# Patient Record
Sex: MALE | Race: BLACK OR AFRICAN AMERICAN | NOT HISPANIC OR LATINO | Employment: OTHER | URBAN - METROPOLITAN AREA
[De-identification: names, ages, dates, MRNs, and addresses within clinical notes are randomized per-mention and may not be internally consistent; named-entity substitution may affect disease eponyms.]

---

## 2017-03-23 ENCOUNTER — HOSPITAL ENCOUNTER (EMERGENCY)
Facility: HOSPITAL | Age: 22
Discharge: HOME | End: 2017-03-23
Attending: EMERGENCY MEDICINE
Payer: MEDICARE

## 2017-03-23 VITALS
BODY MASS INDEX: 15.83 KG/M2 | SYSTOLIC BLOOD PRESSURE: 105 MMHG | OXYGEN SATURATION: 95 % | HEART RATE: 95 BPM | RESPIRATION RATE: 20 BRPM | TEMPERATURE: 101 F | WEIGHT: 95 LBS | HEIGHT: 65 IN | DIASTOLIC BLOOD PRESSURE: 46 MMHG

## 2017-03-23 DIAGNOSIS — R50.9 FEVER: ICD-10-CM

## 2017-03-23 DIAGNOSIS — B34.9 VIRAL SYNDROME: ICD-10-CM

## 2017-03-23 DIAGNOSIS — N39.0 LOWER URINARY TRACT INFECTIOUS DISEASE: ICD-10-CM

## 2017-03-23 LAB
BACTERIA #/AREA URNS AUTO: ABNORMAL /HPF
BILIRUB UR QL STRIP: NEGATIVE
CLARITY UR REFRACT.AUTO: CLEAR
COLOR UR AUTO: ABNORMAL
FLUAV AG SPEC QL IA: NEGATIVE
FLUBV AG SPEC QL IA: NEGATIVE
GLUCOSE UR QL STRIP: NEGATIVE
HGB UR QL STRIP: ABNORMAL
HYALINE CASTS UR QL AUTO: 2 /LPF
KETONES UR QL STRIP: NEGATIVE
LEUKOCYTE ESTERASE UR QL STRIP: ABNORMAL
MICROSCOPIC COMMENT: ABNORMAL
NITRITE UR QL STRIP: NEGATIVE
NON-SQ EPI CELLS #/AREA URNS AUTO: ABNORMAL /HPF
PH UR STRIP: 8 [PH] (ref 5–8)
PROT UR QL STRIP: ABNORMAL
RBC #/AREA URNS AUTO: 35 /HPF (ref 0–4)
SP GR UR STRIP: 1.01 (ref 1–1.03)
SPECIMEN SOURCE: NORMAL
SQUAMOUS #/AREA URNS AUTO: ABNORMAL /HPF
URN SPEC COLLECT METH UR: ABNORMAL
UROBILINOGEN UR STRIP-ACNC: NEGATIVE EU/DL
WBC #/AREA URNS AUTO: 15 /HPF (ref 0–5)

## 2017-03-23 PROCEDURE — 99284 EMERGENCY DEPT VISIT MOD MDM: CPT | Mod: 25

## 2017-03-23 PROCEDURE — 87400 INFLUENZA A/B EACH AG IA: CPT

## 2017-03-23 PROCEDURE — 25000003 PHARM REV CODE 250: Performed by: EMERGENCY MEDICINE

## 2017-03-23 PROCEDURE — 96372 THER/PROPH/DIAG INJ SC/IM: CPT

## 2017-03-23 PROCEDURE — 63600175 PHARM REV CODE 636 W HCPCS: Performed by: EMERGENCY MEDICINE

## 2017-03-23 PROCEDURE — 81000 URINALYSIS NONAUTO W/SCOPE: CPT

## 2017-03-23 RX ORDER — SULFAMETHOXAZOLE AND TRIMETHOPRIM 200; 40 MG/5ML; MG/5ML
20 SUSPENSION ORAL EVERY 12 HOURS
Qty: 400 ML | Refills: 0 | Status: SHIPPED | OUTPATIENT
Start: 2017-03-23 | End: 2017-04-02

## 2017-03-23 RX ORDER — ACETAMINOPHEN 650 MG/1
650 SUPPOSITORY RECTAL
Status: COMPLETED | OUTPATIENT
Start: 2017-03-23 | End: 2017-03-23

## 2017-03-23 RX ORDER — DEXAMETHASONE SODIUM PHOSPHATE 4 MG/ML
8 INJECTION, SOLUTION INTRA-ARTICULAR; INTRALESIONAL; INTRAMUSCULAR; INTRAVENOUS; SOFT TISSUE
Status: COMPLETED | OUTPATIENT
Start: 2017-03-23 | End: 2017-03-23

## 2017-03-23 RX ADMIN — DEXAMETHASONE SODIUM PHOSPHATE 8 MG: 4 INJECTION, SOLUTION INTRAMUSCULAR; INTRAVENOUS at 05:03

## 2017-03-23 RX ADMIN — ACETAMINOPHEN 650 MG: 650 SUPPOSITORY RECTAL at 04:03

## 2017-03-23 NOTE — ED AVS SNAPSHOT
[facility]  [address] [id number]               Karri Galeano   3/23/2017  4:41 AM   ED    Description:  Male : 1995   Department:  [facility]           Your Care was Coordinated By:     Provider Role From To    Deysi Bernstein MD Attending Provider 17 0526 --      Reason for Visit     Fever           Diagnoses this Visit        Comments    Fever         Viral syndrome         Lower urinary tract infectious disease           ED Disposition     ED Disposition Condition Comment    Discharge             To Do List           Follow-up Information     Follow up with Echo Reeves MD In 1 week(s).    Specialty:  Emergency Medicine    Contact information:    [address] [facility] [address]  [phone]          Follow up with Echo Reeves MD In 1 week(s).    Specialty:  Emergency Medicine    Contact information:    [address] [facility] [address]  [phone]         These Medications        Disp Refills Start End    sulfamethoxazole-trimethoprim 200-40 mg/5 ml (BACTRIM,SEPTRA) 200-40 mg/5 mL Susp 400 mL 0 3/23/2017 2017    Take 20 mLs by mouth every 12 (twelve) hours. - Oral      [facility]     [facility] -  Assistance  Registered nurses in the [facility] provide clinical advisement, health education, appointment booking, and other advisory services.  Call for this free service at [phone].             Medications           START taking these NEW medications        Refills    sulfamethoxazole-trimethoprim 200-40 mg/5 ml (BACTRIM,SEPTRA) 200-40 mg/5 mL Susp 0    Sig: Take 20 mLs by mouth every 12 (twelve) hours.    Class: Print    Route: Oral      These medications were administered today        Dose Freq    acetaminophen suppository 650 mg 650 mg ED 1 Time    Sig: Place 1 suppository (650 mg total) rectally ED 1 Time.    Class: Normal    Route: Rectal     "dexamethasone injection 8 mg 8 mg ED 1 Time    Sig: Inject 2 mLs (8 mg total) into the muscle ED 1 Time.    Class: Normal    Route: Intramuscular           Verify that the below list of medications is an accurate representation of the medications you are currently taking.  If none reported, the list may be blank. If incorrect, please contact your healthcare provider. Carry this list with you in case of emergency.           Current Medications     clonazePAM (KLONOPIN) 1 MG tablet Take 1 mg by mouth 2 (two) times daily.    divalproex (DEPAKOTE) 500 MG Tb24 Take 500 mg by mouth 2 (two) times daily.    fluoxetine (PROZAC) 40 MG capsule Take 40 mg by mouth once daily.    fluticasone (CUTIVATE) 0.05 % cream Apply topically nightly.    LACTOSE-REDUCED FOOD (ENSURE ORAL) Take by mouth 4 (four) times daily.    salicylic acid 2 % Liqd Apply topically.    sulfamethoxazole-trimethoprim 200-40 mg/5 ml (BACTRIM,SEPTRA) 200-40 mg/5 mL Susp Take 20 mLs by mouth every 12 (twelve) hours.    trazodone (DESYREL) 150 MG tablet Take 150 mg by mouth every evening.           Clinical Reference Information           Your Vitals Were     BP Pulse Temp Resp Height Weight    105/46 104 102.1 °F (38.9 °C) (Rectal) 20 5' 5" (1.651 m) 43.1 kg (95 lb)    SpO2 BMI             95% 15.81 kg/m2         Allergies as of 3/23/2017        Reactions    Clindamycin Hives    Erythromycin Rash      Immunizations Administered on Date of Encounter - 3/23/2017     None      ED Micro, Lab, POCT     Start Ordered       Status Ordering Provider    03/23/17 0519 03/23/17 0518  Urinalysis  STAT      Final result     03/23/17 0518 03/23/17 0518  Urinalysis Microscopic  Once      Final result     03/23/17 0457 03/23/17 0456  Influenza antigen Nasal Swab  STAT      Final result       ED Imaging Orders     Start Ordered       Status Ordering Provider    03/23/17 0448 03/23/17 0447  X-Ray Chest AP Portable  1 time imaging      In process         Discharge Instructions  " "         Viral Syndrome (Adult)  A viral illness may cause a number of symptoms. The symptoms depend on the part of the body that the virus affects. If it settles in your nose, throat, and lungs, it may cause cough, sore throat, congestion, and sometimes headache. If it settles in your stomach and intestinal tract, it may cause vomiting and diarrhea. Sometimes it causes vague symptoms like "aching all over," feeling tired, loss of appetite, or fever.  A viral illness usually lasts 1 to 2 weeks, but sometimes it lasts longer. In some cases, a more serious infection can look like a viral syndrome in the first few days of the illness. You may need another exam and additional tests to know the difference. Watch for the warning signs listed below.  Home care  Follow these guidelines for taking care of yourself at home:  · If symptoms are severe, rest at home for the first 2 to 3 days.  · Stay away from cigarette smoke - both your smoke and the smoke from others.  · You may use over-the-counter acetaminophen or ibuprofen for fever, muscle aching, and headache, unless another medicine was prescribed for this. If you have chronic liver or kidney disease or ever had a stomach ulcer or GI bleeding, talk with your doctor before using these medicines. No one who is younger than 18 and ill with a fever should take aspirin. It may cause severe disease or death.  · Your appetite may be poor, so a light diet is fine. Avoid dehydration by drinking 8 to 12 8-ounce glasses of fluids each day. This may include water; orange juice; lemonade; apple, grape, and cranberry juice; clear fruit drinks; electrolyte replacement and sports drinks; and decaffeinated teas and coffee. If you have been diagnosed with a kidney disease, ask your doctor how much and what types of fluids you should drink to prevent dehydration. If you have kidney disease, drinking too much fluid can cause it build up in the your body and be dangerous to your " health.  · Over-the-counter remedies won't shorten the length of the illness but may be helpful for cough, sore throat; and nasal and sinus congestion. Don't use decongestants if you have high blood pressure.  Follow-up care  Follow up with your healthcare provider if you do not improve over the next week.  Call 911  Get emergency medical care if any of the following occur:  · Convulsion  · Feeling weak, dizzy, or like you are going to faint  · Chest pain, shortness of breath, wheezing, or difficulty breathing  When to seek medical advice  Call your healthcare provider right away if any of these occur:  · Cough with lots of colored sputum (mucus) or blood in your sputum  · Chest pain, shortness of breath, wheezing, or difficulty breathing  · Severe headache; face, neck, or ear pain  · Severe, constant pain in the lower right side of your belly (abdominal)  · Continued vomiting (cant keep liquids down)  · Frequent diarrhea (more than 5 times a day); blood (red or black color) or mucus in diarrhea  · Feeling weak, dizzy, or like you are going to faint  · Extreme thirst  · Fever of 100.4°F (38°C) or higher, or as directed by your healthcare provider  Date Last Reviewed: 9/25/2015  © 0351-5245 [employer]. [address] [place] [address], [place] [address]. All rights reserved. This information is not intended as a substitute for professional medical care. Always follow your healthcare professional's instructions.          Urinary Tract Infections in Men    Urinary tract infections (UTIs) are most often caused by bacteria (germs) that invade the urinary tract. The bacteria may come from outside the body. Or they may travel from the skin outside of rectum into the urethra. Pain in or around the urinary tract is a common symptom for most UTIs. But the only way to know for sure if you have a UTI is to have a urinalysis and urine culture.   Types of UTIs  · Cystitis: This is a bladder infection and is often linked to  a blockage from an enlarged prostate. You may have an urgent or frequent need to urinate, and bloody urine. Treatment includes antibiotics and medications to relax or shrink the prostate. Sometimes, surgery is needed.  · Urethritis: This is an infection of the urethra. You may have a discharge from the urethra or burning when you urinate. You may also have pain in the urethra or penis. It is treated with antibiotics.  · Prostatitis: This is an inflammation or infection of the prostate. You may have an urgent or frequent need to urinate, fever, or burning when you urinate. Or you may have a tender prostate, or a vague feeling of pressure. Prostatitis is treated with a range of medications, depending on the cause.  · Pyelonephritis: This is a kidney infection. If not treated, it can be serious and damage your kidneys. In severe cases you may be hospitalized. You may have a fever and upper back pain.  Treating a UTI  · Medications: Most UTIs are treated with antibiotics. These kill the bacteria. The length of time you need to take them depends on the type of infection. Take antibiotics exactly as directed until all of the medication is gone. If you don't, the infection may not go away and may become harder to treat. For certain types of UTIs, you may be given other medications to help treat your symptoms.  · Lifestyle changes: The lifestyle changes below will help get rid of your current infection. They may also help prevent future UTIs.  ¨ Drink plenty of fluids such as water, juice, or other caffeine-free drinks. This helps flush bacteria out of your system.  ¨ Empty your bladder when you feel the urge to urinate and before going to sleep. Urine that stays in your bladder promotes infection.  ¨ Use condoms during sex. These help prevent UTIs caused by sexually transmitted bacteria.  ¨ Keep follow-up appointments with your health care provider. He or she can may do tests to make sure the infection has cleared. If  necessary, additional treatment can be started.  · Other treatment: Most UTIs respond to medication. But sometimes a procedure or surgery is needed. This can treat an enlarged prostate, or remove a kidney stone or other blockage. Surgery may also treat problems caused by scarring or long-term infections.  Date Last Reviewed: 9/8/2014  © 1245-3361 [employer]. [address] [place] [address]. All rights reserved. This information is not intended as a substitute for professional medical care. Always follow your healthcare professional's instructions.          [facility] Sign-Up     Activating your [facility] account is as easy as 1-2-3!     1) Visit [email or web address], select Sign Up Now, enter this activation code and your date of birth, then select Next.  [email or web address]  Expires: 5/7/2017  6:07 AM      2) Create a username and password to use when you visit [facility] in the future and select a security question in case you lose your password and select Next.    3) Enter your e-mail address and click Sign Up!    Additional Information  If you have questions, please e-mail [email or web address] or call [phone] to talk to our [facility] staff. Remember, [facility] is NOT to be used for urgent needs. For medical emergencies, dial 911.          [facility] complies with applicable Federal civil rights laws and does not discriminate on the basis of race, color, national origin, age, disability, or sex.        Language Assistance Services     ATTENTION: Language assistance services are available, free of charge. Please call [phone].      ATENCIÓN: Si habla español, tiene a sheikh disposición servicios gratuitos de asistencia lingüística. Llame al [phone].     CHÚ Ý: N?u b?n nói Ti?ng Vi?t, có các d?ch v? h? tr? ngôn ng? mi?n phí dành cho b?n. G?i s? [phone].

## 2017-03-23 NOTE — DISCHARGE INSTRUCTIONS
"    Viral Syndrome (Adult)  A viral illness may cause a number of symptoms. The symptoms depend on the part of the body that the virus affects. If it settles in your nose, throat, and lungs, it may cause cough, sore throat, congestion, and sometimes headache. If it settles in your stomach and intestinal tract, it may cause vomiting and diarrhea. Sometimes it causes vague symptoms like "aching all over," feeling tired, loss of appetite, or fever.  A viral illness usually lasts 1 to 2 weeks, but sometimes it lasts longer. In some cases, a more serious infection can look like a viral syndrome in the first few days of the illness. You may need another exam and additional tests to know the difference. Watch for the warning signs listed below.  Home care  Follow these guidelines for taking care of yourself at home:  · If symptoms are severe, rest at home for the first 2 to 3 days.  · Stay away from cigarette smoke - both your smoke and the smoke from others.  · You may use over-the-counter acetaminophen or ibuprofen for fever, muscle aching, and headache, unless another medicine was prescribed for this. If you have chronic liver or kidney disease or ever had a stomach ulcer or GI bleeding, talk with your doctor before using these medicines. No one who is younger than 18 and ill with a fever should take aspirin. It may cause severe disease or death.  · Your appetite may be poor, so a light diet is fine. Avoid dehydration by drinking 8 to 12 8-ounce glasses of fluids each day. This may include water; orange juice; lemonade; apple, grape, and cranberry juice; clear fruit drinks; electrolyte replacement and sports drinks; and decaffeinated teas and coffee. If you have been diagnosed with a kidney disease, ask your doctor how much and what types of fluids you should drink to prevent dehydration. If you have kidney disease, drinking too much fluid can cause it build up in the your body and be dangerous to your " health.  · Over-the-counter remedies won't shorten the length of the illness but may be helpful for cough, sore throat; and nasal and sinus congestion. Don't use decongestants if you have high blood pressure.  Follow-up care  Follow up with your healthcare provider if you do not improve over the next week.  Call 911  Get emergency medical care if any of the following occur:  · Convulsion  · Feeling weak, dizzy, or like you are going to faint  · Chest pain, shortness of breath, wheezing, or difficulty breathing  When to seek medical advice  Call your healthcare provider right away if any of these occur:  · Cough with lots of colored sputum (mucus) or blood in your sputum  · Chest pain, shortness of breath, wheezing, or difficulty breathing  · Severe headache; face, neck, or ear pain  · Severe, constant pain in the lower right side of your belly (abdominal)  · Continued vomiting (cant keep liquids down)  · Frequent diarrhea (more than 5 times a day); blood (red or black color) or mucus in diarrhea  · Feeling weak, dizzy, or like you are going to faint  · Extreme thirst  · Fever of 100.4°F (38°C) or higher, or as directed by your healthcare provider  Date Last Reviewed: 9/25/2015  © 2012-4081 [employer]. [address] [place] [address], [place] [address]. All rights reserved. This information is not intended as a substitute for professional medical care. Always follow your healthcare professional's instructions.          Urinary Tract Infections in Men    Urinary tract infections (UTIs) are most often caused by bacteria (germs) that invade the urinary tract. The bacteria may come from outside the body. Or they may travel from the skin outside of rectum into the urethra. Pain in or around the urinary tract is a common symptom for most UTIs. But the only way to know for sure if you have a UTI is to have a urinalysis and urine culture.   Types of UTIs  · Cystitis: This is a bladder infection and is often linked to  a blockage from an enlarged prostate. You may have an urgent or frequent need to urinate, and bloody urine. Treatment includes antibiotics and medications to relax or shrink the prostate. Sometimes, surgery is needed.  · Urethritis: This is an infection of the urethra. You may have a discharge from the urethra or burning when you urinate. You may also have pain in the urethra or penis. It is treated with antibiotics.  · Prostatitis: This is an inflammation or infection of the prostate. You may have an urgent or frequent need to urinate, fever, or burning when you urinate. Or you may have a tender prostate, or a vague feeling of pressure. Prostatitis is treated with a range of medications, depending on the cause.  · Pyelonephritis: This is a kidney infection. If not treated, it can be serious and damage your kidneys. In severe cases you may be hospitalized. You may have a fever and upper back pain.  Treating a UTI  · Medications: Most UTIs are treated with antibiotics. These kill the bacteria. The length of time you need to take them depends on the type of infection. Take antibiotics exactly as directed until all of the medication is gone. If you don't, the infection may not go away and may become harder to treat. For certain types of UTIs, you may be given other medications to help treat your symptoms.  · Lifestyle changes: The lifestyle changes below will help get rid of your current infection. They may also help prevent future UTIs.  ¨ Drink plenty of fluids such as water, juice, or other caffeine-free drinks. This helps flush bacteria out of your system.  ¨ Empty your bladder when you feel the urge to urinate and before going to sleep. Urine that stays in your bladder promotes infection.  ¨ Use condoms during sex. These help prevent UTIs caused by sexually transmitted bacteria.  ¨ Keep follow-up appointments with your health care provider. He or she can may do tests to make sure the infection has cleared. If  necessary, additional treatment can be started.  · Other treatment: Most UTIs respond to medication. But sometimes a procedure or surgery is needed. This can treat an enlarged prostate, or remove a kidney stone or other blockage. Surgery may also treat problems caused by scarring or long-term infections.  Date Last Reviewed: 9/8/2014  © 2433-2896 [employer]. [address] [place] [address], [place] [address]. All rights reserved. This information is not intended as a substitute for professional medical care. Always follow your healthcare professional's instructions.

## 2017-03-23 NOTE — ED PROVIDER NOTES
Encounter Date: 3/23/2017       History     Chief Complaint   Patient presents with    Fever     Pt with fever last couple of hours. Congestion and cough for the last three days. Brought in by nurse at [facility]     Review of patient's allergies indicates:   Allergen Reactions    Clindamycin Hives    Erythromycin Rash     Patient is a 21 y.o. male presenting with the following complaint: fever. The history is provided by a caregiver.   Fever   Primary symptoms of the febrile illness include fever. Primary symptoms do not include wheezing, shortness of breath, abdominal pain, nausea or vomiting. The current episode started today. This is a new problem. The problem has not changed since onset.  The maximum temperature recorded prior to his arrival was 101 to 101.9 F. Primary symptoms comment: rhinorrhea.     Past Medical History:   Diagnosis Date    Anxiety     Congenital cerebral palsy     Hypotonia     Leukodystrophy     Mental retardation     Seizures     Stereotyped movement disorder      History reviewed. No pertinent surgical history.  History reviewed. No pertinent family history.  Social History   Substance Use Topics    Smoking status: Never Smoker    Smokeless tobacco: None    Alcohol use None     Review of Systems   Constitutional: Positive for fever.   HENT: Positive for rhinorrhea.    Respiratory: Negative for shortness of breath and wheezing.    Gastrointestinal: Negative for abdominal pain, nausea and vomiting.   All other systems reviewed and are negative.      Physical Exam   Initial Vitals   BP Pulse Resp Temp SpO2   03/23/17 0444 03/23/17 0444 03/23/17 0444 03/23/17 0444 03/23/17 0444   132/80 104 20 102.1 °F (38.9 °C) 95 %     Physical Exam    Nursing note and vitals reviewed.  Constitutional: He appears well-developed and well-nourished.   HENT:   Head: Normocephalic and atraumatic.   Nose: Mucosal edema and rhinorrhea present.   Eyes: EOM are normal.   Neck: Normal range of motion.  Neck supple.   Cardiovascular: Normal rate, regular rhythm, normal heart sounds and intact distal pulses.   Pulmonary/Chest: Breath sounds normal.   Abdominal: Soft.   Musculoskeletal: Normal range of motion.   Neurological: He is alert and oriented to person, place, and time.   Skin: Skin is warm and dry.   Psychiatric: He has a normal mood and affect. His behavior is normal. Judgment and thought content normal.         ED Course   Procedures  Labs Reviewed   URINALYSIS - Abnormal; Notable for the following:        Result Value    Protein, UA 1+ (*)     Occult Blood UA 3+ (*)     Leukocytes, UA 1+ (*)     All other components within normal limits   INFLUENZA A AND B ANTIGEN   URINALYSIS MICROSCOPIC          X-Rays:   Independently Interpreted Readings:   Chest X-Ray: Normal heart size.  No infiltrates.  No acute abnormalities.     Medical Decision Making:   Clinical Tests:   Lab Tests: Ordered and Reviewed  Radiological Study: Ordered and Reviewed                   ED Course     Clinical Impression:   Diagnoses of Fever, Viral syndrome, and Lower urinary tract infectious disease were pertinent to this visit.    Disposition:   Disposition: Discharged  Condition: Stable       Deysi Bernstein MD  03/23/17 0607

## 2017-06-03 ENCOUNTER — HOSPITAL ENCOUNTER (INPATIENT)
Facility: HOSPITAL | Age: 22
LOS: 3 days | Discharge: HOME | DRG: 871 | End: 2017-06-06
Attending: EMERGENCY MEDICINE | Admitting: HOSPITALIST
Payer: MEDICARE

## 2017-06-03 DIAGNOSIS — R09.02 HYPOXIA: ICD-10-CM

## 2017-06-03 DIAGNOSIS — A41.9 SEPSIS DUE TO PNEUMONIA: ICD-10-CM

## 2017-06-03 DIAGNOSIS — J18.9 PNEUMONIA OF RIGHT LOWER LOBE DUE TO INFECTIOUS ORGANISM: Primary | ICD-10-CM

## 2017-06-03 DIAGNOSIS — J18.9 SEPSIS DUE TO PNEUMONIA: ICD-10-CM

## 2017-06-03 DIAGNOSIS — J18.9 RIGHT LOWER LOBE PNEUMONIA: ICD-10-CM

## 2017-06-03 DIAGNOSIS — F72 SEVERE MENTAL RETARDATION: Chronic | ICD-10-CM

## 2017-06-03 PROBLEM — Z74.1: Chronic | Status: ACTIVE | Noted: 2017-06-03

## 2017-06-03 PROBLEM — J96.01 ACUTE HYPOXEMIC RESPIRATORY FAILURE: Status: ACTIVE | Noted: 2017-06-03

## 2017-06-03 LAB
ALBUMIN SERPL BCP-MCNC: 3.9 G/DL
ALLENS TEST: ABNORMAL
ALP SERPL-CCNC: 107 IU/L
ALT SERPL W/O P-5'-P-CCNC: 21 IU/L
ANION GAP SERPL CALC-SCNC: 13 MMOL/L
ANISOCYTOSIS BLD QL SMEAR: SLIGHT
APTT BLDCRRT: 28 SEC
AST SERPL-CCNC: 23 IU/L
BASOPHILS NFR BLD: 0 %
BILIRUB SERPL-MCNC: 0.6 MG/DL
BILIRUB UR QL STRIP: NEGATIVE
BUN SERPL-MCNC: 17 MG/DL
CALCIUM SERPL-MCNC: 8.8 MG/DL
CHLORIDE SERPL-SCNC: 105 MMOL/L
CLARITY UR REFRACT.AUTO: CLEAR
CO2 SERPL-SCNC: 29 MMOL/L
COLOR UR AUTO: YELLOW
CREAT SERPL-MCNC: 0.56 MG/DL
DELSYS: ABNORMAL
DIFFERENTIAL METHOD: ABNORMAL
EOSINOPHIL NFR BLD: 0 %
ERYTHROCYTE [DISTWIDTH] IN BLOOD BY AUTOMATED COUNT: 14.6 %
EST. GFR  (AFRICAN AMERICAN): >60 ML/MIN/1.73 M^2
EST. GFR  (NON AFRICAN AMERICAN): >60 ML/MIN/1.73 M^2
FLUAV AG SPEC QL IA: NEGATIVE
FLUBV AG SPEC QL IA: NEGATIVE
GLUCOSE SERPL-MCNC: 124 MG/DL
GLUCOSE UR QL STRIP: NEGATIVE
HCO3 UR-SCNC: 27 MMOL/L (ref 24–28)
HCT VFR BLD AUTO: 37.1 %
HCT VFR BLD CALC: 34 %PCV (ref 36–54)
HGB BLD-MCNC: 12 G/DL
HGB BLD-MCNC: 12.1 G/DL
HGB UR QL STRIP: ABNORMAL
INR PPP: 1.4
KETONES UR QL STRIP: NEGATIVE
LACTATE SERPL-SCNC: 1.9 MMOL/L
LEUKOCYTE ESTERASE UR QL STRIP: NEGATIVE
LIPASE SERPL-CCNC: 22 U/L
LYMPHOCYTES NFR BLD: 14 %
MAGNESIUM SERPL-MCNC: 1.6 MG/DL
MCH RBC QN AUTO: 28.8 PG
MCHC RBC AUTO-ENTMCNC: 32.6 %
MCV RBC AUTO: 88 FL
MICROSCOPIC COMMENT: ABNORMAL
MONOCYTES NFR BLD: 17 %
NEUTROPHILS NFR BLD: 66 %
NEUTS BAND NFR BLD MANUAL: 3 %
NITRITE UR QL STRIP: NEGATIVE
PCO2 BLDA: 37.9 MMHG (ref 35–45)
PH SMN: 7.46 [PH] (ref 7.35–7.45)
PH UR STRIP: 8 [PH] (ref 5–8)
PHOSPHATE SERPL-MCNC: 3.2 MG/DL
PLATELET # BLD AUTO: 149 K/UL
PMV BLD AUTO: 12.3 FL
PO2 BLDA: 55 MMHG (ref 80–100)
POC BE: 3 MMOL/L
POC IONIZED CALCIUM: 1.14 MMOL/L (ref 1.06–1.42)
POC SATURATED O2: 90 % (ref 95–100)
POC TCO2: 28 MMOL/L (ref 23–27)
POIKILOCYTOSIS BLD QL SMEAR: SLIGHT
POTASSIUM BLD-SCNC: 3.6 MMOL/L (ref 3.5–5.1)
POTASSIUM SERPL-SCNC: 3.8 MMOL/L
PROT SERPL-MCNC: 7.2 G/DL
PROT UR QL STRIP: NEGATIVE
PROTHROMBIN TIME: 15.7 SEC
RBC # BLD AUTO: 4.2 M/UL
RBC #/AREA URNS AUTO: 25 /HPF (ref 0–4)
SAMPLE: ABNORMAL
SITE: ABNORMAL
SODIUM BLD-SCNC: 144 MMOL/L (ref 136–145)
SODIUM SERPL-SCNC: 147 MMOL/L
SP GR UR STRIP: 1.01 (ref 1–1.03)
SPECIMEN SOURCE: NORMAL
TROPONIN I SERPL DL<=0.01 NG/ML-MCNC: <0.012 NG/ML
URN SPEC COLLECT METH UR: ABNORMAL
UROBILINOGEN UR STRIP-ACNC: 1 EU/DL
WBC # BLD AUTO: 15.91 K/UL
WBC #/AREA URNS AUTO: 1 /HPF (ref 0–5)

## 2017-06-03 PROCEDURE — 84484 ASSAY OF TROPONIN QUANT: CPT

## 2017-06-03 PROCEDURE — 96367 TX/PROPH/DG ADDL SEQ IV INF: CPT

## 2017-06-03 PROCEDURE — 25000003 PHARM REV CODE 250

## 2017-06-03 PROCEDURE — 96365 THER/PROPH/DIAG IV INF INIT: CPT

## 2017-06-03 PROCEDURE — 84100 ASSAY OF PHOSPHORUS: CPT

## 2017-06-03 PROCEDURE — 63600175 PHARM REV CODE 636 W HCPCS

## 2017-06-03 PROCEDURE — 63600175 PHARM REV CODE 636 W HCPCS: Performed by: EMERGENCY MEDICINE

## 2017-06-03 PROCEDURE — 87040 BLOOD CULTURE FOR BACTERIA: CPT | Mod: 59

## 2017-06-03 PROCEDURE — 11000001 HC ACUTE MED/SURG PRIVATE ROOM

## 2017-06-03 PROCEDURE — 94761 N-INVAS EAR/PLS OXIMETRY MLT: CPT

## 2017-06-03 PROCEDURE — 96375 TX/PRO/DX INJ NEW DRUG ADDON: CPT

## 2017-06-03 PROCEDURE — 93005 ELECTROCARDIOGRAM TRACING: CPT

## 2017-06-03 PROCEDURE — 96361 HYDRATE IV INFUSION ADD-ON: CPT

## 2017-06-03 PROCEDURE — 36600 WITHDRAWAL OF ARTERIAL BLOOD: CPT

## 2017-06-03 PROCEDURE — 83690 ASSAY OF LIPASE: CPT

## 2017-06-03 PROCEDURE — 81000 URINALYSIS NONAUTO W/SCOPE: CPT

## 2017-06-03 PROCEDURE — 83605 ASSAY OF LACTIC ACID: CPT

## 2017-06-03 PROCEDURE — 25000242 PHARM REV CODE 250 ALT 637 W/ HCPCS: Performed by: NURSE PRACTITIONER

## 2017-06-03 PROCEDURE — 94640 AIRWAY INHALATION TREATMENT: CPT

## 2017-06-03 PROCEDURE — 87400 INFLUENZA A/B EACH AG IA: CPT

## 2017-06-03 PROCEDURE — 85610 PROTHROMBIN TIME: CPT

## 2017-06-03 PROCEDURE — 25000003 PHARM REV CODE 250: Performed by: HOSPITALIST

## 2017-06-03 PROCEDURE — 27000221 HC OXYGEN, UP TO 24 HOURS

## 2017-06-03 PROCEDURE — 85025 COMPLETE CBC W/AUTO DIFF WBC: CPT

## 2017-06-03 PROCEDURE — 94760 N-INVAS EAR/PLS OXIMETRY 1: CPT

## 2017-06-03 PROCEDURE — 96368 THER/DIAG CONCURRENT INF: CPT

## 2017-06-03 PROCEDURE — 25000003 PHARM REV CODE 250: Performed by: EMERGENCY MEDICINE

## 2017-06-03 PROCEDURE — 99285 EMERGENCY DEPT VISIT HI MDM: CPT | Mod: 25

## 2017-06-03 PROCEDURE — 83735 ASSAY OF MAGNESIUM: CPT

## 2017-06-03 PROCEDURE — 63600175 PHARM REV CODE 636 W HCPCS: Performed by: HOSPITALIST

## 2017-06-03 PROCEDURE — 80053 COMPREHEN METABOLIC PANEL: CPT

## 2017-06-03 PROCEDURE — 87086 URINE CULTURE/COLONY COUNT: CPT

## 2017-06-03 PROCEDURE — 25000003 PHARM REV CODE 250: Performed by: NURSE PRACTITIONER

## 2017-06-03 PROCEDURE — 85730 THROMBOPLASTIN TIME PARTIAL: CPT

## 2017-06-03 PROCEDURE — 82803 BLOOD GASES ANY COMBINATION: CPT

## 2017-06-03 PROCEDURE — 63600175 PHARM REV CODE 636 W HCPCS: Performed by: NURSE PRACTITIONER

## 2017-06-03 RX ORDER — FLUOXETINE HYDROCHLORIDE 20 MG/1
60 CAPSULE ORAL DAILY
Status: DISCONTINUED | OUTPATIENT
Start: 2017-06-04 | End: 2017-06-06 | Stop reason: HOSPADM

## 2017-06-03 RX ORDER — ALBUTEROL SULFATE 0.83 MG/ML
2.5 SOLUTION RESPIRATORY (INHALATION)
Status: DISCONTINUED | OUTPATIENT
Start: 2017-06-04 | End: 2017-06-06 | Stop reason: HOSPADM

## 2017-06-03 RX ORDER — GUAIFENESIN 100 MG/5ML
200 SOLUTION ORAL 3 TIMES DAILY
Status: DISCONTINUED | OUTPATIENT
Start: 2017-06-03 | End: 2017-06-06 | Stop reason: HOSPADM

## 2017-06-03 RX ORDER — IPRATROPIUM BROMIDE AND ALBUTEROL SULFATE 2.5; .5 MG/3ML; MG/3ML
3 SOLUTION RESPIRATORY (INHALATION)
Status: COMPLETED | OUTPATIENT
Start: 2017-06-03 | End: 2017-06-03

## 2017-06-03 RX ORDER — ACETAMINOPHEN 650 MG/1
650 SUPPOSITORY RECTAL
Status: COMPLETED | OUTPATIENT
Start: 2017-06-03 | End: 2017-06-03

## 2017-06-03 RX ORDER — ALBUTEROL SULFATE 0.83 MG/ML
2.5 SOLUTION RESPIRATORY (INHALATION)
Status: CANCELLED | OUTPATIENT
Start: 2017-06-03

## 2017-06-03 RX ORDER — DEXTROSE MONOHYDRATE 50 MG/ML
1000 INJECTION, SOLUTION INTRAVENOUS
Status: COMPLETED | OUTPATIENT
Start: 2017-06-03 | End: 2017-06-03

## 2017-06-03 RX ORDER — SODIUM CHLORIDE 0.9 % (FLUSH) 0.9 %
3 SYRINGE (ML) INJECTION EVERY 8 HOURS
Status: DISCONTINUED | OUTPATIENT
Start: 2017-06-03 | End: 2017-06-06 | Stop reason: HOSPADM

## 2017-06-03 RX ORDER — ENOXAPARIN SODIUM 100 MG/ML
40 INJECTION SUBCUTANEOUS EVERY 24 HOURS
Status: DISCONTINUED | OUTPATIENT
Start: 2017-06-03 | End: 2017-06-06 | Stop reason: HOSPADM

## 2017-06-03 RX ORDER — TRAZODONE HYDROCHLORIDE 100 MG/1
100 TABLET ORAL NIGHTLY
Status: ON HOLD | COMMUNITY
End: 2019-11-01 | Stop reason: HOSPADM

## 2017-06-03 RX ORDER — CLONAZEPAM 0.5 MG/1
1 TABLET ORAL 2 TIMES DAILY
Status: DISCONTINUED | OUTPATIENT
Start: 2017-06-03 | End: 2017-06-06 | Stop reason: HOSPADM

## 2017-06-03 RX ORDER — IPRATROPIUM BROMIDE AND ALBUTEROL SULFATE 2.5; .5 MG/3ML; MG/3ML
3 SOLUTION RESPIRATORY (INHALATION)
Status: DISCONTINUED | OUTPATIENT
Start: 2017-06-04 | End: 2017-06-03

## 2017-06-03 RX ORDER — FLUOXETINE HYDROCHLORIDE 20 MG/1
60 CAPSULE ORAL DAILY
Status: ON HOLD | COMMUNITY
End: 2019-11-01 | Stop reason: SDUPTHER

## 2017-06-03 RX ORDER — TRAZODONE HYDROCHLORIDE 100 MG/1
100 TABLET ORAL NIGHTLY
Status: DISCONTINUED | OUTPATIENT
Start: 2017-06-03 | End: 2017-06-06 | Stop reason: HOSPADM

## 2017-06-03 RX ORDER — KETOROLAC TROMETHAMINE 30 MG/ML
15 INJECTION, SOLUTION INTRAMUSCULAR; INTRAVENOUS
Status: COMPLETED | OUTPATIENT
Start: 2017-06-03 | End: 2017-06-03

## 2017-06-03 RX ORDER — DOXYCYCLINE HYCLATE 100 MG
100 TABLET ORAL EVERY 12 HOURS
Status: DISCONTINUED | OUTPATIENT
Start: 2017-06-03 | End: 2017-06-04

## 2017-06-03 RX ADMIN — ACETAMINOPHEN 650 MG: 650 SUPPOSITORY RECTAL at 03:06

## 2017-06-03 RX ADMIN — SODIUM CHLORIDE 1320 ML: 0.9 INJECTION, SOLUTION INTRAVENOUS at 03:06

## 2017-06-03 RX ADMIN — KETOROLAC TROMETHAMINE 15 MG: 30 INJECTION, SOLUTION INTRAMUSCULAR at 06:06

## 2017-06-03 RX ADMIN — PIPERACILLIN AND TAZOBACTAM 4.5 G: 4; .5 INJECTION, POWDER, LYOPHILIZED, FOR SOLUTION INTRAVENOUS; PARENTERAL at 09:06

## 2017-06-03 RX ADMIN — IPRATROPIUM BROMIDE AND ALBUTEROL SULFATE 3 ML: .5; 3 SOLUTION RESPIRATORY (INHALATION) at 03:06

## 2017-06-03 RX ADMIN — DEXTROSE 1000 ML: 5 SOLUTION INTRAVENOUS at 04:06

## 2017-06-03 RX ADMIN — ENOXAPARIN SODIUM 40 MG: 100 INJECTION SUBCUTANEOUS at 09:06

## 2017-06-03 RX ADMIN — PIPERACILLIN SODIUM AND TAZOBACTAM SODIUM 4.5 G: 4; .5 INJECTION, POWDER, LYOPHILIZED, FOR SOLUTION INTRAVENOUS at 05:06

## 2017-06-03 RX ADMIN — SODIUM CHLORIDE, PRESERVATIVE FREE 3 ML: 5 INJECTION INTRAVENOUS at 09:06

## 2017-06-03 NOTE — ED NOTES
Here from [facility] home with a sitter.  The sitter states that he has not been eating today and that he has had a fever of 103.5 this am.  Pt is non verbal and is mentally challenged.  No family is present at this time and the sitter has limited information regarding the patient but does have acces to the patient chart.  Pt is tachypneic and feels febrile at this time.  Pt has not had Tylenol according to the sitter.

## 2017-06-03 NOTE — PROGRESS NOTES
Karri Galeano is a 21 y.o. black man with cerebral palsy, microcephaly, severe mental retardation, hypotonia, developmental delays, fine motor and gross motor delays, history of seizures before age 3.  He lives at [facility] in [place], [place].      He was taken to [facility] on 6/3/17 after he had a fever of 103.5 F.  He was found to also have tachycardia (pulse 110), tachypnea (respiratory rate 42), hypoxia (oxygen saturation 89%), leukocytosis (15,910), hypernatremia (147), and a right lung base ground glass opacity on chest x-ray.  [facility].      Plan: Ceftriaxone and doxycycline, albuterol-ipratropium nebulizer treatments, guaifenesin, supplemental oxygen.

## 2017-06-03 NOTE — ED NOTES
Report called to Mary Wray at [facility].  Awaiting [facility] ambulance to transport.  Sitter remains at bedside.

## 2017-06-03 NOTE — ED NOTES
Patient is resting comfortably.  Sitter at bedside.  Linens changed of urine soaked sheets.  Sitter had changed patient prior to my arrival.

## 2017-06-03 NOTE — ED NOTES
Sitter aware that patient is to be transferred for admission.  Pt resting quietly.  Respirations non labored but remains tachypneic.  Pt very mobile in bed.  Lying supine with feet against abdomen and buttocks in the air.  He is very limber and kicks his feet into the air/above his head frequently.  Sitter remains at bedside.  We are awaiting bed placement.

## 2017-06-03 NOTE — ED NOTES
Dr Aguilar on phone with Dr Herrera about pt, notified [facility] of bed request.

## 2017-06-03 NOTE — ED NOTES
[facility] contacted for transport to [facility] [address] dispatch advised will send a unit within the hour.

## 2017-06-03 NOTE — PROGRESS NOTES
Results for MODESTO CANTU (MRN [id number])    Ref. Range 6/3/2017 15:41   POC Sodium Latest Ref Range: 136 - 145 mmol/L 144   POC Potassium Latest Ref Range: 3.5 - 5.1 mmol/L 3.6   POC Ionized Calcium Latest Ref Range: 1.06 - 1.42 mmol/L 1.14   POC Hematocrit Latest Ref Range: 36 - 54 %PCV 34 (L)   POC HEMOGLOBIN Latest Units: g/dL 12   POC PH Latest Ref Range: 7.35 - 7.45  7.460 (H)   POC PCO2 Latest Ref Range: 35 - 45 mmHg 37.9   POC PO2 Latest Ref Range: 80 - 100 mmHg 55 (LL)   POC BE Latest Ref Range: -2 to 2 mmol/L 3   POC HCO3 Latest Ref Range: 24 - 28 mmol/L 27.0   POC SATURATED O2 Latest Ref Range: 95 - 100 % 90 (L)   POC TCO2 Latest Ref Range: 23 - 27 mmol/L 28 (H)   Sample Unknown ARTERIAL   DelSys Unknown Nasal Can   Allens Test Unknown Pass   Site Unknown RR     Pressure held  No bleeding  Bandage applied  Results given to ELAINE Tolbert NP   Patient remains on NC 4.5 lpm  In use

## 2017-06-03 NOTE — ED PROVIDER NOTES
"Encounter Date: 6/3/2017       History     Chief Complaint   Patient presents with    Fever     onset this am     Review of patient's allergies indicates:   Allergen Reactions    Clindamycin Hives    Erythromycin Rash     21-year-old male history of cerebral palsy and mental retardation presents to the emergency room with caregiver who states he would not eat breakfast this morning and had a temperature of 103.5.  Caregiver is a poor historian and states "I just started working with him last week and this is only mild second shift".  Patient is nonverbal.  Caregiver states she thinks that he may have had a strong urine and maybe a cough unable to provide any other information at this.  Patient is a resident of [facility].          Past Medical History:   Diagnosis Date    Anxiety     Congenital cerebral palsy     Hypotonia     Leukodystrophy     Mental retardation     Seizures     Stereotyped movement disorder      History reviewed. No pertinent surgical history.  History reviewed. No pertinent family history.  Social History   Substance Use Topics    Smoking status: Never Smoker    Smokeless tobacco: Not on file    Alcohol use Not on file     Review of Systems   Unable to perform ROS: Patient nonverbal       Physical Exam     Initial Vitals [06/03/17 1450]   BP Pulse Resp Temp SpO2   129/65 110 (!) 28 100.3 °F (37.9 °C) (!) 94 %     Physical Exam    Nursing note and vitals reviewed.  Constitutional: He has a sickly appearance. He appears distressed.   Patient illness his eyes to verbal stimuli however he is nonverbal.     HENT:   Mouth/Throat: Mucous membranes are dry.   Yellow and orange secretions noted in patient's mouth and on teeth.  Caregiver does not know if anyone performs oral hygiene on patient.    Eyes: Pupils are equal, round, and reactive to light.   Cardiovascular: Tachycardia present.    Pulmonary/Chest: No accessory muscle usage. Tachypnea noted. He is in respiratory distress. " He has no wheezes.   Abdominal: Soft. There is no rigidity, no rebound, no guarding and no CVA tenderness.   Musculoskeletal:   Lower extremities contracted.  Patient able to move upper extremities movement somewhat uncoordinated   Neurological:   Patient nonverbal and unable to determine patient's baseline mental status and activity   Skin: Skin is dry. Capillary refill takes less than 2 seconds.   Psychiatric:   Nonverbal         ED Course   Procedures  Labs Reviewed   CBC W/ AUTO DIFFERENTIAL - Abnormal; Notable for the following:        Result Value    WBC 15.91 (*)     RBC 4.20 (*)     Hemoglobin 12.1 (*)     Hematocrit 37.1 (*)     RDW 14.6 (*)     Platelets 149 (*)     Lymph% 14.0 (*)     Mono% 17.0 (*)     All other components within normal limits   COMPREHENSIVE METABOLIC PANEL - Abnormal; Notable for the following:     Sodium 147 (*)     Glucose 124 (*)     All other components within normal limits   LIPASE - Abnormal; Notable for the following:     Lipase 22 (*)     All other components within normal limits   PROTIME-INR - Abnormal; Notable for the following:     Prothrombin Time 15.7 (*)     INR 1.4 (*)     All other components within normal limits   URINALYSIS - Abnormal; Notable for the following:     Occult Blood UA 3+ (*)     All other components within normal limits   URINALYSIS MICROSCOPIC - Abnormal; Notable for the following:     RBC, UA 25 (*)     All other components within normal limits   ISTAT PROCEDURE - Abnormal; Notable for the following:     POC PH 7.460 (*)     POC PO2 55 (*)     POC SATURATED O2 90 (*)     POC TCO2 28 (*)     POC Hematocrit 34 (*)     All other components within normal limits   CULTURE, URINE   APTT   LACTIC ACID, PLASMA   MAGNESIUM   PHOSPHORUS   TROPONIN I   INFLUENZA A AND B ANTIGEN             Medical Decision Making:   Initial Assessment:   21-year-old male history of cerebral palsy and mental retardation presents to the emergency room with caregiver who states he  "would not eat breakfast this morning and had a temperature of 103.5.  Caregiver is a poor historian and states "I just started working with him last week and this is only mild second shift".  Patient is nonverbal.  Caregiver states she thinks that he may have had a strong urine and maybe a cough unable to provide any other information at this.  Condition initial O2 sat 84% on room air improved to 89% with 2 L respiratory called to bedside stat ABG drawn patient increased to 4 L 90% on 4 L patient remains tachypnic initial temperature 103.6 rectal.  Differential Diagnosis:   Sepsis, UTI, aspiration pneumonia, bacterial pneumonia, viral syndrome,  Clinical Tests:   Lab Tests: Ordered  Radiological Study: Ordered  Medical Tests: Ordered                   ED Course     Clinical Impression:   The primary encounter diagnosis was Pneumonia of right lower lobe due to infectious organism. Diagnoses of Sepsis due to pneumonia, Hypoxia, and Right lower lobe pneumonia were also pertinent to this visit.    Disposition:   Disposition: Admitted  Condition: Kitty Tolbert NP  06/04/17 1207    "

## 2017-06-03 NOTE — ED NOTES
Pt cleaned of urine soaked brief and linens.  Awaiting [facility] for transport to [place].

## 2017-06-04 PROBLEM — F98.4: Chronic | Status: ACTIVE | Noted: 2017-06-04

## 2017-06-04 PROBLEM — D72.829 LEUKOCYTOSIS: Status: ACTIVE | Noted: 2017-06-04

## 2017-06-04 PROBLEM — E87.0 HYPERNATREMIA: Status: ACTIVE | Noted: 2017-06-04

## 2017-06-04 PROBLEM — E87.0 HYPERNATREMIA: Status: RESOLVED | Noted: 2017-06-04 | Resolved: 2017-06-04

## 2017-06-04 PROBLEM — R13.12 OROPHARYNGEAL DYSPHAGIA: Chronic | Status: ACTIVE | Noted: 2017-06-04

## 2017-06-04 LAB
ALBUMIN SERPL BCP-MCNC: 2.8 G/DL
ALP SERPL-CCNC: 74 U/L
ALT SERPL W/O P-5'-P-CCNC: 10 U/L
ANION GAP SERPL CALC-SCNC: 10 MMOL/L
ANION GAP SERPL CALC-SCNC: 10 MMOL/L
ANISOCYTOSIS BLD QL SMEAR: SLIGHT
ANISOCYTOSIS BLD QL SMEAR: SLIGHT
AST SERPL-CCNC: 13 U/L
BACTERIA UR CULT: NO GROWTH
BASOPHILS # BLD AUTO: ABNORMAL K/UL
BASOPHILS # BLD AUTO: ABNORMAL K/UL
BASOPHILS NFR BLD: 0 %
BASOPHILS NFR BLD: 0 %
BILIRUB SERPL-MCNC: 0.7 MG/DL
BUN SERPL-MCNC: 16 MG/DL
BUN SERPL-MCNC: 16 MG/DL
CALCIUM SERPL-MCNC: 8.6 MG/DL
CALCIUM SERPL-MCNC: 8.6 MG/DL
CHLORIDE SERPL-SCNC: 101 MMOL/L
CHLORIDE SERPL-SCNC: 101 MMOL/L
CO2 SERPL-SCNC: 26 MMOL/L
CO2 SERPL-SCNC: 26 MMOL/L
CREAT SERPL-MCNC: 0.7 MG/DL
CREAT SERPL-MCNC: 0.7 MG/DL
DIFFERENTIAL METHOD: ABNORMAL
DIFFERENTIAL METHOD: ABNORMAL
EOSINOPHIL # BLD AUTO: ABNORMAL K/UL
EOSINOPHIL # BLD AUTO: ABNORMAL K/UL
EOSINOPHIL NFR BLD: 1 %
EOSINOPHIL NFR BLD: 1 %
ERYTHROCYTE [DISTWIDTH] IN BLOOD BY AUTOMATED COUNT: 14.2 %
ERYTHROCYTE [DISTWIDTH] IN BLOOD BY AUTOMATED COUNT: 14.2 %
EST. GFR  (AFRICAN AMERICAN): >60 ML/MIN/1.73 M^2
EST. GFR  (AFRICAN AMERICAN): >60 ML/MIN/1.73 M^2
EST. GFR  (NON AFRICAN AMERICAN): >60 ML/MIN/1.73 M^2
EST. GFR  (NON AFRICAN AMERICAN): >60 ML/MIN/1.73 M^2
GLUCOSE SERPL-MCNC: 111 MG/DL
GLUCOSE SERPL-MCNC: 111 MG/DL
HCT VFR BLD AUTO: 33.4 %
HCT VFR BLD AUTO: 33.4 %
HGB BLD-MCNC: 11 G/DL
HGB BLD-MCNC: 11 G/DL
HYPOCHROMIA BLD QL SMEAR: ABNORMAL
HYPOCHROMIA BLD QL SMEAR: ABNORMAL
LYMPHOCYTES # BLD AUTO: ABNORMAL K/UL
LYMPHOCYTES # BLD AUTO: ABNORMAL K/UL
LYMPHOCYTES NFR BLD: 15 %
LYMPHOCYTES NFR BLD: 15 %
MAGNESIUM SERPL-MCNC: 1.8 MG/DL
MCH RBC QN AUTO: 28.4 PG
MCH RBC QN AUTO: 28.4 PG
MCHC RBC AUTO-ENTMCNC: 32.9 %
MCHC RBC AUTO-ENTMCNC: 32.9 %
MCV RBC AUTO: 86 FL
MCV RBC AUTO: 86 FL
METAMYELOCYTES NFR BLD MANUAL: 6 %
METAMYELOCYTES NFR BLD MANUAL: 6 %
MONOCYTES # BLD AUTO: ABNORMAL K/UL
MONOCYTES # BLD AUTO: ABNORMAL K/UL
MONOCYTES NFR BLD: 13 %
MONOCYTES NFR BLD: 13 %
NEUTROPHILS NFR BLD: 40 %
NEUTROPHILS NFR BLD: 40 %
NEUTS BAND NFR BLD MANUAL: 25 %
NEUTS BAND NFR BLD MANUAL: 25 %
PHOSPHATE SERPL-MCNC: 2.4 MG/DL
PLATELET # BLD AUTO: 147 K/UL
PLATELET # BLD AUTO: 147 K/UL
PLATELET BLD QL SMEAR: ABNORMAL
PLATELET BLD QL SMEAR: ABNORMAL
PMV BLD AUTO: 12.1 FL
PMV BLD AUTO: 12.1 FL
POTASSIUM SERPL-SCNC: 3.3 MMOL/L
POTASSIUM SERPL-SCNC: 3.3 MMOL/L
PROT SERPL-MCNC: 6.3 G/DL
RBC # BLD AUTO: 3.88 M/UL
RBC # BLD AUTO: 3.88 M/UL
SODIUM SERPL-SCNC: 137 MMOL/L
SODIUM SERPL-SCNC: 137 MMOL/L
WBC # BLD AUTO: 15.73 K/UL
WBC # BLD AUTO: 15.73 K/UL

## 2017-06-04 PROCEDURE — 94761 N-INVAS EAR/PLS OXIMETRY MLT: CPT

## 2017-06-04 PROCEDURE — 11000001 HC ACUTE MED/SURG PRIVATE ROOM

## 2017-06-04 PROCEDURE — 84100 ASSAY OF PHOSPHORUS: CPT

## 2017-06-04 PROCEDURE — 25000003 PHARM REV CODE 250: Performed by: NURSE PRACTITIONER

## 2017-06-04 PROCEDURE — 63600175 PHARM REV CODE 636 W HCPCS: Performed by: HOSPITALIST

## 2017-06-04 PROCEDURE — 25000003 PHARM REV CODE 250: Performed by: HOSPITALIST

## 2017-06-04 PROCEDURE — 99900035 HC TECH TIME PER 15 MIN (STAT)

## 2017-06-04 PROCEDURE — 27000221 HC OXYGEN, UP TO 24 HOURS

## 2017-06-04 PROCEDURE — 36415 COLL VENOUS BLD VENIPUNCTURE: CPT

## 2017-06-04 PROCEDURE — G8997 SWALLOW GOAL STATUS: HCPCS | Mod: CK

## 2017-06-04 PROCEDURE — G8996 SWALLOW CURRENT STATUS: HCPCS | Mod: CN

## 2017-06-04 PROCEDURE — 80053 COMPREHEN METABOLIC PANEL: CPT

## 2017-06-04 PROCEDURE — 94640 AIRWAY INHALATION TREATMENT: CPT

## 2017-06-04 PROCEDURE — 83735 ASSAY OF MAGNESIUM: CPT

## 2017-06-04 PROCEDURE — 25000242 PHARM REV CODE 250 ALT 637 W/ HCPCS: Performed by: HOSPITALIST

## 2017-06-04 PROCEDURE — 85025 COMPLETE CBC W/AUTO DIFF WBC: CPT

## 2017-06-04 PROCEDURE — 92610 EVALUATE SWALLOWING FUNCTION: CPT

## 2017-06-04 RX ORDER — DEXTROSE MONOHYDRATE AND SODIUM CHLORIDE 5; .45 G/100ML; G/100ML
INJECTION, SOLUTION INTRAVENOUS CONTINUOUS
Status: DISCONTINUED | OUTPATIENT
Start: 2017-06-04 | End: 2017-06-05

## 2017-06-04 RX ORDER — DIVALPROEX SODIUM 500 MG/1
500 TABLET, FILM COATED, EXTENDED RELEASE ORAL 2 TIMES DAILY
Status: DISCONTINUED | OUTPATIENT
Start: 2017-06-04 | End: 2017-06-04

## 2017-06-04 RX ORDER — DEXTROSE MONOHYDRATE AND SODIUM CHLORIDE 5; .45 G/100ML; G/100ML
INJECTION, SOLUTION INTRAVENOUS CONTINUOUS
Status: ACTIVE | OUTPATIENT
Start: 2017-06-04 | End: 2017-06-04

## 2017-06-04 RX ORDER — LORAZEPAM 2 MG/ML
1 INJECTION INTRAMUSCULAR ONCE
Status: COMPLETED | OUTPATIENT
Start: 2017-06-05 | End: 2017-06-04

## 2017-06-04 RX ADMIN — ALBUTEROL SULFATE 2.5 MG: 2.5 SOLUTION RESPIRATORY (INHALATION) at 12:06

## 2017-06-04 RX ADMIN — ALBUTEROL SULFATE 2.5 MG: 2.5 SOLUTION RESPIRATORY (INHALATION) at 04:06

## 2017-06-04 RX ADMIN — SODIUM CHLORIDE, PRESERVATIVE FREE 3 ML: 5 INJECTION INTRAVENOUS at 06:06

## 2017-06-04 RX ADMIN — ALBUTEROL SULFATE 2.5 MG: 2.5 SOLUTION RESPIRATORY (INHALATION) at 07:06

## 2017-06-04 RX ADMIN — DOXYCYCLINE 100 MG: 100 INJECTION, POWDER, LYOPHILIZED, FOR SOLUTION INTRAVENOUS at 11:06

## 2017-06-04 RX ADMIN — DEXTROSE AND SODIUM CHLORIDE: 5; .45 INJECTION, SOLUTION INTRAVENOUS at 03:06

## 2017-06-04 RX ADMIN — ENOXAPARIN SODIUM 40 MG: 100 INJECTION SUBCUTANEOUS at 08:06

## 2017-06-04 RX ADMIN — DOXYCYCLINE 100 MG: 100 INJECTION, POWDER, LYOPHILIZED, FOR SOLUTION INTRAVENOUS at 12:06

## 2017-06-04 RX ADMIN — VALPROATE SODIUM 500 MG: 100 INJECTION, SOLUTION INTRAVENOUS at 11:06

## 2017-06-04 RX ADMIN — DEXTROSE AND SODIUM CHLORIDE: 5; .45 INJECTION, SOLUTION INTRAVENOUS at 08:06

## 2017-06-04 RX ADMIN — CEFTRIAXONE 1 G: 1 INJECTION, SOLUTION INTRAVENOUS at 06:06

## 2017-06-04 RX ADMIN — VALPROATE SODIUM 500 MG: 100 INJECTION, SOLUTION INTRAVENOUS at 08:06

## 2017-06-04 RX ADMIN — LORAZEPAM 1 MG: 2 INJECTION INTRAMUSCULAR; INTRAVENOUS at 11:06

## 2017-06-04 NOTE — ASSESSMENT & PLAN NOTE
Right lower lobe pneumonia  Leukocytosis  Pneumonia of right lower lobe due to infectious organism  IV ceftriaxone and doxycycline, albuterol-ipratropium nebulizer treatments, guaifenesin if willing to take PO, supplemental oxygen.

## 2017-06-04 NOTE — ED NOTES
Report received from Dottie at bedside. Pt awake and alert in stable condition at this time. Sitter at bedside.

## 2017-06-04 NOTE — ASSESSMENT & PLAN NOTE
Right lower lobe pneumonia  Leukocytosis  Pneumonia of right lower lobe due to infectious organism  Meets sepsis criteria due to tachycardia, leukocytosis, and Right lung base ground glass opacity on chest x-ray.       Plan: Ceftriaxone and doxycycline, albuterol-ipratropium nebulizer treatments, guaifenesin, supplemental oxygen.

## 2017-06-04 NOTE — PLAN OF CARE
Problem: Patient Care Overview  Goal: Plan of Care Review  Pt received on nasal cannula at  2 lpm. SPO2  91 % increased to 3lpm NC.  Pt tachypeanic with RR 28  Will continue to monitor.

## 2017-06-04 NOTE — ED NOTES
Called [facility] for [facility], said would try for within the hour, but there's no [facility]place coverage at this time per dispatch.

## 2017-06-04 NOTE — PROGRESS NOTES
[facility] Hospital Medicine  Progress Note    Patient Name: Karri Galeano  MRN: [id number]  Patient Class: IP- Inpatient   Admission Date: 6/3/2017  Length of Stay: 1 days  Attending Physician: Todd Herrera MD  Primary Care Provider: Echo Reeves MD        Subjective:     Principal Problem:Right lower lobe pneumonia    HPI:  Karri Galeano is a 21 y.o. black man with cerebral palsy, microcephaly, severe mental retardation, hypotonia, developmental delays, fine motor and gross motor delays, history of seizures before age 3.  He lives at [facility] in [place], [place].   His caretaker's name is Johnathon Waterman.  His next of kin is his aunt Chloe Galeano [phone].     He was taken to [facility] Emergency Department on 6/3/17 after he had an axillary temperature reading of 101F, no appetite, and withdrawn.   Per caretaker, he is normally very playful, smiling, and active in wheelchair.      Upon arrival to ED he was found to also have tachycardia (pulse 110), tachypnea (respiratory rate 42), hypoxia (oxygen saturation 89%), leukocytosis (15,910), hypernatremia (147), and a right lung base ground glass opacity on chest x-ray.   Influenza EIA was negative.  [facility] was [facility] for pneumonia.        Hospital Course:  He was put on IV ceftriaxone and PO doxycycline for atypicals instead of azithromycin due to erythromycin allergy.  Scheduled nebulizer treatments were given.  He was relatively withdrawn and refused to take oral medications, like an ill infant.  Doxycycline was changed to IV the next day.      Interval History: See above.    Review of Systems   Reason unable to perform ROS: developmental delay.     Objective:     Vital Signs (Most Recent):  Temp: 98.7 °F (37.1 °C) (06/04/17 1200)  Pulse: 103 (06/04/17 1202)  Resp: (!) 24 (06/04/17 1202)  BP: (!) 147/67 (06/04/17  1200)  SpO2: (!) 93 % (06/04/17 1202) Vital Signs (24h Range):  Temp:  [98.1 °F (36.7 °C)-103.6 °F (39.8 °C)] 98.7 °F (37.1 °C)  Pulse:  [] 103  Resp:  [18-42] 24  SpO2:  [89 %-98 %] 93 %  BP: (120-147)/(55-82) 147/67     Weight: 44 kg (97 lb)  Body mass index is 16.14 kg/m².    Intake/Output Summary (Last 24 hours) at 06/04/17 1349  Last data filed at 06/04/17 0605   Gross per 24 hour   Intake           228.33 ml   Output               97 ml   Net           131.33 ml      Physical Exam   Constitutional: He appears well-developed. No distress.   Cardiovascular: Regular rhythm.  Tachycardia present.    Pulmonary/Chest: Tachypnea noted. He has decreased breath sounds.   Neurological: He is alert.   Responds to stimuli and looks at speaker   Psychiatric: His affect is blunt. He is withdrawn.   Nursing note and vitals reviewed.      Significant Labs:   CBC:   Recent Labs  Lab 06/03/17  1520 06/03/17  1541 06/04/17  0515   WBC 15.91*  --  15.73*  15.73*   HGB 12.1*  --  11.0*  11.0*   HCT 37.1* 34* 33.4*  33.4*   *  --  147*  147*     CMP:   Recent Labs  Lab 06/03/17  1520 06/04/17  0515   * 137  137   K 3.8 3.3*  3.3*    101  101   CO2 29 26  26   * 111*  111*   BUN 17 16  16   CREATININE 0.56 0.7  0.7   CALCIUM 8.8 8.6*  8.6*   PROT 7.2 6.3   ALBUMIN 3.9 2.8*   BILITOT 0.6 0.7   ALKPHOS 107 74   AST 23 13   ALT 21 10   ANIONGAP 13 10  10   EGFRNONAA >60.0 >60  >60       Significant Imaging: I have reviewed all pertinent imaging results/findings within the past 24 hours.   X-Ray Chest AP Portable 6/03/17: The exam limited due to motion or respiratory artifact. Heart size appears normal. There appears be some groundglass opacity in the right lung base, however this may be due to the motion artifact.  No pneumothorax.    Assessment/Plan:      Oropharyngeal dysphagia    Is on pureed diet at group home.  Appreciate Speech Therapy.          Atypical stereotyped movement disorder     Continue home dose valproic acid (change to IV) and clonazepam (if willing to take PO).          Acute hypoxemic respiratory failure    Supplemental oxygen.          Sepsis due to pneumonia    Right lower lobe pneumonia  Leukocytosis  Pneumonia of right lower lobe due to infectious organism  IV ceftriaxone and doxycycline, albuterol-ipratropium nebulizer treatments, guaifenesin if willing to take PO, supplemental oxygen.        Severe mental retardation    Microcephaly  Requires assistance with all daily activities  Makes treatment more difficult compared to an average 21 year old.            VTE Risk Mitigation         Ordered     enoxaparin injection 40 mg  Daily     Route:  Subcutaneous        06/03/17 2106     High Risk of VTE  Once      06/03/17 2106          Todd Herrera MD  Department of Hospital Medicine   [facility]

## 2017-06-04 NOTE — ASSESSMENT & PLAN NOTE
Microcephaly  Requires assistance with all daily activities  Makes treatment more difficult compared to an average 21 year old.

## 2017-06-04 NOTE — PLAN OF CARE
Pt lives at [facility] in [place], [place].   His caretaker's name is Johnathon Waterman.  His next of kin is his aunt Chloe Galeano [phone].     He was taken to [facility] on 6/3/17 after he had an axillary temperature reading of 101F, no appetite, and withdrawn.   Per caretaker, patient is normally very playful, smiling, and active in wheelchair.      Hx of cerebral palsy, microcephaly, severe mental retardation, hypotonia, developmental delays, fine motor and gross motor delays, history of seizures before age 3.     Upon arrival to ED he was found to also have tachycardia (pulse 110), tachypnea (respiratory rate 42), hypoxia (oxygen saturation 89%), leukocytosis (15,910), hypernatremia (147), and a right lung base ground glass opacity on chest x-ray.  He was admitted to [facility] for pneumonia.         06/04/17 1503   Discharge Assessment   Assessment Type Discharge Planning Assessment   Confirmed/corrected address and phone number on facesheet? Yes   Assessment information obtained from? Caregiver   Expected Length of Stay (days) (TBD)   Communicated expected length of stay with patient/caregiver no   Prior to hospitilization cognitive status: Not Oriented to Person;Not Oriented to Place;Not Oriented to Time   Prior to hospitalization functional status: Completely Dependent;Wheelchair Bound   Current cognitive status: Not Oriented to Time;Not Oriented to Place;Not Oriented to Person   Current Functional Status: Completely Dependent;Wheelchair Bound   Arrived From [facility] home  ([facility] [phone])   Lives With facility resident   Able to Return to Prior Arrangements yes   Is patient able to care for self after discharge? No   How many people do you have in your home that can help with your care after discharge? other (see comments)  ([facility] [phone])    Who are your caregiver(s) and their phone number(s)? [facility] [phone]   Patient's perception of discharge disposition group home   Readmission Within The Last 30 Days no previous admission in last 30 days   Patient currently being followed by outpatient case management? No   Patient currently receives home health services? No   Does the patient currently use HME? Yes   Patient currently receives private duty nursing? No   Patient currently receives any other outside agency services? No   Equipment Currently Used at Home wheelchair;shower chair;grab bar   Do you have any problems affording any of your prescribed medications? No   Is the patient taking medications as prescribed? yes   Do you have any financial concerns preventing you from receiving the healthcare you need? No   Does the patient have transportation to healthcare appointments? Yes   Transportation Available agency transportation;van, wheelchair accessible   On Dialysis? No   Does the patient receive services at the [facility]mad[facility] Clinic? No   Are there any open cases? No   Discharge Plan A Group Home   Discharge Plan B Group home   Patient/Family In Agreement With Plan yes     Teresa Barreto RN Transitional Navigator  [phone]

## 2017-06-04 NOTE — PLAN OF CARE
Problem: Fall Risk (Adult)  Goal: Absence of Falls  Patient will demonstrate the desired outcomes by discharge/transition of care.   Outcome: Ongoing (interventions implemented as appropriate)  Pt stable, no apparent distress noted, fall/safety/Aspiration precautions maintained, no acute injuries noted during shift, Bed in lowest position, locked , call light within reach, side rails up x's 2, slip resistant socks on. Bed alarm on,will continue to monitor pt.    Soft wrist mitten restraints remains in place due to pt removing tele, pulling at lines and removing PIV, O2 continued at 3L via NC, pt still tachypneic, RR 22-24 per min, IV fluids and antibiotics continued, pt remains NPO, speech evaluate completed today, speech to repeat tomorrow, plan of care continued.    Pt stable, pt on telemetry, no ectopy or true red alarms noted.

## 2017-06-04 NOTE — HPI
Karri Galeano is a 21 y.o. male with cerebral palsy, microcephaly, severe mental retardation, hypotonia, developmental delays, fine motor and gross motor delays, history of seizures before age 3.  He lives at [facility] in [place], [place].  His caretaker's name is Johnathon Waterman ([phone]).  His next of kin is his aunt Chloe Galeano [phone] who lives in [place].     He was taken to [facility] on 6/3/17 after he had an axillary temperature reading of 101F, no appetite, and withdrawn.  Per caretaker, he is normally very playful, smiling, and active in wheelchair.      Upon arrival to ED, he was found to also have tachycardia (pulse 110), tachypnea (respiratory rate 42), hypoxia (oxygen saturation 89%), leukocytosis (15,910), hypernatremia (147), and a right lung base ground glass opacity on chest x-ray.  Influenza EIA was negative.  [facility] for pneumonia.

## 2017-06-04 NOTE — SUBJECTIVE & OBJECTIVE
Interval History: See above.    Review of Systems   Reason unable to perform ROS: developmental delay.     Objective:     Vital Signs (Most Recent):  Temp: 98.7 °F (37.1 °C) (06/04/17 1200)  Pulse: 103 (06/04/17 1202)  Resp: (!) 24 (06/04/17 1202)  BP: (!) 147/67 (06/04/17 1200)  SpO2: (!) 93 % (06/04/17 1202) Vital Signs (24h Range):  Temp:  [98.1 °F (36.7 °C)-103.6 °F (39.8 °C)] 98.7 °F (37.1 °C)  Pulse:  [] 103  Resp:  [18-42] 24  SpO2:  [89 %-98 %] 93 %  BP: (120-147)/(55-82) 147/67     Weight: 44 kg (97 lb)  Body mass index is 16.14 kg/m².    Intake/Output Summary (Last 24 hours) at 06/04/17 1349  Last data filed at 06/04/17 0605   Gross per 24 hour   Intake           228.33 ml   Output               97 ml   Net           131.33 ml      Physical Exam   Constitutional: He appears well-developed. No distress.   Cardiovascular: Regular rhythm.  Tachycardia present.    Pulmonary/Chest: Tachypnea noted. He has decreased breath sounds.   Neurological: He is alert.   Responds to stimuli and looks at speaker   Psychiatric: His affect is blunt. He is withdrawn.   Nursing note and vitals reviewed.      Significant Labs:   CBC:   Recent Labs  Lab 06/03/17  1520 06/03/17  1541 06/04/17  0515   WBC 15.91*  --  15.73*  15.73*   HGB 12.1*  --  11.0*  11.0*   HCT 37.1* 34* 33.4*  33.4*   *  --  147*  147*     CMP:   Recent Labs  Lab 06/03/17  1520 06/04/17  0515   * 137  137   K 3.8 3.3*  3.3*    101  101   CO2 29 26  26   * 111*  111*   BUN 17 16  16   CREATININE 0.56 0.7  0.7   CALCIUM 8.8 8.6*  8.6*   PROT 7.2 6.3   ALBUMIN 3.9 2.8*   BILITOT 0.6 0.7   ALKPHOS 107 74   AST 23 13   ALT 21 10   ANIONGAP 13 10  10   EGFRNONAA >60.0 >60  >60       Significant Imaging: I have reviewed all pertinent imaging results/findings within the past 24 hours.   X-Ray Chest AP Portable 6/03/17: The exam limited due to motion or respiratory artifact. Heart size appears normal. There appears be  some groundglass opacity in the right lung base, however this may be due to the motion artifact.  No pneumothorax.

## 2017-06-04 NOTE — H&P
[facility] Hospital Medicine  History & Physical    Patient Name: Karri Galeano  MRN: [id number]  Admission Date: 6/3/2017  Attending Physician: Todd Herrera MD   Primary Care Provider: Echo Reeves MD         Patient information was obtained from past medical records and ER records.     Subjective:     Principal Problem:Right lower lobe pneumonia    Chief Complaint:   Chief Complaint   Patient presents with    Fever     onset this am        HPI: Karri Galeano is a 21 y.o. black man with cerebral palsy, microcephaly, severe mental retardation, hypotonia, developmental delays, fine motor and gross motor delays, history of seizures before age 3.  He lives at [facility] in [place], [place].   His caretaker's name is Johnathon Waterman.  His next of kin is his aunt Chloe Galeano [phone].     He was taken to [facility] Emergency Department on 6/3/17 after he had an axillary temperature reading of 101F, no appetite, and withdrawn.   Per caretaker, patient is normally very playful, smiling, and active in wheelchair.      Upon arrival to ED he was found to also have tachycardia (pulse 110), tachypnea (respiratory rate 42), hypoxia (oxygen saturation 89%), leukocytosis (15,910), hypernatremia (147), and a right lung base ground glass opacity on chest x-ray.  [facility] was [facility] for pneumonia.        Past Medical History:   Diagnosis Date    Anxiety     Congenital cerebral palsy     Hypotonia     Leukodystrophy     Mental retardation     Seizures     Stereotyped movement disorder        History reviewed. No pertinent surgical history.    Review of patient's allergies indicates:   Allergen Reactions    Clindamycin Hives    Erythromycin Rash       No current facility-administered medications on file prior to encounter.      Current Outpatient Prescriptions on File Prior to  Encounter   Medication Sig    clonazePAM (KLONOPIN) 1 MG tablet Take 1 mg by mouth 2 (two) times daily.    divalproex (DEPAKOTE) 500 MG Tb24 Take 500 mg by mouth 2 (two) times daily.    LACTOSE-REDUCED FOOD (ENSURE ORAL) Take by mouth 4 (four) times daily.    [DISCONTINUED] fluoxetine (PROZAC) 40 MG capsule Take 60 mg by mouth once daily.     salicylic acid 2 % Liqd Apply topically.    [DISCONTINUED] fluticasone (CUTIVATE) 0.05 % cream Apply topically nightly.    [DISCONTINUED] trazodone (DESYREL) 150 MG tablet Take 150 mg by mouth every evening.     Family History     None        Social History Main Topics    Smoking status: Never Smoker    Smokeless tobacco: Not on file    Alcohol use Not on file    Drug use: Unknown    Sexual activity: Not on file     Review of Systems   Unable to perform ROS: Patient nonverbal     Objective:     Vital Signs (Most Recent):  Temp: 99.1 °F (37.3 °C) (06/04/17 0052)  Pulse: 91 (06/04/17 0052)  Resp: 20 (06/04/17 0052)  BP: 135/82 (06/04/17 0052)  SpO2: (!) 94 % (06/03/17 2322) Vital Signs (24h Range):  Temp:  [99.1 °F (37.3 °C)-103.6 °F (39.8 °C)] 99.1 °F (37.3 °C)  Pulse:  [] 91  Resp:  [18-42] 20  SpO2:  [89 %-98 %] 94 %  BP: (120-142)/(55-82) 135/82     Weight: 44 kg (97 lb)  Body mass index is 16.14 kg/m².    Physical Exam   Constitutional: He appears cachectic. He appears ill. Nasal cannula in place.   2L NC   HENT:   Head: Normocephalic and atraumatic.   Mouth/Throat: Dental caries present.   Eyes: Conjunctivae are normal. Pupils are equal, round, and reactive to light. No scleral icterus.   Neck: Normal range of motion. Neck supple. No JVD present.   Cardiovascular: Normal rate, regular rhythm and intact distal pulses.    Pulmonary/Chest: Breath sounds normal. Tachypnea noted.   Abdominal: Soft. Bowel sounds are normal. He exhibits no distension. There is no tenderness.   Musculoskeletal:        Right knee: He exhibits decreased range of motion.        Left  knee: He exhibits decreased range of motion.   Patient is in fetal position; contracted at bilateral knees.   Neurological: He is alert. GCS eye subscore is 4. GCS verbal subscore is 1. GCS motor subscore is 5.   Nonverbal.  Tracts movements appropriately with eyes.  Spontaneous, purposeful movement of bilateral upper extremities.   Skin: Skin is warm, dry and intact. No cyanosis. Nails show no clubbing.   Psychiatric:   Unable to assess   Nursing note and vitals reviewed.       Significant Labs:   ABGs:   Recent Labs  Lab 06/03/17  1541   PH 7.460*   PCO2 37.9   HCO3 27.0   POCSATURATED 90*   BE 3     CBC:   Recent Labs  Lab 06/03/17  1520 06/03/17  1541   WBC 15.91*  --    HGB 12.1*  --    HCT 37.1* 34*   *  --      CMP:   Recent Labs  Lab 06/03/17  1520   *   K 3.8      CO2 29   *   BUN 17   CREATININE 0.56   CALCIUM 8.8   PROT 7.2   ALBUMIN 3.9   BILITOT 0.6   ALKPHOS 107   AST 23   ALT 21   ANIONGAP 13   EGFRNONAA >60.0     Coagulation:   Recent Labs  Lab 06/03/17  1520   INR 1.4*   APTT 28.0     Lactic Acid:   Recent Labs  Lab 06/03/17  1530   LACTATE 1.9     Troponin:   Recent Labs  Lab 06/03/17  1520   TROPONINI <0.012     Urine Studies:   Recent Labs  Lab 06/03/17  1548   COLORU Yellow   APPEARANCEUA Clear   PHUR 8.0   SPECGRAV 1.010   PROTEINUA Negative   GLUCUA Negative   KETONESU Negative   BILIRUBINUA Negative   OCCULTUA 3+*   NITRITE Negative   UROBILINOGEN 1.0   LEUKOCYTESUR Negative   RBCUA 25*   WBCUA 1     Influenza A & B antigen: negative    All pertinent labs within the past 24 hours have been reviewed.    Significant Imaging: I have reviewed all pertinent imaging results/findings within the past 24 hours.   X  -Ray Chest AP Portable: The exam limited due to motion or respiratory artifact. Heart size appears normal. There appears be some groundglass opacity in the right lung base, however this may be due to the motion artifact.  No pneumothorax.    Assessment/Plan:      Sepsis due to pneumonia    Right lower lobe pneumonia  Leukocytosis  Pneumonia of right lower lobe due to infectious organism  Meets sepsis criteria due to tachycardia, leukocytosis, and Right lung base ground glass opacity on chest x-ray.       Plan: Ceftriaxone and doxycycline, albuterol-ipratropium nebulizer treatments, guaifenesin, supplemental oxygen.        Hypernatremia    Hydrate with IVFs.  Repeat BMP in a.m.          Oropharyngeal dysphagia    Is on pureed diet at group home.  Will have SLP evaluate.          Atypical stereotyped movement disorder    Resume home dose depakote and clonazepam.          Acute hypoxemic respiratory failure    Supplemental oxygen.            VTE Risk Mitigation         Ordered     enoxaparin injection 40 mg  Daily     Route:  Subcutaneous        06/03/17 2106     High Risk of VTE  Once      06/03/17 2106        Duane Amezquita NP  Department of Hospital Medicine   [facility]

## 2017-06-04 NOTE — PLAN OF CARE
Problem: Patient Care Overview  Goal: Plan of Care Review  Outcome: Ongoing (interventions implemented as appropriate)  Plan of care reviewed with patient. Patient non-verbal. Patient shows no non-verbal indicators of pain/discomfort. Patient on 2L O2 per NC, O2 sats ~94%. Without supplemental O2, patient de-sats to mid 80's. Soft mitt restraints initiated @ 0305 due to patient pulling out IVs, pulling at lines, removing supplemental O2. D5 1/2 NS infusing @ 100 cc/hour. Patient NPO pending SLP evaluation. Patient NSR on telemetry monitoring, HR 90's, with no ectopy noted. Bed is low and locked, bed alarm is activated, call light is within reach. Will continue to monitor.

## 2017-06-04 NOTE — HOSPITAL COURSE
He was put on IV ceftriaxone and PO doxycycline for atypicals instead of azithromycin due to erythromycin allergy.  Scheduled nebulizer treatments were given.  He was relatively withdrawn and refused to take oral medications.  Doxycycline was changed to IV the next day.  On early AM 6/5/17 his supplemental oxygen needed to be increased to high flow nasal cannula. Now > 95% so weaned as tolerated.  6/6/17: Pt is breathing comfortably on room air.  O2 sat monitor is on his toe for the low readings.  When moved to the right ear, reading > 95%. Switched to PO augmentin for 4 days to complete 7 days course as he is tolerating PO.  Speech Therapy performed modified barium swallow study today and noted: Modified Barium no aspiration or penetration with pureed consistency and thin liquids. Recommend: Pureed diet, thin liquids, crushed po medications, 1:1 assistance with meals, avoid styrofoam cups and plastic utensils, upright for all po intake and 30 minutes s/p po intake, slow rate/one bite/sip at a time. Left message for pt's Aunt and spoke to pt's caregiver Tha Waterman about discharge.

## 2017-06-04 NOTE — PLAN OF CARE
Patient arrived on unit via [facility]. Safely moved to patient bed with 3 assist. Patient non-verbal, moves eyes in response to verbal stimuli. Healed PU noted to sacrum, skin otherwise clean, dry and intact. In no apparent distress. Will continue to monitor.

## 2017-06-04 NOTE — PLAN OF CARE
Problem: Patient Care Overview  Goal: Plan of Care Review  SpO2   94% on   2lpm NC. No apparent distress noted. Will continue to monitor.

## 2017-06-04 NOTE — PT/OT/SLP EVAL
Speech Language Pathology  Bedside Swallow Study  Evaluation    Karri Galeano   MRN: [id number]   [id number]    Admitting Diagnosis: Right lower lobe pneumonia    Diet recommendations: Solid Diet Level: NPO  Liquid Diet Level: NPO oral care as tolerated      SLP Treatment Date: 06/04/17  Speech Start Time: 1041     Speech Stop Time: 1050     Speech Total (min): 9 min       TREATMENT BILLABLE MINUTES:  Eval Swallow and Oral Function 9    Diagnosis: Right lower lobe pneumonia    CXR: The exam limited due to motion or respiratory artifact. Heart size appears normal. There appears be some groundglass opacity in the right lung base, however this may be due to the motion artifact.  No pneumothorax.    H&P: Karri Galeano is a 21 y.o. black man with cerebral palsy, microcephaly, severe mental retardation, hypotonia, developmental delays, fine motor and gross motor delays, history of seizures before age 3.  He lives at [facility] in [place], [place].   His caretaker's name is Johnathon Waterman.  His next of kin is his aunt Chloe Galeano [phone].  He was taken to [facility] on 6/3/17 after he had an axillary temperature reading of 101F, no appetite, and withdrawn.   Per caretaker, patient is normally very playful, smiling, and active in wheelchair.    Upon arrival to ED he was found to also have tachycardia (pulse 110), tachypnea (respiratory rate 42), hypoxia (oxygen saturation 89%), leukocytosis (15,910), hypernatremia (147), and a right lung base ground glass opacity on chest x-ray.  He was admitted to [facility] for pneumonia.        Past Medical History:   Diagnosis Date    Anxiety     Congenital cerebral palsy     Hypotonia     Leukodystrophy     Mental retardation     Seizures     Stereotyped movement disorder      History reviewed. No pertinent surgical history.    Has the patient been  evaluated by SLP for swallowing? : Yes  Keep patient NPO?: Yes   General Precautions: Standard,         Social Hx: Patient lives with at [facility].    Prior diet: puree/thin; crushed po meds; liquids via sippy cup; typically excellent appetite    Subjective:  Pt awake and contracted. Caregiver from [facility] present in pt's room.   Patient goals: no vocalizations; caregiver goal for pt to eat.    Pain/Comfort  Pain Rating 1: 0/10    Objective:      Oral Musculature Evaluation  Oral Musculature: unable to assess due to poor participation/comprehension  Volitional Swallow: no swallow initiated  Voice Prior to PO Intake: no vocalizations     Bedside Swallow Eval:  Consistencies Assessed: Thin liquids ice chip, cup sip, spoon sip. straw sip attempted and Puree 1/4 tsp bite of pudding attempted  Oral Phase: unable to cooperate  Pharyngeal Phase: no swallows initiated  Communicated with RN prior to BSS.  Bedside swallow study attempted. Pt actively pushing SLP's hands away and clamping mouth closed. Per caregiver, pt on a pureed diet/thin liquids via sippy cup, and crushed meds with an excellent appetite. Typically no coughing noted during meals. Oral care attempted, SLP placed oral swab with mouth wash in pt's oral cavity, and pt began turning head away and pushing SLP's hands away.  Recommend: NPO. ST will continue to follow.    Assessment:  Karri Galeano is a 21 y.o. male with a medical diagnosis of Right lower lobe pneumonia and presents with decreased acceptance of po intake. ST will continue to follow for ongoing assessment of swallowing function.           Discharge recommendations: Discharge Facility/Level Of Care Needs:  (pending progress)     Goals:    SLP Goals        Problem: SLP Goal    Goal Priority Disciplines Outcome   SLP Goal     SLP Ongoing (interventions implemented as appropriate)   Description:  Short Term Goals:   1. Pt will participate in an ongoing assessment to determine the  least restrictive and safest po diet with updated goals to follow pending results.                        Plan:   Patient to be seen Therapy Frequency: 3 x/week   Plan of Care expires: 07/03/17  Plan of Care reviewed with: patient, caregiver  SLP Follow-up?: Yes  SLP - Next Visit Date: 06/05/17      SLP G-Codes  Functional Assessment Tool Used: noms  Score: 1  Functional Limitations: Swallowing  Swallow Current Status (): CN  Swallow Goal Status (): JOSE MANUEL Velazquez, CCC-SLP  06/04/2017

## 2017-06-04 NOTE — PLAN OF CARE
Problem: SLP Goal  Goal: SLP Goal  Short Term Goals:   1. Pt will participate in an ongoing assessment to determine the least restrictive and safest po diet with updated goals to follow pending results.     Outcome: Ongoing (interventions implemented as appropriate)  Bedside swallow study attempted. Pt actively pushing SLP's hands away and clamping mouth closed. Per caregiver, pt on a pureed diet/thin liquids via sippy cup, and crushed meds with an excellent appetite. Recommend: NPO. ST will continue to follow.  KENNEDY Nelson., CCC-SLP  06/04/2017

## 2017-06-05 PROBLEM — G80.0 SPASTIC QUADRIPLEGIC CEREBRAL PALSY: Chronic | Status: ACTIVE | Noted: 2017-06-05

## 2017-06-05 LAB
ALBUMIN SERPL BCP-MCNC: 2.7 G/DL
ALP SERPL-CCNC: 92 U/L
ALT SERPL W/O P-5'-P-CCNC: 13 U/L
ANION GAP SERPL CALC-SCNC: 8 MMOL/L
ANION GAP SERPL CALC-SCNC: 8 MMOL/L
ANISOCYTOSIS BLD QL SMEAR: SLIGHT
ANISOCYTOSIS BLD QL SMEAR: SLIGHT
AST SERPL-CCNC: 19 U/L
BASOPHILS # BLD AUTO: 0.03 K/UL
BASOPHILS # BLD AUTO: 0.03 K/UL
BASOPHILS NFR BLD: 0.2 %
BASOPHILS NFR BLD: 0.2 %
BILIRUB SERPL-MCNC: 0.6 MG/DL
BNP SERPL-MCNC: 146 PG/ML
BUN SERPL-MCNC: 10 MG/DL
BUN SERPL-MCNC: 10 MG/DL
CALCIUM SERPL-MCNC: 8.5 MG/DL
CALCIUM SERPL-MCNC: 8.5 MG/DL
CHLORIDE SERPL-SCNC: 101 MMOL/L
CHLORIDE SERPL-SCNC: 101 MMOL/L
CO2 SERPL-SCNC: 25 MMOL/L
CO2 SERPL-SCNC: 25 MMOL/L
CREAT SERPL-MCNC: 0.6 MG/DL
CREAT SERPL-MCNC: 0.6 MG/DL
DIFFERENTIAL METHOD: ABNORMAL
DIFFERENTIAL METHOD: ABNORMAL
EOSINOPHIL # BLD AUTO: 0 K/UL
EOSINOPHIL # BLD AUTO: 0 K/UL
EOSINOPHIL NFR BLD: 0 %
EOSINOPHIL NFR BLD: 0 %
ERYTHROCYTE [DISTWIDTH] IN BLOOD BY AUTOMATED COUNT: 13.7 %
ERYTHROCYTE [DISTWIDTH] IN BLOOD BY AUTOMATED COUNT: 13.7 %
EST. GFR  (AFRICAN AMERICAN): >60 ML/MIN/1.73 M^2
EST. GFR  (AFRICAN AMERICAN): >60 ML/MIN/1.73 M^2
EST. GFR  (NON AFRICAN AMERICAN): >60 ML/MIN/1.73 M^2
EST. GFR  (NON AFRICAN AMERICAN): >60 ML/MIN/1.73 M^2
GLUCOSE SERPL-MCNC: 107 MG/DL
GLUCOSE SERPL-MCNC: 107 MG/DL
HCT VFR BLD AUTO: 32.7 %
HCT VFR BLD AUTO: 32.7 %
HGB BLD-MCNC: 11 G/DL
HGB BLD-MCNC: 11 G/DL
HYPOCHROMIA BLD QL SMEAR: ABNORMAL
HYPOCHROMIA BLD QL SMEAR: ABNORMAL
LYMPHOCYTES # BLD AUTO: 2.8 K/UL
LYMPHOCYTES # BLD AUTO: 2.8 K/UL
LYMPHOCYTES NFR BLD: 18 %
LYMPHOCYTES NFR BLD: 18 %
MAGNESIUM SERPL-MCNC: 1.9 MG/DL
MCH RBC QN AUTO: 27.9 PG
MCH RBC QN AUTO: 27.9 PG
MCHC RBC AUTO-ENTMCNC: 33.6 %
MCHC RBC AUTO-ENTMCNC: 33.6 %
MCV RBC AUTO: 83 FL
MCV RBC AUTO: 83 FL
MONOCYTES # BLD AUTO: 2.3 K/UL
MONOCYTES # BLD AUTO: 2.3 K/UL
MONOCYTES NFR BLD: 15 %
MONOCYTES NFR BLD: 15 %
NEUTROPHILS # BLD AUTO: 10.1 K/UL
NEUTROPHILS # BLD AUTO: 10.1 K/UL
NEUTROPHILS NFR BLD: 66.8 %
NEUTROPHILS NFR BLD: 66.8 %
PHOSPHATE SERPL-MCNC: 2.1 MG/DL
PLATELET # BLD AUTO: 167 K/UL
PLATELET # BLD AUTO: 167 K/UL
PLATELET BLD QL SMEAR: ABNORMAL
PLATELET BLD QL SMEAR: ABNORMAL
PMV BLD AUTO: 11.6 FL
PMV BLD AUTO: 11.6 FL
POTASSIUM SERPL-SCNC: 3.1 MMOL/L
POTASSIUM SERPL-SCNC: 3.1 MMOL/L
PROT SERPL-MCNC: 6.3 G/DL
RBC # BLD AUTO: 3.94 M/UL
RBC # BLD AUTO: 3.94 M/UL
SODIUM SERPL-SCNC: 134 MMOL/L
SODIUM SERPL-SCNC: 134 MMOL/L
WBC # BLD AUTO: 15.3 K/UL
WBC # BLD AUTO: 15.3 K/UL

## 2017-06-05 PROCEDURE — 25000003 PHARM REV CODE 250: Performed by: HOSPITALIST

## 2017-06-05 PROCEDURE — 99900035 HC TECH TIME PER 15 MIN (STAT)

## 2017-06-05 PROCEDURE — 94644 CONT INHLJ TX 1ST HOUR: CPT

## 2017-06-05 PROCEDURE — 80053 COMPREHEN METABOLIC PANEL: CPT

## 2017-06-05 PROCEDURE — 83735 ASSAY OF MAGNESIUM: CPT

## 2017-06-05 PROCEDURE — 36415 COLL VENOUS BLD VENIPUNCTURE: CPT

## 2017-06-05 PROCEDURE — 94664 DEMO&/EVAL PT USE INHALER: CPT

## 2017-06-05 PROCEDURE — 97802 MEDICAL NUTRITION INDIV IN: CPT

## 2017-06-05 PROCEDURE — 85025 COMPLETE CBC W/AUTO DIFF WBC: CPT

## 2017-06-05 PROCEDURE — 94640 AIRWAY INHALATION TREATMENT: CPT

## 2017-06-05 PROCEDURE — 25000003 PHARM REV CODE 250: Performed by: PHYSICIAN ASSISTANT

## 2017-06-05 PROCEDURE — 83880 ASSAY OF NATRIURETIC PEPTIDE: CPT

## 2017-06-05 PROCEDURE — 11000001 HC ACUTE MED/SURG PRIVATE ROOM

## 2017-06-05 PROCEDURE — 27100171 HC OXYGEN HIGH FLOW UP TO 24 HOURS

## 2017-06-05 PROCEDURE — 94761 N-INVAS EAR/PLS OXIMETRY MLT: CPT

## 2017-06-05 PROCEDURE — 63600175 PHARM REV CODE 636 W HCPCS: Performed by: NURSE PRACTITIONER

## 2017-06-05 PROCEDURE — 25000242 PHARM REV CODE 250 ALT 637 W/ HCPCS: Performed by: HOSPITALIST

## 2017-06-05 PROCEDURE — 92526 ORAL FUNCTION THERAPY: CPT

## 2017-06-05 PROCEDURE — 27000221 HC OXYGEN, UP TO 24 HOURS

## 2017-06-05 PROCEDURE — 63600175 PHARM REV CODE 636 W HCPCS: Performed by: HOSPITALIST

## 2017-06-05 PROCEDURE — 84100 ASSAY OF PHOSPHORUS: CPT

## 2017-06-05 RX ORDER — AMMONIUM LACTATE 12 G/100G
LOTION TOPICAL 2 TIMES DAILY
Status: DISCONTINUED | OUTPATIENT
Start: 2017-06-05 | End: 2017-06-06 | Stop reason: HOSPADM

## 2017-06-05 RX ORDER — POTASSIUM CHLORIDE 20 MEQ/15ML
40 SOLUTION ORAL ONCE
Status: COMPLETED | OUTPATIENT
Start: 2017-06-05 | End: 2017-06-05

## 2017-06-05 RX ADMIN — ALBUTEROL SULFATE 2.5 MG: 2.5 SOLUTION RESPIRATORY (INHALATION) at 07:06

## 2017-06-05 RX ADMIN — VALPROATE SODIUM 500 MG: 100 INJECTION, SOLUTION INTRAVENOUS at 10:06

## 2017-06-05 RX ADMIN — Medication: at 10:06

## 2017-06-05 RX ADMIN — CLONAZEPAM 1 MG: 0.5 TABLET ORAL at 10:06

## 2017-06-05 RX ADMIN — VALPROATE SODIUM 500 MG: 100 INJECTION, SOLUTION INTRAVENOUS at 08:06

## 2017-06-05 RX ADMIN — ALBUTEROL SULFATE 2.5 MG: 2.5 SOLUTION RESPIRATORY (INHALATION) at 08:06

## 2017-06-05 RX ADMIN — ALBUTEROL SULFATE 2.5 MG: 2.5 SOLUTION RESPIRATORY (INHALATION) at 03:06

## 2017-06-05 RX ADMIN — SODIUM CHLORIDE, PRESERVATIVE FREE 3 ML: 5 INJECTION INTRAVENOUS at 10:06

## 2017-06-05 RX ADMIN — ENOXAPARIN SODIUM 40 MG: 100 INJECTION SUBCUTANEOUS at 10:06

## 2017-06-05 RX ADMIN — TRAZODONE HYDROCHLORIDE 100 MG: 100 TABLET ORAL at 10:06

## 2017-06-05 RX ADMIN — DEXTROSE AND SODIUM CHLORIDE: 5; .45 INJECTION, SOLUTION INTRAVENOUS at 06:06

## 2017-06-05 RX ADMIN — ALBUTEROL SULFATE 2.5 MG: 2.5 SOLUTION RESPIRATORY (INHALATION) at 11:06

## 2017-06-05 RX ADMIN — POTASSIUM CHLORIDE 40 MEQ: 20 SOLUTION ORAL at 03:06

## 2017-06-05 RX ADMIN — CARBAMIDE PEROXIDE 6.5% 5 DROP: 6.5 LIQUID AURICULAR (OTIC) at 10:06

## 2017-06-05 RX ADMIN — CEFTRIAXONE 1 G: 1 INJECTION, SOLUTION INTRAVENOUS at 06:06

## 2017-06-05 RX ADMIN — SODIUM CHLORIDE, PRESERVATIVE FREE 3 ML: 5 INJECTION INTRAVENOUS at 06:06

## 2017-06-05 RX ADMIN — GUAIFENESIN 200 MG: 100 SOLUTION ORAL at 10:06

## 2017-06-05 RX ADMIN — DOXYCYCLINE 100 MG: 100 INJECTION, POWDER, LYOPHILIZED, FOR SOLUTION INTRAVENOUS at 11:06

## 2017-06-05 RX ADMIN — GUAIFENESIN 200 MG: 100 SOLUTION ORAL at 03:06

## 2017-06-05 NOTE — PLAN OF CARE
Problem: Pneumonia (Adult)  Goal: Signs and Symptoms of Listed Potential Problems Will be Absent, Minimized or Managed (Pneumonia)  Signs and symptoms of listed potential problems will be absent, minimized or managed by discharge/transition of care (reference Pneumonia (Adult) CPG).   Pt continues on hi-ivan nc at 10lpm, per Jennifer NP attempting to wean as able. Pt tolerating meds as ordered. Occasionally agitated with tubes and mitts in place. Appears more energetic throughout shift. Tolerating puree diet with thin liquids. VSS. Frequent rounding in place, bed alarm on.

## 2017-06-05 NOTE — SUBJECTIVE & OBJECTIVE
Interval History: Group Home Director, Golden, and Manager, Leena, present this afternoon and confirm that patient is usually active and eats whatever he is given.  They state that he will drink Ensure even if not feeling well so not eating.  State that he must be fed and that he likes to roll around on the floor at the home.  Aunkarolina Galeano is flying in from [place] on 6/6/2017 evening to visit.      Review of Systems   Unable to perform ROS: Patient nonverbal     Objective:     Vital Signs (Most Recent):  Temp: 99.1 °F (37.3 °C) (06/05/17 1200)  Pulse: 81 (06/05/17 1200)  Resp: 18 (06/05/17 1200)  BP: (!) 140/90 (06/05/17 1200)  SpO2: 99 % (06/05/17 1117) Vital Signs (24h Range):  Temp:  [98.8 °F (37.1 °C)-99.6 °F (37.6 °C)] 99.1 °F (37.3 °C)  Pulse:  [] 81  Resp:  [18-24] 18  SpO2:  [88 %-99 %] 99 %  BP: (137-163)/(70-90) 140/90     Weight: 44 kg (97 lb)  Body mass index is 16.14 kg/m².    Intake/Output Summary (Last 24 hours) at 06/05/17 1356  Last data filed at 06/05/17 0800   Gross per 24 hour   Intake             1455 ml   Output              749 ml   Net              706 ml      Physical Exam   Constitutional: No distress.   Cardiovascular: Normal rate and regular rhythm.    Murmur heard.  Pulmonary/Chest: Effort normal and breath sounds normal. No respiratory distress. He has no wheezes.   Abdominal: Soft.   Musculoskeletal: He exhibits no edema.   Legs with no muscle tone and pulled up under his hips.    Neurological: He is alert.   Pt rubbing his right arm on the bed rail. He stops and holds my hand while I talk to him.     Skin: Skin is dry.   Skin on right forearm dry and peeling.    Psychiatric:   Calm and listens while I talk to him.    Nursing note and vitals reviewed.      Significant Labs:   BMP:   Recent Labs  Lab 06/05/17  0334     107   *  134*   K 3.1*  3.1*     101   CO2 25  25   BUN 10  10   CREATININE 0.6  0.6   CALCIUM 8.5*  8.5*   MG 1.9     CBC:    Recent Labs  Lab 06/03/17  1520 06/03/17  1541 06/04/17  0515 06/05/17  0334   WBC 15.91*  --  15.73*  15.73* 15.30*  15.30*   HGB 12.1*  --  11.0*  11.0* 11.0*  11.0*   HCT 37.1* 34* 33.4*  33.4* 32.7*  32.7*   *  --  147*  147* 167  167       Significant Imaging: no new

## 2017-06-05 NOTE — PROGRESS NOTES
"[facility]  Adult Nutrition  Progress Note    SUMMARY     Recommendations    Recommendation/Intervention:  1. Encourage good intake of meals & Boost.     Goals:   Pt will consume at least 75% intake at meals  Nutrition Goal Status: new     Continuum of Care Plan  Referral to Outpatient Services:  (d/c diet per ST recs)    Reason for Assessment  Reason for Assessment: identified at risk by screening criteria (dysphagia)  Diagnosis:  (pneumonia)  Relevent Medical History: mental retardation, anxiety, congenital cerebral palsy, seizures     General Information Comments: Pt started on pureed diet with Boost Plus per ST recs. Pt underderweight related to hx as evidenced by pt with poor po intake.    Nutrition Prescription Ordered  Current Diet Order: pureed  Oral Nutrition Supplement: Boost Plus   Other Calories (kcal): 408 (5% dex at 100)     Nutrition Risk Screen  Nutrition Risk Screen: dysphagia or difficulty swallowing    Nutrition/Diet History  Patient Reported Diet/Restrictions/Preferences:  (pureed thin liquids at group home)  Food Preferences: no Synagogue or cultural food prefs identified  Factors Affecting Nutritional Intake: impaired cognitive status/motor control     Labs/Tests/Procedures/Meds  Pertinent Labs Reviewed: reviewed  Pertinent Labs Comments: K 3.3L, Glu 111H, Ca 8.6L, Alb 2.8L  Pertinent Medications Reviewed: reviewed  Pertinent Medications Comments: rocephin, 5% dex at 100    Physical Findings  Overall Physical Appearance: on oxygen therapy, underweight  Tubes:  (none)  Oral/Mouth Cavity:  (unable to assess)  Skin:  (Antwon 15-intact)    Anthropometrics  Temp: 99.1 °F (37.3 °C)  Height: 5' 5" (165.1 cm)  Weight Method: Stated  Weight: 44 kg (97 lb)  Ideal Body Weight (IBW), Male: 136 lb  % Ideal Body Weight, Male (lb): 71.32 lb  BMI (Calculated): 16.2  BMI Grade: less than 16 protein-energy malnutrition grade III     Estimated/Assessed Needs  Weight Used For Calorie " Calculations: 61.8 kg (136 lb 3.9 oz) (IBW)   Energy Need Method: Kcal/kg  RMR (Dyer-St. Jeor Equation): 1366.88  Weight Used For Protein Calculations: 61.8 kg (136 lb 3.9 oz)  Protein Requirements: 74g (1.2 g/kg IBW)     Assessment and Plan  No new Assessment & Plan notes have been filed under this hospital service since the last note was generated.  Service: Nutrition    Monitor and Evaluation  Food and Nutrient Intake: energy intake  Food and Nutrient Adminstration: diet order, enteral and parenteral nutrition administration  Physical Activity and Function: nutrition-related ADLs and IADLs  Anthropometric Measurements: weight  Biochemical Data, Medical Tests and Procedures: electrolyte and renal panel  Nutrition-Focused Physical Findings: overall appearance    Nutrition Risk  Level of Risk:  (2x/weekly)    Nutrition Follow-Up  RD Follow-up?: Yes

## 2017-06-05 NOTE — ASSESSMENT & PLAN NOTE
Right lower lobe pneumonia  Leukocytosis  Pneumonia of right lower lobe due to infectious organism  Afebrile. WBC elevated in 2016 too.  IV ceftriaxone and doxycycline, day #3; continue albuterol-ipratropium nebulizer treatments, guaifenesin if willing to take PO, supplemental oxygen.

## 2017-06-05 NOTE — PLAN OF CARE
Problem: Nutrition, Imbalanced: Inadequate Oral Intake (Adult)  Goal: Improved Oral Intake  Patient will demonstrate the desired outcomes by discharge/transition of care.  Outcome: Outcome(s) achieved Date Met: 06/05/17  Recommendation/Intervention:  1. Encourage good intake of meals & Boost.     Goals:   Pt will consume at least 75% intake at meals  Nutrition Goal Status: new

## 2017-06-05 NOTE — PLAN OF CARE
Problem: Patient Care Overview  Goal: Plan of Care Review  Outcome: Ongoing (interventions implemented as appropriate)  Plan of care reviewed with patient. Patient non-verbal. Spoke briefly on phone with patient's caretaker regarding patient's care. Bilateral soft mitt hand restraints maintained; order renewed and will  @ 0243 on . Patient agitated for most of shift, frequently pulling mitts off, pulling @ lines, O2 tubing, telemetry box. One time dose of ativan IVP administered; patient slept for ~1.5 hours after dose administration. Patient unable to obtain adequate oxygen saturation on low-ivan nasal cannula. Patient switched to hi-flow nasal cannula, O2 sats currently high 90's. Patient NSR-sinus tachycardia on telemetry monitoring, HR 70's-110's, with no true red alarms noted. Bed is low and locked, bed alarm is activated. Will continue to monitor.

## 2017-06-05 NOTE — PLAN OF CARE
TN met with patient. Patient from a group home.   Patient currently on high-flow 02 at this time.   TN attempted to call Leena with [facility], left message, will call again tomorrow.  No discharge plans at this time, will continue to follow.       06/05/17 7085   Discharge Reassessment   Assessment Type Discharge Planning Reassessment   Can the patient answer the patient profile reliably? No, cognitively impaired   Describe the patient's ability to walk at the present time. Major restrictions/daily assistance from another person   How often would a person be available to care for the patient? Occasionally   Discharge Plan A Group Home   Discharge Plan B Group home   Patient choice form signed by patient/caregiver N/A   Involved the patient/caregiver in establishing a new discharge plan: No     Claire Monahan RN  Transition Navigator  [phone]

## 2017-06-05 NOTE — PLAN OF CARE
Problem: SLP Goal  Goal: SLP Goal  Short Term Goals:   1. Pt will participate in an ongoing assessment to determine the least restrictive and safest po diet with updated goals to follow pending results. MET 6/5  2. Patient will tolerate puree consistency/thin liquids po diet with no overt s/s of aspiration.          Outcome: Ongoing (interventions implemented as appropriate)  6/5: Swallow re-assessed at bedside, pt fed with caregiver, Leena from group home, pt tolerated cup sips of water/juice and tsp swallows of applesauce via spoon. Pt tolerated with delay in oral phase and loud, swallow noted on liquids. Swallow precautions posted in room, pt noted to grab at SLP and caregiver, she reports, he has sensory issues and tends to touch others as a form of non-verbal communication.   JOSE MANUEL Singer, [facility]-SLP  Speech Pathologist 6/5/2017

## 2017-06-05 NOTE — PLAN OF CARE
Problem: Patient Care Overview  Goal: Plan of Care Review  SpO2   90% on  3 lpm NC. No apparent distress noted. Will continue to monitor.

## 2017-06-05 NOTE — PROGRESS NOTES
[facility] Hospital Medicine  Progress Note    Patient Name: Karri Galeano  MRN: [id number]  Patient Class: IP- Inpatient   Admission Date: 6/3/2017  Length of Stay: 2 days  Attending Physician: Todd Herrera MD  Primary Care Provider: Echo Reeves MD        Subjective:     Principal Problem:Right lower lobe pneumonia    HPI:  Karri Galeano is a 21 y.o. black man with cerebral palsy, microcephaly, severe mental retardation, hypotonia, developmental delays, fine motor and gross motor delays, history of seizures before age 3.  He lives at [facility] in [place], [place].   His caretaker's name is Johnathon Waterman.  His next of kin is his aunt Chloe Galeano [phone].     He was taken to [facility] Emergency Department on 6/3/17 after he had an axillary temperature reading of 101F, no appetite, and withdrawn.   Per caretaker, he is normally very playful, smiling, and active in wheelchair.      Upon arrival to ED he was found to also have tachycardia (pulse 110), tachypnea (respiratory rate 42), hypoxia (oxygen saturation 89%), leukocytosis (15,910), hypernatremia (147), and a right lung base ground glass opacity on chest x-ray.   Influenza EIA was negative.  [facility] was [facility] for pneumonia.        Hospital Course:  He was put on IV ceftriaxone and PO doxycycline for atypicals instead of azithromycin due to erythromycin allergy.  Scheduled nebulizer treatments were given.  He was relatively withdrawn and refused to take oral medications, like an ill infant.  Doxycycline was changed to IV the next day.  On early AM 6/5/17 his supplemental oxygen needed to be increased to high flow nasal cannula. Now > 95% so will wean as tolerated.      Interval History: Group Home Director, Golden, and Manager, Leena, present this afternoon and confirm that patient is usually active and  eats whatever he is given.  They state that he will drink Ensure even if not feeling well so not eating.  State that he must be fed and that he likes to roll around on the floor at the home.  Aunt Chloe Galeano is flying in from [place] on 6/6/2017 evening to visit.      Review of Systems   Unable to perform ROS: Patient nonverbal     Objective:     Vital Signs (Most Recent):  Temp: 99.1 °F (37.3 °C) (06/05/17 1200)  Pulse: 81 (06/05/17 1200)  Resp: 18 (06/05/17 1200)  BP: (!) 140/90 (06/05/17 1200)  SpO2: 99 % (06/05/17 1117) Vital Signs (24h Range):  Temp:  [98.8 °F (37.1 °C)-99.6 °F (37.6 °C)] 99.1 °F (37.3 °C)  Pulse:  [] 81  Resp:  [18-24] 18  SpO2:  [88 %-99 %] 99 %  BP: (137-163)/(70-90) 140/90     Weight: 44 kg (97 lb)  Body mass index is 16.14 kg/m².    Intake/Output Summary (Last 24 hours) at 06/05/17 1356  Last data filed at 06/05/17 0800   Gross per 24 hour   Intake             1455 ml   Output              749 ml   Net              706 ml      Physical Exam   Constitutional: No distress.   Cardiovascular: Normal rate and regular rhythm.    Murmur heard.  Pulmonary/Chest: Effort normal and breath sounds normal. No respiratory distress. He has no wheezes.   Abdominal: Soft.   Musculoskeletal: He exhibits no edema.   Legs with no muscle tone and pulled up under his hips.    Neurological: He is alert.   Pt rubbing his right arm on the bed rail. He stops and holds my hand while I talk to him.     Skin: Skin is dry.   Skin on right forearm dry and peeling.    Psychiatric:   Calm and listens while I talk to him.    Nursing note and vitals reviewed.      Significant Labs:   BMP:   Recent Labs  Lab 06/05/17  0334     107   *  134*   K 3.1*  3.1*     101   CO2 25  25   BUN 10  10   CREATININE 0.6  0.6   CALCIUM 8.5*  8.5*   MG 1.9     CBC:   Recent Labs  Lab 06/03/17  1520 06/03/17  1541 06/04/17  0515 06/05/17  0334   WBC 15.91*  --  15.73*  15.73* 15.30*  15.30*   HGB 12.1*   --  11.0*  11.0* 11.0*  11.0*   HCT 37.1* 34* 33.4*  33.4* 32.7*  32.7*   *  --  147*  147* 167  167       Significant Imaging: no new    Assessment/Plan:      Sepsis due to pneumonia    Right lower lobe pneumonia  Leukocytosis  Pneumonia of right lower lobe due to infectious organism  Afebrile. WBC elevated in 2016 too.  IV ceftriaxone and doxycycline, day #3; continue albuterol-ipratropium nebulizer treatments, guaifenesin if willing to take PO, supplemental oxygen.        Acute hypoxemic respiratory failure    Pt noted to have O2 sat 88% last night so switched to high-flow oxygen. Wean as tolerated.          Atypical stereotyped movement disorder    Cerebral palsy   Continue home dose valproic acid (IV for now) and clonazepam (if willing to take PO).          Oropharyngeal dysphagia    Is on pureed diet at group home.  Appreciate Speech Therapy recommendations.          Severe mental retardation    Microcephaly  Requires assistance with all daily activities  Discharge when on room air.              VTE Risk Mitigation         Ordered     enoxaparin injection 40 mg  Daily     Route:  Subcutaneous        06/03/17 2106     High Risk of VTE  Once      06/03/17 2106          Jennifer Dunn PA-C  Department of Hospital Medicine   [facility]  Pager: [phone]    Attending: Todd Herrera MD

## 2017-06-05 NOTE — PT/OT/SLP PROGRESS
"Speech Language Pathology  Swallow Treatment    Karri Galeano   MRN: [id number]   Admitting Diagnosis: Right lower lobe pneumonia    Diet recommendations: Solid Diet Level: Puree  Liquid Diet Level: Thin   Upright for meals, assist for all meals, alert and awake for all meals, crush po meds, cup swallows of thin liquids, alternate sips and bites    SLP Treatment Date: 06/05/17  Speech Start Time: 1411     Speech Stop Time: 1424     Speech Total (min): 13 min       TREATMENT BILLABLE MINUTES:  Treatment Swallowing Dysfunction 13    Has the patient been evaluated by SLP for swallowing? : Yes  Keep patient NPO?: No   General Precautions: Standard, fall (intellectual disability, lives at group home)  Current Respiratory Status:  (room air)       Subjective:  Pt seen at bedside for swallow re-assessment, pt found in room, alert and awake.     Pain/Comfort  Pain Rating 1: 0/10 (non-verbal)    Objective:   Patient found with: bed alarm, telemetry (hand mits)  Pt seen for direct dysphagia follow-up and assessment of swallow function.   SLP called by RN and caregiver.  Pt resides at group home, Edward at bedside familiar with pt and provided case history.   Pt tolerated cup swallows by cup edge sips of water and apple juice. He consumed 4 oz of juice with audible and loud swallow noted.   Pt tolerated applesauce by tsp swallows with delay in a/p transfer and occasional bolus holding noted. Caregivers at bedside reported pocketing and delay in oral phae has been ongoing. No coughing or choking  Noted with trials at bedside. Pt was seated upright and noted to grab at SLP during feedings. Caregiver reports he is trying to communicate with SLP and that wants "sensory input."    FIM:  Social Interaction: 2 Maximal Direction--The patient interacts appropriately 25 to 49% of the time, but may beed restraint due to socially inappropriate behaviors.         Comprehension: 1 Total Assistance--The patient understands " direction and conversation about basic daily needs less than 25% of the time, or does no understand simple, commonly used spoken expressions (e.g hello, how are you) or gestures (e.g. waving good-bye, thank                Assessment:  Karri Galeano is a 21 y.o. male with a medical diagnosis of Right lower lobe pneumonia and presents with oral dysphagia which appears to be baseline given intellectual disability.     Discharge recommendations: Discharge Facility/Level Of Care Needs:  (return to group [facility], needs 24 hour supervision with meals)     Goals:    SLP Goals        Problem: SLP Goal    Goal Priority Disciplines Outcome   SLP Goal     SLP Ongoing (interventions implemented as appropriate)   Description:  Short Term Goals:   1. Pt will participate in an ongoing assessment to determine the least restrictive and safest po diet with updated goals to follow pending results. MET 6/5  2. Patient will tolerate puree consistency/thin liquids po diet with no overt s/s of aspiration.                             Plan:   Patient to be seen Therapy Frequency: 2 x/week   Plan of Care expires: 07/03/17  Plan of Care reviewed with: patient, caregiver (lisa and otto from group [facility])  SLP Follow-up?: Yes  SLP - Next Visit Date: 06/06/17           JOSE MANUEL Singer, CCC-SLP  06/05/2017

## 2017-06-06 VITALS
TEMPERATURE: 99 F | BODY MASS INDEX: 16.16 KG/M2 | SYSTOLIC BLOOD PRESSURE: 127 MMHG | WEIGHT: 97 LBS | HEIGHT: 65 IN | OXYGEN SATURATION: 99 % | DIASTOLIC BLOOD PRESSURE: 75 MMHG | HEART RATE: 80 BPM | RESPIRATION RATE: 16 BRPM

## 2017-06-06 PROBLEM — D72.829 LEUKOCYTOSIS: Status: RESOLVED | Noted: 2017-06-04 | Resolved: 2017-06-06

## 2017-06-06 PROBLEM — A41.9 SEPSIS DUE TO PNEUMONIA: Status: RESOLVED | Noted: 2017-06-03 | Resolved: 2017-06-06

## 2017-06-06 PROBLEM — J96.01 ACUTE HYPOXEMIC RESPIRATORY FAILURE: Status: RESOLVED | Noted: 2017-06-03 | Resolved: 2017-06-06

## 2017-06-06 PROBLEM — J18.9 SEPSIS DUE TO PNEUMONIA: Status: RESOLVED | Noted: 2017-06-03 | Resolved: 2017-06-06

## 2017-06-06 LAB
ANION GAP SERPL CALC-SCNC: 9 MMOL/L
ANISOCYTOSIS BLD QL SMEAR: SLIGHT
BASOPHILS # BLD AUTO: ABNORMAL K/UL
BASOPHILS NFR BLD: 0 %
BUN SERPL-MCNC: 9 MG/DL
CALCIUM SERPL-MCNC: 8.8 MG/DL
CHLORIDE SERPL-SCNC: 100 MMOL/L
CO2 SERPL-SCNC: 24 MMOL/L
CREAT SERPL-MCNC: 0.6 MG/DL
DIFFERENTIAL METHOD: ABNORMAL
EOSINOPHIL # BLD AUTO: ABNORMAL K/UL
EOSINOPHIL NFR BLD: 0 %
ERYTHROCYTE [DISTWIDTH] IN BLOOD BY AUTOMATED COUNT: 13.7 %
EST. GFR  (AFRICAN AMERICAN): >60 ML/MIN/1.73 M^2
EST. GFR  (NON AFRICAN AMERICAN): >60 ML/MIN/1.73 M^2
GLUCOSE SERPL-MCNC: 105 MG/DL
HCT VFR BLD AUTO: 32.8 %
HGB BLD-MCNC: 11.1 G/DL
HYPOCHROMIA BLD QL SMEAR: ABNORMAL
LYMPHOCYTES # BLD AUTO: ABNORMAL K/UL
LYMPHOCYTES NFR BLD: 24 %
MAGNESIUM SERPL-MCNC: 1.8 MG/DL
MCH RBC QN AUTO: 27.8 PG
MCHC RBC AUTO-ENTMCNC: 33.8 %
MCV RBC AUTO: 82 FL
MONOCYTES # BLD AUTO: ABNORMAL K/UL
MONOCYTES NFR BLD: 5 %
NEUTROPHILS NFR BLD: 49 %
NEUTS BAND NFR BLD MANUAL: 22 %
PHOSPHATE SERPL-MCNC: 3.5 MG/DL
PLATELET # BLD AUTO: 191 K/UL
PLATELET BLD QL SMEAR: ABNORMAL
PMV BLD AUTO: 11.1 FL
POTASSIUM SERPL-SCNC: 3.3 MMOL/L
RBC # BLD AUTO: 3.99 M/UL
SODIUM SERPL-SCNC: 133 MMOL/L
WBC # BLD AUTO: 11.99 K/UL

## 2017-06-06 PROCEDURE — 25000003 PHARM REV CODE 250: Performed by: PHYSICIAN ASSISTANT

## 2017-06-06 PROCEDURE — 83735 ASSAY OF MAGNESIUM: CPT

## 2017-06-06 PROCEDURE — 99900035 HC TECH TIME PER 15 MIN (STAT)

## 2017-06-06 PROCEDURE — G8998 SWALLOW D/C STATUS: HCPCS | Mod: CJ

## 2017-06-06 PROCEDURE — 92611 MOTION FLUOROSCOPY/SWALLOW: CPT

## 2017-06-06 PROCEDURE — 85027 COMPLETE CBC AUTOMATED: CPT

## 2017-06-06 PROCEDURE — G8997 SWALLOW GOAL STATUS: HCPCS | Mod: CK

## 2017-06-06 PROCEDURE — 36415 COLL VENOUS BLD VENIPUNCTURE: CPT

## 2017-06-06 PROCEDURE — 94761 N-INVAS EAR/PLS OXIMETRY MLT: CPT

## 2017-06-06 PROCEDURE — 84100 ASSAY OF PHOSPHORUS: CPT

## 2017-06-06 PROCEDURE — 25000242 PHARM REV CODE 250 ALT 637 W/ HCPCS: Performed by: HOSPITALIST

## 2017-06-06 PROCEDURE — 94640 AIRWAY INHALATION TREATMENT: CPT

## 2017-06-06 PROCEDURE — 80048 BASIC METABOLIC PNL TOTAL CA: CPT

## 2017-06-06 PROCEDURE — 25000003 PHARM REV CODE 250: Performed by: HOSPITALIST

## 2017-06-06 PROCEDURE — 85007 BL SMEAR W/DIFF WBC COUNT: CPT

## 2017-06-06 PROCEDURE — 92526 ORAL FUNCTION THERAPY: CPT

## 2017-06-06 PROCEDURE — 63600175 PHARM REV CODE 636 W HCPCS: Performed by: HOSPITALIST

## 2017-06-06 RX ORDER — DIVALPROEX SODIUM 125 MG/1
500 TABLET, DELAYED RELEASE ORAL EVERY 12 HOURS
Status: DISCONTINUED | OUTPATIENT
Start: 2017-06-06 | End: 2017-06-06 | Stop reason: HOSPADM

## 2017-06-06 RX ORDER — GUAIFENESIN 100 MG/5ML
200 SOLUTION ORAL 3 TIMES DAILY
Refills: 0 | COMMUNITY
Start: 2017-06-06 | End: 2017-06-11

## 2017-06-06 RX ORDER — AMOXICILLIN AND CLAVULANATE POTASSIUM 875; 125 MG/1; MG/1
1 TABLET, FILM COATED ORAL EVERY 12 HOURS
Status: DISCONTINUED | OUTPATIENT
Start: 2017-06-06 | End: 2017-06-06 | Stop reason: HOSPADM

## 2017-06-06 RX ORDER — AMOXICILLIN AND CLAVULANATE POTASSIUM 875; 125 MG/1; MG/1
1 TABLET, FILM COATED ORAL EVERY 12 HOURS
Qty: 7 TABLET | Refills: 0 | Status: SHIPPED | OUTPATIENT
Start: 2017-06-06 | End: 2017-06-10

## 2017-06-06 RX ORDER — POTASSIUM CHLORIDE 20 MEQ/15ML
40 SOLUTION ORAL ONCE
Status: COMPLETED | OUTPATIENT
Start: 2017-06-06 | End: 2017-06-06

## 2017-06-06 RX ORDER — DIVALPROEX SODIUM 500 MG/1
500 TABLET, DELAYED RELEASE ORAL EVERY 12 HOURS
Qty: 60 TABLET | Refills: 11 | Status: ON HOLD
Start: 2017-06-06 | End: 2019-11-01 | Stop reason: HOSPADM

## 2017-06-06 RX ADMIN — ALBUTEROL SULFATE 2.5 MG: 2.5 SOLUTION RESPIRATORY (INHALATION) at 08:06

## 2017-06-06 RX ADMIN — AMOXICILLIN AND CLAVULANATE POTASSIUM 1 TABLET: 875; 125 TABLET, FILM COATED ORAL at 12:06

## 2017-06-06 RX ADMIN — VALPROATE SODIUM 500 MG: 100 INJECTION, SOLUTION INTRAVENOUS at 09:06

## 2017-06-06 RX ADMIN — CEFTRIAXONE 1 G: 1 INJECTION, SOLUTION INTRAVENOUS at 06:06

## 2017-06-06 RX ADMIN — ALBUTEROL SULFATE 2.5 MG: 2.5 SOLUTION RESPIRATORY (INHALATION) at 12:06

## 2017-06-06 RX ADMIN — FLUOXETINE 60 MG: 20 CAPSULE ORAL at 09:06

## 2017-06-06 RX ADMIN — CARBAMIDE PEROXIDE 6.5% 5 DROP: 6.5 LIQUID AURICULAR (OTIC) at 09:06

## 2017-06-06 RX ADMIN — POTASSIUM CHLORIDE 40 MEQ: 20 SOLUTION ORAL at 12:06

## 2017-06-06 RX ADMIN — CLONAZEPAM 1 MG: 0.5 TABLET ORAL at 09:06

## 2017-06-06 RX ADMIN — Medication: at 09:06

## 2017-06-06 RX ADMIN — SODIUM CHLORIDE, PRESERVATIVE FREE 3 ML: 5 INJECTION INTRAVENOUS at 06:06

## 2017-06-06 NOTE — ASSESSMENT & PLAN NOTE
Resolved. Suspect O2 sat monitor on toe not accurate. Pt in no distress and readings > 94% on right ear.  Monitor.

## 2017-06-06 NOTE — PT/OT/SLP PROGRESS
Speech Language Pathology  Dysphagia Treatment    Karri Galeano   MRN: [id number]   [id number]/[id number] A    Admitting Diagnosis: Right lower lobe pneumonia    Diet recommendations: Solid Diet Level: Puree  Liquid Diet Level: Thin strict aspiration precautions  MBSS to objectively rule out aspiration and determine the least restrictive and safest diet    SLP Treatment Date: 06/06/17  Speech Start Time: 1046     Speech Stop Time: 1110     Speech Total (min): 24 min       TREATMENT BILLABLE MINUTES:  Treatment Swallowing Dysfunction 24    Has the patient been evaluated by SLP for swallowing? : Yes  Keep patient NPO?: No   General Precautions: Standard, fall, aspiration, pureed diet  Current Respiratory Status:  (taken off high flow nasal cannula; room air)       Subjective:  Pt awake with PA present in room. Pt taken off of high flow nasal cannula.     Pain/Comfort  Pain Rating 1: 0/10    Objective:   Pt seen to assess tolerance of pureed diet and thin liquids. Pt noted to swallow with open mouth posture utilizing tongue to push bolus into pharyngeal cavity. All swallows palpated. Noted audible swallows, wet breathing sounds, and decrease in O2 stats per each sip/bite. Pt also noted to have RLL pneumonia dx which is concerning for possible aspiration. Pt would benefit from a Modified Barium Swallow Study to objectively r/o aspiration and determine the least restrictive and safest diet.     Assessment:  Karri Galeano is a 21 y.o. male with a medical diagnosis of Right lower lobe pneumonia and presents with Dysphagia. ST will continue to follow.     Discharge recommendations: Discharge Facility/Level Of Care Needs:  (return to group home, needs 24 hour supervision with meals)     Goals:    SLP Goals        Problem: SLP Goal    Goal Priority Disciplines Outcome   SLP Goal     SLP Ongoing (interventions implemented as appropriate)   Description:  Short Term Goals:   1. Pt will participate in an ongoing assessment to determine  the least restrictive and safest po diet with updated goals to follow pending results. MET 6/5  2. Patient will tolerate puree consistency/thin liquids po diet with no overt s/s of aspiration.                             Plan:   Patient to be seen Therapy Frequency: 2 x/week   Plan of Care expires: 07/03/17  Plan of Care reviewed with: patient (RN)  SLP Follow-up?: Yes  SLP - Next Visit Date: 06/06/17           JOSE MANUEL Bower, CCC-SLP  06/06/2017

## 2017-06-06 NOTE — PLAN OF CARE
The patient's  spoke to his caregiver. Patient d/c home d/c instructions printed and I will send with the patient.  Education printed and  I will send with the patient for his caregiver. refer to clinical reference for education. IV removed tip intact. Soft restraints removed. Waiting for transportation to come and  the patient

## 2017-06-06 NOTE — SUBJECTIVE & OBJECTIVE
Interval History: Pt appears comfortable on room air. Charge Nurse Catalina moved O2 sat monitor to right ear and now > 95% on room air consistently.  Was previously on his great toe.      Review of Systems   Unable to perform ROS: Patient nonverbal     Objective:     Vital Signs (Most Recent):  Temp: 98.5 °F (36.9 °C) (06/06/17 0800)  Pulse: 76 (06/06/17 0802)  Resp: 18 (06/06/17 0802)  BP: (!) 140/89 (06/06/17 0800)  SpO2: (!) 88 % (06/06/17 0802) Vital Signs (24h Range):  Temp:  [98.4 °F (36.9 °C)-99.1 °F (37.3 °C)] 98.5 °F (36.9 °C)  Pulse:  [66-89] 76  Resp:  [16-19] 18  SpO2:  [88 %-99 %] 88 %  BP: (140-156)/(68-96) 140/89     Weight: 44 kg (97 lb)  Body mass index is 16.14 kg/m².    Intake/Output Summary (Last 24 hours) at 06/06/17 1032  Last data filed at 06/06/17 0903   Gross per 24 hour   Intake              700 ml   Output              434 ml   Net              266 ml      Physical Exam   Constitutional: No distress.   Cardiovascular: Normal rate and regular rhythm.    Murmur heard.  Pulmonary/Chest: Effort normal and breath sounds normal. No respiratory distress. He has no wheezes.   Abdominal: Soft.   Musculoskeletal: He exhibits no edema.   Legs with no muscle tone and pulled up under his hips.    Neurological: He is alert.       Skin: Skin is warm.   Scratch with dried blood on left lateral nose near corner of his eye.     Psychiatric:   Calm and listens while I talk to him.    Nursing note and vitals reviewed.      Significant Labs:   BMP:   Recent Labs  Lab 06/06/17  0432      *   K 3.3*      CO2 24   BUN 9   CREATININE 0.6   CALCIUM 8.8   MG 1.8     CBC:   Recent Labs  Lab 06/05/17  0334 06/06/17  0432   WBC 15.30*  15.30* 11.99   HGB 11.0*  11.0* 11.1*   HCT 32.7*  32.7* 32.8*     167 191       Significant Imaging: no new

## 2017-06-06 NOTE — PROGRESS NOTES
[facility] Hospital Medicine  Progress Note    Patient Name: Karri Galeano  MRN: [id number]  Patient Class: IP- Inpatient   Admission Date: 6/3/2017  Length of Stay: 3 days  Attending Physician: Mainor Claros MD  Primary Care Provider: Echo Reeves MD        Subjective:     Principal Problem:Right lower lobe pneumonia    HPI:  Karri Galeano is a 21 y.o. black man with cerebral palsy, microcephaly, severe mental retardation, hypotonia, developmental delays, fine motor and gross motor delays, history of seizures before age 3.  He lives at [facility] in [place], [place].   His caretaker's name is Johnathon Waterman.  His next of kin is his aunt Chloe Galeano [phone].     He was taken to [facility] on 6/3/17 after he had an axillary temperature reading of 101F, no appetite, and withdrawn.   Per caretaker, he is normally very playful, smiling, and active in wheelchair.      Upon arrival to ED he was found to also have tachycardia (pulse 110), tachypnea (respiratory rate 42), hypoxia (oxygen saturation 89%), leukocytosis (15,910), hypernatremia (147), and a right lung base ground glass opacity on chest x-ray.   Influenza EIA was negative.  He was admitted to [facility] for pneumonia.        Hospital Course:  He was put on IV ceftriaxone and PO doxycycline for atypicals instead of azithromycin due to erythromycin allergy.  Scheduled nebulizer treatments were given.  He was relatively withdrawn and refused to take oral medications, like an ill infant.  Doxycycline was changed to IV the next day.  On early AM 6/5/17 his supplemental oxygen needed to be increased to high flow nasal cannula. Now > 95% so will wean as tolerated.  6/6/17: Pt is breathing comfortably on room air.  O2 sat monitor is on his toe for the low readings.  When moved to the right ear, reading >  95%. Switching to PO augmentin as he is tolerating PO. Will work on discharge today.     Interval History: Pt appears comfortable on room air. Charge Nurse Catalina moved O2 sat monitor to right ear and now > 95% on room air consistently.  Was previously on his great toe.      Review of Systems   Unable to perform ROS: Patient nonverbal     Objective:     Vital Signs (Most Recent):  Temp: 98.5 °F (36.9 °C) (06/06/17 0800)  Pulse: 76 (06/06/17 0802)  Resp: 18 (06/06/17 0802)  BP: (!) 140/89 (06/06/17 0800)  SpO2: (!) 88 % (06/06/17 0802) Vital Signs (24h Range):  Temp:  [98.4 °F (36.9 °C)-99.1 °F (37.3 °C)] 98.5 °F (36.9 °C)  Pulse:  [66-89] 76  Resp:  [16-19] 18  SpO2:  [88 %-99 %] 88 %  BP: (140-156)/(68-96) 140/89     Weight: 44 kg (97 lb)  Body mass index is 16.14 kg/m².    Intake/Output Summary (Last 24 hours) at 06/06/17 1032  Last data filed at 06/06/17 0903   Gross per 24 hour   Intake              700 ml   Output              434 ml   Net              266 ml      Physical Exam   Constitutional: No distress.   Cardiovascular: Normal rate and regular rhythm.    Murmur heard.  Pulmonary/Chest: Effort normal and breath sounds normal. No respiratory distress. He has no wheezes.   Abdominal: Soft.   Musculoskeletal: He exhibits no edema.   Legs with no muscle tone and pulled up under his hips.    Neurological: He is alert.       Skin: Skin is warm.   Scratch with dried blood on left lateral nose near corner of his eye.     Psychiatric:   Calm and listens while I talk to him.    Nursing note and vitals reviewed.      Significant Labs:   BMP:   Recent Labs  Lab 06/06/17  0432      *   K 3.3*      CO2 24   BUN 9   CREATININE 0.6   CALCIUM 8.8   MG 1.8     CBC:   Recent Labs  Lab 06/05/17  0334 06/06/17  0432   WBC 15.30*  15.30* 11.99   HGB 11.0*  11.0* 11.1*   HCT 32.7*  32.7* 32.8*     167 191       Significant Imaging: no new    Assessment/Plan:      Sepsis due to pneumonia    Right  lower lobe pneumonia  Leukocytosis - resolved  Pneumonia of right lower lobe due to infectious organism  Sepsis resolved. Afebrile. WBC 12K today. D/C IV ceftriaxone and doxycycline - received 3 days. Starting on PO augmentin for 4 more days to complete 7 day course. Continue albuterol-ipratropium nebulizer treatments, guaifenesin and PRN supplemental oxygen.        Acute hypoxemic respiratory failure    Resolved. Suspect O2 sat monitor on toe not accurate. Pt in no distress and readings > 94% on right ear.  Monitor.         Atypical stereotyped movement disorder    Cerebral palsy   Continue home dose valproic acid and clonazepam.          Oropharyngeal dysphagia    Is on pureed diet at group home.  Appreciate Speech Therapy recommendations.          Severe mental retardation    Microcephaly  Requires assistance with all daily activities  Discharge when on room air.              VTE Risk Mitigation         Ordered     enoxaparin injection 40 mg  Daily     Route:  Subcutaneous        06/03/17 2106     High Risk of VTE  Once      06/03/17 2106          Jennifer Dunn PA-C  Department of Hospital Medicine   [facility]  Pager: [phone]    Attending: Todd Herrera MD

## 2017-06-06 NOTE — PHYSICIAN QUERY
"PT Name: Karri Galeano  MR #: [id number]    Physician Query Form - Pneumonia Clarification     CDS/: Lizz Mejia               Contact information:[email or web address]  This form is a permanent document in the medical record.    Query Date:  June 6, 2017    By submitting this query, we are merely seeking further clarification of documentation. Please utilize your independent clinical judgment when addressing the question(s) below.    The Medical record contains the following:   Indicators   Supporting Clinical Findings Location in Medical Record   x "Pneumonia" documented Pneumonia of right lower lobe due to infectious organism  H&P   x Chest X-Ray: Right lung base ground glass opacity on chest x-ray.    H&P    PaO2    PaCO2     O2 sat      Cultures      x Treatment  He was put on IV ceftriaxone and PO doxycycline for atypicals instead of azithromycin due to erythromycin allergy    Switched to PO augmentin for 4 days to complete 7 days course as he is tolerating PO.  PN 06/04              Discharge Summary    Supplemental O2      Other         Provider, please specify type of pneumonia.    [x ] Bacterial Pneumonia (Specify organism): ______________________  [  ] Bacterial, Gram Negative organism Pneumonia  [  ] Other type of pneumonia (please specify): ______________________________________  [  ] Clinically undetermined    Please document in your progress notes daily for the duration of treatment, until resolved, and include in your discharge summary.    .                                                                                    "

## 2017-06-06 NOTE — ASSESSMENT & PLAN NOTE
Right lower lobe pneumonia  Leukocytosis - resolved  Pneumonia of right lower lobe due to infectious organism  Sepsis resolved. Afebrile. WBC 12K today. D/C IV ceftriaxone and doxycycline - received 3 days. Starting on PO augmentin for 4 more days to complete 7 day course. Continue albuterol-ipratropium nebulizer treatments, guaifenesin and PRN supplemental oxygen.

## 2017-06-06 NOTE — PROCEDURES
Modifed Barium Swallow Study  Speech Start Time: 1130  Speech Stop Time: 1200  Speech Total (min): 30 min  SLP Treatment Date: 06/06/17    Reason for Referral  Patient was referred for a Modified Barium Swallow Study to assess the efficiency of his/her swallow function, rule out aspiration and make recommendations regarding safe dietary consistencies, effective compensatory strategies, and safe eating environment.     Diagnosis   Right lower lobe pneumonia    Past Medical History:   Diagnosis Date    Anxiety     Congenital cerebral palsy     Hypotonia     Leukodystrophy     Mental retardation     Seizures     Stereotyped movement disorder      History reviewed. No pertinent surgical history.     General Precautions: fall, aspiration, pureed diet  Current Respiratory Status:  (taken off high flow nasal cannula; room air)    Recommendations  1. Pureed diet  2. Thin liquids  3. Crushed po medication  4. Upright for all po intake and remain upright for 20 minutes s/p po intake  5. Small bites and sips  6. Slow rate  7. Avoid styrofoam cups/plastic utensils    Oral Peripheral Examination  Oral Musculature Evaluation  Oral Musculature: unable to assess due to poor participation/comprehension  Volitional Swallow: no swallow initiated  Voice Prior to PO Intake: no vocalizations    Consistencies Assessed  THIN: Thin liquids 5cc thin x1 via cup, clinicina regulated unmeasured cup sip x1, tsp sip x3 and   Rosenbek 8-Point Penetration-Aspiration Scale Score: 1: Material does not enter the airway.  PUREE: Puree tsp bites x2  Rosenbek 8-Point Penetration-Aspiration Scale Score: 1: Material does not enter the airway.    Oral Preparation / Oral Phase  Pt presents with moderate-severe oral dysphagia c/b anterior loss of all thin liquid trials via cup, impaired ability to close lips around cup/spoon,  Decreased bolus formation, and mild oral residue follow all trials 2/2 decreased overall oral motor strength and coordination.  This is pt's baseline.     Pharyngeal Phase  Pt presents with mild pharyngeal dysphagia c/b trace-mild piriform sinus residue s/p thin liquid trials 2/2 decreased pharyngeal wall constriction. Pt does not follow commands to complete volitional second swallows to clear. Noted delayed second volitional swallow. No aspiration or penetration noted with all trials.     Cervical Esophageal Phase  Adequate UES opening, no noted esophageal backflow    Impressions  Pt presented with moderate oral dysphagia and mild pharyngeal dysphagia. Pt presented with no aspiration or penetration with pureed consistency and thin liquids. Recommend: Pureed diet, thin liquids, crushed po medications, 1:1 assistance with meals, avoid styrofoam cups and plastic utensils, upright for all po intake and 30 minutes s/p po intake, slow rate/one bite/sip at a time. No further skilled ST services warranted at this time. Please reconsult as needed.    Prognosis/Plan/Education  Prognosis for improvement is poor 2/2 severe intellectual disability.      Care Plan    SLP Goals     Not on file          Multidisciplinary Problems (Resolved)        Problem: SLP Goal    Goal Priority Disciplines Outcome   SLP Goal   (Resolved)     SLP Outcome(s) achieved   Description:  Short Term Goals:   1. Pt will participate in an ongoing assessment to determine the least restrictive and safest po diet with updated goals to follow pending results. MET 6/5  2. Patient will tolerate puree consistency/thin liquids po diet with no overt s/s of aspiration. MET                             G-Codes  Functional Assessment Tool Used: noms  Score: 1  Functional Limitations: Swallowing  Swallow Current Status (): CN  Swallow Goal Status (): MARIAA Cade, CCC-SLP  06/06/2017

## 2017-06-06 NOTE — ASSESSMENT & PLAN NOTE
Sepsis resolved. Afebrile. WBC 12K today. D/C IV ceftriaxone and doxycycline - received 3 days. Started on PO augmentin for 4 more days to complete 7 day course. Continue guaifenesin for 4 more days.

## 2017-06-06 NOTE — PLAN OF CARE
TN spoke to Tha Waterman, patient's caretaker @ group home  @ [phone] and informed of pt ready for discharge and will need to see PCP next week. TN unable to schedule as office is closed this week for vacation. Mr. Waterman verbalized understanding and informed Tn he will bring pt in for follow up next week and will have someone  pt from hospital between 2- 2:30. TN informed floor nurse Janet of pt ready when ride arrives.      06/06/17 1252   Final Note   Assessment Type Final Discharge Note   Discharge Disposition Home   Discharge planning education complete? Yes   What phone number can be called within the next 1-3 days to see how you are doing after discharge? [phone]   Hospital Follow Up  Appt(s) scheduled? No  (message left at PCP office and caregiver informed to have pt follow up next week)   Offered [facility] Bedside Delivery? n/a   Referral to Outpatient Case Management complete? n/a   Referral to / orders for Home Health Complete? n/a   30 day supply of medicines given at discharge, if documented non-compliance / non-adherence? n/a   Any social issues identified prior to discharge? No   Did you assess the readiness or willingness of the family or caregiver to support self management of care? Yes   Right Care Referral Info   Post Acute Recommendation No Care

## 2017-06-06 NOTE — DISCHARGE SUMMARY
[facility] Hospital Medicine  Discharge Summary      Patient Name: Karri Galeano  MRN: [id number]  Admission Date: 6/3/2017  Hospital Length of Stay: 3 days  Discharge Date and Time:  06/06/2017 12:32 PM  Attending Physician: Mainor Claros MD   Discharging Provider: Jennifer Dunn PA-C  Primary Care Provider: Echo Reeves MD      HPI:   Karri Galeano is a 21 y.o.  male with cerebral palsy, microcephaly, severe mental retardation, hypotonia, developmental delays, fine motor and gross motor delays, history of seizures before age 3.  He lives at [facility] in [place], [place].   His caretaker's name is Johnathon Waterman ([phone]).  His next of kin is his aunt Chloe Galeano [phone] who lives in [place].     He was taken to [facility] on 6/3/17 after he had an axillary temperature reading of 101F, no appetite, and withdrawn.   Per caretaker, he is normally very playful, smiling, and active in wheelchair.      Upon arrival to ED, he was found to also have tachycardia (pulse 110), tachypnea (respiratory rate 42), hypoxia (oxygen saturation 89%), leukocytosis (15,910), hypernatremia (147), and a right lung base ground glass opacity on chest x-ray.   Influenza EIA was negative.  He was admitted to [facility] for pneumonia.        * No surgery found *      Indwelling Lines/Drains at time of discharge:   Lines/Drains/Airways          No matching active lines, drains, or airways        Hospital Course:   He was put on IV ceftriaxone and PO doxycycline for atypicals instead of azithromycin due to erythromycin allergy.  Scheduled nebulizer treatments were given.  He was relatively withdrawn and refused to take oral medications.  Doxycycline was changed to IV the next day.  On early AM 6/5/17 his supplemental oxygen needed to be increased to high flow nasal  cannula. Now > 95% so weaned as tolerated.  6/6/17: Pt is breathing comfortably on room air.  O2 sat monitor is on his toe for the low readings.  When moved to the right ear, reading > 95%. Switched to PO augmentin for 4 days to complete 7 days course as he is tolerating PO.  Speech Therapy performed modified barium swallow study today and noted: Modified Barium no aspiration or penetration with pureed consistency and thin liquids. Recommend: Pureed diet, thin liquids, crushed po medications, 1:1 assistance with meals, avoid styrofoam cups and plastic utensils, upright for all po intake and 30 minutes s/p po intake, slow rate/one bite/sip at a time. Left message for pt's Aunt and spoke to pt's caregiver Tha Waterman about discharge.       Consults:     Significant Diagnostic Studies: Labs:   BMP:   Recent Labs  Lab 06/05/17  0334 06/06/17  0432     107 105   *  134* 133*   K 3.1*  3.1* 3.3*     101 100   CO2 25  25 24   BUN 10  10 9   CREATININE 0.6  0.6 0.6   CALCIUM 8.5*  8.5* 8.8   MG 1.9 1.8    and CBC   Recent Labs  Lab 06/05/17  0334 06/06/17  0432   WBC 15.30*  15.30* 11.99   HGB 11.0*  11.0* 11.1*   HCT 32.7*  32.7* 32.8*     167 191     Microbiology Results (last 7 days)     Procedure Component Value Units Date/Time    Blood culture x two cultures. Draw prior to antibiotics. [[id number]] Collected:  06/03/17 1520    Order Status:  Completed Specimen:  Blood from Peripheral, Forearm, Left Updated:  06/05/17 2012     Blood Culture, Routine No Growth to date     Blood Culture, Routine No Growth to date     Blood Culture, Routine No Growth to date    Narrative:       Aerobic and anaerobic    Blood culture x two cultures. Draw prior to antibiotics. [[id number]] Collected:  06/03/17 1531    Order Status:  Completed Specimen:  Blood from Peripheral, Wrist, Left Updated:  06/05/17 2012     Blood Culture, Routine No Growth to date     Blood Culture, Routine No Growth to date      Blood Culture, Routine No Growth to date    Narrative:       Aerobic and anaerobic    Urine culture [[id number]] Collected:  06/03/17 1548    Order Status:  Completed Specimen:  Urine from Urine, Catheterized Updated:  06/04/17 2001     Urine Culture, Routine No growth        Imaging Results          Fl Modified Barium Swallow Speech (Final result)  Result time 06/06/17 12:17:39    Final result by Krysta Funes MD (06/06/17 12:17:39)                 Impression:      No evidence of laryngeal penetration or tracheal aspiration.  Please see speech pathology report for further details.      Electronically signed by: KRYSTA FUNES MD  Date:     06/06/17  Time:    12:17              Narrative:    Clinical indication: 21-year-old male with possible aspiration.    Comparison: None.    Technique: Video fluoroscopic swallowing examination was performed in conjunction with the speech pathology department.  Various liquid and solid food substances were used to assess swallowing.    Findings: Normal oral transit was observed. Both regular and thin barium, as well as semisolid food substances were swallowed without difficulty.  No laryngeal penetration or tracheal aspiration was observed.  Please see speech pathology report for further details.    Total fluoroscopic time was 1 minute 8 seconds.                             X-Ray Chest AP Portable (Final result)  Result time 06/03/17 15:42:48    Final result by Nj Blackwell MD (06/03/17 15:42:48)                 Impression:     See above            Electronically signed by: NJ BLACKWELL MD  Date:     06/03/17  Time:    15:42              Narrative:    Exam: Portable chest radiograph    Clinical History:   sepsis.     Comparison: Chest x-ray, 03/23/2017    Findings:.     The exam limited due to motion or respiratory artifact. Heart size appears normal. There appears be some groundglass opacity in the right lung base, however this may be due to the motion artifact.  No  pneumothorax.                            6/6/2017:  Modified Barium Swallow Study completed. Pt presented with no aspiration or penetration with pureed consistency and thin liquids. Recommend: Pureed diet, thin liquids, crushed po medications, 1:1 assistance with meals, avoid styrofoam cups and plastic utensils, upright for all po intake and 30 minutes s/p po intake, slow rate/one bite/sip at a time.      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] 06/05/2017 Yes     Chronic    Requires assistance with all daily activities [Z74.1] 06/03/2017 Not Applicable     Chronic    Severe mental retardation [F72] 03/02/2015 Yes     Chronic    Microcephaly [Q02] 03/02/2015 Not Applicable     Chronic      Problems Resolved During this Admission:    Diagnosis Date Noted Date Resolved POA    Sepsis due to pneumonia [J18.9, A41.9] 06/03/2017 06/06/2017 Yes    Acute hypoxemic respiratory failure [J96.01] 06/03/2017 06/06/2017 Yes    Hypernatremia [E87.0] 06/04/2017 06/04/2017 Yes    Leukocytosis [D72.829] 06/04/2017 06/06/2017 Yes      * Right lower lobe pneumonia    Sepsis resolved. Afebrile. WBC 12K today. D/C IV ceftriaxone and doxycycline - received 3 days. Started on PO augmentin for 4 more days to complete 7 day course. Continue guaifenesin for 4 more days.         Atypical stereotyped movement disorder    Cerebral palsy   Continue home dose valproic acid and clonazepam.          Oropharyngeal dysphagia    Is on pureed diet at group home.  Appreciate Speech Therapy recommendations.          Severe mental retardation    Microcephaly  Requires assistance with all daily activities  Return to group home.               Discharged Condition: stable    Disposition: Home or Self Care    Follow Up:  Follow-up Information     Echo Reeves MD.    Specialty:  Emergency Medicine  Why:  As needed  Contact information:  [address] [address] [phone]                 Patient Instructions:     Diet general   Order Specific Question Answer Comments   Additional restrictions: Pureed      Activity as tolerated     Call MD for:  temperature >100.4     Call MD for:  difficulty breathing or increased cough       Medications:  Reconciled Home Medications:   Current Discharge Medication List      START taking these medications    Details   amoxicillin-clavulanate 875-125mg (AUGMENTIN) 875-125 mg per tablet Take 1 tablet by mouth every 12 (twelve) hours.  Qty: 7 tablet, Refills: 0    Associated Diagnoses: Pneumonia of right lower lobe due to infectious organism      divalproex (DEPAKOTE) 500 MG TbEC Take 1 tablet (500 mg total) by mouth every 12 (twelve) hours.  Qty: 60 tablet, Refills: 11    Comments: Resume prior prescription.  Associated Diagnoses: Severe mental retardation      guaifenesin 100 mg/5 ml (ROBITUSSIN) 100 mg/5 mL syrup Take 10 mLs (200 mg total) by mouth 3 (three) times daily.  Refills: 0    Associated Diagnoses: Pneumonia of right lower lobe due to infectious organism         CONTINUE these medications which have NOT CHANGED    Details   clonazePAM (KLONOPIN) 1 MG tablet Take 1 mg by mouth 2 (two) times daily.      fluoxetine (PROZAC) 20 MG capsule Take 60 mg by mouth once daily.      LACTOSE-REDUCED FOOD (ENSURE ORAL) Take by mouth 4 (four) times daily.      mineral oil-hydrophil petrolat (AQUAPHOR) Oint Apply topically as needed. Apply to dry skin once daily after bath      nutritional supplement-caloric (BENECALORIE) 7.5 kcal/mL Liqd Take by mouth. Add to food or drink 3 times daily for calorie supplement at 7am and 1600 and 1900      trazodone (DESYREL) 100 MG tablet Take 100 mg by mouth every  evening.      salicylic acid 2 % Liqd Apply topically to face every morning for acne         STOP taking these medications       divalproex (DEPAKOTE) 500 MG Tb24 Comments:   Reason for Stopping:             Time spent on the discharge of patient: 40 minutes    Jennifer Dunn PA-C  Department of Hospital Medicine  [facility]  Pager: [phone]    Attending: Mainor Claros MD

## 2017-06-06 NOTE — PLAN OF CARE
Problem: SLP Goal  Goal: SLP Goal  Short Term Goals:   1. Pt will participate in an ongoing assessment to determine the least restrictive and safest po diet with updated goals to follow pending results. MET 6/5  2. Patient will tolerate puree consistency/thin liquids po diet with no overt s/s of aspiration. MET          Outcome: Outcome(s) achieved Date Met: 06/06/17  Modified Barium Swallow Study completed. Pt presented with no aspiration or penetration with pureed consistency and thin liquids. Recommend: Pureed diet, thin liquids, crushed po medications, 1:1 assistance with meals, avoid styrofoam cups and plastic utensils, upright for all po intake and 30 minutes s/p po intake, slow rate/one bite/sip at a time. No further skilled ST services warranted at this time. Please reconsult as needed.  KENNEDY Nelson., CCC-SLP  06/06/2017

## 2017-06-06 NOTE — PLAN OF CARE
Problem: Patient Care Overview  Goal: Plan of Care Review  Outcome: Ongoing (interventions implemented as appropriate)  Plan of care reviewed with patient. Patient non-verbal. Patient on hi-flow O2 per NC, will continue to wean as tolerated. PO meds administered crushed in pudding. When awake, patient continues to pull at lines, O2 tubing. Bilateral soft mitt restraints maintained; order to  06/07 @ 0236. Bed is low and locked, bed alarm is activated, call light is within reach. Will continue to monitor.

## 2017-06-06 NOTE — PLAN OF CARE
Problem: Patient Care Overview  Goal: Plan of Care Review  Pt received on 10 lpm HFNC @ 90% with a SpO2 of 98 %. No respiratory distress noted. Will continue to monitor.

## 2017-06-07 NOTE — PHARMACY MED REC
[facility] Medication Reconciliation  Template    Patient was admitted on 6/3/2017 for Right lower lobe pneumonia.      Patient's prior to admission medication regimen was as follows:  No prescriptions prior to admission.         Please add appropriate    SmartPhrase below:      Pharmacy Medication Reconciliation     Admission Medication Reconciliation has been completed by the technician and reviewed by the pharmacist.     Any issues and discrepancies have been discussed with the [facility] team.     Please call pharmacy if any questions.     Pepe Morales, PharmD  [phone]

## 2017-06-08 LAB
BACTERIA BLD CULT: NORMAL
BACTERIA BLD CULT: NORMAL

## 2017-06-11 NOTE — ED PROVIDER NOTES
Encounter Date: 6/3/2017       History     Chief Complaint   Patient presents with    Fever     onset this am     Review of patient's allergies indicates:   Allergen Reactions    Clindamycin Hives    Erythromycin Rash     HPI  Past Medical History:   Diagnosis Date    Anxiety     Congenital cerebral palsy     Hypotonia     Leukodystrophy     Mental retardation     Seizures     Stereotyped movement disorder      History reviewed. No pertinent surgical history.  History reviewed. No pertinent family history.  Social History   Substance Use Topics    Smoking status: Never Smoker    Smokeless tobacco: Not on file    Alcohol use Not on file     Review of Systems    Physical Exam     Initial Vitals [06/03/17 1450]   BP Pulse Resp Temp SpO2   129/65 110 (!) 42 100.3 °F (37.9 °C) (!) 94 %     Physical Exam    ED Course   Procedures  Labs Reviewed   CBC W/ AUTO DIFFERENTIAL - Abnormal; Notable for the following:        Result Value    WBC 15.91 (*)     RBC 4.20 (*)     Hemoglobin 12.1 (*)     Hematocrit 37.1 (*)     RDW 14.6 (*)     Platelets 149 (*)     Lymph% 14.0 (*)     Mono% 17.0 (*)     All other components within normal limits   COMPREHENSIVE METABOLIC PANEL - Abnormal; Notable for the following:     Sodium 147 (*)     Glucose 124 (*)     All other components within normal limits   LIPASE - Abnormal; Notable for the following:     Lipase 22 (*)     All other components within normal limits   PROTIME-INR - Abnormal; Notable for the following:     Prothrombin Time 15.7 (*)     INR 1.4 (*)     All other components within normal limits   URINALYSIS - Abnormal; Notable for the following:     Occult Blood UA 3+ (*)     All other components within normal limits   URINALYSIS MICROSCOPIC - Abnormal; Notable for the following:     RBC, UA 25 (*)     All other components within normal limits   ISTAT PROCEDURE - Abnormal; Notable for the following:     POC PH 7.460 (*)     POC PO2 55 (*)     POC SATURATED O2 90 (*)      POC TCO2 28 (*)     POC Hematocrit 34 (*)     All other components within normal limits   APTT   LACTIC ACID, PLASMA   MAGNESIUM   PHOSPHORUS   TROPONIN I   INFLUENZA A AND B ANTIGEN        ECG Results          EKG 12-lead (Final result)  Result time 06/05/17 19:08:38    Final result by Interface, Lab In [facility] (06/05/17 19:08:38)                 Narrative:    Test Reason : r00.0  Blood Pressure :mmHG  Vent. Rate : 111 BPM     Atrial Rate : 111 BPM     P-R Int : 126 ms          QRS Dur : 078 ms      QT Int : 304 ms       P-R-T Axes : 043 031 043 degrees     QTc Int : 413 ms    Sinus tachycardia  Normal ECG  No previous ECGs available  Confirmed by Trenton Haider MD ([id number]) on 6/5/2017 7:08:23 PM    Referred By: [email or web address]   SELF           Confirmed By:Trenton Haider MD                                                 ED Course     Clinical Impression:   The primary encounter diagnosis was Pneumonia of right lower lobe due to infectious organism. Diagnoses of Sepsis due to pneumonia, Hypoxia, Right lower lobe pneumonia, and Severe mental retardation were also pertinent to this visit.          Denys Aguilar MD  06/11/17 0651       Denys Aguilar MD  06/11/17 0652

## 2017-06-12 NOTE — PHYSICIAN QUERY
PT Name: Karri Galeano  MR #: [id number]    Physician Query Form - Cause and Effect Relationship Clarification      CDS/: Catalina Manuel               Contact information: [email or web address]      This form is a permanent document in the medical record.     Query Date: June 12, 2017    By submitting this query, we are merely seeking further clarification of documentation. Please utilize your independent clinical judgment when addressing the question(s) below.    The Medical record contains the following:  Supporting Clinical Findings   Location in record         ED Course :     Clinical Impression:   The primary encounter diagnosis was Pneumonia of right lower lobe due to infectious organism. Diagnoses of Sepsis due to pneumonia, Hypoxia, and Right lower lobe pneumonia were also pertinent to this visit.     Sepsis due to pneumonia Right lower lobe pneumonia   Leukocytosis   Pneumonia of right lower lobe due to infectious organism   Meets sepsis criteria due to tachycardia, leukocytosis, and Right lung base ground glass opacity on chest x-ray.     Plan:Ceftriaxone and doxycycline, albuterol-ipratropium nebulizer treatments, guaifenesin, supplemental oxygen.                                              Bacterial Pneumonia                                                                                                                                                ED note 6/3                    H&P                    Query 6/6                                                                                                                                                                                                       Provider, please clarify if there is any correlation between _sepsis__ and __bacterial pneumonia___.           Are the conditions:     [ x ] Due to or associated with each other     [  ] Unrelated to each other     [  ] Other (Please Specify): _________________________     [  ] Clinically  Undetermined

## 2017-09-11 ENCOUNTER — HOSPITAL ENCOUNTER (EMERGENCY)
Facility: HOSPITAL | Age: 22
Discharge: HOME | End: 2017-09-11
Attending: EMERGENCY MEDICINE
Payer: MEDICARE

## 2017-09-11 VITALS
BODY MASS INDEX: 19.97 KG/M2 | WEIGHT: 120 LBS | DIASTOLIC BLOOD PRESSURE: 77 MMHG | TEMPERATURE: 98 F | HEART RATE: 94 BPM | SYSTOLIC BLOOD PRESSURE: 127 MMHG | OXYGEN SATURATION: 98 % | RESPIRATION RATE: 18 BRPM
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
## 2017-09-11 PROCEDURE — 99283 EMERGENCY DEPT VISIT LOW MDM: CPT

## 2017-09-12 NOTE — DISCHARGE INSTRUCTIONS
Follow-up the patient primary care physician on tomorrow.  Monitor patient for any worsening of symptoms or changes in behavior.

## 2017-09-12 NOTE — ED PROVIDER NOTES
"Encounter Date: 9/11/2017       History     Chief Complaint   Patient presents with    Fall     family states " he fell backwards in his weelchair around 3 pm" denies LOC, no complaints      21-year-old male with a history of cerebral palsy an MR reports to the emergency room with staff from living facility that reports a fall from the wheelchair.  hospitals patient was not properly strapped into his wheelchair when the wheelchair rocked and the patient slid from the wheelchair seat onto the ground.  hospitals patient fell primarily on his back.  Fall was witnessed by staff.  Patient is nonverbal but has been behaving appropriately at baseline since the fall.  Denies any change in behaviors or activities.          Review of patient's allergies indicates:   Allergen Reactions    Clindamycin Hives    Erythromycin Rash     Past Medical History:   Diagnosis Date    Anxiety     Congenital cerebral palsy     Hypotonia     Leukodystrophy     Mental retardation     Seizures     Stereotyped movement disorder      History reviewed. No pertinent surgical history.  History reviewed. No pertinent family history.  Social History   Substance Use Topics    Smoking status: Never Smoker    Smokeless tobacco: Never Used    Alcohol use No     Review of Systems   Unable to perform ROS: Patient nonverbal       Physical Exam     Initial Vitals [09/11/17 1750]   BP Pulse Resp Temp SpO2   133/83 101 18 98 °F (36.7 °C) 100 %      MAP       99.67         Physical Exam    Nursing note and vitals reviewed.  Constitutional: He is not diaphoretic. No distress.   HENT:   Head: Normocephalic and atraumatic.   Eyes: Conjunctivae are normal.   Neck: Normal range of motion. Neck supple.   Cardiovascular: Normal rate and regular rhythm.   Pulmonary/Chest: Breath sounds normal. No respiratory distress.   Abdominal: Soft. He exhibits no distension.   Musculoskeletal: He exhibits no tenderness.   Neurological: He is alert.   Skin: Skin is warm and " dry. Capillary refill takes less than 2 seconds.   Psychiatric: He has a normal mood and affect.         ED Course   Procedures  Labs Reviewed - No data to display          Medical Decision Making:   Initial Assessment:   21-year-old male with a history of cerebral palsy an MR reports to the emergency room with staff from living facility that reports a fall from the wheelchair.  States patient was not properly strapped into his wheelchair when the wheelchair rocked and the patient slid from the wheelchair seat onto the ground.  States patient fell primarily on his back.  Fall was witnessed by staff.  Patient is nonverbal but has been behaving appropriately at baseline since the fall.  Denies any change in behaviors or activities. Patient behaving at baseline per staff.  He is moving all extremities without difficulty. vital signs stable.   Differential Diagnosis:   Fall, fracture, dislocation, contusion, head injury, internal hemorrhage  ED Management:  Based on my assessment believe the patient did not sustain any significant injuries from the fall. there is no area of ecchymosis or deformity. I instructed the staff return patient to the emergency room if any behavioral changes occur are there any further concerns. follow-up with primary care doctor on tomorrow.  Take preventative measures to prevent further falls                   ED Course      Clinical Impression:   The encounter diagnosis was Fall, initial encounter.                           Blanche Tolbert NP  09/19/17 1951

## 2017-10-16 ENCOUNTER — HOSPITAL ENCOUNTER (EMERGENCY)
Facility: HOSPITAL | Age: 22
Discharge: HOME | End: 2017-10-16
Payer: MEDICARE

## 2017-10-16 VITALS
DIASTOLIC BLOOD PRESSURE: 70 MMHG | OXYGEN SATURATION: 97 % | TEMPERATURE: 97 F | RESPIRATION RATE: 16 BRPM | SYSTOLIC BLOOD PRESSURE: 136 MMHG | HEART RATE: 75 BPM

## 2017-10-16 DIAGNOSIS — K59.00 CONSTIPATION, UNSPECIFIED CONSTIPATION TYPE: Primary | ICD-10-CM

## 2017-10-16 DIAGNOSIS — K92.1 BLOOD IN STOOL: ICD-10-CM

## 2017-10-16 PROCEDURE — 99283 EMERGENCY DEPT VISIT LOW MDM: CPT

## 2017-10-16 RX ORDER — POLYETHYLENE GLYCOL 3350 17 G/17G
17 POWDER, FOR SOLUTION ORAL DAILY
Qty: 119 G | Refills: 0 | Status: SHIPPED | OUTPATIENT
Start: 2017-10-16 | End: 2018-02-07

## 2017-10-17 NOTE — ED PROVIDER NOTES
Encounter Date: 10/16/2017       History     Chief Complaint   Patient presents with    Rectal Bleeding     caregiver noted blood in stool today. Caregiver that brought pt in did not see the blood. She does report constipation      22-year-old male history of cerebral palsy and mental retardation brought to the emergency room today by staff at the [facility] states at the  program they notice some blood in his stool during one bowel movement.  Staff with him at this time states he had a bowel movement approximate 2 hours ago ago and no blood was noted at that time.  Staff states patient has been eating and drinking normally.  Behaving at his baseline.  Has not had a soft bowel movement in several days.          Review of patient's allergies indicates:   Allergen Reactions    Clindamycin Hives    Erythromycin Rash     Past Medical History:   Diagnosis Date    Anxiety     Congenital cerebral palsy     Hypotonia     Leukodystrophy     Mental retardation     Seizures     Stereotyped movement disorder      History reviewed. No pertinent surgical history.  History reviewed. No pertinent family history.  Social History   Substance Use Topics    Smoking status: Never Smoker    Smokeless tobacco: Never Used    Alcohol use No     Review of Systems   Unable to perform ROS: Patient nonverbal       Physical Exam     Initial Vitals   BP Pulse Resp Temp SpO2   -- -- -- -- --      MAP       --         Physical Exam    Nursing note and vitals reviewed.  Constitutional: He is not diaphoretic. No distress.   HENT:   Head: Normocephalic and atraumatic.   Eyes: Pupils are equal, round, and reactive to light.   Neck: Normal range of motion.   Cardiovascular: Normal rate.   Pulmonary/Chest: No respiratory distress.   Abdominal: Soft. Bowel sounds are normal. He exhibits no distension.   Neurological: He is alert.   Skin: Skin is warm and dry. Capillary refill takes less than 2 seconds.         ED Course    Procedures  Labs Reviewed - No data to display          Medical Decision Making:   Initial Assessment:   22-year-old male history of cerebral palsy and mental retardation brought to the emergency room today by staff at the [facility] states at the  program they notice some blood in his stool during one bowel movement.  Staff with him at this time states he had a bowel movement approximate 2 hours ago ago and no blood was noted at that time.  Staff states patient has been eating and drinking normally.  Behaving at his baseline.  Has not had a soft bowel movement in several days.  Abdomen is soft nontender.  Bowel sounds are normal. Patient is playful and behaving at his baseline.  Does not appear to be in distress.  Oral mucosa is pink and moist.  Differential Diagnosis:   Constipation, rectal bleeding, colon cancer  ED Management:  I instructed the [facility] staff to follow-up with primary care doctor on tomorrow.  There was only one episode of blood noted in stool after constipation.  I will prescribe MiraLAX.  If bleeding continues or worsens return to the emergency department.                   ED Course      Clinical Impression:   The primary encounter diagnosis was Constipation, unspecified constipation type. A diagnosis of Blood in stool was also pertinent to this visit.    Disposition:   Disposition: Discharged  Condition: Stable                        Blanche Tolbert NP  10/19/17 [id number]

## 2017-10-17 NOTE — DISCHARGE INSTRUCTIONS
Please contact the patient's primary care doctor on tomorrow and have him further evaluated if bleeding returns.  Increase fluid intake.

## 2017-11-21 ENCOUNTER — HOSPITAL ENCOUNTER (EMERGENCY)
Facility: HOSPITAL | Age: 22
Discharge: HOME | End: 2017-11-21
Attending: EMERGENCY MEDICINE
Payer: MEDICARE

## 2017-11-21 VITALS
HEART RATE: 79 BPM | OXYGEN SATURATION: 97 % | DIASTOLIC BLOOD PRESSURE: 78 MMHG | RESPIRATION RATE: 18 BRPM | SYSTOLIC BLOOD PRESSURE: 142 MMHG | TEMPERATURE: 98 F

## 2017-11-21 DIAGNOSIS — L03.119 CELLULITIS OF HAND: ICD-10-CM

## 2017-11-21 DIAGNOSIS — K59.00 CONSTIPATION: ICD-10-CM

## 2017-11-21 DIAGNOSIS — R45.1 AGITATION: ICD-10-CM

## 2017-11-21 PROCEDURE — 99283 EMERGENCY DEPT VISIT LOW MDM: CPT

## 2017-11-21 RX ORDER — SULFAMETHOXAZOLE AND TRIMETHOPRIM 800; 160 MG/1; MG/1
1 TABLET ORAL 2 TIMES DAILY
Qty: 20 TABLET | Refills: 0 | Status: SHIPPED | OUTPATIENT
Start: 2017-11-21 | End: 2017-12-01

## 2017-11-21 RX ORDER — POLYETHYLENE GLYCOL 3350 17 G/17G
17 POWDER, FOR SOLUTION ORAL DAILY
Qty: 510 G | Refills: 0 | Status: SHIPPED | OUTPATIENT
Start: 2017-11-21 | End: 2017-12-21

## 2017-11-22 NOTE — ED PROVIDER NOTES
Encounter Date: 11/21/2017       History     Chief Complaint   Patient presents with    Hand Problem     Pt lives in a group home- ress care worker noted right hand with a josep on it and nurse said looks like something bit him. Right hand swelling. Pt has been aggressive for 3 days and also has had a decreased appetite for 3 days also. They noticed it around 3:00 pm when also the agressiveness got worse.     Decreased Appetite     Pt lives in a group home- ress care worker noted   right hand with a josep on it and nurse said looks like something bit him.   Right hand swelling. Pt has been more aggressive for 3 days and also has had a   decreased appetite for 3 days also. They noticed it around 3:00 pm when   also the agressiveness got worse.  He also has not had a BM since Saturday, 2 days after stopping Miralax                       Review of patient's allergies indicates:   Allergen Reactions    Clindamycin Hives    Erythromycin Rash     Past Medical History:   Diagnosis Date    Anxiety     Congenital cerebral palsy     Hypotonia     Leukodystrophy     Mental retardation     Seizures     Stereotyped movement disorder      History reviewed. No pertinent surgical history.  History reviewed. No pertinent family history.  Social History   Substance Use Topics    Smoking status: Never Smoker    Smokeless tobacco: Never Used    Alcohol use No     Review of Systems   Constitutional: Negative for fever.   HENT: Negative for sore throat.    Respiratory: Negative for shortness of breath.    Cardiovascular: Negative for chest pain.   Gastrointestinal: Negative for nausea.   Genitourinary: Negative for dysuria.   Musculoskeletal: Negative for back pain.   Skin: Negative for rash.   Neurological: Negative for weakness.   Hematological: Does not bruise/bleed easily.   All other systems reviewed and are negative.      Physical Exam     Initial Vitals [11/21/17 1801]   BP Pulse Resp Temp SpO2   (!) 142/78 84 20 98 °F  (36.7 °C) 95 %      MAP       99.33         Physical Exam    Nursing note and vitals reviewed.  Constitutional: He appears well-developed and well-nourished. He does not have a sickly appearance. He does not appear ill. No distress.   HENT:   Head: Normocephalic and atraumatic.   Mouth/Throat: Oropharynx is clear and moist and mucous membranes are normal.   Eyes: Conjunctivae are normal.   Neck: Trachea normal. Neck supple.   Cardiovascular: Normal rate and regular rhythm. Exam reveals no gallop and no friction rub.    No murmur heard.  Pulmonary/Chest: Breath sounds normal. He has no wheezes. He has no rales.   Abdominal: Soft. There is no tenderness. There is no guarding.   Musculoskeletal: Normal range of motion.        Right forearm: He exhibits tenderness and swelling. He exhibits no deformity.   Neurological: He is alert and oriented to person, place, and time. He has normal strength.   Psychiatric: He has a normal mood and affect. Thought content normal.         ED Course   Procedures  Labs Reviewed - No data to display              - none    Vitals:    11/21/17 1801   BP: (!) 142/78   Pulse: 84   Resp: 20   Temp: 98 °F (36.7 °C)   TempSrc: Axillary   SpO2: 95%       Results for orders placed or performed during the hospital encounter of 06/03/17   Blood culture x two cultures. Draw prior to antibiotics.   Result Value Ref Range    Blood Culture, Routine No growth after 5 days.    Blood culture x two cultures. Draw prior to antibiotics.   Result Value Ref Range    Blood Culture, Routine No growth after 5 days.    Urine culture   Result Value Ref Range    Urine Culture, Routine No growth    APTT   Result Value Ref Range    aPTT 28.0 21.0 - 32.0 sec   CBC auto differential   Result Value Ref Range    WBC 15.91 (H) 3.90 - 12.70 K/uL    RBC 4.20 (L) 4.60 - 6.20 M/uL    Hemoglobin 12.1 (L) 14.0 - 18.0 g/dL    Hematocrit 37.1 (L) 40.0 - 54.0 %    MCV 88 82 - 98 fL    MCH 28.8 27.0 - 31.0 pg    MCHC 32.6 32.0 - 36.0  %    RDW 14.6 (H) 11.5 - 14.5 %    Platelets 149 (L) 150 - 350 K/uL    MPV 12.3 9.2 - 12.9 fL    Gran% 66.0 38.0 - 73.0 %    Lymph% 14.0 (L) 18.0 - 48.0 %    Mono% 17.0 (H) 4.0 - 15.0 %    Eosinophil% 0.0 0.0 - 8.0 %    Basophil% 0.0 0.0 - 1.9 %    Bands 3.0 %    Aniso Slight     Poik Slight     Differential Method Automated    Comprehensive metabolic panel   Result Value Ref Range    Sodium 147 (H) 136 - 145 mmol/L    Potassium 3.8 3.5 - 5.1 mmol/L    Chloride 105 95 - 110 mmol/L    CO2 29 23 - 29 mmol/L    Glucose 124 (H) 70 - 110 mg/dL    BUN, Bld 17 2 - 20 mg/dL    Creatinine 0.56 0.50 - 1.40 mg/dL    Calcium 8.8 8.7 - 10.5 mg/dL    Total Protein 7.2 6.0 - 8.4 g/dL    Albumin 3.9 3.5 - 5.2 g/dL    Total Bilirubin 0.6 0.1 - 1.0 mg/dL    Alkaline Phosphatase 107 38 - 126 IU/L    AST 23 15 - 46 IU/L    ALT 21 10 - 44 IU/L    Anion Gap 13 8 - 16 mmol/L    eGFR if African American >60.0 >60 mL/min/1.73 m^2    eGFR if non African American >60.0 >60 mL/min/1.73 m^2   Lactic acid, plasma #1   Result Value Ref Range    Lactate (Lactic Acid) 1.9 0.5 - 2.2 mmol/L   Lipase   Result Value Ref Range    Lipase 22 (L) 23 - 300 U/L   Magnesium   Result Value Ref Range    Magnesium 1.6 1.6 - 2.6 mg/dL   Phosphorus   Result Value Ref Range    Phosphorus 3.2 2.7 - 4.5 mg/dL   Protime-INR   Result Value Ref Range    Prothrombin Time 15.7 (H) 9.0 - 12.5 sec    INR 1.4 (H) 0.8 - 1.2   Troponin I   Result Value Ref Range    Troponin I <0.012 0.012 - 0.034 ng/mL   Urinalysis   Result Value Ref Range    Specimen UA Urine, Catheterized     Color, UA Yellow Yellow, Straw, Afua    Appearance, UA Clear Clear    pH, UA 8.0 5.0 - 8.0    Specific Gravity, UA 1.010 1.005 - 1.030    Protein, UA Negative Negative    Glucose, UA Negative Negative    Ketones, UA Negative Negative    Bilirubin (UA) Negative Negative    Occult Blood UA 3+ (A) Negative    Nitrite, UA Negative Negative    Urobilinogen, UA 1.0 <2.0 EU/dL    Leukocytes, UA Negative  Negative   Influenza antigen Nasopharyngeal Swab   Result Value Ref Range    Influenza A Ag, EIA Negative Negative    Influenza B Ag, EIA Negative Negative    Flu A & B Source Nasopharyngeal Swab    Urinalysis Microscopic   Result Value Ref Range    RBC, UA 25 (H) 0 - 4 /hpf    WBC, UA 1 0 - 5 /hpf    Microscopic Comment SEE COMMENT    Basic Metabolic Panel (BMP)   Result Value Ref Range    Sodium 137 136 - 145 mmol/L    Potassium 3.3 (L) 3.5 - 5.1 mmol/L    Chloride 101 95 - 110 mmol/L    CO2 26 23 - 29 mmol/L    Glucose 111 (H) 70 - 110 mg/dL    BUN, Bld 16 6 - 20 mg/dL    Creatinine 0.7 0.5 - 1.4 mg/dL    Calcium 8.6 (L) 8.7 - 10.5 mg/dL    Anion Gap 10 8 - 16 mmol/L    eGFR if African American >60 >60 mL/min/1.73 m^2    eGFR if non African American >60 >60 mL/min/1.73 m^2   Magnesium   Result Value Ref Range    Magnesium 1.8 1.6 - 2.6 mg/dL   Phosphorus   Result Value Ref Range    Phosphorus 2.4 (L) 2.7 - 4.5 mg/dL   CBC auto differential   Result Value Ref Range    WBC 15.73 (H) 3.90 - 12.70 K/uL    RBC 3.88 (L) 4.60 - 6.20 M/uL    Hemoglobin 11.0 (L) 14.0 - 18.0 g/dL    Hematocrit 33.4 (L) 40.0 - 54.0 %    MCV 86 82 - 98 fL    MCH 28.4 27.0 - 31.0 pg    MCHC 32.9 32.0 - 36.0 %    RDW 14.2 11.5 - 14.5 %    Platelets 147 (L) 150 - 350 K/uL    MPV 12.1 9.2 - 12.9 fL    Lymph # CANCELED 1.0 - 4.8 K/uL    Mono # CANCELED 0.3 - 1.0 K/uL    Eos # CANCELED 0.0 - 0.5 K/uL    Baso # CANCELED 0.00 - 0.20 K/uL    Gran% 40.0 38.0 - 73.0 %    Lymph% 15.0 (L) 18.0 - 48.0 %    Mono% 13.0 4.0 - 15.0 %    Eosinophil% 1.0 0.0 - 8.0 %    Basophil% 0.0 0.0 - 1.9 %    Bands 25.0 %    Metamyelocytes 6.0 %    Platelet Estimate Decreased (A)     Aniso Slight     Hypo Occasional     Differential Method Automated    Comprehensive metabolic panel   Result Value Ref Range    Sodium 137 136 - 145 mmol/L    Potassium 3.3 (L) 3.5 - 5.1 mmol/L    Chloride 101 95 - 110 mmol/L    CO2 26 23 - 29 mmol/L    Glucose 111 (H) 70 - 110 mg/dL    BUN,  Bld 16 6 - 20 mg/dL    Creatinine 0.7 0.5 - 1.4 mg/dL    Calcium 8.6 (L) 8.7 - 10.5 mg/dL    Total Protein 6.3 6.0 - 8.4 g/dL    Albumin 2.8 (L) 3.5 - 5.2 g/dL    Total Bilirubin 0.7 0.1 - 1.0 mg/dL    Alkaline Phosphatase 74 55 - 135 U/L    AST 13 10 - 40 U/L    ALT 10 10 - 44 U/L    Anion Gap 10 8 - 16 mmol/L    eGFR if African American >60 >60 mL/min/1.73 m^2    eGFR if non African American >60 >60 mL/min/1.73 m^2   CBC auto differential   Result Value Ref Range    WBC 15.73 (H) 3.90 - 12.70 K/uL    RBC 3.88 (L) 4.60 - 6.20 M/uL    Hemoglobin 11.0 (L) 14.0 - 18.0 g/dL    Hematocrit 33.4 (L) 40.0 - 54.0 %    MCV 86 82 - 98 fL    MCH 28.4 27.0 - 31.0 pg    MCHC 32.9 32.0 - 36.0 %    RDW 14.2 11.5 - 14.5 %    Platelets 147 (L) 150 - 350 K/uL    MPV 12.1 9.2 - 12.9 fL    Lymph # CANCELED 1.0 - 4.8 K/uL    Mono # CANCELED 0.3 - 1.0 K/uL    Eos # CANCELED 0.0 - 0.5 K/uL    Baso # CANCELED 0.00 - 0.20 K/uL    Gran% 40.0 38.0 - 73.0 %    Lymph% 15.0 (L) 18.0 - 48.0 %    Mono% 13.0 4.0 - 15.0 %    Eosinophil% 1.0 0.0 - 8.0 %    Basophil% 0.0 0.0 - 1.9 %    Bands 25.0 %    Metamyelocytes 6.0 %    Platelet Estimate Decreased (A)     Aniso Slight     Hypo Occasional     Differential Method Automated    Basic Metabolic Panel (BMP)   Result Value Ref Range    Sodium 134 (L) 136 - 145 mmol/L    Potassium 3.1 (L) 3.5 - 5.1 mmol/L    Chloride 101 95 - 110 mmol/L    CO2 25 23 - 29 mmol/L    Glucose 107 70 - 110 mg/dL    BUN, Bld 10 6 - 20 mg/dL    Creatinine 0.6 0.5 - 1.4 mg/dL    Calcium 8.5 (L) 8.7 - 10.5 mg/dL    Anion Gap 8 8 - 16 mmol/L    eGFR if African American >60 >60 mL/min/1.73 m^2    eGFR if non African American >60 >60 mL/min/1.73 m^2   Magnesium   Result Value Ref Range    Magnesium 1.9 1.6 - 2.6 mg/dL   Phosphorus   Result Value Ref Range    Phosphorus 2.1 (L) 2.7 - 4.5 mg/dL   CBC auto differential   Result Value Ref Range    WBC 15.30 (H) 3.90 - 12.70 K/uL    RBC 3.94 (L) 4.60 - 6.20 M/uL    Hemoglobin 11.0 (L)  14.0 - 18.0 g/dL    Hematocrit 32.7 (L) 40.0 - 54.0 %    MCV 83 82 - 98 fL    MCH 27.9 27.0 - 31.0 pg    MCHC 33.6 32.0 - 36.0 %    RDW 13.7 11.5 - 14.5 %    Platelets 167 150 - 350 K/uL    MPV 11.6 9.2 - 12.9 fL    Gran # 10.1 (H) 1.8 - 7.7 K/uL    Lymph # 2.8 1.0 - 4.8 K/uL    Mono # 2.3 (H) 0.3 - 1.0 K/uL    Eos # 0.0 0.0 - 0.5 K/uL    Baso # 0.03 0.00 - 0.20 K/uL    Gran% 66.8 38.0 - 73.0 %    Lymph% 18.0 18.0 - 48.0 %    Mono% 15.0 4.0 - 15.0 %    Eosinophil% 0.0 0.0 - 8.0 %    Basophil% 0.2 0.0 - 1.9 %    Platelet Estimate Appears normal     Aniso Slight     Hypo Occasional     Differential Method Automated    Comprehensive metabolic panel   Result Value Ref Range    Sodium 134 (L) 136 - 145 mmol/L    Potassium 3.1 (L) 3.5 - 5.1 mmol/L    Chloride 101 95 - 110 mmol/L    CO2 25 23 - 29 mmol/L    Glucose 107 70 - 110 mg/dL    BUN, Bld 10 6 - 20 mg/dL    Creatinine 0.6 0.5 - 1.4 mg/dL    Calcium 8.5 (L) 8.7 - 10.5 mg/dL    Total Protein 6.3 6.0 - 8.4 g/dL    Albumin 2.7 (L) 3.5 - 5.2 g/dL    Total Bilirubin 0.6 0.1 - 1.0 mg/dL    Alkaline Phosphatase 92 55 - 135 U/L    AST 19 10 - 40 U/L    ALT 13 10 - 44 U/L    Anion Gap 8 8 - 16 mmol/L    eGFR if African American >60 >60 mL/min/1.73 m^2    eGFR if non African American >60 >60 mL/min/1.73 m^2   CBC auto differential   Result Value Ref Range    WBC 15.30 (H) 3.90 - 12.70 K/uL    RBC 3.94 (L) 4.60 - 6.20 M/uL    Hemoglobin 11.0 (L) 14.0 - 18.0 g/dL    Hematocrit 32.7 (L) 40.0 - 54.0 %    MCV 83 82 - 98 fL    MCH 27.9 27.0 - 31.0 pg    MCHC 33.6 32.0 - 36.0 %    RDW 13.7 11.5 - 14.5 %    Platelets 167 150 - 350 K/uL    MPV 11.6 9.2 - 12.9 fL    Gran # 10.1 (H) 1.8 - 7.7 K/uL    Lymph # 2.8 1.0 - 4.8 K/uL    Mono # 2.3 (H) 0.3 - 1.0 K/uL    Eos # 0.0 0.0 - 0.5 K/uL    Baso # 0.03 0.00 - 0.20 K/uL    Gran% 66.8 38.0 - 73.0 %    Lymph% 18.0 18.0 - 48.0 %    Mono% 15.0 4.0 - 15.0 %    Eosinophil% 0.0 0.0 - 8.0 %    Basophil% 0.2 0.0 - 1.9 %    Platelet Estimate Appears  normal     Aniso Slight     Hypo Occasional     Differential Method Automated    Brain natriuretic peptide   Result Value Ref Range     (H) 0 - 99 pg/mL   Basic Metabolic Panel (BMP)   Result Value Ref Range    Sodium 133 (L) 136 - 145 mmol/L    Potassium 3.3 (L) 3.5 - 5.1 mmol/L    Chloride 100 95 - 110 mmol/L    CO2 24 23 - 29 mmol/L    Glucose 105 70 - 110 mg/dL    BUN, Bld 9 6 - 20 mg/dL    Creatinine 0.6 0.5 - 1.4 mg/dL    Calcium 8.8 8.7 - 10.5 mg/dL    Anion Gap 9 8 - 16 mmol/L    eGFR if African American >60 >60 mL/min/1.73 m^2    eGFR if non African American >60 >60 mL/min/1.73 m^2   Magnesium   Result Value Ref Range    Magnesium 1.8 1.6 - 2.6 mg/dL   Phosphorus   Result Value Ref Range    Phosphorus 3.5 2.7 - 4.5 mg/dL   CBC auto differential   Result Value Ref Range    WBC 11.99 3.90 - 12.70 K/uL    RBC 3.99 (L) 4.60 - 6.20 M/uL    Hemoglobin 11.1 (L) 14.0 - 18.0 g/dL    Hematocrit 32.8 (L) 40.0 - 54.0 %    MCV 82 82 - 98 fL    MCH 27.8 27.0 - 31.0 pg    MCHC 33.8 32.0 - 36.0 %    RDW 13.7 11.5 - 14.5 %    Platelets 191 150 - 350 K/uL    MPV 11.1 9.2 - 12.9 fL    Lymph # CANCELED 1.0 - 4.8 K/uL    Mono # CANCELED 0.3 - 1.0 K/uL    Eos # CANCELED 0.0 - 0.5 K/uL    Baso # CANCELED 0.00 - 0.20 K/uL    Gran% 49.0 38.0 - 73.0 %    Lymph% 24.0 18.0 - 48.0 %    Mono% 5.0 4.0 - 15.0 %    Eosinophil% 0.0 0.0 - 8.0 %    Basophil% 0.0 0.0 - 1.9 %    Bands 22.0 %    Platelet Estimate Appears normal     Aniso Slight     Hypo Occasional     Differential Method Manual    ISTAT PROCEDURE   Result Value Ref Range    POC PH 7.460 (H) 7.35 - 7.45    POC PCO2 37.9 35 - 45 mmHg    POC PO2 55 (LL) 80 - 100 mmHg    POC HCO3 27.0 24 - 28 mmol/L    POC BE 3 -2 to 2 mmol/L    POC SATURATED O2 90 (L) 95 - 100 %    POC Sodium 144 136 - 145 mmol/L    POC Potassium 3.6 3.5 - 5.1 mmol/L    POC TCO2 28 (H) 23 - 27 mmol/L    POC Ionized Calcium 1.14 1.06 - 1.42 mmol/L    POC Hematocrit 34 (L) 36 - 54 %PCV    POC HEMOGLOBIN 12  g/dL    Sample ARTERIAL     Site RR     Allens Test Pass     DelSys Nasal Can         Imaging Results          X-Ray Hand 2 View Right (Final result)  Result time 11/21/17 19:17:21   Procedure changed from X-Ray Hand 2 View Left     Final result by Gatito Fuentes MD (Timothy) (11/21/17 19:17:21)                 Impression:         No acute bony changes      Electronically signed by: GATITO FUENTES MD  Date:     11/21/17  Time:    19:17              Narrative:    Right hand, 3 views    Clinical History:  Right hand cellulitis    Findings:     There is  no acute bony or joint abnormality. No acute fracture or dislocation.No soft tissue gas.  No opaque foreign body.                             X-Ray Abdomen AP 1 View (KUB) (Final result)  Result time 11/21/17 19:18:36    Final result by Gatito Fuentes MD (Timothy) (11/21/17 19:18:36)                 Impression:     Scattered gas in the small bowel and colon.  There is also still noted in colon.  Findings could represent an ileus and constipation.      Electronically signed by: GATITO FUENTES MD  Date:     11/21/17  Time:    19:18              Narrative:    Abdomen, one view    Clinical history: Constipation    Findings: Scattered gas in the small bowel.  There scattered gas and stool in colon.  Findings could represent diffuse ileus with constipation.                             X-Ray Chest 1 View (Final result)  Result time 11/21/17 19:12:58    Final result by Gatito Fuentes MD (Timothy) (11/21/17 19:12:58)                 Impression:     Normal sized heart.Clear lungs. Gas distention of bowel which could represent an ileus.  Comparison 06/03/2017.      Electronically signed by: GATITO FUENTES MD  Date:     11/21/17  Time:    19:12              Narrative:    Chest, 1 view.    Clinical History: Restlessness and agitation                                 The above test results and vital signs have been reviewed by the physician.         I have a low suspicion for  medical, surgical or other life threatening illness and I believe patient is safe for discharge.  I specifically counseled the patient and/or family/caretaker that despite an unrevealing evaluation in the ED, patient may still be at risk for serious, even life threatening illness.  I have attempted to answer all questions related to her stay today.  I have given the patient explicit instructions to return immediately for any worsening or change in current symptoms, or for any concern.                ED Course      Clinical Impression:   Diagnoses of Cellulitis of hand, Constipation, and Agitation were pertinent to this visit.    Disposition:   Disposition: Discharged  Condition: Stable                        Marlon Tovar MD  11/21/17 2012

## 2017-12-07 ENCOUNTER — HOSPITAL ENCOUNTER (EMERGENCY)
Facility: HOSPITAL | Age: 22
Discharge: HOME | End: 2017-12-07
Attending: EMERGENCY MEDICINE
Payer: MEDICARE

## 2017-12-07 VITALS
HEART RATE: 98 BPM | WEIGHT: 95 LBS | RESPIRATION RATE: 18 BRPM | BODY MASS INDEX: 15.81 KG/M2 | DIASTOLIC BLOOD PRESSURE: 82 MMHG | SYSTOLIC BLOOD PRESSURE: 145 MMHG | TEMPERATURE: 98 F | OXYGEN SATURATION: 99 %

## 2017-12-07 DIAGNOSIS — R14.1 ABDOMINAL GAS PAIN: Primary | ICD-10-CM

## 2017-12-07 DIAGNOSIS — R52 PAIN: ICD-10-CM

## 2017-12-07 LAB
BASOPHILS # BLD AUTO: 0.03 K/UL
BASOPHILS NFR BLD: 0.3 %
DIFFERENTIAL METHOD: ABNORMAL
EOSINOPHIL # BLD AUTO: 0 K/UL
EOSINOPHIL NFR BLD: 0.1 %
ERYTHROCYTE [DISTWIDTH] IN BLOOD BY AUTOMATED COUNT: 14.4 %
HCT VFR BLD AUTO: 39.2 %
HGB BLD-MCNC: 13.1 G/DL
LYMPHOCYTES # BLD AUTO: 2.5 K/UL
LYMPHOCYTES NFR BLD: 21.7 %
MCH RBC QN AUTO: 28.2 PG
MCHC RBC AUTO-ENTMCNC: 33.4 G/DL
MCV RBC AUTO: 84 FL
MONOCYTES # BLD AUTO: 1.6 K/UL
MONOCYTES NFR BLD: 13.9 %
NEUTROPHILS # BLD AUTO: 7.2 K/UL
NEUTROPHILS NFR BLD: 63.7 %
PLATELET # BLD AUTO: 210 K/UL
PMV BLD AUTO: 11.8 FL
RBC # BLD AUTO: 4.65 M/UL
WBC # BLD AUTO: 11.31 K/UL

## 2017-12-07 PROCEDURE — 99284 EMERGENCY DEPT VISIT MOD MDM: CPT | Mod: 25

## 2017-12-07 PROCEDURE — 96360 HYDRATION IV INFUSION INIT: CPT

## 2017-12-07 PROCEDURE — 25000003 PHARM REV CODE 250: Performed by: EMERGENCY MEDICINE

## 2017-12-07 PROCEDURE — 85025 COMPLETE CBC W/AUTO DIFF WBC: CPT

## 2017-12-07 RX ORDER — SODIUM CHLORIDE 9 MG/ML
1000 INJECTION, SOLUTION INTRAVENOUS
Status: COMPLETED | OUTPATIENT
Start: 2017-12-07 | End: 2017-12-07

## 2017-12-07 RX ADMIN — SODIUM CHLORIDE 1000 ML: 0.9 INJECTION, SOLUTION INTRAVENOUS at 08:12

## 2017-12-08 ENCOUNTER — HOSPITAL ENCOUNTER (OUTPATIENT)
Facility: HOSPITAL | Age: 22
Discharge: OTHER INSTITUTION | End: 2017-12-09
Attending: EMERGENCY MEDICINE | Admitting: INTERNAL MEDICINE
Payer: MEDICARE

## 2017-12-08 DIAGNOSIS — F72 SEVERE MENTAL RETARDATION: Chronic | ICD-10-CM

## 2017-12-08 DIAGNOSIS — D64.9 NORMOCYTIC ANEMIA: ICD-10-CM

## 2017-12-08 DIAGNOSIS — R10.9 ABDOMINAL PAIN, UNSPECIFIED ABDOMINAL LOCATION: Primary | ICD-10-CM

## 2017-12-08 DIAGNOSIS — R31.9 HEMATURIA, UNSPECIFIED TYPE: ICD-10-CM

## 2017-12-08 DIAGNOSIS — G80.0 SPASTIC QUADRIPLEGIC CEREBRAL PALSY: Chronic | ICD-10-CM

## 2017-12-08 DIAGNOSIS — E86.0 DEHYDRATION: ICD-10-CM

## 2017-12-08 LAB
ALBUMIN SERPL BCP-MCNC: 4.1 G/DL
ALP SERPL-CCNC: 95 U/L
ALT SERPL W/O P-5'-P-CCNC: 26 U/L
ANION GAP SERPL CALC-SCNC: 13 MMOL/L
AST SERPL-CCNC: 46 U/L
BASOPHILS # BLD AUTO: 0.03 K/UL
BASOPHILS NFR BLD: 0.2 %
BILIRUB SERPL-MCNC: 0.5 MG/DL
BILIRUB UR QL STRIP: NEGATIVE
BUN SERPL-MCNC: 9 MG/DL
CALCIUM SERPL-MCNC: 9.6 MG/DL
CHLORIDE SERPL-SCNC: 106 MMOL/L
CLARITY UR: ABNORMAL
CO2 SERPL-SCNC: 23 MMOL/L
COLOR UR: YELLOW
CREAT SERPL-MCNC: 0.7 MG/DL
DIFFERENTIAL METHOD: ABNORMAL
EOSINOPHIL # BLD AUTO: 0 K/UL
EOSINOPHIL NFR BLD: 0 %
ERYTHROCYTE [DISTWIDTH] IN BLOOD BY AUTOMATED COUNT: 14.4 %
EST. GFR  (AFRICAN AMERICAN): >60 ML/MIN/1.73 M^2
EST. GFR  (NON AFRICAN AMERICAN): >60 ML/MIN/1.73 M^2
GLUCOSE SERPL-MCNC: 110 MG/DL
GLUCOSE UR QL STRIP: NEGATIVE
HCT VFR BLD AUTO: 38.6 %
HGB BLD-MCNC: 12.9 G/DL
HGB UR QL STRIP: ABNORMAL
KETONES UR QL STRIP: ABNORMAL
LEUKOCYTE ESTERASE UR QL STRIP: NEGATIVE
LIPASE SERPL-CCNC: 9 U/L
LYMPHOCYTES # BLD AUTO: 1.8 K/UL
LYMPHOCYTES NFR BLD: 14.6 %
MCH RBC QN AUTO: 28.2 PG
MCHC RBC AUTO-ENTMCNC: 33.4 G/DL
MCV RBC AUTO: 85 FL
MICROSCOPIC COMMENT: ABNORMAL
MONOCYTES # BLD AUTO: 1.3 K/UL
MONOCYTES NFR BLD: 10.3 %
NEUTROPHILS # BLD AUTO: 9.2 K/UL
NEUTROPHILS NFR BLD: 74.6 %
NITRITE UR QL STRIP: NEGATIVE
PH UR STRIP: 7 [PH] (ref 5–8)
PLATELET # BLD AUTO: 213 K/UL
PMV BLD AUTO: 10.8 FL
POTASSIUM SERPL-SCNC: 3.7 MMOL/L
PROT SERPL-MCNC: 7.4 G/DL
PROT UR QL STRIP: NEGATIVE
RBC # BLD AUTO: 4.57 M/UL
RBC #/AREA URNS HPF: 50 /HPF (ref 0–4)
SODIUM SERPL-SCNC: 142 MMOL/L
SP GR UR STRIP: 1.02 (ref 1–1.03)
URN SPEC COLLECT METH UR: ABNORMAL
UROBILINOGEN UR STRIP-ACNC: NEGATIVE EU/DL
WBC # BLD AUTO: 12.35 K/UL

## 2017-12-08 PROCEDURE — 81000 URINALYSIS NONAUTO W/SCOPE: CPT

## 2017-12-08 PROCEDURE — G0378 HOSPITAL OBSERVATION PER HR: HCPCS

## 2017-12-08 PROCEDURE — 85025 COMPLETE CBC W/AUTO DIFF WBC: CPT

## 2017-12-08 PROCEDURE — 80053 COMPREHEN METABOLIC PANEL: CPT

## 2017-12-08 PROCEDURE — 96374 THER/PROPH/DIAG INJ IV PUSH: CPT

## 2017-12-08 PROCEDURE — 25500020 PHARM REV CODE 255: Performed by: EMERGENCY MEDICINE

## 2017-12-08 PROCEDURE — 96372 THER/PROPH/DIAG INJ SC/IM: CPT

## 2017-12-08 PROCEDURE — 83690 ASSAY OF LIPASE: CPT

## 2017-12-08 PROCEDURE — 63600175 PHARM REV CODE 636 W HCPCS: Performed by: EMERGENCY MEDICINE

## 2017-12-08 PROCEDURE — 96361 HYDRATE IV INFUSION ADD-ON: CPT

## 2017-12-08 PROCEDURE — 11000001 HC ACUTE MED/SURG PRIVATE ROOM

## 2017-12-08 PROCEDURE — 99285 EMERGENCY DEPT VISIT HI MDM: CPT | Mod: 25

## 2017-12-08 PROCEDURE — 25000003 PHARM REV CODE 250: Performed by: EMERGENCY MEDICINE

## 2017-12-08 PROCEDURE — 96376 TX/PRO/DX INJ SAME DRUG ADON: CPT

## 2017-12-08 RX ORDER — MIDAZOLAM HYDROCHLORIDE 1 MG/ML
2 INJECTION INTRAMUSCULAR; INTRAVENOUS ONCE
Status: COMPLETED | OUTPATIENT
Start: 2017-12-08 | End: 2017-12-08

## 2017-12-08 RX ORDER — DEXTROMETHORPHAN/PSEUDOEPHED 2.5-7.5/.8
40 DROPS ORAL
Status: COMPLETED | OUTPATIENT
Start: 2017-12-08 | End: 2017-12-08

## 2017-12-08 RX ORDER — MIDAZOLAM HYDROCHLORIDE 5 MG/ML
2 INJECTION INTRAMUSCULAR; INTRAVENOUS
Status: DISCONTINUED | OUTPATIENT
Start: 2017-12-08 | End: 2017-12-08

## 2017-12-08 RX ORDER — MIDAZOLAM HYDROCHLORIDE 5 MG/ML
2 INJECTION INTRAMUSCULAR; INTRAVENOUS
Status: DISCONTINUED | OUTPATIENT
Start: 2017-12-08 | End: 2017-12-08 | Stop reason: SDUPTHER

## 2017-12-08 RX ORDER — LIDOCAINE HYDROCHLORIDE 20 MG/ML
JELLY TOPICAL
Status: COMPLETED | OUTPATIENT
Start: 2017-12-08 | End: 2017-12-08

## 2017-12-08 RX ORDER — MIDAZOLAM HYDROCHLORIDE 1 MG/ML
2 INJECTION INTRAMUSCULAR; INTRAVENOUS
Status: COMPLETED | OUTPATIENT
Start: 2017-12-08 | End: 2017-12-08

## 2017-12-08 RX ADMIN — SIMETHICONE 40 MG: 20 SUSPENSION/ DROPS ORAL at 07:12

## 2017-12-08 RX ADMIN — SODIUM CHLORIDE 1000 ML: 0.9 INJECTION, SOLUTION INTRAVENOUS at 03:12

## 2017-12-08 RX ADMIN — IOHEXOL 75 ML: 350 INJECTION, SOLUTION INTRAVENOUS at 09:12

## 2017-12-08 RX ADMIN — MIDAZOLAM HYDROCHLORIDE 2 MG: 1 INJECTION, SOLUTION INTRAMUSCULAR; INTRAVENOUS at 08:12

## 2017-12-08 RX ADMIN — MIDAZOLAM HYDROCHLORIDE 2 MG: 1 INJECTION, SOLUTION INTRAMUSCULAR; INTRAVENOUS at 02:12

## 2017-12-08 RX ADMIN — LIDOCAINE HYDROCHLORIDE 5 ML: 20 JELLY TOPICAL at 06:12

## 2017-12-08 RX ADMIN — MIDAZOLAM HYDROCHLORIDE 2 MG: 1 INJECTION, SOLUTION INTRAMUSCULAR; INTRAVENOUS at 01:12

## 2017-12-08 NOTE — ED NOTES
Pt quietly lying in bed. Respirations even and unlabored. Skin warm and dry. NAD noted. Fall precautions remain in place. Will continue to monitor.

## 2017-12-08 NOTE — ED PROVIDER NOTES
Encounter Date: 12/8/2017       History     Chief Complaint   Patient presents with    Abdominal Pain     Seen in ED on 12/7 for abdominal pain d/t gas, caregiver reports pain continues.  Taken to PCP this morning and was told he is dehydrated and to come straight to the ER.      Patient is nonverbal suffering cerebral palsy and is not able to give any history additionally normally however able to indicate if he is in pain or having problems.  However this time he's been unable to give any medication other than his agitated behavior.    All history is provided by the caregivers and documents from the primary care doctor as well as the ED visit last night.  Patient brought by caregivers concern that he has been having problems with constipation for over 2 weeks.  Initially given MiraLAX with some relief but his appetite and abdominal distention has not changed.  He has increase in flatus without relief.      He was brought to [facility] yesterday evening as he was interpreted that he was having abdominal pain, his behavior is becoming more agitated which is consistent with the pain picking objects, tearing papers, and scratching his skin.  Studies last night included a abdominal abdominal obstruction series x-ray which showed a nonspecific gas pattern and a CBC which was nonspecific noted testing was done.  Caretakers were instructed to encourage fluid intake    Patient has refused medications for 2 days and has not been taking any food this time..          Review of patient's allergies indicates:   Allergen Reactions    Clindamycin Hives    Erythromycin Rash     Past Medical History:   Diagnosis Date    Anxiety     Congenital cerebral palsy     Hypotonia     Leukodystrophy     Mental retardation     Seizures     Stereotyped movement disorder      History reviewed. No pertinent surgical history.  History reviewed. No pertinent family history.  Social History   Substance Use Topics    Smoking status:  Never Smoker    Smokeless tobacco: Never Used    Alcohol use No     Review of Systems   Constitutional: Positive for activity change and appetite change. Negative for chills and fever.        More agitated behavior decreased oral intake    All history provided by caretakers   HENT: Negative for nosebleeds, rhinorrhea and sore throat.    Eyes: Negative for pain and visual disturbance.   Respiratory: Negative for cough, choking and shortness of breath.    Cardiovascular: Negative for chest pain, palpitations and leg swelling.   Gastrointestinal: Positive for abdominal pain and constipation. Negative for diarrhea, nausea and vomiting.   Genitourinary: Negative for difficulty urinating, dysuria and frequency.        Decreased urination   Musculoskeletal: Negative for arthralgias, myalgias and neck pain.   Skin: Positive for wound.        Abrasion on right foot due to scratching, this occurred several days ago   Neurological: Negative for seizures, syncope, facial asymmetry and weakness.   Hematological: Does not bruise/bleed easily.   Psychiatric/Behavioral:        Unable to assess due to patient's condition   All other systems reviewed and are negative.      Physical Exam     Initial Vitals [12/08/17 1157]   BP Pulse Resp Temp SpO2   (!) 182/97 96 18 98.8 °F (37.1 °C) 96 %      MAP       125.33         Physical Exam    Nursing note and vitals reviewed.  Constitutional: He appears well-nourished. He is not diaphoretic. No distress.   Patient is agitated grasping pulling trying to bite his jacket   HENT:   Head: Normocephalic and atraumatic.   Mouth/Throat: No oropharyngeal exudate.   Oropharynx is dry and pasty with no lesions   Eyes: Conjunctivae and EOM are normal. Pupils are equal, round, and reactive to light. Right eye exhibits no discharge. Left eye exhibits no discharge. No scleral icterus.   Neck: Normal range of motion. Neck supple.   Cardiovascular: Normal rate, regular rhythm, normal heart sounds and intact  distal pulses.   No murmur heard.  Pulmonary/Chest: Breath sounds normal. No respiratory distress. He has no wheezes. He exhibits no tenderness.   Abdominal: Soft. Bowel sounds are normal. There is no tenderness.   Abdomen is diffusely tympanic.   Musculoskeletal: He exhibits no edema or tenderness.   Chronic ballistic maneuvers but much exaggerated for this individual   Lymphadenopathy:     He has no cervical adenopathy.   Neurological: He is alert. He has normal strength.   Unable to completely assess due to patient's current medical condition however patient is able to grasp and move his arms and legs.  However he can push my hands away in protection from painful stimuli.    Skin: Skin is warm and dry. Capillary refill takes 2 to 3 seconds. Rash noted. No erythema.   Good skin turgor and good capillary refill    Several thin nail scratch lines on his right medial foot - not indurated not warm no fluctuance         ED Course   Procedures  Labs Reviewed - No data to display          Medical Decision Making:   Initial Assessment:   Patient brought in by caretakers from care facility for problems with initially constipation medication well with MiraLAX now continue to be distended and with some belching and flatus no longer taking his medications or eating any of his thickened foods the last 3-4 days.  He was seen at [place] ED last night in which they found that he had a nonsignificant CBC and a abdominal obstruction series showing no evidence of any obstruction but diffuse gas pattern without any step extensive dilation.  His chest x-ray also showed no signs of pneumonia.  Facility is concerned because health since he's been noted be in more pain as detected by his behaviors  Differential Diagnosis:   Abdominal pain, obstruction, urinary tract infection, pyelonephritis,   ED Management:  Work up including examination evaluation of additional labs not completed yesterday including electrolytes and CMP also  collecting urine for the patient.  We were originally unsuccessful collecting urine with a bag or condom catheter a bag or condom catheter.  We did attempted placing a in and out or otherwise indwelling catheter without success several times.  after a final attempt with a coudé and using a Urojet and failure to pass at approximate depth of a prostate I removed the catheter and the patient began urinating as I pressed  over his suprapubic bladder area.  It is unlikely that the patient was suffering from urinary tract infection as his urinalysis was negative for white cells and just had 2 red cells.    Patient still however not taking any sips of medicines not taking any orals of any kind.  With concern for patient's poor status for taking medications and maintaining hydration we will plan admission.  He would be in the unreferred section his doctor is Emy Hartley and he does not have a insurance that would apply for [facility].  I spoke to the [facility] hospitalist resident who will come see the patient and will order a CT abdomen as requested by the hospitalist team                   ED Course      Clinical Impression:   Abdominal pain,.  Abdominal distention, dehydration, altered behavior    Disposition:   Disposition: Admitted  Condition: Stable                        Randy Peguero MD  12/08/17 2032

## 2017-12-08 NOTE — ED NOTES
Patient identifiers verified and correct for Karri Galeano.   History of microcephaly, with severe retardation, continuous exaggerated movement noted. Changes in behavior reported by caregivers, Self injury noted, crying, bowel changes. Was seen yesterday at another facility, because of impaction, and flatus. Behaviors continues    LOC: The patient is awake, severe retardation reported by caregivers.quick glances, no verbalization noted.  APPEARANCE: Patient resting comfortably and in no acute distress, patient is clean and well groomed, patient's clothing is properly fastened.  SKIN: The skin is warm and dry, color consistent with ethnicity, patient has normal skin turgor and moist mucus membranes, skin intact, no breakdown or bruising noted.Small scratches, to face.  MUSCULOSKELETAL: Patient moving all extremities spontaneously, no obvious swelling or deformities noted. Exaggerated movements of lower extremities.  RESPIRATORY: Airway is open and patent, respirations are spontaneous, patient has a normal effort and rate, no accessory muscle use noted, bilateral breath sounds clear.  CARDIAC: Patient has a normal rate and regular rhythm, no periphreal edema noted, capillary refill < 3 seconds.  ABDOMEN: Soft and non tender to palpation, no distention noted, normoactive bowel sounds present in all four quadrants.  NEUROLOGIC: PERRL, 4mm bilaterally, eyes open spontaneously, behavior appropriate to situation, follows commands, facial expression symmetrical, bilateral hand grasp equal and even, purposeful motor response noted, normal sensation in all extremities when touched with a finger.

## 2017-12-08 NOTE — ED NOTES
Patients's diaper wet with urine. Attempted to in and cath, sterile technique maintained. Unable to obtain urine. Two other attempts made by RN to no avail. MD notified. Bladder scanned 276 ml noted. MD notified.

## 2017-12-08 NOTE — ED PROVIDER NOTES
"Encounter Date: 12/7/2017       History     Chief Complaint   Patient presents with    Agitation     "He is more agitated than normal and his behavior is not like this. He has been scratching his face and has had no bowel movements since Monday" per sitter. Pt lives in [place] [phone]. Pt has CP     The history is provided by a caregiver.   General Illness    The current episode started several days ago. The problem occurs continuously. The problem has been unchanged. The pain is at a severity of 2/10. Nothing relieves the symptoms. Nothing aggravates the symptoms. Pertinent negatives include no constipation, no diarrhea, no nausea, no vomiting, no congestion, no ear discharge, no headaches, no mouth sores, no rhinorrhea, no sore throat, no cough, no shortness of breath, no wheezing and no eye redness.     Review of patient's allergies indicates:   Allergen Reactions    Clindamycin Hives    Erythromycin Rash     Past Medical History:   Diagnosis Date    Anxiety     Congenital cerebral palsy     Hypotonia     Leukodystrophy     Mental retardation     Seizures     Stereotyped movement disorder      No past surgical history on file.  No family history on file.  Social History   Substance Use Topics    Smoking status: Never Smoker    Smokeless tobacco: Never Used    Alcohol use No     Review of Systems   Unable to perform ROS: Dementia   HENT: Negative for congestion, ear discharge, mouth sores, rhinorrhea and sore throat.    Eyes: Negative for redness.   Respiratory: Negative for cough, shortness of breath and wheezing.    Gastrointestinal: Negative for constipation, diarrhea, nausea and vomiting.   Neurological: Negative for headaches.   All other systems reviewed and are negative.      Physical Exam     Initial Vitals [12/07/17 1906]   BP Pulse Resp Temp SpO2   (!) 158/92 110 18 97.5 °F (36.4 °C) 98 %      MAP       114         Physical Exam    Nursing note and vitals reviewed.  Constitutional: " He appears well-developed and well-nourished.   HENT:   Head: Normocephalic and atraumatic.   Eyes: EOM are normal.   Neck: Normal range of motion. Neck supple.   Cardiovascular: Normal rate, regular rhythm, normal heart sounds and intact distal pulses.   Pulmonary/Chest: Breath sounds normal.   Abdominal: Soft.   Musculoskeletal: Normal range of motion.   Neurological: He is alert. No cranial nerve deficit or sensory deficit.   Skin: Skin is warm. Capillary refill takes less than 2 seconds.   Psychiatric:   Patient has advanced CP and is incapable of cooperating for this portion of the exam         ED Course   Procedures  Labs Reviewed   CBC W/ AUTO DIFFERENTIAL - Abnormal; Notable for the following:        Result Value    Hemoglobin 13.1 (*)     Hematocrit 39.2 (*)     Mono # 1.6 (*)     All other components within normal limits          X-Rays:   Independently Interpreted Readings:   Abdomen:   KUB of Abdomen - Large amount of intestinal gas.     Medical Decision Making:   Clinical Tests:   Lab Tests: Ordered and Reviewed  Radiological Study: Ordered and Reviewed                   ED Course      Clinical Impression:   The primary encounter diagnosis was Abdominal gas pain. A diagnosis of Pain was also pertinent to this visit.    Disposition:   Disposition: Discharged  Condition: Stable                        Deysi Bernstein MD  12/07/17 6185

## 2017-12-08 NOTE — ED NOTES
"Per pts caregiver, pt has been "constipated and agitated since Saturday." Pts last BM Monday 12/4/17. Pts bilateral cheeks appear reddened. Per caretaker, pt has been occasionally "scratching" face. Pt appears restless. Bowel sounds audible and active. No facial grimacing or discomfort noted with palpation of pts abdomen. Respirations even and unlabored. Awaiting further orders.  "

## 2017-12-09 VITALS
DIASTOLIC BLOOD PRESSURE: 97 MMHG | SYSTOLIC BLOOD PRESSURE: 166 MMHG | OXYGEN SATURATION: 96 % | RESPIRATION RATE: 20 BRPM | TEMPERATURE: 98 F | HEART RATE: 89 BPM

## 2017-12-09 PROBLEM — R31.9 HEMATURIA: Status: ACTIVE | Noted: 2017-12-09

## 2017-12-09 PROBLEM — D64.9 NORMOCYTIC ANEMIA: Status: ACTIVE | Noted: 2017-12-09

## 2017-12-09 PROBLEM — E86.0 DEHYDRATION: Status: ACTIVE | Noted: 2017-12-09

## 2017-12-09 LAB
FERRITIN SERPL-MCNC: 34 NG/ML
VIT B12 SERPL-MCNC: 1191 PG/ML

## 2017-12-09 PROCEDURE — G0378 HOSPITAL OBSERVATION PER HR: HCPCS

## 2017-12-09 PROCEDURE — 82607 VITAMIN B-12: CPT

## 2017-12-09 PROCEDURE — 94761 N-INVAS EAR/PLS OXIMETRY MLT: CPT

## 2017-12-09 PROCEDURE — 25000003 PHARM REV CODE 250: Performed by: HOSPITALIST

## 2017-12-09 PROCEDURE — 36415 COLL VENOUS BLD VENIPUNCTURE: CPT

## 2017-12-09 PROCEDURE — 82728 ASSAY OF FERRITIN: CPT

## 2017-12-09 RX ORDER — GLYCERIN 1 G/1
1 SUPPOSITORY RECTAL ONCE
Status: COMPLETED | OUTPATIENT
Start: 2017-12-09 | End: 2017-12-09

## 2017-12-09 RX ORDER — SODIUM CHLORIDE 9 MG/ML
INJECTION, SOLUTION INTRAVENOUS CONTINUOUS
Status: DISCONTINUED | OUTPATIENT
Start: 2017-12-09 | End: 2017-12-09 | Stop reason: HOSPADM

## 2017-12-09 RX ADMIN — SODIUM CHLORIDE: 0.9 INJECTION, SOLUTION INTRAVENOUS at 01:12

## 2017-12-09 RX ADMIN — GLYCERIN 1 SUPPOSITORY: 2.1 SUPPOSITORY RECTAL at 11:12

## 2017-12-09 NOTE — PROGRESS NOTES
Pt is at baseline. VSS. NAD noted. Discharge teaching given to facility staff. She verbalized understanding. All questions answered. Pt d/c'd per protocol.

## 2017-12-09 NOTE — ED NOTES
Urologist Dr Miller here, bladder scan obtained. Attempted to scan bladder, patient urinated. Md said no further exam needed.

## 2017-12-09 NOTE — PLAN OF CARE
Problem: Fall Risk (Adult)  Goal: Identify Related Risk Factors and Signs and Symptoms  Related risk factors and signs and symptoms are identified upon initiation of Human Response Clinical Practice Guideline (CPG)   Outcome: Ongoing (interventions implemented as appropriate)  Patient Awake and Alert. Non verbal. Does not follow commands. patient frequently getting into fetal position or bringing legs to chest position. patient fidgety , frequently rubbing feet on bed. Abrasions POA to right foot x2. And right upper arm. mepilex placed on wounds. Incontinent of urine and stool . Diaper placed. No sacral breakdown. PIV intact to left Arm. Suction at bedside if needed. patient refusing to drink. Bed alarm on yellow socks placed . Fall arm band on. Bed alarm on with side rails up.   Goal: Absence of Falls  Patient will demonstrate the desired outcomes by discharge/transition of care.   Outcome: Ongoing (interventions implemented as appropriate)       Problem: Patient Care Overview  Goal: Plan of Care Review  Outcome: Ongoing (interventions implemented as appropriate)     Goal: Individualization & Mutuality  Outcome: Ongoing (interventions implemented as appropriate)     Goal: Discharge Needs Assessment  Outcome: Ongoing (interventions implemented as appropriate)       Problem: Pressure Ulcer Risk (Antwon Scale) (Adult,Obstetrics,Pediatric)  Goal: Identify Related Risk Factors and Signs and Symptoms  Related risk factors and signs and symptoms are identified upon initiation of Human Response Clinical Practice Guideline (CPG)   Outcome: Ongoing (interventions implemented as appropriate)     Goal: Skin Integrity  Patient will demonstrate the desired outcomes by discharge/transition of care.   Outcome: Ongoing (interventions implemented as appropriate)

## 2017-12-09 NOTE — PLAN OF CARE
"  Chief Complaint   Patient presents with    Agitation       "He is more agitated than normal and his behavior is not like this. He has been scratching his face and has had no bowel movements since Monday" per sitter. Pt lives in [facility] [phone]. Pt has CP      Pt lives at [facility] in [place], [place].   His caretaker's name is Johnathon Waterman.  His next of kin is his aunt Chloe Galeano [phone].     Per medical record, patient is normally very playful, smiling, and active in wheelchair.       Hx of cerebral palsy, microcephaly, severe mental retardation, hypotonia, developmental delays, fine motor and gross motor delays, history of seizures before age 3.     12/09/17 1400   Discharge Assessment   Assessment Type Discharge Planning Assessment   Confirmed/corrected address and phone number on facesheet? No   Assessment information obtained from? Medical Record  (pt is non verbal)   Expected Length of Stay (days) 2   Communicated expected length of stay with patient/caregiver yes   Prior to hospitilization cognitive status: Not Oriented to Place;Not Oriented to Time;Not Oriented to Person   Prior to hospitalization functional status: Completely Dependent;Wheelchair Bound   Current cognitive status: Not Oriented to Person;Not Oriented to Place;Not Oriented to Time   Current Functional Status: Completely Dependent;Wheelchair Bound   Facility Arrived From: [facility] [phone]   Lives With facility resident   Able to Return to Prior Arrangements yes   Is patient able to care for self after discharge? No   Who are your caregiver(s) and their phone number(s)? [facility] [phone]   Patient's perception of discharge disposition group home   Readmission Within The Last 30 Days no previous admission in last 30 days   Patient currently being followed by outpatient case management? Unable to determine (comments)   Patient currently receives any other outside " agency services? No   Equipment Currently Used at Home wheelchair;shower chair;grab bar   Do you have any problems affording any of your prescribed medications? No   Is the patient taking medications as prescribed? yes   Does the patient have transportation home? Yes   Transportation Available agency transportation;van, wheelchair accessible   Dialysis Name and Scheduled days N/A   Does the patient receive services at the [facility]? No   Discharge Plan A Group Home   Patient/Family In Agreement With Plan unable to assess     Teresa Barreto RN Transitional Navigator  [phone]

## 2017-12-09 NOTE — PLAN OF CARE
Discharge orders noted, no HH or HME ordered.    Pt's nurse will go over medications/signs and symptoms prior to discharge    Representative/caretaker from [facility] was visiting pt at time of discharge and will provide transportation for pt back to facility       12/09/17 1444   Final Note   Assessment Type Final Discharge Note   Discharge Disposition ANOTHER [facility]  ([facility])   What phone number can be called within the next 1-3 days to see how you are doing after discharge? [phone]  ([facility])   Right Care Referral Info   Post Acute Recommendation No Care  (pt to return to [facility])     Teresa Barreto, RN Transitional Navigator  [phone]

## 2017-12-09 NOTE — PROGRESS NOTES
[facility] IM Resident [facility] Progress Note    Subjective:      Karri Galeano is a 22 y.o.  male who is being followed by the [facility] IM service at [facility] for abdominal pain.        Objective:   Last 24 Hour Vital Signs:  BP  Min: 150/89  Max: 182/97  Temp  Av.4 °F (36.9 °C)  Min: 97.5 °F (36.4 °C)  Max: 99.5 °F (37.5 °C)  Pulse  Av.9  Min: 70  Max: 96  Resp  Av.3  Min: 18  Max: 20  SpO2  Av.1 %  Min: 95 %  Max: 100 %  No intake/output data recorded.    Physical Examination:  Gen: Awake, Alert, NAD  HEENT: PERRLA, EOMI  CV: RRR, no murmurs or rubs, no S3 or S4  Lungs: CTA bilaterally, no wheezes, rales, or rhonchi  Abd: firm, non-tender, non-distended, +BS  Ext: no peripheral edema, excoriations on R. Foot and R. Arm  Neuro: moving all extremities spontaneously, no evidence of focal deficits    Laboratory:  Laboratory Data Reviewed: yes    CBC: Lab Results   Component Value Date    WBC 12.35 2017    HGB 12.9 (L) 2017    HCT 38.6 (L) 2017     2017    MCV 85 2017    RDW 14.4 2017     BMP: Lab Results   Component Value Date     2017    K 3.7 2017     2017    CO2 23 2017    BUN 9 2017    CREATININE 0.7 2017     2017    CALCIUM 9.6 2017     LFTs: Lab Results   Component Value Date    PROT 7.4 2017    ALBUMIN 4.1 2017    BILITOT 0.5 2017    AST 46 (H) 2017    ALKPHOS 95 2017    ALT 26 2017       Urinalysis: Lab Results   Component Value Date    LABURIN No growth 2017    COLORU Yellow 2017    SPECGRAV 1.020 2017    NITRITE Negative 2017    KETONESU 1+ (A) 2017    UROBILINOGEN Negative 2017    WBCUA 1 2017       Trended Lab Data:    Recent Labs  Lab 17  1400   WBC 11.31 12.35   HGB 13.1* 12.9*   HCT 39.2* 38.6*    213   MCV 84 85   RDW 14.4 14.4   NA  --  142   K  --  3.7   CL   --  106   CO2  --  23   BUN  --  9   CREATININE  --  0.7   GLU  --  110   PROT  --  7.4   ALBUMIN  --  4.1   BILITOT  --  0.5   AST  --  46*   ALKPHOS  --  95   ALT  --  26       Current Medications:     Infusions:   sodium chloride 0.9% 125 mL/hr at 12/09/17 0106        Scheduled:   glycerin adult  1 suppository Rectal Once        PRN:      Assessment:     Karri Galeano is a 22 y.o.male with  Patient Active Problem List    Diagnosis Date Noted    Normocytic anemia 12/09/2017    Hematuria 12/09/2017    Abdominal pain 12/08/2017    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    Atypical stereotyped movement disorder 06/04/2017    Oropharyngeal dysphagia 06/04/2017    Requires assistance with all daily activities 06/03/2017    Right lower lobe pneumonia 06/03/2017    Severe mental retardation 03/02/2015    History of complicated labor and delivery 03/02/2015    Microcephaly 03/02/2015        Plan:     Abdominal Pain  -likely 2/2 to gas, no acute abdomen on exam  -CT Abdomen without evidence of acute intra-abdominal abnormalities  -Suppository given today     VTE ppx:  Diet:Regular, low sodium  Dispo: home today    Jeane Blackwell  [facility] Internal Medicine HO-I  [facility] IM Service Team A    [facility] Medicine Hospitalist Pager numbers:   [facility] Hospitalist Medicine Team A (Taiwo/Gustavo): [phone]  [facility] Hospitalist Medicine Team B (Teresa/Gage):  [phone]

## 2017-12-09 NOTE — H&P
[facility] History and Physical - Resident Note    Admitting Team: Medicine Team A  Attending Physician: Dr. Maravilla  Resident: Dr. David Soto  Interns: Dr. Jeane Blackwell and Dr. Zoltan Claros    Date of Admit: 12/8/2017    Chief Complaint   Abdominal Pain    Subjective:      History of Present Illness:    Pt is a 21 y/o M with PMHx of Cerebral Palsy, Mental Retardation, and Seizures who presented to [facility] for abdominal pain. Hx mostly obtained from ED as pt is non-verbal and caregiver not present at this time. Yesterday, pt seen at [facility] in [place] for abdominal pain 2/2 to gas. However, after discharge from ED, abdominal pain persisted per caregiver. She also reported that he has not had a bowel movement for four days and has suffered with constipation for past week. Per ED records, Miralax given without improvement in abdominal distension.Additionally, pt has been refusing meds and food for two days. Pt seen by PCP this AM and instructed to come to ED for dehydration.       Past Medical History:  Past Medical History:   Diagnosis Date    Anxiety     Congenital cerebral palsy     Hypotonia     Leukodystrophy     Mental retardation     Seizures     Stereotyped movement disorder        Past Surgical History:  History reviewed. No pertinent surgical history.    Allergies:  Review of patient's allergies indicates:   Allergen Reactions    Clindamycin Hives    Erythromycin Rash       Home Medications:  Prior to Admission medications    Medication Sig Start Date End Date Taking? Authorizing Provider   clonazePAM (KLONOPIN) 1 MG tablet Take 1 mg by mouth 2 (two) times daily.    Historical Provider, MD   divalproex (DEPAKOTE) 500 MG TbEC Take 1 tablet (500 mg total) by mouth every 12 (twelve) hours. 6/6/17 6/6/18  Jennifer Dunn PA-C   fluoxetine (PROZAC) 20 MG capsule Take 60 mg by mouth once daily.    Historical Provider, MD   LACTOSE-REDUCED FOOD (ENSURE ORAL) Take by mouth 4 (four) times  daily.    Historical Provider, MD   mineral oil-hydrophil petrolat (AQUAPHOR) Oint Apply topically as needed. Apply to dry skin once daily after bath    Historical Provider, MD   nutritional supplement-caloric (BENECALORIE) 7.5 kcal/mL Liqd Take by mouth. Add to food or drink 3 times daily for calorie supplement at 7am and 1600 and 1900    Historical Provider, MD   polyethylene glycol (GLYCOLAX) 17 gram/dose powder Take 17 g by mouth once daily. 10/16/17   Blanche Tolbert NP   polyethylene glycol (GLYCOLAX) 17 gram/dose powder Take 17 g by mouth once daily. 17  Marlon Tovar MD   salicylic acid 2 % Liqd Apply topically to face every morning for acne    Historical Provider, MD   trazodone (DESYREL) 100 MG tablet Take 100 mg by mouth every evening.    Historical Provider, MD       Family History:  History reviewed. No pertinent family history.    Social History:  Social History   Substance Use Topics    Smoking status: Never Smoker    Smokeless tobacco: Never Used    Alcohol use No       Review of Systems:  Pertinent positives and negatives are listed in HPI.  All other systems are reviewed and are negative.    Health Maintaince :   Primary Care Physician: Leandro     Objective:   Last 24 Hour Vital Signs:  BP  Min: 150/89  Max: 182/97  Temp  Av.4 °F (36.9 °C)  Min: 97.5 °F (36.4 °C)  Max: 99.5 °F (37.5 °C)  Pulse  Av.9  Min: 70  Max: 96  Resp  Av.3  Min: 18  Max: 20  SpO2  Av.1 %  Min: 95 %  Max: 100 %  There is no height or weight on file to calculate BMI.  No intake/output data recorded.    Physical Examination:  General: Alert and awake in NAD  Head:  Normocephalic and atraumatic  Eyes:  PERRL; EOMi   Mouth:  Oropharynx clear and without exudate; moist mucous membranes  Cardio:  Regular rate and rhythm with normal S1 and S2; no murmurs or rubs  Resp:  CTAB and unlabored; no wheezes, crackles or rhonchi  Abdom: Firm, non-distended, non-tender with normoactive bowel  sounds  Extrem: Legs contracted and folded Right foot with excoriations  Pulses: 2+ and symmetric distally  Neuro:  Moving all extremities spontaneously, no obvious focal deficits    Laboratory:  Most Recent Data:  CBC: Lab Results   Component Value Date    WBC 12.35 12/08/2017    HGB 12.9 (L) 12/08/2017    HCT 38.6 (L) 12/08/2017     12/08/2017    MCV 85 12/08/2017    RDW 14.4 12/08/2017     BMP: Lab Results   Component Value Date     12/08/2017    K 3.7 12/08/2017     12/08/2017    CO2 23 12/08/2017    BUN 9 12/08/2017    CREATININE 0.7 12/08/2017     12/08/2017    CALCIUM 9.6 12/08/2017    MG 1.8 06/06/2017    PHOS 3.5 06/06/2017     LFTs: Lab Results   Component Value Date    PROT 7.4 12/08/2017    ALBUMIN 4.1 12/08/2017    BILITOT 0.5 12/08/2017    AST 46 (H) 12/08/2017    ALKPHOS 95 12/08/2017    ALT 26 12/08/2017       Urinalysis: Lab Results   Component Value Date    LABURIN No growth 06/03/2017    COLORU Yellow 12/08/2017    SPECGRAV 1.020 12/08/2017    NITRITE Negative 12/08/2017    KETONESU 1+ (A) 12/08/2017    UROBILINOGEN Negative 12/08/2017    WBCUA 1 06/03/2017       Trended Lab Data:    Recent Labs  Lab 12/07/17 2008 12/08/17  1400   WBC 11.31 12.35   HGB 13.1* 12.9*   HCT 39.2* 38.6*    213   MCV 84 85   RDW 14.4 14.4   NA  --  142   K  --  3.7   CL  --  106   CO2  --  23   BUN  --  9   CREATININE  --  0.7   GLU  --  110   PROT  --  7.4   ALBUMIN  --  4.1   BILITOT  --  0.5   AST  --  46*   ALKPHOS  --  95   ALT  --  26       Other Laboratory Data:  Lipase: 9    Other Results:  Radiology:  Imaging Results          CT Abdomen Pelvis With Contrast (Final result)  Result time 12/08/17 21:34:30    Final result by Krysta Funes MD (12/08/17 21:34:30)                 Impression:      No acute intra-abdominal abnormalities identified, allowing for suboptimal positioning and limitations as above.      Electronically signed by: KRYSTA FUNES MD  Date:      12/08/17  Time:    21:34              Narrative:    Clinical indication: 22-year-old male with abdominal pain.    Comparison: None.    Technique: Transaxial images were obtained through the abdomen and pelvis in 5 mm increments.  75 cc of Omnipaque 350 IV contrast were administered.      Findings:  Suboptimal positioning with flexed thighs and patient's arms across abdomen creates streak artifact which partially limits evaluation.    The visualized portion of the heart is unremarkable.  The lung bases are clear.    No focal hepatic abnormalities are seen.  There is no intra-or extrahepatic biliary ductal dilatation.  The gallbladder is unremarkable.  The stomach, pancreas, spleen, and adrenal glands are unremarkable.    Kidneys are functioning.  No evidence of hydronephrosis.  Ureters are unremarkable along their courses.  Urinary bladder is distended.      Appendix is not definitely visualized, however no abnormalities are inflammatory changes are seen in the right lower quadrant to suggest appendicitis.  Significant retained stool is visualized within the distal colon and rectum.  The visualized loops of small and large bowel show no evidence of obstruction or inflammation.  No free air or free fluid.    Aorta tapers normally.     No acute osseous abnormalities are identified.  Subcutaneous soft tissue structures are unremarkable.                                 Assessment:     Karri Galeano is a 22 y.o. male with:     Plan:     Abdominal Pain  -likely 2/2 to gas, no acute abdomen on PE  -CT Abdomen without evidence of acute intra-abdominal abnormalities  -Will be administering suppository    VTE ppx:  Diet:Regular, low sodium  Dispo: home today    Code Status:     Full    Jeane Blackwell MD  [facility] HO-I  U Medicine Service Team A    [facility] Medicine Hospitalist Pager numbers:   [facility] Hospitalist Medicine Team A (Taiwo/Gustavo): [phone]  [facility] Hospitalist Medicine Team B (Teresa/Gage):   [phone]

## 2017-12-09 NOTE — PLAN OF CARE
Problem: Patient Care Overview  Goal: Plan of Care Review  Outcome: Ongoing (interventions implemented as appropriate)  Pt on room air with SpO2  98%. No respiratory distress noted. Will continue to monitor.

## 2017-12-09 NOTE — PROGRESS NOTES
md notified of patients arrival and questioned about sitter order, MD states that she will come see patient

## 2017-12-09 NOTE — PROGRESS NOTES
Doctors at bedside. pateint with wet diaper. pateint cleaned and changed. Abrasion to right upper arm POA.  Abrasions to right toes and foot POA

## 2017-12-09 NOTE — PROGRESS NOTES
Patient arrived from ER via strecther. Non verbal. V/s obtained. In EPIC. Side rails up. Bed alarm on

## 2017-12-09 NOTE — PROGRESS NOTES
Attempt to give patient water thicken. patient not opening mouth. Drooling water from corner of mouth

## 2017-12-10 ENCOUNTER — HOSPITAL ENCOUNTER (EMERGENCY)
Facility: HOSPITAL | Age: 22
Discharge: HOME | End: 2017-12-10
Attending: EMERGENCY MEDICINE
Payer: MEDICARE

## 2017-12-10 VITALS
HEIGHT: 65 IN | WEIGHT: 95 LBS | HEART RATE: 76 BPM | SYSTOLIC BLOOD PRESSURE: 132 MMHG | BODY MASS INDEX: 15.83 KG/M2 | DIASTOLIC BLOOD PRESSURE: 77 MMHG | OXYGEN SATURATION: 97 % | TEMPERATURE: 99 F | RESPIRATION RATE: 18 BRPM

## 2017-12-10 DIAGNOSIS — R41.82 ALTERED MENTAL STATUS: Primary | ICD-10-CM

## 2017-12-10 DIAGNOSIS — R14.0 GASEOUS ABDOMINAL DISTENTION: ICD-10-CM

## 2017-12-10 LAB
ALBUMIN SERPL BCP-MCNC: 3.4 G/DL
ALP SERPL-CCNC: 84 U/L
ALT SERPL W/O P-5'-P-CCNC: 34 U/L
ANION GAP SERPL CALC-SCNC: 10 MMOL/L
AST SERPL-CCNC: 63 U/L
BASOPHILS # BLD AUTO: 0.06 K/UL
BASOPHILS NFR BLD: 0.5 %
BILIRUB SERPL-MCNC: 0.5 MG/DL
BUN SERPL-MCNC: 19 MG/DL
CALCIUM SERPL-MCNC: 9 MG/DL
CHLORIDE SERPL-SCNC: 106 MMOL/L
CO2 SERPL-SCNC: 24 MMOL/L
CREAT SERPL-MCNC: 0.7 MG/DL
DIFFERENTIAL METHOD: ABNORMAL
EOSINOPHIL # BLD AUTO: 0.1 K/UL
EOSINOPHIL NFR BLD: 0.7 %
ERYTHROCYTE [DISTWIDTH] IN BLOOD BY AUTOMATED COUNT: 14.7 %
EST. GFR  (AFRICAN AMERICAN): >60 ML/MIN/1.73 M^2
EST. GFR  (NON AFRICAN AMERICAN): >60 ML/MIN/1.73 M^2
FLUAV AG SPEC QL IA: NEGATIVE
FLUBV AG SPEC QL IA: NEGATIVE
GLUCOSE SERPL-MCNC: 71 MG/DL
HCT VFR BLD AUTO: 39.4 %
HGB BLD-MCNC: 12.7 G/DL
IMM GRANULOCYTES # BLD AUTO: 0.05 K/UL
IMM GRANULOCYTES NFR BLD AUTO: 0.4 %
LYMPHOCYTES # BLD AUTO: 2.1 K/UL
LYMPHOCYTES NFR BLD: 17.3 %
MCH RBC QN AUTO: 27.7 PG
MCHC RBC AUTO-ENTMCNC: 32.2 G/DL
MCV RBC AUTO: 86 FL
MONOCYTES # BLD AUTO: 1.2 K/UL
MONOCYTES NFR BLD: 9.9 %
NEUTROPHILS # BLD AUTO: 8.5 K/UL
NEUTROPHILS NFR BLD: 71.2 %
NRBC BLD-RTO: 0 /100 WBC
PLATELET # BLD AUTO: 179 K/UL
PMV BLD AUTO: 11.1 FL
POTASSIUM SERPL-SCNC: 4.7 MMOL/L
PROT SERPL-MCNC: 6.9 G/DL
RBC # BLD AUTO: 4.59 M/UL
SODIUM SERPL-SCNC: 140 MMOL/L
SPECIMEN SOURCE: NORMAL
WBC # BLD AUTO: 11.94 K/UL

## 2017-12-10 PROCEDURE — 87400 INFLUENZA A/B EACH AG IA: CPT

## 2017-12-10 PROCEDURE — 85025 COMPLETE CBC W/AUTO DIFF WBC: CPT

## 2017-12-10 PROCEDURE — 96360 HYDRATION IV INFUSION INIT: CPT

## 2017-12-10 PROCEDURE — 80053 COMPREHEN METABOLIC PANEL: CPT

## 2017-12-10 PROCEDURE — 25000003 PHARM REV CODE 250: Performed by: STUDENT IN AN ORGANIZED HEALTH CARE EDUCATION/TRAINING PROGRAM

## 2017-12-10 PROCEDURE — S5010 5% DEXTROSE AND 0.45% SALINE: HCPCS | Performed by: STUDENT IN AN ORGANIZED HEALTH CARE EDUCATION/TRAINING PROGRAM

## 2017-12-10 PROCEDURE — 99285 EMERGENCY DEPT VISIT HI MDM: CPT | Mod: 25

## 2017-12-10 PROCEDURE — 96361 HYDRATE IV INFUSION ADD-ON: CPT

## 2017-12-10 RX ORDER — DEXTROSE MONOHYDRATE AND SODIUM CHLORIDE 5; .45 G/100ML; G/100ML
1000 INJECTION, SOLUTION INTRAVENOUS
Status: COMPLETED | OUTPATIENT
Start: 2017-12-10 | End: 2017-12-10

## 2017-12-10 RX ORDER — SODIUM CHLORIDE 9 MG/ML
500 INJECTION, SOLUTION INTRAVENOUS
Status: COMPLETED | OUTPATIENT
Start: 2017-12-10 | End: 2017-12-10

## 2017-12-10 RX ORDER — MIDAZOLAM HYDROCHLORIDE 1 MG/ML
2 INJECTION INTRAMUSCULAR; INTRAVENOUS ONCE
Status: DISCONTINUED | OUTPATIENT
Start: 2017-12-10 | End: 2017-12-11 | Stop reason: HOSPADM

## 2017-12-10 RX ADMIN — SODIUM CHLORIDE 500 ML: 0.9 INJECTION, SOLUTION INTRAVENOUS at 03:12

## 2017-12-10 RX ADMIN — DEXTROSE AND SODIUM CHLORIDE 1000 ML: 5; .45 INJECTION, SOLUTION INTRAVENOUS at 06:12

## 2017-12-10 NOTE — ED TRIAGE NOTES
Karri Galeano, a 22 y.o. male presents to the ED per report pt has been constipated for some time. Pt seen several times for constipation.  Caregiver keeps bringing him to be evaluated.       Chief Complaint   Patient presents with    Constipation     decreased urine output and constipation.  seen multiple times for same      Review of patient's allergies indicates:   Allergen Reactions    Clindamycin Hives    Erythromycin Rash     Past Medical History:   Diagnosis Date    Anxiety     Congenital cerebral palsy     Hypotonia     Leukodystrophy     Mental retardation     Seizures     Stereotyped movement disorder      LOC: Patient name and date of birth verified. The patient is awake, pt is nonverbal   APPEARANCE: Patient resting comfortably.  SKIN: The skin is warm and dry, color consistent with ethnicity, patient has normal skin turgor and moist mucus membranes, skin intact, no breakdown or bruising noted.  MUSCULOSKELETAL: No obvious swelling or deformities noted.   RESPIRATORY: Respirations are spontaneous, patient has a normal effort and rate, no accessory muscle use noted.  CARDIAC: Patient has a normal rate and rhythm, no periphreal edema noted, capillary refill < 3 seconds.  NEUROLOGIC: Eyes open spontaneously

## 2017-12-10 NOTE — ED PROVIDER NOTES
Encounter Date: 12/10/2017    SCRIBE #1 NOTE: I, Jeanine Ernandez, am scribing for, and in the presence of,  Dr. Peguero. I have scribed the following portions of the note - the Resident attestation.       History     Chief Complaint   Patient presents with    Constipation     decreased urine output and constipation.  seen multiple times for same      22 year old man with cerebral palsy, leukodystrophy, seizure disorder, and developed mental delay presenting with decreased activity and not taking PO.  Pt seent three days ago in [place] ED and the following day in [place] by Dr. Cantor and was noted to have constipation for one week, abdominal distension, and increased agitation (which is his typical pain pattern).   Workup at [place] revealed no evidence of obstruction on abdominal series.  Next day at [place] CT exam was also negative and UA showed no evidence of UTI.  Since then, he has become more lethargic with decreased activity.  Last bowel movement one week ago.            Review of patient's allergies indicates:   Allergen Reactions    Clindamycin Hives    Erythromycin Rash     Past Medical History:   Diagnosis Date    Anxiety     Congenital cerebral palsy     Hypotonia     Leukodystrophy     Mental retardation     Seizures     Stereotyped movement disorder      History reviewed. No pertinent surgical history.  History reviewed. No pertinent family history.  Social History   Substance Use Topics    Smoking status: Never Smoker    Smokeless tobacco: Never Used    Alcohol use No     Review of Systems   Reason unable to perform ROS: per caretaker.   Constitutional: Positive for activity change and appetite change.   HENT: Negative for nosebleeds.    Respiratory: Negative for cough and shortness of breath.    Cardiovascular: Negative for palpitations.   Gastrointestinal: Positive for abdominal distention and constipation. Negative for vomiting.   Genitourinary: Positive for decreased urine volume.  Negative for hematuria.   Musculoskeletal: Negative for arthralgias and back pain.   Skin: Negative for rash.   Neurological: Negative for seizures.   Psychiatric/Behavioral: Positive for behavioral problems.       Physical Exam     Initial Vitals [12/10/17 1232]   BP Pulse Resp Temp SpO2   (!) 151/92 91 18 97.6 °F (36.4 °C) 97 %      MAP       111.67         Physical Exam    Constitutional:  Non-toxic appearance. He does not have a sickly appearance. No distress.   HENT:   Head: Atraumatic.   Mouth/Throat: Oropharynx is clear and moist.   Eyes: Conjunctivae are normal. Pupils are equal, round, and reactive to light.   Cardiovascular: Normal rate and regular rhythm.   No murmur heard.  Pulmonary/Chest: No respiratory distress. He has no wheezes. He has no rales.   Abdominal: Soft. He exhibits no distension. There is no tenderness. There is no guarding.   Abdomen tympanic   Genitourinary: Rectal exam shows no external hemorrhoid, no internal hemorrhoid, no mass, no tenderness and guaiac negative stool. Guaiac negative stool.   Neurological:   Nonverbal, awake and alert  Contracted extremities  No facial asymmettry    Skin: Skin is warm and dry. Capillary refill takes less than 2 seconds.         ED Course   Procedures  Labs Reviewed   CBC W/ AUTO DIFFERENTIAL   URINALYSIS, REFLEX TO URINE CULTURE   COMPREHENSIVE METABOLIC PANEL   POCT INFLUENZA A/B             Medical Decision Making:   History:   Old Medical Records: I decided to obtain old medical records.  Clinical Tests:   Lab Tests: Ordered and Reviewed  Radiological Study: Ordered and Reviewed  ED Management:  Patient has good skin turgor and good Refill       APC / Resident Notes:   22 year old man with cerebral palsy presenting with caregiver with decreased oral intake and worsening mental status with decreased activity; seen at [facility] ED with above nonrevealing work-up, will repeat lab workup, perform head CT, chest x-ray, and administer fluids        Scribe Attestation:   Scribe #1: I performed the above scribed service and the documentation accurately describes the services I performed. I attest to the accuracy of the note.    Attending Attestation:   Physician Attestation Statement for Resident:  As the supervising MD   Physician Attestation Statement: I have personally seen and examined this patient.   I agree with the above history. -: 22 y.o. male with cerebral palsy, in a care facility. Was seen by me 2 days ago in [place] for problems with abdominal pain, distension, no bowel movements, refusing to takes medications or any other po intake. At that time, we gave him Versed, we scanned his belly, no evidence of obstruction. Unable to pass a catheter initially, however when we tried again, he began urinating spontaneously, not good flow. Will get a rectal temp today. Dry stool, so will check for no impaction. Per caretaker, who is the same, he is refusing to take any po, he will close his jaw tight. Caretakers forced his meds into his mouth this morning. Has not eaten anything now for almost a week, reporting here that he is less active when he is in pain, pretty much has just lying pretty still. In the past hour, he began sucking on his left hand, macerating the skin.   As the supervising MD I agree with the above PE.    As the supervising MD I agree with the above treatment, course, plan, and disposition.   -: My initial differential diagnosis includes but is not limited to infection, pneumonia, UTI, viral infection. We will check to make sure he's not impacted. Will also do a CT scan of the head for brain bleed, mass effects. Will do basic labs to check electrolytes.     Laboratory results indicate the patient is slightly anemic there is no evidence of any elevation of white cell count, there is no abnormal electrolytes, kidney function is within normal limits liver function tests show no abnormalities.  Head CT shows no acute process no bleeds or  mass effects no fractures    Patient began eating applesauce and taking sips of liquid in the emergency department patient did respond to an passing bowel movements and has been slowly passing additional gas and fluid from his rectum.  Patient is also belching and department had much more comfortable appearing.    Patient will be discharged patient is stable- I discussed the overall scheme of today's workup with primary caregiver                    ED Course      Clinical Impression:   The encounter diagnosis was Altered mental status. evaluation for: abdominal distention, failure to thrive.  Refusal to eat or drink.        Disposition:   Disposition: Discharged  Condition: Stable                        Randy Peguero MD  12/10/17 4417

## 2017-12-11 NOTE — ED NOTES
Report received from irina smith rn. Pt care assumed. Pt resting comfortably and independently repositioned in stretcher with bed locked in lowest position for safety. NAD noted at this time. Respirations even and unlabored and visible chest rise noted.  Pt on continuous cardiac, BP, and O2 monitoring. Call light within reach.No needs at this time. Will continue to monitor.

## 2017-12-11 NOTE — DISCHARGE SUMMARY
[facility] Discharge Summary    Primary Team: [facility] IM Team A  Attending Physician: Taiwo  Resident: Charles  Intern: Hyacinth    Date of Admit: 12/8/2017  Date of Discharge: 12/11/2017    Discharge to: Group Home  Condition: Stable    Discharge Diagnoses     Patient Active Problem List   Diagnosis    Severe mental retardation    History of complicated labor and delivery    Microcephaly    Requires assistance with all daily activities    Right lower lobe pneumonia    Atypical stereotyped movement disorder    Oropharyngeal dysphagia    Spastic quadriplegic cerebral palsy    Abdominal pain    Normocytic anemia    Hematuria    Dehydration       Consultants and Procedures     Consultants:  None    Procedures:   None    Brief History of Present Illness      Pt is a 23 y/o M with PMHx of Cerebral Palsy, Mental Retardation, and Seizures who presented to [facility] for abdominal pain. Hx mostly obtained from ED as pt is non-verbal and caregiver not present at this time. Day prior to admit, pt seen at [facility] in [place] for abdominal pain 2/2 to gas. However, after discharge from ED, abdominal pain persisted per caregiver. She also reported that he has not had a bowel movement for four days and has suffered with constipation for past week. Per ED records, Miralax given without improvement in abdominal distension.Additionally, pt has been refusing meds and food for two days. Pt seen by PCP this AM and instructed to come to ED for dehydration.     For the full HPI please refer to the History & Physical from this admission.    Hospital Course By Problem with Pertinent Findings     Abdominal Pain  -likely 2/2 to gas, no acute abdomen on exam  -CT Abdomen without evidence of acute intra-abdominal abnormalities  -Electrolytes WNL and WBC was not elevated  -Suppository given and patient had a BM.  -He was tolerating liquids before discharge and determined to be stable    Discharge Medications        Medication List      CONTINUE  taking these medications    AQUAPHOR Oint  Generic drug:  mineral oil-hydrophil petrolat     BENECALORIE 7.5 kcal/mL Liqd  Generic drug:  nutritional supplement-caloric     clonazePAM 1 MG tablet  Commonly known as:  KLONOPIN     divalproex 500 MG Tbec  Commonly known as:  DEPAKOTE  Take 1 tablet (500 mg total) by mouth every 12 (twelve) hours.     ENSURE ORAL     FLUoxetine 20 MG capsule  Commonly known as:  PROZAC     * polyethylene glycol 17 gram/dose powder  Commonly known as:  GLYCOLAX  Take 17 g by mouth once daily.     * polyethylene glycol 17 gram/dose powder  Commonly known as:  GLYCOLAX  Take 17 g by mouth once daily.     SALICYLIC ACID 2 % Clsr  Generic drug:  salicylic acid     traZODone 100 MG tablet  Commonly known as:  DESYREL        * This list has 2 medication(s) that are the same as other medications prescribed for you. Read the directions carefully, and ask your doctor or other care provider to review them with you.                Discharge Information:   Diet:  Full as tolerated    Physical Activity:  As tolerated    Instructions:  1. Take all medications as prescribed  2. Keep all follow-up appointments  3. Return to the hospital or call your primary care physicians if any worsening symptoms such as abdominal pain, lethargy occur.      Follow-Up Appointments:  With PCP    David Soto  [facility] Internal Medicine, [facility]

## 2017-12-11 NOTE — DISCHARGE INSTRUCTIONS
You were evaluated for problems of your failure to swallow food and medications, behaving with your normal level of activity, with continued abdominal distention,    Your CAT scan showed no change since 2014 study done prior.  Her abdomen continues show a nonspecific gas pattern and a small amount of stool in the rectum which you past with using an enema of the  soapsuds type  soapsuds type.  He is able  to tolerate applesauce and a small amount of liquid.  He does appear that he would be able to begin taking his medications and feedings as usual.    As noted 2 days ago and [place] emergency department there is no infection in his bladder/urine.  It appears we have helped him begin to clear that pressures abdomen it was keeping him from eating .  He appears no longer to be suffering from the pain in his abdomen.

## 2017-12-30 ENCOUNTER — HOSPITAL ENCOUNTER (EMERGENCY)
Facility: HOSPITAL | Age: 22
Discharge: HOME | End: 2017-12-30
Attending: FAMILY MEDICINE
Payer: MEDICARE

## 2017-12-30 VITALS
DIASTOLIC BLOOD PRESSURE: 83 MMHG | HEART RATE: 94 BPM | RESPIRATION RATE: 20 BRPM | SYSTOLIC BLOOD PRESSURE: 160 MMHG | TEMPERATURE: 99 F | OXYGEN SATURATION: 100 %

## 2017-12-30 DIAGNOSIS — S00.01XA ABRASION OF SCALP, INITIAL ENCOUNTER: Primary | ICD-10-CM

## 2017-12-30 PROCEDURE — 99283 EMERGENCY DEPT VISIT LOW MDM: CPT

## 2017-12-31 NOTE — ED PROVIDER NOTES
Encounter Date: 12/30/2017       History     Chief Complaint   Patient presents with    Head Laceration     EMS reports pt was s/p shower at [facility] when pt fell over hitting back of head, denies LOC per report.  Pt noted with approx 1cm laceration to posterior scalp.  Pt noted non-verbal and with contractures to Lower extremities.  EMS reports normal for self.      Patient is a 22-year-old male with a history of mental retardation, cerebral palsy and seizure disorder is brought in for evaluation of possible head laceration.  Just prior to arrival patient was in a wheelchair in the shower when he started to slide forward and scraped the posterior aspect of his scalp against a metal portion the chair he did not fall out of the chair and did not hit the floor and there was no loss of consciousness.  Patient has been behaving normally.  No bleeding from her scalp wound is now stopped.  Tetanus is up-to-date          Review of patient's allergies indicates:   Allergen Reactions    Clindamycin Hives    Erythromycin Rash     Past Medical History:   Diagnosis Date    Anxiety     Congenital cerebral palsy     Hypotonia     Leukodystrophy     Mental retardation     Seizures     Stereotyped movement disorder      History reviewed. No pertinent surgical history.  History reviewed. No pertinent family history.  Social History   Substance Use Topics    Smoking status: Never Smoker    Smokeless tobacco: Never Used    Alcohol use No     Review of Systems   Unable to perform ROS: Patient nonverbal       Physical Exam     Initial Vitals [12/30/17 1703]   BP Pulse Resp Temp SpO2   (!) 160/83 94 20 98.9 °F (37.2 °C) 100 %      MAP       108.67         Physical Exam    Nursing note and vitals reviewed.  Constitutional: Vital signs are normal. He appears well-developed and well-nourished. He is not diaphoretic. No distress.   HENT:   Head: Normocephalic and atraumatic.   Eyes: EOM are normal. Pupils are equal, round, and  reactive to light.   Neck: Normal range of motion. Neck supple.   Cardiovascular: Normal rate and regular rhythm.   Pulmonary/Chest: Breath sounds normal. No respiratory distress. He has no wheezes. He has no rhonchi. He has no rales. He exhibits no tenderness.   Abdominal: Soft. He exhibits no distension. There is no tenderness. There is no rebound and no guarding.   Musculoskeletal: Normal range of motion. He exhibits no tenderness.   Neurological: He is alert and oriented to person, place, and time. No cranial nerve deficit.   Skin: Skin is warm and dry.   1 cm scalp abrasion.  No active bleeding and no laceration.   Psychiatric: He has a normal mood and affect.         ED Course   Procedures  Labs Reviewed - No data to display                            ED Course      Clinical Impression:   The encounter diagnosis was Abrasion of scalp, initial encounter.    Disposition:   Disposition: Discharged  Condition: Stable                        Cameron Mcdonnell MD  12/30/17 7850

## 2018-01-18 ENCOUNTER — TELEPHONE (OUTPATIENT)
Dept: GASTROENTEROLOGY | Facility: CLINIC | Age: 23
End: 2018-01-18

## 2018-01-18 ENCOUNTER — OFFICE VISIT (OUTPATIENT)
Dept: GASTROENTEROLOGY | Facility: CLINIC | Age: 23
End: 2018-01-18
Payer: MEDICARE

## 2018-01-18 VITALS
WEIGHT: 115 LBS | HEART RATE: 85 BPM | DIASTOLIC BLOOD PRESSURE: 80 MMHG | BODY MASS INDEX: 19.16 KG/M2 | HEIGHT: 65 IN | SYSTOLIC BLOOD PRESSURE: 122 MMHG

## 2018-01-18 DIAGNOSIS — F72 SEVERE MENTAL RETARDATION: ICD-10-CM

## 2018-01-18 DIAGNOSIS — G80.0 SPASTIC QUADRIPLEGIC CEREBRAL PALSY: ICD-10-CM

## 2018-01-18 DIAGNOSIS — F98.4 ATYPICAL STEREOTYPED MOVEMENT DISORDER: ICD-10-CM

## 2018-01-18 DIAGNOSIS — K59.00 CONSTIPATION, UNSPECIFIED CONSTIPATION TYPE: Primary | ICD-10-CM

## 2018-01-18 PROCEDURE — 99999 PR PBB SHADOW E&M-EST. PATIENT-LVL III: CPT | Mod: PBBFAC,,, | Performed by: INTERNAL MEDICINE

## 2018-01-18 PROCEDURE — 99213 OFFICE O/P EST LOW 20 MIN: CPT | Mod: PBBFAC,PN | Performed by: INTERNAL MEDICINE

## 2018-01-18 PROCEDURE — 99204 OFFICE O/P NEW MOD 45 MIN: CPT | Mod: S$PBB,,, | Performed by: INTERNAL MEDICINE

## 2018-01-18 NOTE — PROGRESS NOTES
Subjective:      Patient ID: Karri Galeano is a 22 y.o. male.    Chief Complaint: Constipation    HPI:   Patient 22-year-old institutionalized male presents with a history of irregular bowel habits.  Patient has a past history of cerebral palsy and mental retardation.  Seizure disorder.  He is noncommunicative.  Currently the available notes he was seen in the emergency room with abdominal distention and constipation.  KUB December 17, 2017 was relatively unremarkable.  Mild gaseous distention.  CT abdomen and pelvis unremarkable.  Since that time patient has been taking lactulose on a daily basis.  Patient's attendance indicate that when he takes prune juice she will have a bowel movement.  Review of her brief hospitalization last month, Asian responded to a suppository with good bowel illumination and subsequent resumption of oral intake.  I will recommend daily lactulose or MiraLAX.  May need a glycerin or Dulcolax suppository on a once or twice a week basis if he does not produce bowel movements.      Review of patient's allergies indicates:   Allergen Reactions    Clindamycin Hives    Erythromycin Rash     Past Medical History:   Diagnosis Date    Anxiety     Congenital cerebral palsy     Hypotonia     Leukodystrophy     Mental retardation     Seizures     Stereotyped movement disorder      History reviewed. No pertinent surgical history.  Family History   Problem Relation Age of Onset    No Known Problems Mother     No Known Problems Father      Social History     Social History    Marital status: Single     Spouse name: N/A    Number of children: N/A    Years of education: N/A     Occupational History    Not on file.     Social History Main Topics    Smoking status: Never Smoker    Smokeless tobacco: Never Used    Alcohol use No    Drug use: Unknown    Sexual activity: Not on file     Other Topics Concern    Not on file     Social History Narrative    No narrative on file       Review  "of Systems:  Constitutional: Negative for appetite change.   HENT: Negative for trouble swallowing.   Eyes: Negative for photophobia.   Respiratory: Negative for cough and shortness of breath.   Cardiovascular: Negative for palpitations.   Gastrointestinal: See HPI for details.  Genitourinary: Negative for frequency and hematuria.   Skin: Negative for rash.   Neurological: Negative for weakness and headaches.   Hematological: Negative.   Psychiatric/Behavioral: Negative for suicidal ideas and behavioral problems.     Objective:     /80 (BP Location: Right arm, Patient Position: Sitting)   Pulse 85   Ht 5' 5" (1.651 m)   Wt 52.2 kg (115 lb)   BMI 19.14 kg/m²     Physical Exam:  Alert, in no distress.  Wheelchair bound  Eyes: Pupils are equal, round, and reactive to light.   Neck: Supple. No mass  Cardiovascular: Regular rhythm . No murmur   Pulmonary/Chest: Lungs clear   Abdominal: Soft. No mass palpated. Nontender, no guarding. Positive bowel sounds   Musculoskeletal: Cerebral palsy deformities.. No edema.   Psychiatric: Noncommunicative    Assessment:     1. Constipation, unspecified constipation type    2. Spastic quadriplegic cerebral palsy    3. Severe mental retardation    4. Atypical stereotyped movement disorder      Plan:     Karri was seen today for constipation.    Diagnoses and all orders for this visit:    Constipation, unspecified constipation type    Spastic quadriplegic cerebral palsy    Severe mental retardation    Atypical stereotyped movement disorder      Impression  Constipation    Recommendation  Daily lactulose or MiraLAX  Weekly or twice weekly suppository as needed.  (Glycerin suppository or, if unsuccessful, Dulcolax suppository).      "

## 2018-01-18 NOTE — TELEPHONE ENCOUNTER
----- Message from Evy Castro sent at 1/18/2018 10:16 AM CST -----  Contact: Alanis Mathis [phone]  A[facility] is calling to inform you that patient will be about 30 minutes late today. Please advise.

## 2018-01-18 NOTE — PATIENT INSTRUCTIONS
Constipation (Adult)  Constipation means that you have bowel movements that are less frequent than usual. Stools often become very hard and difficult to pass.  Constipation is very common. At some point in life it affects almost everyone. Since everyone's bowel habits are different, what is constipation to one person may not be to another. Your healthcare provider may do tests to diagnose constipation. It depends on what he or she finds when evaluating you.    Symptoms of constipation include:  · Abdominal pain  · Bloating  · Vomiting  · Painful bowel movements  · Itching, swelling, bleeding, or pain around the anus  Causes  Constipation can have many causes. These include:  · Diet low in fiber  · Too much dairy  · Not drinking enough liquids  · Lack of exercise or physical activity. This is especially true for older adults.  · Changes in lifestyle or daily routine, including pregnancy, aging, work, and travel  · Frequent use or misuse of laxatives  · Ignoring the urge to have a bowel movement or delaying it until later  · Medicines, such as certain prescription pain medicines, iron supplements, antacids, certain antidepressants, and calcium supplements  · Diseases like irritable bowel syndrome, bowel obstructions, stroke, diabetes, thyroid disease, Parkinson disease, hemorrhoids, and colon cancer  Complications  Potential complications of constipation can include:  · Hemorrhoids  · Rectal bleeding from hemorrhoids or anal fissures (skin tears)  · Hernias  · Dependency on laxatives  · Chronic constipation  · Fecal impaction  · Bowel obstruction or perforation  Home care  All treatment should be done after talking with your healthcare provider. This is especially true if you have another medical problems, are taking prescription medicines, or are an older adult. Treatment most often involves lifestyle changes. You may also need medicines. Your healthcare provider will tell you which will work best for you. Follow  the advice below to help avoid this problem in the future.  Lifestyle changes  These lifestyle changes can help prevent constipation:  · Diet. Eat a high-fiber diet, with fresh fruit and vegetables, and reduce dairy intake, meats, and processed foods  · Fluids. It's important to get enough fluids each day. Drink plenty of water when you eat more fiber. If you are on diet that limits the amount of fluid you can have, talk about this with your healthcare provider.  · Regular exercise. Check with your healthcare provider first.  Medications  Take any medicines as directed. Some laxatives are safe to use only every now and then. Others can be taken on a regular basis. Talk with your doctor or pharmacist if you have questions.  Prescription pain medicines can cause constipation. If you are taking this kind of medicine, ask your healthcare provider if you should also take a stool softener.  Medicines you may take to treat constipation include:  · Fiber supplements  · Stool softeners  · Laxatives  · Enemas  · Rectal suppositories  Follow-up care  Follow up with your healthcare provider if symptoms don't get better in the next few days. You may need to have more tests or see a specialist.  Call 911  Call 911 if any of these occur:  · Trouble breathing  · Stiff, rigid abdomen that is severely painful to touch  · Confusion  · Fainting or loss of consciousness  · Rapid heart rate  · Chest pain  When to seek medical advice  Call your healthcare provider right away if any of these occur:  · Fever over 100.4°F (38°C)  · Failure to resume normal bowel movements  · Pain in your abdomen or back gets worse  · Nausea or vomiting  · Swelling in your abdomen  · Blood in the stool  · Black, tarry stool  · Involuntary weight loss  · Weakness  Date Last Reviewed: 12/30/2015  © 0277-5997 [employer]. [address] [place] [address], [place] [address]. All rights reserved. This information is not intended as a substitute for  professional medical care. Always follow your healthcare professional's instructions.

## 2018-01-18 NOTE — TELEPHONE ENCOUNTER
----- Message from Evy Castro sent at 1/18/2018 11:11 AM CST -----  Contact: Alanis Mathis [phone]  Enoc states she went to wrong location and is on her way there. She will be there in about 10 minutes. Please advise.

## 2018-01-25 ENCOUNTER — TELEPHONE (OUTPATIENT)
Dept: GASTROENTEROLOGY | Facility: CLINIC | Age: 23
End: 2018-01-25

## 2018-01-25 NOTE — TELEPHONE ENCOUNTER
----- Message from Tahmina Fang sent at 1/25/2018 11:39 AM CST -----  Contact: [phone]/Lian pt's care taker   Ms Beal called stating pt's paper work was faxed to her but her printer didn't have ink so the paper work came out blank . Ms Beal wants to know if she can  the paper work in the office . Please advise

## 2018-02-07 ENCOUNTER — TELEPHONE (OUTPATIENT)
Dept: UROLOGY | Facility: CLINIC | Age: 23
End: 2018-02-07

## 2018-02-07 ENCOUNTER — OFFICE VISIT (OUTPATIENT)
Dept: UROLOGY | Facility: CLINIC | Age: 23
End: 2018-02-07
Payer: MEDICARE

## 2018-02-07 VITALS — WEIGHT: 115 LBS | HEIGHT: 65 IN | BODY MASS INDEX: 19.16 KG/M2

## 2018-02-07 DIAGNOSIS — N35.9 URETHRAL STRICTURE, UNSPECIFIED STRICTURE TYPE: ICD-10-CM

## 2018-02-07 DIAGNOSIS — R33.9 URINARY RETENTION: Primary | ICD-10-CM

## 2018-02-07 PROCEDURE — 99213 OFFICE O/P EST LOW 20 MIN: CPT | Mod: PBBFAC,PO | Performed by: UROLOGY

## 2018-02-07 PROCEDURE — 99205 OFFICE O/P NEW HI 60 MIN: CPT | Mod: ,,, | Performed by: UROLOGY

## 2018-02-07 PROCEDURE — 99999 PR PBB SHADOW E&M-EST. PATIENT-LVL III: CPT | Mod: PBBFAC,,, | Performed by: UROLOGY

## 2018-02-07 RX ORDER — CIPROFLOXACIN 2 MG/ML
400 INJECTION, SOLUTION INTRAVENOUS
Status: CANCELLED | OUTPATIENT
Start: 2018-02-07

## 2018-02-07 RX ORDER — SODIUM CHLORIDE 9 MG/ML
INJECTION, SOLUTION INTRAVENOUS CONTINUOUS
Status: CANCELLED | OUTPATIENT
Start: 2018-02-07

## 2018-02-07 RX ORDER — LACTULOSE 10 G/15ML
10 SOLUTION ORAL; RECTAL DAILY
Status: ON HOLD | COMMUNITY
End: 2019-11-01 | Stop reason: HOSPADM

## 2018-02-07 NOTE — TELEPHONE ENCOUNTER
----- Message from Andreina Souza sent at 2/7/2018  1:41 PM CST -----  Contact: Kary Shepherd, nurse from [facility]/[phone]  Patient is scheduled for surgery with Dr. Alvares tomorrow but it will not happen unless you call the nurse back today.  (her words)    Please call and advise.

## 2018-02-07 NOTE — PROGRESS NOTES
Subjective:       Patient ID: Karri Galeano is a 22 y.o. male.    Chief Complaint: Urinary Retention    [age] AAM with cerebral palsy and profound MR presents with distended bladder, spraying urinary stream and history of inability to be catheterized. Here for evaluation.      Other   This is a new (urinary retention for~58 days) problem. The current episode started more than 1 month ago. The problem occurs constantly. The problem has been unchanged. Associated symptoms include urinary symptoms (Spraying stream). Pertinent negatives include no abdominal pain, anorexia, arthralgias, change in bowel habit, chest pain, chills, congestion, coughing, diaphoresis, fatigue, fever, headaches, joint swelling, myalgias, nausea, neck pain, numbness, rash, sore throat, swollen glands, vertigo, visual change, vomiting or weakness. Associated symptoms comments: Constipation. Nothing aggravates the symptoms. He has tried nothing for the symptoms.     Review of Systems   Constitutional: Positive for unexpected weight change. Negative for activity change, appetite change, chills, diaphoresis, fatigue and fever.   HENT: Positive for dental problem, drooling and mouth sores. Negative for congestion, hearing loss, sinus pressure, sore throat and trouble swallowing.    Eyes: Negative for photophobia, pain, discharge and visual disturbance.   Respiratory: Negative for apnea, cough and shortness of breath.    Cardiovascular: Negative for chest pain, palpitations and leg swelling.   Gastrointestinal: Negative for abdominal distention, abdominal pain, anal bleeding, anorexia, blood in stool, change in bowel habit, constipation, diarrhea, nausea, rectal pain and vomiting.   Endocrine: Negative for cold intolerance, heat intolerance, polydipsia, polyphagia and polyuria.   Genitourinary: Positive for decreased urine volume, difficulty urinating, dysuria and frequency. Negative for discharge, enuresis, flank pain, genital sores, hematuria,  penile pain, penile swelling, scrotal swelling, testicular pain and urgency.   Musculoskeletal: Negative for arthralgias, back pain, joint swelling, myalgias and neck pain.   Skin: Negative for color change, pallor, rash and wound.   Allergic/Immunologic: Negative for environmental allergies, food allergies and immunocompromised state.   Neurological: Positive for seizures and speech difficulty. Negative for dizziness, vertigo, weakness, numbness and headaches.   Hematological: Negative for adenopathy. Does not bruise/bleed easily.   Psychiatric/Behavioral: Positive for agitation. The patient is hyperactive.        Objective:      Physical Exam   Nursing note and vitals reviewed.  Constitutional: He appears well-developed and well-nourished.   HENT:   Head: Atraumatic.   Nose: Nose normal.   Mouth/Throat: Oropharynx is clear and moist.   Eyes: Conjunctivae and EOM are normal. Pupils are equal, round, and reactive to light.   Neck: Normal range of motion. Neck supple.   Cardiovascular: Normal rate, regular rhythm, normal heart sounds and intact distal pulses.    Pulmonary/Chest: Effort normal and breath sounds normal.   Abdominal: Soft. Bowel sounds are normal. He exhibits distension. He exhibits no mass. There is no tenderness. There is no rebound and no guarding.   Genitourinary: Penis normal.   Genitourinary Comments: Bladder scan > 999   Musculoskeletal: Normal range of motion.   Neurological: He is alert. He has normal reflexes. Coordination abnormal.   Skin: Skin is warm and dry.     Psychiatric: He has a normal mood and affect. His behavior is normal. Judgment and thought content normal.       Assessment:       1. Urinary retention    2. Urethral stricture, unspecified stricture type    3. Urethral stricture        Plan:       Patient Instructions   Urgent trip to surgery in am  RUG, Cystoscopy and Urethral dilation.

## 2018-02-08 ENCOUNTER — TELEPHONE (OUTPATIENT)
Dept: UROLOGY | Facility: CLINIC | Age: 23
End: 2018-02-08

## 2018-02-08 PROBLEM — N35.919 URETHRAL STRICTURE: Status: ACTIVE | Noted: 2018-02-08

## 2018-02-08 NOTE — TELEPHONE ENCOUNTER
----- Message from Damaris Martins sent at 2/8/2018  4:08 PM CST -----  Contact: [phone]   Patient had surgery and Dr Alvares sent Rx to a pharmacy that they can't use. Please advise.

## 2018-02-09 ENCOUNTER — TELEPHONE (OUTPATIENT)
Dept: FAMILY MEDICINE | Facility: CLINIC | Age: 23
End: 2018-02-09

## 2018-02-09 ENCOUNTER — TELEPHONE (OUTPATIENT)
Dept: UROLOGY | Facility: CLINIC | Age: 23
End: 2018-02-09

## 2018-02-09 NOTE — TELEPHONE ENCOUNTER
----- Message from Mary Townsend sent at 2/9/2018 10:07 AM CST -----  Contact: Kary (nurse)/ [phone]  Nurse called in to let you know medication phenazopyridine (PYRIDIUM) 100 MG tablet is not covered under his insurance and would alternate medication  AZO 97.5 mg.    Please call and advise.

## 2018-02-09 NOTE — TELEPHONE ENCOUNTER
----- Message from Rebecca Quinonez sent at 2/8/2018  5:20 PM CST -----  Contact: Kary Shepherd nurse, [phone]  States you failed to call PYRIDIUM prescription in to the pharmacy. States they received all other medications listed in discharge paperwork.

## 2018-03-30 ENCOUNTER — HOSPITAL ENCOUNTER (EMERGENCY)
Facility: HOSPITAL | Age: 23
Discharge: HOME | End: 2018-03-30
Payer: MEDICARE

## 2018-03-30 VITALS
TEMPERATURE: 98 F | SYSTOLIC BLOOD PRESSURE: 137 MMHG | HEART RATE: 80 BPM | WEIGHT: 100 LBS | RESPIRATION RATE: 20 BRPM | DIASTOLIC BLOOD PRESSURE: 84 MMHG | OXYGEN SATURATION: 100 % | BODY MASS INDEX: 16.64 KG/M2

## 2018-03-30 DIAGNOSIS — S00.81XA ABRASION OF FACE, INITIAL ENCOUNTER: Primary | ICD-10-CM

## 2018-03-30 DIAGNOSIS — S05.12XA CONTUSION OF LEFT EYE, INITIAL ENCOUNTER: ICD-10-CM

## 2018-03-30 PROCEDURE — 25000003 PHARM REV CODE 250: Performed by: NURSE PRACTITIONER

## 2018-03-30 PROCEDURE — 99283 EMERGENCY DEPT VISIT LOW MDM: CPT

## 2018-03-30 RX ORDER — DIPHENHYDRAMINE HCL 25 MG
25 CAPSULE ORAL
Status: COMPLETED | OUTPATIENT
Start: 2018-03-30 | End: 2018-03-30

## 2018-03-30 RX ORDER — DIPHENHYDRAMINE HCL 25 MG
CAPSULE ORAL
Status: DISCONTINUED
Start: 2018-03-30 | End: 2018-03-30 | Stop reason: HOSPADM

## 2018-03-30 RX ADMIN — DIPHENHYDRAMINE HYDROCHLORIDE 25 MG: 25 CAPSULE ORAL at 07:03

## 2018-03-30 NOTE — ED TRIAGE NOTES
from group home with care giver, states he fell over froma sitting position and hit head on table. hematoma to left eye

## 2018-03-31 NOTE — ED PROVIDER NOTES
Encounter Date: 3/30/2018       History     Chief Complaint   Patient presents with    Fall     from group home with care giver, states he fell over froma sitting position and hit head on table. hematoma to left eye     22-year-old male presents to emergency room with caregiver.  Patient has history of MR is unable to provide history.  Caregiver stated she walked to the door and patient leaned out of wheelchair and hit the corner of his eye on the table.  Patient behaving normally after the fall.  Minimal bleeding.  Reports swelling to the left upper eyelid.          Review of patient's allergies indicates:   Allergen Reactions    Clindamycin Hives    Erythromycin Rash     Past Medical History:   Diagnosis Date    Anxiety     Congenital cerebral palsy     Hypotonia     Leukodystrophy     Mental retardation     Retention of urine     Seizures     Stereotyped movement disorder     Urinary tract infection      History reviewed. No pertinent surgical history.  Family History   Problem Relation Age of Onset    No Known Problems Mother     No Known Problems Father     Prostate cancer Neg Hx     Kidney disease Neg Hx      Social History   Substance Use Topics    Smoking status: Never Smoker    Smokeless tobacco: Never Used    Alcohol use No     Review of Systems   Constitutional: Negative for fever.   HENT: Negative for sore throat.    Respiratory: Negative for shortness of breath.    Cardiovascular: Negative for chest pain.   Gastrointestinal: Negative for nausea.   Genitourinary: Negative for dysuria.   Musculoskeletal: Negative for back pain.   Skin: Positive for wound. Negative for rash.   Neurological: Negative for weakness.   Hematological: Does not bruise/bleed easily.   All other systems reviewed and are negative.      Physical Exam     Initial Vitals [03/30/18 1836]   BP Pulse Resp Temp SpO2   126/86 90 20 97.4 °F (36.3 °C) 98 %      MAP       99.33         Physical Exam    Nursing note and  vitals reviewed.  Constitutional: He appears well-nourished. He is not diaphoretic. No distress.   HENT:   Head: Normocephalic.       Contusion to the left upper eyelid with a small abrasion.  No orbital tenderness or deformity.   Eyes: Conjunctivae are normal.   Neck: Normal range of motion. Neck supple.   Cardiovascular: Normal rate and regular rhythm.   Pulmonary/Chest: Breath sounds normal. No respiratory distress.   Abdominal: Soft. He exhibits no distension. There is no tenderness.   Musculoskeletal: He exhibits no tenderness.   Neurological: He is alert.   Skin: Skin is warm and dry.         ED Course   Procedures  Labs Reviewed - No data to display          Medical Decision Making:   Initial Assessment:   22-year-old male presents to emergency room with caregiver.  Patient has history of MR is unable to provide history.  Caregiver stated she walked to the door and patient leaned out of wheelchair and hit the corner of his eye on the table.  Patient behaving normally after the fall.  Minimal bleeding.  Reports swelling to the left upper eyelid.  Patient is at baseline per caregiver.  Has swelling and bruising to the left upper eyelid.  Small abrasion noted.  No orbital deformity.   Differential Diagnosis:   Orbital fracture, contusion, laceration, abrasion  ED Management:  Patient unable to tolerate CT scan.  Patient was examined by myself and my attending physician.  Believe there is a low risk for orbital fracture.  Patient monitored for any worsening signs for approximately 2 hours in the emergency room.  Patient continuing to behaving at baseline.  Low risk for internal head injury.  Patient will be discharged with instructions to follow-up with primary care doctor in 3-5 days.              Attending Attestation:     Physician Attestation Statement for NP/PA:   I have conducted a face to face encounter with this patient in addition to the NP/PA, due to Medical Complexity    Other NP/PA Attestation  Additions:    History of Present Illness: agree   Physical Exam: agree   Medical Decision Making: agree                    Clinical Impression:   The primary encounter diagnosis was Abrasion of face, initial encounter. A diagnosis of Contusion of left eye, initial encounter was also pertinent to this visit.                           Blanche Tolbert NP  03/30/18 8470       Cameron Mcdonnell MD  03/31/18 6323

## 2018-05-06 ENCOUNTER — HOSPITAL ENCOUNTER (EMERGENCY)
Facility: HOSPITAL | Age: 23
Discharge: HOME | End: 2018-05-06
Attending: EMERGENCY MEDICINE
Payer: MEDICARE

## 2018-05-06 VITALS
RESPIRATION RATE: 16 BRPM | WEIGHT: 100 LBS | HEART RATE: 80 BPM | TEMPERATURE: 99 F | DIASTOLIC BLOOD PRESSURE: 60 MMHG | OXYGEN SATURATION: 97 % | BODY MASS INDEX: 16.64 KG/M2 | SYSTOLIC BLOOD PRESSURE: 128 MMHG

## 2018-05-06 DIAGNOSIS — S09.90XA INJURY OF HEAD, INITIAL ENCOUNTER: Primary | ICD-10-CM

## 2018-05-06 PROCEDURE — 99283 EMERGENCY DEPT VISIT LOW MDM: CPT

## 2018-05-06 RX ORDER — ALPRAZOLAM 1 MG/1
1 TABLET ORAL 2 TIMES DAILY
Status: ON HOLD | COMMUNITY
End: 2019-11-01 | Stop reason: HOSPADM

## 2018-05-06 NOTE — ED TRIAGE NOTES
Patient presents to the ED via personal vehicle from [facility]. Care giver reports that patient had an  Unwitnessed fall out of a bed 2-3 feet high onto hardwood floors. Patient has redness, swelling to right face. Caregiver also reports that patient's mouth was bleeding after fall. Denies loc.

## 2018-05-06 NOTE — ED PROVIDER NOTES
Encounter Date: 5/6/2018    SCRIBE #1 NOTE: I, Marian Abraham, am scribing for, and in the presence of,  Ricky López MD. I have scribed the following portions of the note - Other sections scribed: ROS, HPI and PE.       History     Chief Complaint   Patient presents with    Fall     fell out of bed, bruising to right side of face     CC: Fall    HPI: This 22 y.o. male with a past medical history of Anxiety; Congenital cerebral palsy; Hypotonia; Leukodystrophy; Mental retardation; Retention of urine; Seizures; Stereotyped movement disorder; and Urinary tract infection,  presents to the ED complaining of falling out of his bed on to his R side face 3 hrs ago PTA. No LOC reported. No N/V reported. No other complaints. No prior medical intervention. Per caregiver, the patient is acting consistent with his baseline. Otherwise, hx is limited due to the patient's MR.       The history is provided by a caregiver. No  was used.     Review of patient's allergies indicates:   Allergen Reactions    Clindamycin Hives    Erythromycin Rash     Past Medical History:   Diagnosis Date    Anxiety     Congenital cerebral palsy     Hypotonia     Leukodystrophy     Mental retardation     Retention of urine     Seizures     Stereotyped movement disorder     Urinary tract infection      History reviewed. No pertinent surgical history.  Family History   Problem Relation Age of Onset    No Known Problems Mother     No Known Problems Father     Prostate cancer Neg Hx     Kidney disease Neg Hx      Social History   Substance Use Topics    Smoking status: Never Smoker    Smokeless tobacco: Never Used    Alcohol use No     Review of Systems   Unable to perform ROS: Other (s/c to MR)   Skin: Positive for rash (bruising to R side face).       Physical Exam     Initial Vitals [05/06/18 1338]   BP Pulse Resp Temp SpO2   -- -- 20 -- --      MAP       --         Physical Exam    Nursing note and vitals  reviewed.  Constitutional: Vital signs are normal. He appears well-developed and well-nourished. He is active.  Non-toxic appearance. No distress.   No acute distress. He has mild contusion to his R face. Abdomen non tender. No other signs of trauma. Moving all extremities.  Appears curious and interactive but non verbal at his baseline.    HENT:   Head: Normocephalic and atraumatic.   Eyes: EOM are normal.   Neck: Trachea normal. Neck supple.   Cardiovascular: Normal rate and regular rhythm.   Pulmonary/Chest: Breath sounds normal. No respiratory distress.   Abdominal: Soft. Normal appearance and bowel sounds are normal. He exhibits no distension. There is no tenderness.   Musculoskeletal: Normal range of motion. He exhibits no edema.   Neurological: He is alert.   Skin: Skin is warm, dry and intact.   Psychiatric: He has a normal mood and affect.         ED Course   Procedures  Labs Reviewed - No data to display          Medical Decision Making:   ED Management:  Very low risk head injury. This is a recurrent problem. Would not tolerate head CT. I highly doubt intercranial hemorrhage or fracture. Will observe in ED for 2 hrs.    3:34 PM  Patient is still alert and active.  He is at his baseline.   No significant swelling.  PERRL, EOM intact  Will discharge in stable condition.            Scribe Attestation:   Scribe #1: I performed the above scribed service and the documentation accurately describes the services I performed. I attest to the accuracy of the note.    Attending Attestation:           Physician Attestation for Scribe:  Physician Attestation Statement for Scribe #1: I, Ricky López MD, reviewed documentation, as scribed by Marian Abraham in my presence, and it is both accurate and complete.                    Clinical Impression:   The encounter diagnosis was Injury of head, initial encounter.    Disposition:   Disposition: Discharged  Condition: Stable                        Ricky López  MD  05/06/18 1530

## 2018-08-22 ENCOUNTER — OFFICE VISIT (OUTPATIENT)
Dept: UROLOGY | Facility: CLINIC | Age: 23
End: 2018-08-22
Payer: MEDICARE

## 2018-08-22 VITALS — BODY MASS INDEX: 16.66 KG/M2 | WEIGHT: 100 LBS | HEIGHT: 65 IN

## 2018-08-22 DIAGNOSIS — N35.9 URETHRAL STRICTURE, UNSPECIFIED STRICTURE TYPE: Primary | ICD-10-CM

## 2018-08-22 PROCEDURE — 99999 PR PBB SHADOW E&M-EST. PATIENT-LVL III: CPT | Mod: PBBFAC,,, | Performed by: UROLOGY

## 2018-08-22 PROCEDURE — 99212 OFFICE O/P EST SF 10 MIN: CPT | Mod: S$PBB,,, | Performed by: UROLOGY

## 2018-08-22 PROCEDURE — 99213 OFFICE O/P EST LOW 20 MIN: CPT | Mod: PBBFAC,PO | Performed by: UROLOGY

## 2019-05-08 RX ORDER — TAMSULOSIN HYDROCHLORIDE 0.4 MG/1
CAPSULE ORAL
Qty: 30 CAPSULE | Refills: 11 | Status: ON HOLD | OUTPATIENT
Start: 2019-05-08 | End: 2019-11-01 | Stop reason: HOSPADM

## 2019-10-03 ENCOUNTER — HOSPITAL ENCOUNTER (INPATIENT)
Facility: HOSPITAL | Age: 24
LOS: 28 days | Discharge: HOME | DRG: 871 | End: 2019-11-01
Attending: EMERGENCY MEDICINE | Admitting: HOSPITALIST
Payer: MEDICARE

## 2019-10-03 DIAGNOSIS — J96.01 ACUTE RESPIRATORY FAILURE WITH HYPOXIA: Primary | ICD-10-CM

## 2019-10-03 DIAGNOSIS — Z97.8 ENDOTRACHEALLY INTUBATED: ICD-10-CM

## 2019-10-03 DIAGNOSIS — E43 SEVERE MALNUTRITION: ICD-10-CM

## 2019-10-03 DIAGNOSIS — E86.0 DEHYDRATION: ICD-10-CM

## 2019-10-03 DIAGNOSIS — F72 SEVERE INTELLECTUAL DISABILITY: ICD-10-CM

## 2019-10-03 DIAGNOSIS — R50.9 FEVER: ICD-10-CM

## 2019-10-03 DIAGNOSIS — J18.9 PNA (PNEUMONIA): ICD-10-CM

## 2019-10-03 DIAGNOSIS — R13.12 OROPHARYNGEAL DYSPHAGIA: Chronic | ICD-10-CM

## 2019-10-03 DIAGNOSIS — R13.19 OTHER DYSPHAGIA: ICD-10-CM

## 2019-10-03 DIAGNOSIS — J18.9 PNEUMONIA OF LEFT LUNG DUE TO INFECTIOUS ORGANISM: ICD-10-CM

## 2019-10-03 LAB
ALBUMIN SERPL BCP-MCNC: 3.4 G/DL (ref 3.5–5.2)
ALP SERPL-CCNC: 73 U/L (ref 55–135)
ALT SERPL W/O P-5'-P-CCNC: 14 U/L (ref 10–44)
ANION GAP SERPL CALC-SCNC: 9 MMOL/L (ref 8–16)
AST SERPL-CCNC: 19 U/L (ref 10–40)
BACTERIA #/AREA URNS HPF: ABNORMAL /HPF
BASOPHILS # BLD AUTO: 0.03 K/UL (ref 0–0.2)
BASOPHILS NFR BLD: 0.3 % (ref 0–1.9)
BILIRUB SERPL-MCNC: 0.2 MG/DL (ref 0.1–1)
BILIRUB UR QL STRIP: NEGATIVE
BUN SERPL-MCNC: 15 MG/DL (ref 6–20)
CALCIUM SERPL-MCNC: 8.8 MG/DL (ref 8.7–10.5)
CHLORIDE SERPL-SCNC: 109 MMOL/L (ref 95–110)
CLARITY UR: CLEAR
CO2 SERPL-SCNC: 28 MMOL/L (ref 23–29)
COLOR UR: YELLOW
CREAT SERPL-MCNC: 0.7 MG/DL (ref 0.5–1.4)
DIFFERENTIAL METHOD: ABNORMAL
EOSINOPHIL # BLD AUTO: 0.1 K/UL (ref 0–0.5)
EOSINOPHIL NFR BLD: 1.4 % (ref 0–8)
ERYTHROCYTE [DISTWIDTH] IN BLOOD BY AUTOMATED COUNT: 14.9 % (ref 11.5–14.5)
EST. GFR  (AFRICAN AMERICAN): >60 ML/MIN/1.73 M^2
EST. GFR  (NON AFRICAN AMERICAN): >60 ML/MIN/1.73 M^2
GLUCOSE SERPL-MCNC: 116 MG/DL (ref 70–110)
GLUCOSE UR QL STRIP: NEGATIVE
HCT VFR BLD AUTO: 34.5 % (ref 40–54)
HGB BLD-MCNC: 10.5 G/DL (ref 14–18)
HGB UR QL STRIP: ABNORMAL
HYALINE CASTS #/AREA URNS LPF: ABNORMAL /LPF
KETONES UR QL STRIP: NEGATIVE
LACTATE SERPL-SCNC: 1.2 MMOL/L (ref 0.5–2.2)
LEUKOCYTE ESTERASE UR QL STRIP: NEGATIVE
LYMPHOCYTES # BLD AUTO: 1.1 K/UL (ref 1–4.8)
LYMPHOCYTES NFR BLD: 11 % (ref 18–48)
MCH RBC QN AUTO: 27.9 PG (ref 27–31)
MCHC RBC AUTO-ENTMCNC: 30.4 G/DL (ref 32–36)
MCV RBC AUTO: 92 FL (ref 82–98)
MICROSCOPIC COMMENT: ABNORMAL
MONOCYTES # BLD AUTO: 1.5 K/UL (ref 0.3–1)
MONOCYTES NFR BLD: 14.3 % (ref 4–15)
NEUTROPHILS # BLD AUTO: 7.5 K/UL (ref 1.8–7.7)
NEUTROPHILS NFR BLD: 73.2 % (ref 38–73)
NITRITE UR QL STRIP: NEGATIVE
NON-SQ EPI CELLS #/AREA URNS HPF: 3 /HPF
PH UR STRIP: 7 [PH] (ref 5–8)
PLATELET # BLD AUTO: 137 K/UL (ref 150–350)
PMV BLD AUTO: 11.7 FL (ref 9.2–12.9)
POTASSIUM SERPL-SCNC: 3.7 MMOL/L (ref 3.5–5.1)
PROT SERPL-MCNC: 6.9 G/DL (ref 6–8.4)
PROT UR QL STRIP: ABNORMAL
RBC # BLD AUTO: 3.77 M/UL (ref 4.6–6.2)
RBC #/AREA URNS HPF: 25 /HPF (ref 0–4)
SODIUM SERPL-SCNC: 146 MMOL/L (ref 136–145)
SP GR UR STRIP: 1.03 (ref 1–1.03)
SQUAMOUS #/AREA URNS HPF: 1 /HPF
URN SPEC COLLECT METH UR: ABNORMAL
UROBILINOGEN UR STRIP-ACNC: ABNORMAL EU/DL
WBC # BLD AUTO: 10.32 K/UL (ref 3.9–12.7)
WBC #/AREA URNS HPF: 5 /HPF (ref 0–5)
WBC CLUMPS URNS QL MICRO: ABNORMAL

## 2019-10-03 PROCEDURE — 85025 COMPLETE CBC W/AUTO DIFF WBC: CPT

## 2019-10-03 PROCEDURE — 63600175 PHARM REV CODE 636 W HCPCS: Performed by: EMERGENCY MEDICINE

## 2019-10-03 PROCEDURE — 81000 URINALYSIS NONAUTO W/SCOPE: CPT

## 2019-10-03 PROCEDURE — 94761 N-INVAS EAR/PLS OXIMETRY MLT: CPT

## 2019-10-03 PROCEDURE — 96365 THER/PROPH/DIAG IV INF INIT: CPT

## 2019-10-03 PROCEDURE — 94002 VENT MGMT INPAT INIT DAY: CPT

## 2019-10-03 PROCEDURE — 96375 TX/PRO/DX INJ NEW DRUG ADDON: CPT

## 2019-10-03 PROCEDURE — 93010 ELECTROCARDIOGRAM REPORT: CPT | Mod: ,,, | Performed by: INTERNAL MEDICINE

## 2019-10-03 PROCEDURE — 25000003 PHARM REV CODE 250

## 2019-10-03 PROCEDURE — 80053 COMPREHEN METABOLIC PANEL: CPT

## 2019-10-03 PROCEDURE — 63600175 PHARM REV CODE 636 W HCPCS

## 2019-10-03 PROCEDURE — 83605 ASSAY OF LACTIC ACID: CPT

## 2019-10-03 PROCEDURE — 93005 ELECTROCARDIOGRAM TRACING: CPT

## 2019-10-03 PROCEDURE — 93010 EKG 12-LEAD: ICD-10-PCS | Mod: ,,, | Performed by: INTERNAL MEDICINE

## 2019-10-03 PROCEDURE — 99285 EMERGENCY DEPT VISIT HI MDM: CPT | Mod: 25

## 2019-10-03 PROCEDURE — 87040 BLOOD CULTURE FOR BACTERIA: CPT | Mod: 59

## 2019-10-03 PROCEDURE — 96367 TX/PROPH/DG ADDL SEQ IV INF: CPT

## 2019-10-03 RX ORDER — KETOROLAC TROMETHAMINE 30 MG/ML
15 INJECTION, SOLUTION INTRAMUSCULAR; INTRAVENOUS
Status: COMPLETED | OUTPATIENT
Start: 2019-10-03 | End: 2019-10-03

## 2019-10-03 RX ORDER — ROCURONIUM BROMIDE 10 MG/ML
INJECTION, SOLUTION INTRAVENOUS
Status: COMPLETED
Start: 2019-10-03 | End: 2019-10-03

## 2019-10-03 RX ORDER — ETOMIDATE 2 MG/ML
INJECTION INTRAVENOUS
Status: COMPLETED
Start: 2019-10-03 | End: 2019-10-03

## 2019-10-03 RX ORDER — PROPOFOL 10 MG/ML
5 INJECTION, EMULSION INTRAVENOUS CONTINUOUS
Status: DISCONTINUED | OUTPATIENT
Start: 2019-10-04 | End: 2019-10-04

## 2019-10-03 RX ORDER — PROPOFOL 10 MG/ML
INJECTION, EMULSION INTRAVENOUS
Status: COMPLETED
Start: 2019-10-03 | End: 2019-10-03

## 2019-10-03 RX ADMIN — ETOMIDATE 20 MG: 2 INJECTION INTRAVENOUS at 11:10

## 2019-10-03 RX ADMIN — PIPERACILLIN AND TAZOBACTAM 4.5 G: 4; .5 INJECTION, POWDER, FOR SOLUTION INTRAVENOUS at 10:10

## 2019-10-03 RX ADMIN — SODIUM CHLORIDE 1293 ML: 0.9 INJECTION, SOLUTION INTRAVENOUS at 09:10

## 2019-10-03 RX ADMIN — PROPOFOL 5 MCG/KG/MIN: 10 INJECTION, EMULSION INTRAVENOUS at 11:10

## 2019-10-03 RX ADMIN — ROCURONIUM BROMIDE 50 MG: 10 INJECTION, SOLUTION INTRAVENOUS at 10:10

## 2019-10-03 RX ADMIN — VANCOMYCIN HYDROCHLORIDE 750 MG: 1 INJECTION, POWDER, LYOPHILIZED, FOR SOLUTION INTRAVENOUS at 10:10

## 2019-10-03 RX ADMIN — KETOROLAC TROMETHAMINE 15 MG: 30 INJECTION, SOLUTION INTRAMUSCULAR at 11:10

## 2019-10-04 PROBLEM — R65.20 SEVERE SEPSIS: Status: ACTIVE | Noted: 2019-10-04

## 2019-10-04 PROBLEM — G40.909 EPILEPSY: Status: ACTIVE | Noted: 2019-10-04

## 2019-10-04 PROBLEM — J96.01 ACUTE RESPIRATORY FAILURE WITH HYPOXIA: Status: ACTIVE | Noted: 2019-10-04

## 2019-10-04 PROBLEM — Z99.11 ON MECHANICALLY ASSISTED VENTILATION: Status: ACTIVE | Noted: 2019-10-04

## 2019-10-04 PROBLEM — A41.9 SEVERE SEPSIS: Status: ACTIVE | Noted: 2019-10-04

## 2019-10-04 PROBLEM — G40.909 EPILEPSY: Chronic | Status: ACTIVE | Noted: 2019-10-04

## 2019-10-04 PROBLEM — Z99.11 ON MECHANICALLY ASSISTED VENTILATION: Chronic | Status: ACTIVE | Noted: 2019-10-04

## 2019-10-04 PROBLEM — J18.9 PNEUMONIA OF LEFT LUNG DUE TO INFECTIOUS ORGANISM: Status: ACTIVE | Noted: 2019-10-04

## 2019-10-04 LAB
ALBUMIN SERPL BCP-MCNC: 3.1 G/DL (ref 3.5–5.2)
ALLENS TEST: ABNORMAL
ALLENS TEST: ABNORMAL
ALP SERPL-CCNC: 71 U/L (ref 55–135)
ALT SERPL W/O P-5'-P-CCNC: 15 U/L (ref 10–44)
ANION GAP SERPL CALC-SCNC: 8 MMOL/L (ref 8–16)
AST SERPL-CCNC: 21 U/L (ref 10–40)
BASOPHILS # BLD AUTO: 0.02 K/UL (ref 0–0.2)
BASOPHILS NFR BLD: 0.2 % (ref 0–1.9)
BILIRUB SERPL-MCNC: 0.4 MG/DL (ref 0.1–1)
BUN SERPL-MCNC: 12 MG/DL (ref 6–20)
CALCIUM SERPL-MCNC: 7.9 MG/DL (ref 8.7–10.5)
CHLORIDE SERPL-SCNC: 110 MMOL/L (ref 95–110)
CO2 SERPL-SCNC: 27 MMOL/L (ref 23–29)
CREAT SERPL-MCNC: 0.7 MG/DL (ref 0.5–1.4)
DELSYS: ABNORMAL
DELSYS: ABNORMAL
DIFFERENTIAL METHOD: ABNORMAL
EOSINOPHIL # BLD AUTO: 0.1 K/UL (ref 0–0.5)
EOSINOPHIL NFR BLD: 1.1 % (ref 0–8)
ERYTHROCYTE [DISTWIDTH] IN BLOOD BY AUTOMATED COUNT: 15 % (ref 11.5–14.5)
ERYTHROCYTE [SEDIMENTATION RATE] IN BLOOD BY WESTERGREN METHOD: 18 MM/H
ERYTHROCYTE [SEDIMENTATION RATE] IN BLOOD BY WESTERGREN METHOD: 22 MM/H
EST. GFR  (AFRICAN AMERICAN): >60 ML/MIN/1.73 M^2
EST. GFR  (NON AFRICAN AMERICAN): >60 ML/MIN/1.73 M^2
FIO2: 100
FIO2: 100
GLUCOSE SERPL-MCNC: 94 MG/DL (ref 70–110)
HCO3 UR-SCNC: 26.5 MMOL/L (ref 24–28)
HCO3 UR-SCNC: 27.7 MMOL/L (ref 24–28)
HCT VFR BLD AUTO: 36.7 % (ref 40–54)
HGB BLD-MCNC: 11.2 G/DL (ref 14–18)
LACTATE SERPL-SCNC: 1.1 MMOL/L (ref 0.5–2.2)
LYMPHOCYTES # BLD AUTO: 1.9 K/UL (ref 1–4.8)
LYMPHOCYTES NFR BLD: 18 % (ref 18–48)
MAGNESIUM SERPL-MCNC: 1.7 MG/DL (ref 1.6–2.6)
MCH RBC QN AUTO: 28.4 PG (ref 27–31)
MCHC RBC AUTO-ENTMCNC: 30.5 G/DL (ref 32–36)
MCV RBC AUTO: 93 FL (ref 82–98)
MIN VOL: 9.3
MODE: ABNORMAL
MODE: ABNORMAL
MONOCYTES # BLD AUTO: 1.1 K/UL (ref 0.3–1)
MONOCYTES NFR BLD: 9.9 % (ref 4–15)
NEUTROPHILS # BLD AUTO: 7.6 K/UL (ref 1.8–7.7)
NEUTROPHILS NFR BLD: 71 % (ref 38–73)
PCO2 BLDA: 51.1 MMHG (ref 35–45)
PCO2 BLDA: 56.5 MMHG (ref 35–45)
PEEP: 5
PEEP: 5
PH SMN: 7.3 [PH] (ref 7.35–7.45)
PH SMN: 7.32 [PH] (ref 7.35–7.45)
PHOSPHATE SERPL-MCNC: 3.9 MG/DL (ref 2.7–4.5)
PIP: 24
PLATELET # BLD AUTO: 129 K/UL (ref 150–350)
PMV BLD AUTO: 12.2 FL (ref 9.2–12.9)
PO2 BLDA: 156 MMHG (ref 80–100)
PO2 BLDA: 85 MMHG (ref 80–100)
POC BE: 0 MMOL/L
POC BE: 1 MMOL/L
POC SATURATED O2: 95 % (ref 95–100)
POC SATURATED O2: 99 % (ref 95–100)
POC TCO2: 28 MMOL/L (ref 23–27)
POC TCO2: 29 MMOL/L (ref 23–27)
POTASSIUM SERPL-SCNC: 3.7 MMOL/L (ref 3.5–5.1)
PROT SERPL-MCNC: 6.4 G/DL (ref 6–8.4)
RBC # BLD AUTO: 3.95 M/UL (ref 4.6–6.2)
SAMPLE: ABNORMAL
SAMPLE: ABNORMAL
SITE: ABNORMAL
SITE: ABNORMAL
SODIUM SERPL-SCNC: 145 MMOL/L (ref 136–145)
SP02: 98
VT: 320
VT: 320
WBC # BLD AUTO: 10.72 K/UL (ref 3.9–12.7)

## 2019-10-04 PROCEDURE — 25000242 PHARM REV CODE 250 ALT 637 W/ HCPCS: Performed by: INTERNAL MEDICINE

## 2019-10-04 PROCEDURE — 94667 MNPJ CHEST WALL 1ST: CPT

## 2019-10-04 PROCEDURE — 36415 COLL VENOUS BLD VENIPUNCTURE: CPT

## 2019-10-04 PROCEDURE — 99291 CRITICAL CARE FIRST HOUR: CPT | Mod: ,,, | Performed by: INTERNAL MEDICINE

## 2019-10-04 PROCEDURE — 25000003 PHARM REV CODE 250: Performed by: INTERNAL MEDICINE

## 2019-10-04 PROCEDURE — 20000000 HC ICU ROOM

## 2019-10-04 PROCEDURE — 25000003 PHARM REV CODE 250: Performed by: HOSPITALIST

## 2019-10-04 PROCEDURE — 83735 ASSAY OF MAGNESIUM: CPT

## 2019-10-04 PROCEDURE — 63600175 PHARM REV CODE 636 W HCPCS: Performed by: INTERNAL MEDICINE

## 2019-10-04 PROCEDURE — 94761 N-INVAS EAR/PLS OXIMETRY MLT: CPT

## 2019-10-04 PROCEDURE — 27000221 HC OXYGEN, UP TO 24 HOURS

## 2019-10-04 PROCEDURE — 82803 BLOOD GASES ANY COMBINATION: CPT

## 2019-10-04 PROCEDURE — 94003 VENT MGMT INPAT SUBQ DAY: CPT

## 2019-10-04 PROCEDURE — 85025 COMPLETE CBC W/AUTO DIFF WBC: CPT

## 2019-10-04 PROCEDURE — 87070 CULTURE OTHR SPECIMN AEROBIC: CPT

## 2019-10-04 PROCEDURE — 99900035 HC TECH TIME PER 15 MIN (STAT)

## 2019-10-04 PROCEDURE — 63600175 PHARM REV CODE 636 W HCPCS: Performed by: EMERGENCY MEDICINE

## 2019-10-04 PROCEDURE — 25500020 PHARM REV CODE 255: Performed by: EMERGENCY MEDICINE

## 2019-10-04 PROCEDURE — 36600 WITHDRAWAL OF ARTERIAL BLOOD: CPT

## 2019-10-04 PROCEDURE — 87205 SMEAR GRAM STAIN: CPT

## 2019-10-04 PROCEDURE — 63600175 PHARM REV CODE 636 W HCPCS: Performed by: HOSPITALIST

## 2019-10-04 PROCEDURE — 25000003 PHARM REV CODE 250: Performed by: EMERGENCY MEDICINE

## 2019-10-04 PROCEDURE — 94640 AIRWAY INHALATION TREATMENT: CPT

## 2019-10-04 PROCEDURE — 99291 PR CRITICAL CARE, E/M 30-74 MINUTES: ICD-10-PCS | Mod: ,,, | Performed by: INTERNAL MEDICINE

## 2019-10-04 PROCEDURE — 84100 ASSAY OF PHOSPHORUS: CPT

## 2019-10-04 PROCEDURE — 94668 MNPJ CHEST WALL SBSQ: CPT

## 2019-10-04 PROCEDURE — 80053 COMPREHEN METABOLIC PANEL: CPT

## 2019-10-04 PROCEDURE — 99900026 HC AIRWAY MAINTENANCE (STAT)

## 2019-10-04 RX ORDER — ONDANSETRON 2 MG/ML
4 INJECTION INTRAMUSCULAR; INTRAVENOUS EVERY 8 HOURS PRN
Status: CANCELLED | OUTPATIENT
Start: 2019-10-04

## 2019-10-04 RX ORDER — FLUOXETINE 20 MG/5ML
60 SOLUTION ORAL DAILY
Status: DISCONTINUED | OUTPATIENT
Start: 2019-10-04 | End: 2019-10-15

## 2019-10-04 RX ORDER — DIVALPROEX SODIUM 250 MG/1
500 TABLET, DELAYED RELEASE ORAL EVERY 12 HOURS
Status: DISCONTINUED | OUTPATIENT
Start: 2019-10-04 | End: 2019-10-04

## 2019-10-04 RX ORDER — DIVALPROEX SODIUM 125 MG/1
500 CAPSULE, COATED PELLETS ORAL EVERY 12 HOURS
Status: DISCONTINUED | OUTPATIENT
Start: 2019-10-04 | End: 2019-10-09

## 2019-10-04 RX ORDER — AMOXICILLIN 250 MG
1 CAPSULE ORAL 2 TIMES DAILY
Status: CANCELLED | OUTPATIENT
Start: 2019-10-04

## 2019-10-04 RX ORDER — MORPHINE SULFATE 10 MG/ML
2 INJECTION INTRAMUSCULAR; INTRAVENOUS; SUBCUTANEOUS EVERY 4 HOURS PRN
Status: CANCELLED | OUTPATIENT
Start: 2019-10-04

## 2019-10-04 RX ORDER — ONDANSETRON 2 MG/ML
8 INJECTION INTRAMUSCULAR; INTRAVENOUS EVERY 8 HOURS PRN
Status: DISCONTINUED | OUTPATIENT
Start: 2019-10-04 | End: 2019-10-30 | Stop reason: SDUPTHER

## 2019-10-04 RX ORDER — CHLORHEXIDINE GLUCONATE ORAL RINSE 1.2 MG/ML
15 SOLUTION DENTAL 2 TIMES DAILY
Status: DISCONTINUED | OUTPATIENT
Start: 2019-10-04 | End: 2019-10-09

## 2019-10-04 RX ORDER — AMOXICILLIN 250 MG
1 CAPSULE ORAL 2 TIMES DAILY PRN
Status: DISCONTINUED | OUTPATIENT
Start: 2019-10-04 | End: 2019-10-26

## 2019-10-04 RX ORDER — ENOXAPARIN SODIUM 100 MG/ML
40 INJECTION SUBCUTANEOUS EVERY 24 HOURS
Status: DISCONTINUED | OUTPATIENT
Start: 2019-10-04 | End: 2019-10-30 | Stop reason: SDUPTHER

## 2019-10-04 RX ORDER — TAMSULOSIN HYDROCHLORIDE 0.4 MG/1
1 CAPSULE ORAL DAILY
Status: DISCONTINUED | OUTPATIENT
Start: 2019-10-04 | End: 2019-10-15

## 2019-10-04 RX ORDER — HYDRALAZINE HYDROCHLORIDE 20 MG/ML
10 INJECTION INTRAMUSCULAR; INTRAVENOUS EVERY 6 HOURS PRN
Status: DISCONTINUED | OUTPATIENT
Start: 2019-10-04 | End: 2019-10-30 | Stop reason: SDUPTHER

## 2019-10-04 RX ORDER — RAMELTEON 8 MG/1
8 TABLET ORAL NIGHTLY PRN
Status: DISCONTINUED | OUTPATIENT
Start: 2019-10-04 | End: 2019-10-30 | Stop reason: SDUPTHER

## 2019-10-04 RX ORDER — IPRATROPIUM BROMIDE AND ALBUTEROL SULFATE 2.5; .5 MG/3ML; MG/3ML
3 SOLUTION RESPIRATORY (INHALATION) EVERY 6 HOURS
Status: DISCONTINUED | OUTPATIENT
Start: 2019-10-04 | End: 2019-10-15

## 2019-10-04 RX ORDER — FLUOXETINE HYDROCHLORIDE 20 MG/1
60 CAPSULE ORAL DAILY
Status: DISCONTINUED | OUTPATIENT
Start: 2019-10-04 | End: 2019-10-04

## 2019-10-04 RX ORDER — SODIUM CHLORIDE 9 MG/ML
INJECTION, SOLUTION INTRAVENOUS CONTINUOUS
Status: DISCONTINUED | OUTPATIENT
Start: 2019-10-04 | End: 2019-10-05

## 2019-10-04 RX ORDER — SODIUM CHLORIDE 0.9 % (FLUSH) 0.9 %
10 SYRINGE (ML) INJECTION
Status: CANCELLED | OUTPATIENT
Start: 2019-10-04

## 2019-10-04 RX ORDER — FAMOTIDINE 20 MG/1
20 TABLET, FILM COATED ORAL 2 TIMES DAILY
Status: CANCELLED | OUTPATIENT
Start: 2019-10-04

## 2019-10-04 RX ORDER — LORAZEPAM 2 MG/ML
1 INJECTION INTRAMUSCULAR ONCE
Status: COMPLETED | OUTPATIENT
Start: 2019-10-04 | End: 2019-10-04

## 2019-10-04 RX ORDER — PROPOFOL 10 MG/ML
5 INJECTION, EMULSION INTRAVENOUS CONTINUOUS
Status: DISCONTINUED | OUTPATIENT
Start: 2019-10-04 | End: 2019-10-05

## 2019-10-04 RX ORDER — FENTANYL CITRATE-0.9 % NACL/PF 10 MCG/ML
25 PLASTIC BAG, INJECTION (ML) INTRAVENOUS CONTINUOUS
Status: DISCONTINUED | OUTPATIENT
Start: 2019-10-04 | End: 2019-10-05

## 2019-10-04 RX ORDER — ACETAMINOPHEN 500 MG
500 TABLET ORAL EVERY 6 HOURS PRN
Status: DISCONTINUED | OUTPATIENT
Start: 2019-10-04 | End: 2019-10-30 | Stop reason: SDUPTHER

## 2019-10-04 RX ORDER — PANTOPRAZOLE SODIUM 40 MG/1
40 FOR SUSPENSION ORAL DAILY
Status: DISCONTINUED | OUTPATIENT
Start: 2019-10-04 | End: 2019-10-09

## 2019-10-04 RX ORDER — IPRATROPIUM BROMIDE AND ALBUTEROL SULFATE 2.5; .5 MG/3ML; MG/3ML
3 SOLUTION RESPIRATORY (INHALATION) EVERY 6 HOURS PRN
Status: DISCONTINUED | OUTPATIENT
Start: 2019-10-04 | End: 2019-10-11

## 2019-10-04 RX ORDER — CLONAZEPAM 0.5 MG/1
1 TABLET ORAL 2 TIMES DAILY
Status: DISCONTINUED | OUTPATIENT
Start: 2019-10-04 | End: 2019-10-15

## 2019-10-04 RX ADMIN — IOHEXOL 75 ML: 350 INJECTION, SOLUTION INTRAVENOUS at 01:10

## 2019-10-04 RX ADMIN — CHLORHEXIDINE GLUCONATE 0.12% ORAL RINSE 15 ML: 1.2 LIQUID ORAL at 08:10

## 2019-10-04 RX ADMIN — PIPERACILLIN AND TAZOBACTAM 4.5 G: 4; .5 INJECTION, POWDER, FOR SOLUTION INTRAVENOUS at 07:10

## 2019-10-04 RX ADMIN — PROPOFOL 40 MCG/KG/MIN: 10 INJECTION, EMULSION INTRAVENOUS at 07:10

## 2019-10-04 RX ADMIN — ENOXAPARIN SODIUM 40 MG: 100 INJECTION SUBCUTANEOUS at 05:10

## 2019-10-04 RX ADMIN — PIPERACILLIN AND TAZOBACTAM 4.5 G: 4; .5 INJECTION, POWDER, FOR SOLUTION INTRAVENOUS at 10:10

## 2019-10-04 RX ADMIN — IPRATROPIUM BROMIDE AND ALBUTEROL SULFATE 3 ML: .5; 3 SOLUTION RESPIRATORY (INHALATION) at 07:10

## 2019-10-04 RX ADMIN — SODIUM CHLORIDE: 0.9 INJECTION, SOLUTION INTRAVENOUS at 09:10

## 2019-10-04 RX ADMIN — PIPERACILLIN AND TAZOBACTAM 4.5 G: 4; .5 INJECTION, POWDER, FOR SOLUTION INTRAVENOUS at 01:10

## 2019-10-04 RX ADMIN — SODIUM CHLORIDE: 0.9 INJECTION, SOLUTION INTRAVENOUS at 07:10

## 2019-10-04 RX ADMIN — VANCOMYCIN HYDROCHLORIDE 750 MG: 750 INJECTION, POWDER, LYOPHILIZED, FOR SOLUTION INTRAVENOUS at 10:10

## 2019-10-04 RX ADMIN — LORAZEPAM 1 MG: 2 INJECTION INTRAMUSCULAR; INTRAVENOUS at 08:10

## 2019-10-04 RX ADMIN — Medication 12.5 MCG/HR: at 11:10

## 2019-10-04 RX ADMIN — PIPERACILLIN AND TAZOBACTAM 4.5 G: 4; .5 INJECTION, POWDER, FOR SOLUTION INTRAVENOUS at 03:10

## 2019-10-04 RX ADMIN — SODIUM CHLORIDE: 0.9 INJECTION, SOLUTION INTRAVENOUS at 01:10

## 2019-10-04 RX ADMIN — PANTOPRAZOLE SODIUM 40 MG: 40 GRANULE, DELAYED RELEASE ORAL at 08:10

## 2019-10-04 RX ADMIN — IPRATROPIUM BROMIDE AND ALBUTEROL SULFATE 3 ML: .5; 3 SOLUTION RESPIRATORY (INHALATION) at 01:10

## 2019-10-04 RX ADMIN — DIVALPROEX SODIUM 500 MG: 125 CAPSULE, COATED PELLETS ORAL at 08:10

## 2019-10-04 RX ADMIN — PROPOFOL 30 MCG/KG/MIN: 10 INJECTION, EMULSION INTRAVENOUS at 12:10

## 2019-10-04 RX ADMIN — ACETAMINOPHEN 500 MG: 500 TABLET ORAL at 07:10

## 2019-10-04 RX ADMIN — CLONAZEPAM 1 MG: 0.5 TABLET ORAL at 08:10

## 2019-10-04 RX ADMIN — PROPOFOL 40 MCG/KG/MIN: 10 INJECTION, EMULSION INTRAVENOUS at 04:10

## 2019-10-04 RX ADMIN — FLUOXETINE HYDROCHLORIDE 60 MG: 20 SOLUTION ORAL at 01:10

## 2019-10-04 RX ADMIN — VANCOMYCIN HYDROCHLORIDE 750 MG: 750 INJECTION, POWDER, LYOPHILIZED, FOR SOLUTION INTRAVENOUS at 11:10

## 2019-10-04 RX ADMIN — DIVALPROEX SODIUM 500 MG: 125 CAPSULE, COATED PELLETS ORAL at 01:10

## 2019-10-04 NOTE — PLAN OF CARE
Pt remains on PRVC rt 22, vt  350, +8, 50% with a 7.5 ETT 23 at the lip. AMBU bag and mask along with peep valve at the HOB. Continue duoneb svn tx an CPT as ordered. Will continue to monitor. DERIC Julien, RRT

## 2019-10-04 NOTE — CONSULTS
"  [facility]  Adult Nutrition  Consult Note    SUMMARY     Recommendations    1. If/when extubated and cleared by SLP, pureed diet with Boost Plus tid, as pt was on pta.   2. If NPO for >48hours TF Isosource 1.5 initiated @10 mL/hr and advanced 10 mL q 4 hours to goal rate of 45 mL/hr. TF to provide: 1620 kcal (1847 with propofol at 8.6 mL/hr), 73 g pro, 825 ml fluid. If IV fluids are discountinued, recommend fluid flushes 300 mL tid or per MD.     Goals: Pt to received nutrition by next RD visit.  Nutrition Goal Status: new  Communication of RD Recs: (POC)    Reason for Assessment    Reason For Assessment: consult  Diagnosis: pulmonary disease, infection/sepsis  Relevant Medical History: CP, mental retardation  Interdisciplinary Rounds: attended  General Information Comments: Pt admit yesterday; pneumonia of left lung, currently on vent and propofol.  resported pt to be on pureed diet with use of Ensure Plus and Benecalorie pta. Noted wt hx stable (minor fluctuations) for 1 year. NFPE not performed today; overall appears to be well nourished with appropriate mucle tone for pt with CP.  Nutrition Discharge Planning: (Pending medical course)    Nutrition Risk Screen    Nutrition Risk Screen: no indicators present    Nutrition/Diet History    Patient Reported Diet/Restrictions/Preferences: pureed(reported by )  Spiritual, Cultural Beliefs, Jehovah's witness Practices, Values that Affect Care: no  Supplemental Drinks or Food Habits: Ensure Plus(Benecalorie)  Factors Affecting Nutritional Intake: NPO, on mechanical ventilation    Anthropometrics    Temp: 100.1 °F (37.8 °C)  Height Method: Estimated  Height: 5' 1" (154.9 cm)  Height (inches): 61 in  Weight Method: Bed Scale  Weight: 47.8 kg (105 lb 6.1 oz)  Weight (lb): 105.38 lb  Ideal Body Weight (IBW), Male: 112 lb  % Ideal Body Weight, Male (lb): 94.09 lb  BMI (Calculated): 20  BMI Grade: 18.5-24.9 - normal   "     Lab/Procedures/Meds    Pertinent Labs Reviewed: reviewed  Pertinent Labs Comments: Ca 7.9, Alb 3.1, Vit B12 1191  Pertinent Medications Reviewed: reviewed  Pertinent Medications Comments: pantoprazole, abx, vancomycin, propofol      Estimated/Assessed Needs    Weight Used For Calorie Calculations: 47.8 kg (105 lb 6.1 oz)  Energy Calorie Requirements (kcal): 1825  Energy Need Method: [facility]  Protein Requirements: 57-72(1.2 - 1.5 g/kg)  Weight Used For Protein Calculations: 47.8 kg (105 lb 6.1 oz)     Estimated Fluid Requirement Method: RDA Method  RDA Method (mL): 1825       Nutrition Prescription Ordered    Current Diet Order: NPO    Evaluation of Received Nutrient/Fluid Intake    Other Calories (kcal): 227(propofol @ 8.6 mL/hr)  IV Fluid (mL): 3000  Energy Calories Required: not meeting needs  Protein Required: not meeting needs  Fluid Required: meeting needs  Comments: LBM 10/3  % Intake of Estimated Energy Needs: 0 - 25 %  % Meal Intake: NPO    Nutrition Risk    Level of Risk/Frequency of Follow-up: (F/U 2 x weekly)     Assessment and Plan    Nutrition Problem  Inadequate energy intake    Related to (etiology):   Pneumonia, sepsis    Signs and Symptoms (as evidenced by):   NPO     Interventions/Recommendations (treatment strategy):  Collaboration with other providers    Nutrition Diagnosis Status:   New      Monitor and Evaluation    Food and Nutrient Intake: energy intake, enteral nutrition intake, food and beverage intake  Food and Nutrient Adminstration: diet order, enteral and parenteral nutrition administration  Anthropometric Measurements: weight change, weight, body mass index  Biochemical Data, Medical Tests and Procedures: glucose/endocrine profile, electrolyte and renal panel, lipid profile, inflammatory profile, gastrointestinal profile  Nutrition-Focused Physical Findings: overall appearance, extremities, muscles and bones, head and eyes       Nutrition Follow-Up    RD Follow-up?: Yes

## 2019-10-04 NOTE — PLAN OF CARE
Recommendations     1. If/when extubated and cleared by SLP, pureed diet with Boost Plus tid, as pt was on pta.   2. If NPO for >48hours TF Isosource 1.5 initiated @10 mL/hr and advanced 10 mL q 4 hours to goal rate of 45 mL/hr. TF to provide: 1620 kcal (1847 with propofol at 8.6 mL/hr), 73 g pro, 825 ml fluid. If IV fluids are discountinued, recommend fluid flushes 300 mL tid or per MD.      Goals: Pt to received nutrition by next RD visit.  Nutrition Goal Status: new  Communication of RD Recs: (POC)

## 2019-10-04 NOTE — PROGRESS NOTES
0819: MD Hill notified of pt RR of 50 bpm. Pt appearing restless and attempting to reach for ETT. Now tachycardic with HR 120s on monitor. Propofol gtt infusing at 50 mcg/kg/min. New orders given per MD Hill to given one time dose of ativan 1mg IVP. Will continue to monitor.     0834: pt continued with increase WOB, RR 50s and tachycardic. MD Edward called to bedside and notified of pt's status. Vent settings adjusted per MD Edward at bedside with rapid improvement in pt's WOB. Will continue to monitor.     1332: notified MD Hill of pt's KUB results showing concern for possible obstruction vs ileus. Per MD place OGT to low intermittent wall suction.

## 2019-10-04 NOTE — ED TRIAGE NOTES
Pt presents to ED via urgent care with 102.2 fever, O2 sats at 95 on 3L oxygen via nasal cannula. Pt lives in a group home, caregiver at bedside. Pt's caregiver reports that the pt has had a dry non-productive cough starting today, no vomiting. Pt does not appear to be in any pain at this time.     Per caregiver, pt was at his baseline yesterday and today has been lethargic and sleepy. Was taken to urgent care this evening where they advised coming to ED.     Pt is nonverbal, able to follow only some commands. Uses wheelchair to ambulate.

## 2019-10-04 NOTE — H&P
[facility] Hospital Medicine  History & Physical    Patient Name: Karri Galeano  MRN: [id number]  Admission Date: 10/3/2019  Attending Physician: Bryan Hill MD   Primary Care Provider: Echo Reeves MD         Patient information was obtained from ER records.     Subjective:     Principal Problem:Pneumonia of left lung due to infectious organism    Chief Complaint:  Shortness of breath today.    HPI: Mr. Karri Galeano is a 24 y.o. male with cerebral palsy and epilepsy who presents to [facility] ED from an urgent care facility with complaints of fevers today.  He lives in a group home and started to have a nonproductive cough today.  The caregiver did not observe any vomiting.  He was in his usual state of health yesterday but today has become more lethargic and somnolent.  He was taken to an urgent care facility where he was then transferred to this facility for further care.  He was noted there to have a fever of 102.2° F as well as an oxygen saturation of 95% on nasal cannula at 3 liters/minute.  Further history is otherwise limited at this time.    Chart Review:  Previous Hospitalizations  Date Hospital Diagnosis   Feb 2018 [facility] Urethral stricture s/p cystourethroscopy with urethral dilation   Dec 2017 [facility] Abdominal pain   Jun 2017 [facility] Pneumonia     Outpatient Follow-Up  Date of Visit Physician Service   Aug 2018 Facundo Alvares MD Urology   Jan 2018 Fransisco Prather MD Gastroenterology   Dec 2015 Haleigh Sanchez MD Neurology     Past Medical History:   Diagnosis Date    Anxiety     Congenital cerebral palsy     Hypotonia     Leukodystrophy     Mental retardation     Retention of urine     Seizures     Stereotyped movement disorder     Urinary tract infection        History reviewed. No pertinent surgical history.    Review of patient's allergies indicates:   Allergen Reactions    Clindamycin Hives    Erythromycin Rash       No current  facility-administered medications on file prior to encounter.      Current Outpatient Medications on File Prior to Encounter   Medication Sig    clonazePAM (KLONOPIN) 1 MG tablet Take 1 mg by mouth 2 (two) times daily.    divalproex (DEPAKOTE) 500 MG TbEC Take 1 tablet (500 mg total) by mouth every 12 (twelve) hours.    fluoxetine (PROZAC) 20 MG capsule Take 60 mg by mouth once daily.    lactulose (CHRONULAC) 10 gram/15 mL solution Take 10 g by mouth once daily.    nutritional supplement-caloric (BENECALORIE) 7.5 kcal/mL Liqd Take by mouth. Add to food or drink 3 times daily for calorie supplement at 7am and 1600 and 1900    tamsulosin (FLOMAX) 0.4 mg Cap TAKE 1 CAPSULE BY MOUTH ONCE DAILY    trazodone (DESYREL) 100 MG tablet Take 100 mg by mouth every evening.    ALPRAZolam (XANAX) 1 MG tablet Take 1 mg by mouth 2 (two) times daily.    hydrocodone-acetaminophen 5-325mg (NORCO) 5-325 mg per tablet Take 1 tablet by mouth every 6 (six) hours as needed for Pain.    LACTOSE-REDUCED FOOD (ENSURE ORAL) Take by mouth 4 (four) times daily.    mineral oil-hydrophil petrolat (AQUAPHOR) Oint Apply topically as needed. Apply to dry skin once daily after bath    phenazopyridine (AZO-DINE) 97.5 mg Tab Take by mouth.    salicylic acid 2 % Liqd Apply topically to face every morning for acne     Family History     Problem Relation (Age of Onset)    No Known Problems Mother, Father        Tobacco Use    Smoking status: Never Smoker    Smokeless tobacco: Never Used   Substance and Sexual Activity    Alcohol use: No    Drug use: No    Sexual activity: Never     Birth control/protection: Abstinence     Review of Systems   Unable to perform ROS: Intubated     Objective:     Vital Signs (Most Recent):  Temp: 97.4 °F (36.3 °C) (10/04/19 0200)  Pulse: 74 (10/04/19 0200)  Resp: (!) 22 (10/04/19 0200)  BP: (!) 107/56 (10/04/19 0200)  SpO2: (!) 88 % (10/04/19 0200) Vital Signs (24h Range):  Temp:  [97.4 °F (36.3 °C)-102.2 °F  (39 °C)] 97.4 °F (36.3 °C)  Pulse:  [] 74  Resp:  [20-24] 22  SpO2:  [87 %-99 %] 88 %  BP: (104-177)/() 107/56     Weight: 47.8 kg (105 lb 6.1 oz)  Body mass index is 19.91 kg/m².    Physical Exam   Constitutional: He appears well-developed and well-nourished. No distress.   HENT:   Head: Normocephalic and atraumatic.   Right Ear: External ear normal.   Left Ear: External ear normal.   Nose: Nose normal.   ETT and OGT in place   Eyes: Right eye exhibits no discharge. Left eye exhibits no discharge.   Neck: Normal range of motion.   Cardiovascular: Normal rate, regular rhythm, normal heart sounds and intact distal pulses.   Pulmonary/Chest:   Mechanically ventilated with bilateral rhonchi   Abdominal: Soft. Bowel sounds are normal. He exhibits no distension. There is no tenderness. There is no rebound and no guarding.   Musculoskeletal: Normal range of motion. He exhibits no edema.   Neurological:   Sedated   Skin: Skin is warm and dry. He is not diaphoretic. No erythema.   Nursing note and vitals reviewed.          Significant Labs: All pertinent labs within the past 24 hours have been reviewed.    Significant Imaging: I have reviewed and interpreted all pertinent imaging results/findings within the past 24 hours.    Assessment/Plan:     * Pneumonia of left lung due to infectious organism  Patient was becoming more tachypneic and obtunded which he was intubated.  He may have had an aspiration event with subsequent pneumonia.  He does meet criteria for severe sepsis and has been started on empiric antibiotic therapy.  I personally reviewed his plain chest radiograph which was significant for ...partial left-sided perihilar airspace opacities.  The patient has a CURB-65 score of 0 and a PSI 35, and does meet criteria for healthcare-associated pneumonia.  Will continue empiric antibiotic therapy pending blood cultures.  Will also continue sedation with propofol and consult Pulmonology for ventilator  management.    Severe sepsis  This patient meets criteria for severe sepsis given fevers, tachycardia, tachypnea, pneumonia, acute respiratory failure.  Blood cultures are pending; will start IV fluid hydration and broad spectrum antibiotics.    Acute respiratory failure with hypoxia  As addressed above.    On mechanically assisted ventilation  As addressed above.    Spastic quadriplegic cerebral palsy  Stable; there are no acute issues.    Epilepsy  Stable; continue his home regimen of divalproex.    VTE Risk Mitigation (From admission, onward)         Ordered     enoxaparin injection 40 mg  Daily      10/04/19 0152     IP VTE HIGH RISK PATIENT  Once      10/04/19 0152                   Critical care time spent on the evaluation and treatment of severe organ dysfunction, review of pertinent labs and imaging studies, discussions with consulting providers and discussions with patient/family: 60 minutes.          Kiersten Marcos M.D.  Staff Nocturnist  Department of Hospital Medicine  [facility]  Pager: [phone]          N.B.: Portions of this note was dictated using M*Modal Fluency Direct--there may be voice recognition errors occasionally missed on review.

## 2019-10-04 NOTE — ED PROVIDER NOTES
"Encounter Date: 10/3/2019    SCRIBE #1 NOTE: I, Amrik Bermudez, am scribing for, and in the presence of,  Domenic Laughlin MD. I have scribed the following portions of the note - Other sections scribed: HPI,ROS,PE.       History     Chief Complaint   Patient presents with    Breathing Complaint     pt's caregiver from group home reports that pt was "breathing funny & congested" earlier today, was seen at urgent care, and told to come to ED for low O2 sats & fluid on lungs    Fever     CC: Breathing problems     HPI:  This is a 24 y.o. male with a PMHx of Cerebral palsy, Hypotonia, Leukodystrophy, and Mental Retardation  who presents to the Emergency Department with his care taker with a cc of fever associated with breathing problems. Caregiver reports pt is currently in group home where he usually moves normal but today has been more lethargic than usual. Pt's caregiver reports associated cough. Caregiver notes pt had a normal bowel movement this morning, normal urine, and took his normal medication this morning.      The history is provided by a caregiver. The history is limited by a developmental delay.     Review of patient's allergies indicates:   Allergen Reactions    Clindamycin Hives    Erythromycin Rash     Past Medical History:   Diagnosis Date    Anxiety     Congenital cerebral palsy     Hypotonia     Leukodystrophy     Mental retardation     Retention of urine     Seizures     Stereotyped movement disorder     Urinary tract infection      History reviewed. No pertinent surgical history.  Family History   Problem Relation Age of Onset    No Known Problems Mother     No Known Problems Father     Prostate cancer Neg Hx     Kidney disease Neg Hx      Social History     Tobacco Use    Smoking status: Never Smoker    Smokeless tobacco: Never Used   Substance Use Topics    Alcohol use: No    Drug use: No     Review of Systems   Unable to perform ROS: Patient nonverbal   Constitutional: " "Positive for activity change and fever.   HENT: Positive for congestion.    Respiratory: Positive for cough and shortness of breath.         (+)"breathing problems"        Physical Exam     Initial Vitals [10/03/19 1957]   BP Pulse Resp Temp SpO2   131/85 109 20 (!) 101.9 °F (38.8 °C) (!) 87 %      MAP       --         Physical Exam    Nursing note and vitals reviewed.  Constitutional: He appears well-developed and well-nourished. No distress.   HENT:   Head: Normocephalic and atraumatic.   Right Ear: External ear normal.   Left Ear: External ear normal.   Eyes: Conjunctivae and EOM are normal. Pupils are equal, round, and reactive to light. No scleral icterus.   Cardiovascular: Normal rate, regular rhythm, normal heart sounds and intact distal pulses. Exam reveals no gallop and no friction rub.    No murmur heard.  Pulmonary/Chest: Breath sounds normal. No stridor. He is in respiratory distress. He has no wheezes. He has no rhonchi. He has no rales.   Mild tachypnea.    Abdominal: Soft. He exhibits no distension. There is no tenderness. There is no rebound and no guarding.   Right lower quadrant fullness.    Musculoskeletal: Normal range of motion. He exhibits no edema.   Neurological: He is alert and oriented to person, place, and time. GCS score is 15. GCS eye subscore is 4. GCS verbal subscore is 5. GCS motor subscore is 6.   Skin: Skin is warm and dry.   Psychiatric: He has a normal mood and affect. His behavior is normal.         ED Course   Critical Care  Date/Time: 10/4/2019 1:37 AM  Performed by: Domenic Laughlin MD  Authorized by: Domenic Laughlin MD   Total critical care time (exclusive of procedural time) : 45 minutes  Critical care was necessary to treat or prevent imminent or life-threatening deterioration of the following conditions: respiratory failure.    Intubation  Date/Time: 10/4/2019 1:37 AM  Performed by: Domenic Laughlin MD  Authorized by: Domenic Laughlin MD   Indications: " respiratory failure,  respiratory distress and  hypoxemia  Intubation method: video-assisted  Patient status: paralyzed (RSI)  Preoxygenation: mask and nonrebreather mask  Sedatives: etomidate  Paralytic: rocuronium  Laryngoscope size: Mac 4  Tube size: 7.5 mm  Tube type: cuffed  Number of attempts: 1  Cricoid pressure: yes  Cords visualized: yes  Post-procedure assessment: chest rise and CO2 detector  Breath sounds: equal and absent over the epigastrium and rales/crackles  Cuff inflated: yes  Tube secured with: ETT quiñones  Chest x-ray findings: endotracheal tube in appropriate position  Complications: No        Labs Reviewed   CBC W/ AUTO DIFFERENTIAL - Abnormal; Notable for the following components:       Result Value    RBC 3.77 (*)     Hemoglobin 10.5 (*)     Hematocrit 34.5 (*)     Mean Corpuscular Hemoglobin Conc 30.4 (*)     RDW 14.9 (*)     Platelets 137 (*)     Mono # 1.5 (*)     Gran% 73.2 (*)     Lymph% 11.0 (*)     All other components within normal limits   COMPREHENSIVE METABOLIC PANEL - Abnormal; Notable for the following components:    Sodium 146 (*)     Glucose 116 (*)     Albumin 3.4 (*)     All other components within normal limits   URINALYSIS, REFLEX TO URINE CULTURE - Abnormal; Notable for the following components:    Protein, UA 1+ (*)     Occult Blood UA 1+ (*)     Urobilinogen, UA 4.0-6.0 (*)     All other components within normal limits    Narrative:     Preferred Collection Type->Urine, Clean Catch   URINALYSIS MICROSCOPIC - Abnormal; Notable for the following components:    RBC, UA 25 (*)     Non-Squam Epith 3 (*)     All other components within normal limits    Narrative:     Preferred Collection Type->Urine, Clean Catch   ISTAT PROCEDURE - Abnormal; Notable for the following components:    POC PH 7.298 (*)     POC PCO2 56.5 (*)     POC PO2 156 (*)     POC TCO2 29 (*)     All other components within normal limits   CULTURE, BLOOD   CULTURE, BLOOD   LACTIC ACID, PLASMA   LACTIC ACID,  "PLASMA   POCT INFLUENZA A/B MOLECULAR     EKG Readings: (Independently Interpreted)   Initial Reading: No STEMI. Previous EKG: Compared with most recent EKG Rhythm: Normal Sinus Rhythm. Heart Rate: 99.       Imaging Results          XR Non-Rad Performed NG/Gastric Tube Check (Final result)  Result time 10/04/19 00:51:56    Final result by Aline Espana MD (10/04/19 00:51:56)                 Impression:      Tip of nasogastric tube in the proximal stomach.    Gaseous distension of the bowel.      Electronically signed by: Aline Espana  Date:    10/04/2019  Time:    00:51             Narrative:    EXAMINATION:  XR NON RADIOLOGIST PERFORMED NG/GASTRIC TUBE CHECK    CLINICAL HISTORY:  OGT INSERTION;    TECHNIQUE:  A nasogastric/gastric tube check was performed by a non radiologist.    COMPARISON:  10/04/2019 at 00:04    FINDINGS:  The tip of the nasogastric tube is seen in the proximal stomach. Again prominent amount of gas seen within the gastrointestinal system.  There is airspace opacity seen in the visualized left lung.                                X-Ray Chest 1 View (Final result)  Result time 10/04/19 00:35:01    Final result by Torres Alejo MD (10/04/19 00:35:01)                 Impression:      Interval extubation with significant interval worsening of left lung aeration when compared with the exam performed 3 hours prior, now with near complete opacification of the left hemithorax.  Findings could be related to mucous plugging in this recently intubated patient.  Correlation and close follow-up advised.    This report was flagged in Epic as abnormal.      Electronically signed by: Torres Alejo MD  Date:    10/04/2019  Time:    00:35             Narrative:    EXAMINATION:  XR CHEST 1 VIEW    CLINICAL HISTORY:  Provided history is "  Presence of other specified devices".    TECHNIQUE:  One view of the chest.    COMPARISON:  10/03/2019 at 21:07.    FINDINGS:  Interval placement of an " endotracheal tube, with the tip approximately 2 cm above the isaac.  Nasogastric tube extends below the diaphragm overlying the stomach.  Cardiac silhouette is stable.  Right lung is essentially clear.  There has been significant interval worsening of left lung aeration, now with near complete opacification of the left hemithorax.  Small amount of aerated lung noted in the left upper lung zone.  Gaseous distention of bowel noted in the upper abdomen.                               CT Chest Abdoment Pelvis With Contrast (In process)                X-Ray Chest AP Portable (Final result)  Result time 10/03/19 21:44:49    Final result by Rodrigo Rodriguez MD (10/03/19 21:44:49)                 Impression:      Patchy left perihilar airspace opacities.      Electronically signed by: Rodrigo Rodriguez MD  Date:    10/03/2019  Time:    21:44             Narrative:    EXAMINATION:  XR CHEST AP PORTABLE    CLINICAL HISTORY:  Sepsis;    TECHNIQUE:  Single frontal view of the chest was performed.    COMPARISON:  12/10/2017.    FINDINGS:  Monitoring EKG leads are present.  The trachea is unremarkable.  The cardiomediastinal silhouette is within normal limits.  The hemidiaphragms are unremarkable.  There is no evidence of free air beneath the hemidiaphragms.  There are no pleural effusions.  There is no evidence of a pneumothorax.  There is no evidence of pneumomediastinum.  There are partial left-sided perihilar airspace opacities.  The osseous structures are unremarkable.  There is nonspecific prominence of the bowel loops.                                 Medical Decision Making:   Initial Assessment:   24-year-old male with history of cerebral palsy and severe MR presenting with fever, tachypnea, hypoxia.  On exam, patient has mild tachypnea with noisy respirations.  History limited due to patient being nonverbal.  Febrile, tachycardic, tachypneic.  Meet sepsis criteria.  X-ray shows possible lower lobe pneumonia.  No abdominal  tenderness rebound or guarding, though I do note abdominal fullness, could be constipation.  Patient started on vanc and Zosyn.  Patient noted to become significantly more tachypneic with labored breathing and decreased activity, concerning for respiratory failure.  Patient emergently intubated without difficulty.  Please see separate procedure note.  Will admit the patient to the ICU for respiratory failure in the setting of likely pneumonia.  Patient lives in a group home or nursing home, will treat for hospital-acquired pneumonia.  Patient caretaker unable to provide much information regarding advanced directives.  Large volume medical binder at bedside does not appear to have an LA post.  Patient caretaker believes the patient is full code.  Clinical Tests:   Lab Tests: Ordered and Reviewed  Radiological Study: Ordered and Reviewed  Medical Tests: Ordered and Reviewed            Scribe Attestation:   Scribe #1: I performed the above scribed service and the documentation accurately describes the services I performed. I attest to the accuracy of the note.    Attending Attestation:         Attending Critical Care:   Critical Care Times:   ==============================================================  · Total Critical Care Time - exclusive of procedural time: 30 minutes.  ==============================================================  Critical care was necessary to treat or prevent imminent or life-threatening deterioration of the following conditions: sepsis.                  Clinical Impression:       ICD-10-CM ICD-9-CM   1. Acute respiratory failure with hypoxia J96.01 518.81   2. Fever R50.9 780.60   3. Endotracheally intubated Z97.8 V49.89         Disposition:   Disposition: Admitted  Condition: Stable    I, Domenic Laughlin, personally performed the services described in this documentation. All medical record entries made by the scribe were at my direction and in my presence. I have reviewed the chart and  agree that the record reflects my personal performance and is accurate and complete.                    Domenic Laughlin MD  10/04/19 0138

## 2019-10-04 NOTE — ASSESSMENT & PLAN NOTE
This patient meets criteria for severe sepsis given fevers, tachycardia, tachypnea, pneumonia, acute respiratory failure.  Blood cultures are pending; will start IV fluid hydration and broad spectrum antibiotics.

## 2019-10-04 NOTE — HPI
Mr. Karri Galeano is a 24 y.o. male with cerebral palsy and epilepsy who presents to [facility] ED from an urgent care facility with complaints of fevers today.  He lives in a group home and started to have a nonproductive cough today.  The caregiver did not observe any vomiting.  He was in his usual state of health yesterday but today has become more lethargic and somnolent.  He was taken to an urgent care facility where he was then transferred to this facility for further care.  He was noted there to have a fever of 102.2° F as well as an oxygen saturation of 95% on nasal cannula at 3 liters/minute.  Further history is otherwise limited at this time.

## 2019-10-04 NOTE — PLAN OF CARE
Pt remains in the ICU this shift. Remains on the ventilator, vent settings adjusted per pulmonary. Remains on propofol gtt for comfort and sedation. RR of 50s this AM, inspiratory time decreased and peep increased to 15; weaned to 8 now. IVF continue infusing. OGT to LIWS due to concern for possible obstruction vs ileus. ETT retracted 1 cm per RT, now 23 cm at lips. No falls, injuries or new skin breakdown, precautions maintained for all.

## 2019-10-04 NOTE — CARE UPDATE
[facility]  ICU Multidisciplinary Bedside Rounds     UPDATE     Date: 10/4/2019      Plan of care reviewed with the following, Nurse, Charge Nurse, Physician, Resp. Therapist, Infection Prevention and Dietician.       Needs/ Goals for the day: adjust vent settings per pulmonary, adjust medications      Level of Care: Critical Care

## 2019-10-04 NOTE — ASSESSMENT & PLAN NOTE
Patient was becoming more tachypneic and obtunded which he was intubated.  He may have had an aspiration event with subsequent pneumonia.  He does meet criteria for severe sepsis and has been started on empiric antibiotic therapy.  I personally reviewed his plain chest radiograph which was significant for ...partial left-sided perihilar airspace opacities.  The patient has a CURB-65 score of 0 and a PSI 35, and does meet criteria for healthcare-associated pneumonia.  Will continue empiric antibiotic therapy pending blood cultures.  Will also continue sedation with propofol and consult Pulmonology for ventilator management.

## 2019-10-04 NOTE — ED NOTES
Patient received from [facility]. According to previous nurse patient came back from CT tacypneic. Currently on non-rebreather mask. Patient non verbal and arousable to pain.

## 2019-10-04 NOTE — CONSULTS
[facility]  Pulmonology  Consult Note    Patient Name: Karri Galeano  MRN: [id number]  Admission Date: 10/3/2019  Hospital Length of Stay: 0 days  Code Status: Full Code  Attending Physician: Bryan Hill MD  Primary Care Provider: Echo Reeves MD   Principal Problem: Pneumonia of left lung due to infectious organism    Inpatient consult to Pulmonology  Consult performed by: Rufino Edward MD  Consult ordered by: Bryan Hill MD        Subjective:     HPI:  Patient is 24 y.o. male  has a past medical history of Anxiety, Congenital cerebral palsy, Hypotonia, Leukodystrophy, Mental retardation, Retention of urine, Seizures, Stereotyped movement disorder, and Urinary tract infection. presented to [facility] on 10/3/19 due to fever and worsening lethargy.   patietn was intubated due to decreased mentation and tachypnea.  Post intubation cxr and CT with large atelectasis of left lung which was new when compared to initial admitting cxr on 10/3/19.    Upon my initial evaluation, patient was tachypnic with I:E ratio of 1.4:1.  I lowered inspiratory time from .09 to .65 with immediate improvement in respiratory rate and wob.  Currently, patient is hemodynamically stable.      Past Medical History:   Diagnosis Date    Anxiety     Congenital cerebral palsy     Hypotonia     Leukodystrophy     Mental retardation     Retention of urine     Seizures     Stereotyped movement disorder     Urinary tract infection        History reviewed. No pertinent surgical history.    Review of patient's allergies indicates:   Allergen Reactions    Clindamycin Hives    Erythromycin Rash       Family History     Problem Relation (Age of Onset)    No Known Problems Mother, Father        Tobacco Use    Smoking status: Never Smoker    Smokeless tobacco: Never Used   Substance and Sexual Activity    Alcohol use: No    Drug use: No    Sexual activity: Never     Birth control/protection:  Abstinence         Review of Systems   Unable to perform ROS: Intubated     Objective:     Vital Signs (Most Recent):  Temp: (!) 102.3 °F (39.1 °C) (10/04/19 0750)  Pulse: 92 (10/04/19 0930)  Resp: (!) 23 (10/04/19 0930)  BP: (!) 117/58 (10/04/19 0930)  SpO2: 100 % (10/04/19 0930) Vital Signs (24h Range):  Temp:  [97.4 °F (36.3 °C)-102.3 °F (39.1 °C)] 102.3 °F (39.1 °C)  Pulse:  [] 92  Resp:  [20-76] 23  SpO2:  [69 %-100 %] 100 %  BP: ()/() 117/58     Weight: 47.8 kg (105 lb 6.1 oz)  Body mass index is 19.91 kg/m².      Intake/Output Summary (Last 24 hours) at 10/4/2019 0944  Last data filed at 10/4/2019 0900  Gross per 24 hour   Intake 2799.55 ml   Output 515 ml   Net 2284.55 ml       Physical Exam   Constitutional: He appears well-developed.   HENT:   Head: Normocephalic and atraumatic.   Mouth/Throat: Oropharynx is clear and moist.   ETT in place   Eyes: Pupils are equal, round, and reactive to light. Conjunctivae and EOM are normal.   Neck: Normal range of motion. Neck supple.   Cardiovascular: Normal rate, regular rhythm and normal heart sounds.   Pulmonary/Chest: Effort normal. He has rales.   Coarse breath sound left more than right.     Abdominal: Soft. Bowel sounds are normal.   Musculoskeletal: Normal range of motion.   Neurological: No cranial nerve deficit.   Sedated on vent.   Skin: Skin is warm.       Vents:  Vent Mode: PRVC (10/04/19 0907)  Ventilator Initiated: Yes (10/03/19 2347)  Set Rate: 22 bmp (10/04/19 0907)  Vt Set: 350 mL (10/04/19 0907)  PEEP/CPAP: 15 cmH20 (10/04/19 0907)  Oxygen Concentration (%): 70 (10/04/19 0930)  Peak Airway Pressure: 30.4 cmH2O (10/04/19 0907)  Total Ve: 8.9 mL (10/04/19 0907)  F/VT Ratio<105 (RSBI): (!) 67.65 (10/04/19 0907)    Lines/Drains/Airways     Drain                 NG/OG Tube 10/04/19 0010 orogastric 16 Fr. Center mouth less than 1 day         Urethral Catheter 10/04/19 0104 Straight-tip 16 Fr. less than 1 day          Airway                  Airway - Non-Surgical 10/03/19 2335 Endotracheal Tube less than 1 day          Peripheral Intravenous Line                 Peripheral IV - Single Lumen 10/03/19 2018 20 G Left Forearm less than 1 day         Peripheral IV - Single Lumen 10/03/19 2330 18 G Right Forearm less than 1 day                Significant Labs:    CBC/Anemia Profile:  Recent Labs   Lab 10/03/19  2020 10/04/19  0319   WBC 10.32 10.72   HGB 10.5* 11.2*   HCT 34.5* 36.7*   * 129*   MCV 92 93   RDW 14.9* 15.0*        Chemistries:  Recent Labs   Lab 10/03/19  2020 10/04/19  0318   * 145   K 3.7 3.7    110   CO2 28 27   BUN 15 12   CREATININE 0.7 0.7   CALCIUM 8.8 7.9*   ALBUMIN 3.4* 3.1*   PROT 6.9 6.4   BILITOT 0.2 0.4   ALKPHOS 73 71   ALT 14 15   AST 19 21   MG  --  1.7   PHOS  --  3.9       ABGs:   Recent Labs   Lab 10/04/19  0455   PH 7.323*   PCO2 51.1*   HCO3 26.5   POCSATURATED 95   BE 0     Blood Culture:   Recent Labs   Lab 10/03/19  2020 10/03/19  2054   LABBLOO No Growth to date No Growth to date     Lactic Acid:   Recent Labs   Lab 10/03/19  2020 10/03/19  2345   LACTATE 1.2 1.1     Respiratory Culture: No results for input(s): GSRESP, RESPIRATORYC in the last 48 hours.    Significant Imaging:   CT: I have reviewed all pertinent results/findings within the past 24 hours and my personal findings are:  atelectasis of left lung with abrupt cut off of left mainstem.    CXR: I have reviewed all pertinent results/findings within the past 24 hours and my personal findings are:  new opacification of left hemithorax when compared to 10/3/19    Assessment/Plan:     * Pneumonia of left lung due to infectious organism  Agree with zosyn and vanc for aspiration coverage.      Severe sepsis  Antibiotics.  Await culture.      Acute respiratory failure with hypoxia  Left lung atelectasis.  Suspect mucous plug.  Will start nebs and suction.  Repeat cxr.  May consider fob if opacity persist.            Thank you for your consult. I  will follow-up with patient. Please contact us if you have any additional questions.     Rufino Edward MD  Pulmonology  [facility]    Critical Care Time: 50  minutes  Critical secondary to respiratory failure     Critical care was time spent personally by me on the following activities: development of treatment plan with patient or surrogate and bedside caregivers, discussions with consultants, evaluation of patient's response to treatment, examination of patient, ordering and performing treatments and interventions, ordering and review of laboratory studies, ordering and review of radiographic studies, pulse oximetry, re-evaluation of patient's condition.  This critical care time did not overlap with that of any other provider or involve time for any procedures.

## 2019-10-04 NOTE — SUBJECTIVE & OBJECTIVE
Past Medical History:   Diagnosis Date    Anxiety     Congenital cerebral palsy     Hypotonia     Leukodystrophy     Mental retardation     Retention of urine     Seizures     Stereotyped movement disorder     Urinary tract infection        History reviewed. No pertinent surgical history.    Review of patient's allergies indicates:   Allergen Reactions    Clindamycin Hives    Erythromycin Rash       No current facility-administered medications on file prior to encounter.      Current Outpatient Medications on File Prior to Encounter   Medication Sig    clonazePAM (KLONOPIN) 1 MG tablet Take 1 mg by mouth 2 (two) times daily.    divalproex (DEPAKOTE) 500 MG TbEC Take 1 tablet (500 mg total) by mouth every 12 (twelve) hours.    fluoxetine (PROZAC) 20 MG capsule Take 60 mg by mouth once daily.    lactulose (CHRONULAC) 10 gram/15 mL solution Take 10 g by mouth once daily.    nutritional supplement-caloric (BENECALORIE) 7.5 kcal/mL Liqd Take by mouth. Add to food or drink 3 times daily for calorie supplement at 7am and 1600 and 1900    tamsulosin (FLOMAX) 0.4 mg Cap TAKE 1 CAPSULE BY MOUTH ONCE DAILY    trazodone (DESYREL) 100 MG tablet Take 100 mg by mouth every evening.    ALPRAZolam (XANAX) 1 MG tablet Take 1 mg by mouth 2 (two) times daily.    hydrocodone-acetaminophen 5-325mg (NORCO) 5-325 mg per tablet Take 1 tablet by mouth every 6 (six) hours as needed for Pain.    LACTOSE-REDUCED FOOD (ENSURE ORAL) Take by mouth 4 (four) times daily.    mineral oil-hydrophil petrolat (AQUAPHOR) Oint Apply topically as needed. Apply to dry skin once daily after bath    phenazopyridine (AZO-DINE) 97.5 mg Tab Take by mouth.    salicylic acid 2 % Liqd Apply topically to face every morning for acne     Family History     Problem Relation (Age of Onset)    No Known Problems Mother, Father        Tobacco Use    Smoking status: Never Smoker    Smokeless tobacco: Never Used   Substance and Sexual Activity     Alcohol use: No    Drug use: No    Sexual activity: Never     Birth control/protection: Abstinence     Review of Systems   Unable to perform ROS: Intubated     Objective:     Vital Signs (Most Recent):  Temp: 97.4 °F (36.3 °C) (10/04/19 0200)  Pulse: 74 (10/04/19 0200)  Resp: (!) 22 (10/04/19 0200)  BP: (!) 107/56 (10/04/19 0200)  SpO2: (!) 88 % (10/04/19 0200) Vital Signs (24h Range):  Temp:  [97.4 °F (36.3 °C)-102.2 °F (39 °C)] 97.4 °F (36.3 °C)  Pulse:  [] 74  Resp:  [20-24] 22  SpO2:  [87 %-99 %] 88 %  BP: (104-177)/() 107/56     Weight: 47.8 kg (105 lb 6.1 oz)  Body mass index is 19.91 kg/m².    Physical Exam   Constitutional: He appears well-developed and well-nourished. No distress.   HENT:   Head: Normocephalic and atraumatic.   Right Ear: External ear normal.   Left Ear: External ear normal.   Nose: Nose normal.   ETT and OGT in place   Eyes: Right eye exhibits no discharge. Left eye exhibits no discharge.   Neck: Normal range of motion.   Cardiovascular: Normal rate, regular rhythm, normal heart sounds and intact distal pulses.   Pulmonary/Chest:   Mechanically ventilated with bilateral rhonchi   Abdominal: Soft. Bowel sounds are normal. He exhibits no distension. There is no tenderness. There is no rebound and no guarding.   Musculoskeletal: Normal range of motion. He exhibits no edema.   Neurological:   Sedated   Skin: Skin is warm and dry. He is not diaphoretic. No erythema.   Nursing note and vitals reviewed.          Significant Labs: All pertinent labs within the past 24 hours have been reviewed.    Significant Imaging: I have reviewed and interpreted all pertinent imaging results/findings within the past 24 hours.

## 2019-10-04 NOTE — PROGRESS NOTES
"  [facility]  Adult Nutrition  Consult Note    SUMMARY     Recommendations    1. If/when extubated and cleared by SLP, pureed diet with Boost Plus tid, as pt was on pta.   2. If NPO for >48hours TF Isosource 1.5 initiated @10 mL/hr and advanced 10 mL q 4 hours to goal rate of 45 mL/hr. TF to provide: 1620 kcal (1847 with propofol at 8.6 mL/hr), 73 g pro, 825 ml fluid. If IV fluids are discountinued, recommend fluid flushes 300 mL tid or per MD.     Goals: Pt to received nutrition by next RD visit.  Nutrition Goal Status: new  Communication of RD Recs: (POC)    Reason for Assessment    Reason For Assessment: consult  Diagnosis: pulmonary disease, infection/sepsis  Relevant Medical History: CP, mental retardation  Interdisciplinary Rounds: attended  General Information Comments: Pt admit yesterday; pneumonia of left lung, currently on vent and propofol.  resported pt to be on pureed diet with use of Ensure Plus and Benecalorie pta. Noted wt hx stable (minor fluctuations) for 1 year. NFPE not performed today; overall appears to be well nourished with appropriate mucle tone for pt with CP.  Nutrition Discharge Planning: (Pending medical course)    Nutrition Risk Screen    Nutrition Risk Screen: no indicators present    Nutrition/Diet History    Patient Reported Diet/Restrictions/Preferences: pureed(reported by )  Spiritual, Cultural Beliefs, Worship Practices, Values that Affect Care: no  Supplemental Drinks or Food Habits: Ensure Plus(Benecalorie)  Factors Affecting Nutritional Intake: NPO, on mechanical ventilation    Anthropometrics    Temp: 100.1 °F (37.8 °C)  Height Method: Estimated  Height: 5' 1" (154.9 cm)  Height (inches): 61 in  Weight Method: Bed Scale  Weight: 47.8 kg (105 lb 6.1 oz)  Weight (lb): 105.38 lb  Ideal Body Weight (IBW), Male: 112 lb  % Ideal Body Weight, Male (lb): 94.09 lb  BMI (Calculated): 20  BMI Grade: 18.5-24.9 - normal   "     Lab/Procedures/Meds    Pertinent Labs Reviewed: reviewed  Pertinent Labs Comments: Ca 7.9, Alb 3.1, Vit B12 1191  Pertinent Medications Reviewed: reviewed  Pertinent Medications Comments: pantoprazole, abx, vancomycin, propofol      Estimated/Assessed Needs    Weight Used For Calorie Calculations: 47.8 kg (105 lb 6.1 oz)  Energy Calorie Requirements (kcal): 1825  Energy Need Method: [facility]  Protein Requirements: 57-72(1.2 - 1.5 g/kg)  Weight Used For Protein Calculations: 47.8 kg (105 lb 6.1 oz)     Estimated Fluid Requirement Method: RDA Method  RDA Method (mL): 1825       Nutrition Prescription Ordered    Current Diet Order: NPO    Evaluation of Received Nutrient/Fluid Intake    Other Calories (kcal): 227(propofol @ 8.6 mL/hr)  IV Fluid (mL): 3000  Energy Calories Required: not meeting needs  Protein Required: not meeting needs  Fluid Required: meeting needs  Comments: LBM 10/3  % Intake of Estimated Energy Needs: 0 - 25 %  % Meal Intake: NPO    Nutrition Risk    Level of Risk/Frequency of Follow-up: (F/U 2 x weekly)     Assessment and Plan    Nutrition Problem  Inadequate energy intake    Related to (etiology):   Pneumonia, sepsis    Signs and Symptoms (as evidenced by):   NPO     Interventions/Recommendations (treatment strategy):  Collaboration with other providers    Nutrition Diagnosis Status:   New      Monitor and Evaluation    Food and Nutrient Intake: energy intake, enteral nutrition intake, food and beverage intake  Food and Nutrient Adminstration: diet order, enteral and parenteral nutrition administration  Anthropometric Measurements: weight change, weight, body mass index  Biochemical Data, Medical Tests and Procedures: glucose/endocrine profile, electrolyte and renal panel, lipid profile, inflammatory profile, gastrointestinal profile  Nutrition-Focused Physical Findings: overall appearance, extremities, muscles and bones, head and eyes       Nutrition Follow-Up    RD Follow-up?: Yes

## 2019-10-04 NOTE — ASSESSMENT & PLAN NOTE
Left lung atelectasis.  Suspect mucous plug.  Will start nebs and suction.  Repeat cxr.  May consider fob if opacity persist.

## 2019-10-04 NOTE — CARE UPDATE
Patient seen and examined.  Agree with Dr. Marcos's treatment and plan.  Severe sepsis secondary to pneumonia.  Continue Vanc and Zosyn.  Acute respiratory failure on vent.  Appreciate Pulmonary input.  Continue current management.

## 2019-10-04 NOTE — PROGRESS NOTES
Results for MODESTO CANTU (MRN [id number]) as of 10/4/2019 05:07   Ref. Range 10/4/2019 04:55   POC PH Latest Ref Range: 7.35 - 7.45  7.323 (L)   POC PCO2 Latest Ref Range: 35 - 45 mmHg 51.1 (H)   POC PO2 Latest Ref Range: 80 - 100 mmHg 85   POC BE Latest Ref Range: -2 to 2 mmol/L 0   POC HCO3 Latest Ref Range: 24 - 28 mmol/L 26.5   POC SATURATED O2 Latest Ref Range: 95 - 100 % 95   POC TCO2 Latest Ref Range: 23 - 27 mmol/L 28 (H)   FiO2 Unknown 100   Vt Unknown 320   PiP Unknown 24   PEEP Unknown 5   Sample Unknown ARTERIAL   DelSys Unknown Adult Vent   Allens Test Unknown Pass   Site Unknown RR   Mode Unknown AC/PRVC   Rate Unknown 22

## 2019-10-04 NOTE — PROGRESS NOTES
Patient to CT and to ICU room 269.  Patient placed back on ventilator on previous settings.  Ventilator and alarms on and functioning.  AMBU bag and mask at bedside.  Nurse notified

## 2019-10-04 NOTE — PLAN OF CARE
10/04/19 1556   Discharge Assessment   Assessment Type Discharge Planning Reassessment   Assessment information obtained from? Caregiver   Prior to hospitalization functional status: Completely Dependent   Current Functional Status: Completely Dependent   Facility Arrived From: [facility]    Lives With facility resident   Patient's perception of discharge disposition group home   Readmission Within the Last 30 Days no previous admission in last 30 days   Patient currently being followed by outpatient case management? No   Patient currently receives any other outside agency services? No   Equipment Currently Used at Home wheelchair   Do you have any problems affording any of your prescribed medications? No   Is the patient taking medications as prescribed? yes   Does the patient have transportation home? Yes   Transportation Anticipated health plan transportation   Dialysis Name and Scheduled days N/A   Does the patient receive services at the [facility]? No   Discharge Plan A Group Home   DME Needed Upon Discharge  none   Patient/Family in Agreement with Plan yes

## 2019-10-04 NOTE — PLAN OF CARE
Pt rec to room 269, connected to cardiac monitor, pt intubated and sedated on propofol. Pt does not follow commands but moves arms and legs spontaneously. Dr. Marcos at bedside assessing pt, pt vitals are stable. SR up x3, pt has no skin breakdown. Pt afebrile at this time, pt suctioned and turned on side.

## 2019-10-04 NOTE — SUBJECTIVE & OBJECTIVE
Past Medical History:   Diagnosis Date    Anxiety     Congenital cerebral palsy     Hypotonia     Leukodystrophy     Mental retardation     Retention of urine     Seizures     Stereotyped movement disorder     Urinary tract infection        History reviewed. No pertinent surgical history.    Review of patient's allergies indicates:   Allergen Reactions    Clindamycin Hives    Erythromycin Rash       Family History     Problem Relation (Age of Onset)    No Known Problems Mother, Father        Tobacco Use    Smoking status: Never Smoker    Smokeless tobacco: Never Used   Substance and Sexual Activity    Alcohol use: No    Drug use: No    Sexual activity: Never     Birth control/protection: Abstinence         Review of Systems   Unable to perform ROS: Intubated     Objective:     Vital Signs (Most Recent):  Temp: (!) 102.3 °F (39.1 °C) (10/04/19 0750)  Pulse: 92 (10/04/19 0930)  Resp: (!) 23 (10/04/19 0930)  BP: (!) 117/58 (10/04/19 0930)  SpO2: 100 % (10/04/19 0930) Vital Signs (24h Range):  Temp:  [97.4 °F (36.3 °C)-102.3 °F (39.1 °C)] 102.3 °F (39.1 °C)  Pulse:  [] 92  Resp:  [20-76] 23  SpO2:  [69 %-100 %] 100 %  BP: ()/() 117/58     Weight: 47.8 kg (105 lb 6.1 oz)  Body mass index is 19.91 kg/m².      Intake/Output Summary (Last 24 hours) at 10/4/2019 0944  Last data filed at 10/4/2019 0900  Gross per 24 hour   Intake 2799.55 ml   Output 515 ml   Net 2284.55 ml       Physical Exam   Constitutional: He appears well-developed.   HENT:   Head: Normocephalic and atraumatic.   Mouth/Throat: Oropharynx is clear and moist.   ETT in place   Eyes: Pupils are equal, round, and reactive to light. Conjunctivae and EOM are normal.   Neck: Normal range of motion. Neck supple.   Cardiovascular: Normal rate, regular rhythm and normal heart sounds.   Pulmonary/Chest: Effort normal. He has rales.   Coarse breath sound left more than right.     Abdominal: Soft. Bowel sounds are normal.    Musculoskeletal: Normal range of motion.   Neurological: No cranial nerve deficit.   Sedated on vent.   Skin: Skin is warm.       Vents:  Vent Mode: PRVC (10/04/19 0907)  Ventilator Initiated: Yes (10/03/19 2347)  Set Rate: 22 bmp (10/04/19 0907)  Vt Set: 350 mL (10/04/19 0907)  PEEP/CPAP: 15 cmH20 (10/04/19 0907)  Oxygen Concentration (%): 70 (10/04/19 0930)  Peak Airway Pressure: 30.4 cmH2O (10/04/19 0907)  Total Ve: 8.9 mL (10/04/19 0907)  F/VT Ratio<105 (RSBI): (!) 67.65 (10/04/19 0907)    Lines/Drains/Airways     Drain                 NG/OG Tube 10/04/19 0010 orogastric 16 Fr. Center mouth less than 1 day         Urethral Catheter 10/04/19 0104 Straight-tip 16 Fr. less than 1 day          Airway                 Airway - Non-Surgical 10/03/19 2335 Endotracheal Tube less than 1 day          Peripheral Intravenous Line                 Peripheral IV - Single Lumen 10/03/19 2018 20 G Left Forearm less than 1 day         Peripheral IV - Single Lumen 10/03/19 2330 18 G Right Forearm less than 1 day                Significant Labs:    CBC/Anemia Profile:  Recent Labs   Lab 10/03/19  2020 10/04/19  0319   WBC 10.32 10.72   HGB 10.5* 11.2*   HCT 34.5* 36.7*   * 129*   MCV 92 93   RDW 14.9* 15.0*        Chemistries:  Recent Labs   Lab 10/03/19  2020 10/04/19  0318   * 145   K 3.7 3.7    110   CO2 28 27   BUN 15 12   CREATININE 0.7 0.7   CALCIUM 8.8 7.9*   ALBUMIN 3.4* 3.1*   PROT 6.9 6.4   BILITOT 0.2 0.4   ALKPHOS 73 71   ALT 14 15   AST 19 21   MG  --  1.7   PHOS  --  3.9       ABGs:   Recent Labs   Lab 10/04/19  0455   PH 7.323*   PCO2 51.1*   HCO3 26.5   POCSATURATED 95   BE 0     Blood Culture:   Recent Labs   Lab 10/03/19  2020 10/03/19  2054   LABBLOO No Growth to date No Growth to date     Lactic Acid:   Recent Labs   Lab 10/03/19  2020 10/03/19  2345   LACTATE 1.2 1.1     Respiratory Culture: No results for input(s): GSRESP, RESPIRATORYC in the last 48 hours.    Significant Imaging:   CT: I  have reviewed all pertinent results/findings within the past 24 hours and my personal findings are:  atelectasis of left lung with abrupt cut off of left mainstem.    CXR: I have reviewed all pertinent results/findings within the past 24 hours and my personal findings are:  new opacification of left hemithorax when compared to 10/3/19

## 2019-10-05 PROBLEM — E87.6 HYPOKALEMIA: Status: ACTIVE | Noted: 2019-10-05

## 2019-10-05 LAB
ALLENS TEST: ABNORMAL
ALLENS TEST: ABNORMAL
ANION GAP SERPL CALC-SCNC: 8 MMOL/L (ref 8–16)
BUN SERPL-MCNC: 11 MG/DL (ref 6–20)
CALCIUM SERPL-MCNC: 8.2 MG/DL (ref 8.7–10.5)
CHLORIDE SERPL-SCNC: 107 MMOL/L (ref 95–110)
CO2 SERPL-SCNC: 25 MMOL/L (ref 23–29)
CREAT SERPL-MCNC: 0.6 MG/DL (ref 0.5–1.4)
DELSYS: ABNORMAL
DELSYS: ABNORMAL
ERYTHROCYTE [SEDIMENTATION RATE] IN BLOOD BY WESTERGREN METHOD: 22 MM/H
EST. GFR  (AFRICAN AMERICAN): >60 ML/MIN/1.73 M^2
EST. GFR  (NON AFRICAN AMERICAN): >60 ML/MIN/1.73 M^2
FIO2: 40
FIO2: 40
GLUCOSE SERPL-MCNC: 94 MG/DL (ref 70–110)
HCO3 UR-SCNC: 24.7 MMOL/L (ref 24–28)
HCO3 UR-SCNC: 25.8 MMOL/L (ref 24–28)
MIN VOL: 8.4
MODE: ABNORMAL
MODE: ABNORMAL
PCO2 BLDA: 35.7 MMHG (ref 35–45)
PCO2 BLDA: 43 MMHG (ref 35–45)
PEEP: 5
PEEP: 8
PH SMN: 7.39 [PH] (ref 7.35–7.45)
PH SMN: 7.45 [PH] (ref 7.35–7.45)
PIP: 25
PO2 BLDA: 113 MMHG (ref 80–100)
PO2 BLDA: 163 MMHG (ref 80–100)
POC BE: 1 MMOL/L
POC BE: 1 MMOL/L
POC SATURATED O2: 100 % (ref 95–100)
POC SATURATED O2: 98 % (ref 95–100)
POC TCO2: 26 MMOL/L (ref 23–27)
POC TCO2: 27 MMOL/L (ref 23–27)
POTASSIUM SERPL-SCNC: 3.2 MMOL/L (ref 3.5–5.1)
PS: 10
SAMPLE: ABNORMAL
SAMPLE: ABNORMAL
SITE: ABNORMAL
SITE: ABNORMAL
SODIUM SERPL-SCNC: 140 MMOL/L (ref 136–145)
SP02: 100
SP02: 92
VANCOMYCIN TROUGH SERPL-MCNC: 5.2 UG/ML (ref 10–22)
VT: 350

## 2019-10-05 PROCEDURE — 36600 WITHDRAWAL OF ARTERIAL BLOOD: CPT

## 2019-10-05 PROCEDURE — 94640 AIRWAY INHALATION TREATMENT: CPT

## 2019-10-05 PROCEDURE — 99291 CRITICAL CARE FIRST HOUR: CPT | Mod: ,,, | Performed by: INTERNAL MEDICINE

## 2019-10-05 PROCEDURE — 25000003 PHARM REV CODE 250: Performed by: EMERGENCY MEDICINE

## 2019-10-05 PROCEDURE — 63600175 PHARM REV CODE 636 W HCPCS

## 2019-10-05 PROCEDURE — 80048 BASIC METABOLIC PNL TOTAL CA: CPT

## 2019-10-05 PROCEDURE — 94668 MNPJ CHEST WALL SBSQ: CPT

## 2019-10-05 PROCEDURE — 63600175 PHARM REV CODE 636 W HCPCS: Performed by: EMERGENCY MEDICINE

## 2019-10-05 PROCEDURE — 94003 VENT MGMT INPAT SUBQ DAY: CPT

## 2019-10-05 PROCEDURE — 99900035 HC TECH TIME PER 15 MIN (STAT)

## 2019-10-05 PROCEDURE — 25000003 PHARM REV CODE 250: Performed by: INTERNAL MEDICINE

## 2019-10-05 PROCEDURE — 25000003 PHARM REV CODE 250: Performed by: HOSPITALIST

## 2019-10-05 PROCEDURE — 36415 COLL VENOUS BLD VENIPUNCTURE: CPT

## 2019-10-05 PROCEDURE — 25000242 PHARM REV CODE 250 ALT 637 W/ HCPCS: Performed by: INTERNAL MEDICINE

## 2019-10-05 PROCEDURE — 99291 PR CRITICAL CARE, E/M 30-74 MINUTES: ICD-10-PCS | Mod: ,,, | Performed by: INTERNAL MEDICINE

## 2019-10-05 PROCEDURE — 63600175 PHARM REV CODE 636 W HCPCS: Performed by: INTERNAL MEDICINE

## 2019-10-05 PROCEDURE — 99900017 HC EXTUBATION W/PARAMETERS (STAT)

## 2019-10-05 PROCEDURE — 25000003 PHARM REV CODE 250

## 2019-10-05 PROCEDURE — 94761 N-INVAS EAR/PLS OXIMETRY MLT: CPT

## 2019-10-05 PROCEDURE — 80202 ASSAY OF VANCOMYCIN: CPT

## 2019-10-05 PROCEDURE — 20000000 HC ICU ROOM

## 2019-10-05 PROCEDURE — 99900026 HC AIRWAY MAINTENANCE (STAT)

## 2019-10-05 PROCEDURE — 82803 BLOOD GASES ANY COMBINATION: CPT

## 2019-10-05 RX ORDER — SUCCINYLCHOLINE CHLORIDE 20 MG/ML
50 INJECTION INTRAMUSCULAR; INTRAVENOUS ONCE
Status: COMPLETED | OUTPATIENT
Start: 2019-10-06 | End: 2019-10-05

## 2019-10-05 RX ORDER — POTASSIUM CHLORIDE 20 MEQ/15ML
40 SOLUTION ORAL ONCE
Status: DISCONTINUED | OUTPATIENT
Start: 2019-10-05 | End: 2019-10-05

## 2019-10-05 RX ORDER — ETOMIDATE 2 MG/ML
INJECTION INTRAVENOUS
Status: COMPLETED
Start: 2019-10-05 | End: 2019-10-05

## 2019-10-05 RX ORDER — SUCCINYLCHOLINE CHLORIDE 20 MG/ML
INJECTION INTRAMUSCULAR; INTRAVENOUS
Status: COMPLETED
Start: 2019-10-05 | End: 2019-10-05

## 2019-10-05 RX ORDER — PROPOFOL 10 MG/ML
5 INJECTION, EMULSION INTRAVENOUS CONTINUOUS
Status: DISCONTINUED | OUTPATIENT
Start: 2019-10-06 | End: 2019-10-09

## 2019-10-05 RX ORDER — PROPOFOL 10 MG/ML
INJECTION, EMULSION INTRAVENOUS
Status: COMPLETED
Start: 2019-10-05 | End: 2019-10-05

## 2019-10-05 RX ORDER — ETOMIDATE 2 MG/ML
15 INJECTION INTRAVENOUS ONCE
Status: COMPLETED | OUTPATIENT
Start: 2019-10-06 | End: 2019-10-05

## 2019-10-05 RX ORDER — VANCOMYCIN HCL IN 5 % DEXTROSE 1G/250ML
1000 PLASTIC BAG, INJECTION (ML) INTRAVENOUS
Status: DISCONTINUED | OUTPATIENT
Start: 2019-10-05 | End: 2019-10-07

## 2019-10-05 RX ADMIN — CHLORHEXIDINE GLUCONATE 0.12% ORAL RINSE 15 ML: 1.2 LIQUID ORAL at 08:10

## 2019-10-05 RX ADMIN — FLUOXETINE HYDROCHLORIDE 60 MG: 20 SOLUTION ORAL at 08:10

## 2019-10-05 RX ADMIN — SUCCINYLCHOLINE CHLORIDE 50 MG: 20 INJECTION, SOLUTION INTRAMUSCULAR; INTRAVENOUS at 11:10

## 2019-10-05 RX ADMIN — PROPOFOL 5 MCG/KG/MIN: 10 INJECTION, EMULSION INTRAVENOUS at 11:10

## 2019-10-05 RX ADMIN — IPRATROPIUM BROMIDE AND ALBUTEROL SULFATE 3 ML: .5; 3 SOLUTION RESPIRATORY (INHALATION) at 01:10

## 2019-10-05 RX ADMIN — PIPERACILLIN AND TAZOBACTAM 4.5 G: 4; .5 INJECTION, POWDER, FOR SOLUTION INTRAVENOUS at 06:10

## 2019-10-05 RX ADMIN — IPRATROPIUM BROMIDE AND ALBUTEROL SULFATE 3 ML: .5; 3 SOLUTION RESPIRATORY (INHALATION) at 07:10

## 2019-10-05 RX ADMIN — VANCOMYCIN HYDROCHLORIDE 750 MG: 750 INJECTION, POWDER, LYOPHILIZED, FOR SOLUTION INTRAVENOUS at 11:10

## 2019-10-05 RX ADMIN — SUCCINYLCHOLINE CHLORIDE 50 MG: 20 INJECTION INTRAMUSCULAR; INTRAVENOUS at 11:10

## 2019-10-05 RX ADMIN — DIVALPROEX SODIUM 500 MG: 125 CAPSULE, COATED PELLETS ORAL at 08:10

## 2019-10-05 RX ADMIN — ENOXAPARIN SODIUM 40 MG: 100 INJECTION SUBCUTANEOUS at 04:10

## 2019-10-05 RX ADMIN — ETOMIDATE 15 MG: 2 INJECTION INTRAVENOUS at 11:10

## 2019-10-05 RX ADMIN — TAMSULOSIN HYDROCHLORIDE 0.4 MG: 0.4 CAPSULE ORAL at 08:10

## 2019-10-05 RX ADMIN — PANTOPRAZOLE SODIUM 40 MG: 40 GRANULE, DELAYED RELEASE ORAL at 08:10

## 2019-10-05 RX ADMIN — PIPERACILLIN AND TAZOBACTAM 4.5 G: 4; .5 INJECTION, POWDER, FOR SOLUTION INTRAVENOUS at 02:10

## 2019-10-05 RX ADMIN — PROPOFOL 20 MCG/KG/MIN: 10 INJECTION, EMULSION INTRAVENOUS at 06:10

## 2019-10-05 RX ADMIN — CLONAZEPAM 1 MG: 0.5 TABLET ORAL at 08:10

## 2019-10-05 NOTE — HOSPITAL COURSE
Mr. Galeano presented with fever, cough and lethargy.  Respiratory status decompensated and he required intubation in the ER.  Workup with imaging consistent with possible pneumonia.  Empiric Zosyn and vancomycin initiated.  Pulmonary consulted for vent management.  Extubated on 10/5 in the morning.  Initially did well, but had worsening respiratory failure during the day.  Started on Bipap with no improvement and reintubated on 10/5 in the afternoon.  Blood cultures no growth to date.  Respiratory viral panel came back positive for rhino virus.  Antibiotics stopped.  Extubated on 10/9 to BiPAP.  Respiratory status stable.  Increased cough with feeding patient and he remained in the ICU.  Speech therapy recommended pureed diet.  He was stepped down to the floor on 10/11.  Patient with poor intake along with increased risk for aspiration. Per aunt, patient was fed full meals when pureed diet was recommended by Speech. A MBBS was done. Showed evidence of aspiration therefore NPO status and PEG tube placement recommended. Aunt (next of kin) agreed with PEG placement once aspiration confirmed. Patient had unfortunately aspirated during PO intake and clinically worsened. Progressively requiring more O2 via high flow NC. Developed leukocytosis, became less interactive and spiked fever. NGT placed to avoid all oral intake and initiated on IV abx. Tube feeds started via NGT. Pulmonology re-consulted for co-management. Recommended BiPAP (or CPAP) QHS for positive pressure (as he can't participate with IS), nebulized treatments every 4 hours, nebulized hypertonic saline every 12 hours, chest physiotherapy, bowel regimen for fecal impaction/constipation and keep HOB elevated. Patient has scopolamine patch for oral secretions and IV famotidine for stress ulcer prophylaxis. Repeat CXR on 10/17 showed complete collapse of left lung likely due to atelectasis vs mucous plug. Patient remained stable on high flow but given high risk  for further decompensation +/- bronchoscopy with intubation, he was upgraded to ICU for closer monitoring. Discussed with aunt at bedside. Patient had aggressive pulmonary toilet with improvement in aeration seen on chest x-ray and respiratory status improved.  Trickle feeds resumed and GI planned for PEG on 10/21- not successful and surgery consulted for J tube placement.  The patient was transferred out of the ICU on 10/22.  Patient's aunt concerned about patient pulling at his ear.  Some swelling to ear with possible OM.  Started on Augmentin.  J tube placed on 10/25.  Patient was started on trickle feedings.  He was not having bowel movements and abdomen looked slightly distended.  Xray with possible SBO.  Tube feeds held.  Surgery continued to monitor patient.  Patient then had a bowel movement and tube feeds restarted.  They were slowly increased to goal.  Patient has remained afebrile and hemodynamically stable.  Currently tolerating tube feeds to goal.  Patient is now ready for discharge.  He is going to a different group home.  Everything has been arranged and he will be discharged.

## 2019-10-05 NOTE — PROGRESS NOTES
[facility] Hospital Medicine  Progress Note    Patient Name: Karri Galeano  MRN: [id number]  Patient Class: IP- Inpatient   Admission Date: 10/3/2019  Length of Stay: 1 days  Attending Physician: Bryan Hill MD  Primary Care Provider: Echo Reeves MD        Subjective:     Principal Problem:Pneumonia of left lung due to infectious organism        HPI:  Mr. Karri Galeano is a 24 y.o. male with cerebral palsy and epilepsy who presents to [facility] ED from an urgent care facility with complaints of fevers today.  He lives in a group home and started to have a nonproductive cough today.  The caregiver did not observe any vomiting.  He was in his usual state of health yesterday but today has become more lethargic and somnolent.  He was taken to an urgent care facility where he was then transferred to this facility for further care.  He was noted there to have a fever of 102.2° F as well as an oxygen saturation of 95% on nasal cannula at 3 liters/minute.  Further history is otherwise limited at this time.    Overview/Hospital Course:  Mr. Karri Galeano is a 24 y.o. male with cerebral palsy and epilepsy who presented to [facility] ED from an urgent care facility with complaints of fevers.  He lives in a group home and started to have a nonproductive cough today.   Patient became more lethargic and somnolent.  He was taken to an urgent care facility where he was then transferred to this facility for further care.  He was noted there to have a fever of 102.2° .  Patient became more lethargic and tachypneic and intubated.  Diagnosed with pneumonia and started on Vanc and Zosyn.  Pulmonary consulted for vent management.    Interval History: Remains on vent.    Review of Systems   Unable to perform ROS: Intubated     Objective:     Vital Signs (Most Recent):  Temp: 98.2 °F (36.8 °C) (10/05/19 1130)  Pulse: 94 (10/05/19 1130)  Resp: (!) 25 (10/05/19 1130)  BP: (!) 97/55 (10/05/19 1130)  SpO2: 97 %  (10/05/19 1130) Vital Signs (24h Range):  Temp:  [97.6 °F (36.4 °C)-98.2 °F (36.8 °C)] 98.2 °F (36.8 °C)  Pulse:  [] 94  Resp:  [22-34] 25  SpO2:  [90 %-100 %] 97 %  BP: ()/(44-88) 97/55     Weight: 47.8 kg (105 lb 6.1 oz)  Body mass index is 19.91 kg/m².    Intake/Output Summary (Last 24 hours) at 10/5/2019 1152  Last data filed at 10/5/2019 1137  Gross per 24 hour   Intake 2963.21 ml   Output 980 ml   Net 1983.21 ml      Physical Exam   Constitutional: He appears well-developed.   HENT:   Head: Normocephalic and atraumatic.   Mouth/Throat: Oropharynx is clear and moist.   ETT in place   Eyes: Pupils are equal, round, and reactive to light. Conjunctivae and EOM are normal.   Neck: Normal range of motion. Neck supple.   Cardiovascular: Normal rate, regular rhythm and normal heart sounds.   Pulmonary/Chest: Effort normal. He has rales.   Coarse breath sound left more than right.     Abdominal: Soft. Bowel sounds are normal.   Musculoskeletal: Normal range of motion.   Neurological: No cranial nerve deficit.   Sedated on vent.   Skin: Skin is warm.       Significant Labs:   BMP:   Recent Labs   Lab 10/04/19  0318 10/05/19  0253   GLU 94 94    140   K 3.7 3.2*    107   CO2 27 25   BUN 12 11   CREATININE 0.7 0.6   CALCIUM 7.9* 8.2*   MG 1.7  --      CBC:   Recent Labs   Lab 10/03/19  2020 10/04/19  0319   WBC 10.32 10.72   HGB 10.5* 11.2*   HCT 34.5* 36.7*   * 129*       Significant Imaging: I have reviewed all pertinent imaging results/findings within the past 24 hours.      Assessment/Plan:      * Pneumonia of left lung due to infectious organism  Patient was becoming more tachypneic and obtunded which he was intubated.    He may have had an aspiration event with subsequent pneumonia.    He did meet criteria for severe sepsis and has been started on empiric antibiotic therapy.   Will continue empiric antibiotic therapy with Vanc and Zosyn.    Hypokalemia  Replace as  needed.      Epilepsy  Stable; continue his home regimen of divalproex.    On mechanically assisted ventilation  As addressed above.    Severe sepsis  This patient meets criteria for severe sepsis given fevers, tachycardia, tachypnea, pneumonia, acute respiratory failure.  Blood cultures are pending; will start IV fluid hydration and broad spectrum antibiotics.    Acute respiratory failure with hypoxia  Appreciate Pulmonary input.  Continue trying to wean down oxygen.    Spastic quadriplegic cerebral palsy  Stable; there are no acute issues.      VTE Risk Mitigation (From admission, onward)         Ordered     enoxaparin injection 40 mg  Daily      10/04/19 0152     IP VTE HIGH RISK PATIENT  Once      10/04/19 0152                Critical care time spent on the evaluation and treatment of severe organ dysfunction, review of pertinent labs and imaging studies, discussions with consulting providers and discussions with patient/family: 45 minutes.      Bryan Hill MD  Department of Hospital Medicine   [facility]

## 2019-10-05 NOTE — SUBJECTIVE & OBJECTIVE
Interval History: Remains on vent.    Review of Systems   Unable to perform ROS: Intubated     Objective:     Vital Signs (Most Recent):  Temp: 98.2 °F (36.8 °C) (10/05/19 1130)  Pulse: 94 (10/05/19 1130)  Resp: (!) 25 (10/05/19 1130)  BP: (!) 97/55 (10/05/19 1130)  SpO2: 97 % (10/05/19 1130) Vital Signs (24h Range):  Temp:  [97.6 °F (36.4 °C)-98.2 °F (36.8 °C)] 98.2 °F (36.8 °C)  Pulse:  [] 94  Resp:  [22-34] 25  SpO2:  [90 %-100 %] 97 %  BP: ()/(44-88) 97/55     Weight: 47.8 kg (105 lb 6.1 oz)  Body mass index is 19.91 kg/m².    Intake/Output Summary (Last 24 hours) at 10/5/2019 1152  Last data filed at 10/5/2019 1137  Gross per 24 hour   Intake 2963.21 ml   Output 980 ml   Net 1983.21 ml      Physical Exam   Constitutional: He appears well-developed.   HENT:   Head: Normocephalic and atraumatic.   Mouth/Throat: Oropharynx is clear and moist.   ETT in place   Eyes: Pupils are equal, round, and reactive to light. Conjunctivae and EOM are normal.   Neck: Normal range of motion. Neck supple.   Cardiovascular: Normal rate, regular rhythm and normal heart sounds.   Pulmonary/Chest: Effort normal. He has rales.   Coarse breath sound left more than right.     Abdominal: Soft. Bowel sounds are normal.   Musculoskeletal: Normal range of motion.   Neurological: No cranial nerve deficit.   Sedated on vent.   Skin: Skin is warm.       Significant Labs:   BMP:   Recent Labs   Lab 10/04/19  0318 10/05/19  0253   GLU 94 94    140   K 3.7 3.2*    107   CO2 27 25   BUN 12 11   CREATININE 0.7 0.6   CALCIUM 7.9* 8.2*   MG 1.7  --      CBC:   Recent Labs   Lab 10/03/19  2020 10/04/19  0319   WBC 10.32 10.72   HGB 10.5* 11.2*   HCT 34.5* 36.7*   * 129*       Significant Imaging: I have reviewed all pertinent imaging results/findings within the past 24 hours.

## 2019-10-05 NOTE — SUBJECTIVE & OBJECTIVE
Interval History: no acute issue.  40% fio2.  Sedated overnight with propofol and fentanyl.  Copious nasal discharge but ett suction with minimal secretion.      Objective:     Vital Signs (Most Recent):  Temp: 97.7 °F (36.5 °C) (10/05/19 0730)  Pulse: 68 (10/05/19 0930)  Resp: (!) 29 (10/05/19 0930)  BP: (!) 161/88 (10/05/19 0930)  SpO2: 100 % (10/05/19 0930) Vital Signs (24h Range):  Temp:  [97.6 °F (36.4 °C)-100.1 °F (37.8 °C)] 97.7 °F (36.5 °C)  Pulse:  [] 68  Resp:  [22-44] 29  SpO2:  [90 %-100 %] 100 %  BP: ()/(44-88) 161/88     Weight: 47.8 kg (105 lb 6.1 oz)  Body mass index is 19.91 kg/m².      Intake/Output Summary (Last 24 hours) at 10/5/2019 1002  Last data filed at 10/5/2019 0730  Gross per 24 hour   Intake 2929.21 ml   Output 945 ml   Net 1984.21 ml       Physical Exam   Constitutional: He appears well-developed.   HENT:   Head: Normocephalic and atraumatic.   Mouth/Throat: Oropharynx is clear and moist.   ETT in place   Eyes: Pupils are equal, round, and reactive to light. Conjunctivae and EOM are normal.   Neck: Normal range of motion. Neck supple.   Cardiovascular: Normal rate, regular rhythm and normal heart sounds.   Pulmonary/Chest: Effort normal. He has rales.   Coarse breath sound left more than right.     Abdominal: Soft. Bowel sounds are normal.   Musculoskeletal: Normal range of motion.   Neurological: No cranial nerve deficit.   Sedated on vent.   Skin: Skin is warm.       Vents:  Vent Mode: PRVC (10/05/19 0916)  Ventilator Initiated: Yes (10/03/19 2347)  Set Rate: 22 bmp (10/05/19 0916)  Vt Set: 350 mL (10/05/19 0916)  PEEP/CPAP: 8 cmH20 (10/05/19 0916)  Oxygen Concentration (%): 40 (10/05/19 0930)  Peak Airway Pressure: 25.8 cmH2O (10/05/19 0916)  Total Ve: 8.5 mL (10/05/19 0916)  F/VT Ratio<105 (RSBI): (!) 84.3 (10/05/19 0916)    Lines/Drains/Airways     Drain                 Urethral Catheter 10/04/19 0104 Straight-tip 16 Fr. 1 day         NG/OG Tube 10/04/19 1145 16 Fr.  Right mouth less than 1 day          Airway                 Airway - Non-Surgical 10/03/19 2335 Endotracheal Tube 1 day          Peripheral Intravenous Line                 Peripheral IV - Single Lumen 10/03/19 2018 20 G Left Forearm 1 day         Peripheral IV - Single Lumen 10/03/19 2330 18 G Right Forearm 1 day                Significant Labs:    CBC/Anemia Profile:  Recent Labs   Lab 10/03/19  2020 10/04/19  0319   WBC 10.32 10.72   HGB 10.5* 11.2*   HCT 34.5* 36.7*   * 129*   MCV 92 93   RDW 14.9* 15.0*        Chemistries:  Recent Labs   Lab 10/03/19  2020 10/04/19  0318 10/05/19  0253   * 145 140   K 3.7 3.7 3.2*    110 107   CO2 28 27 25   BUN 15 12 11   CREATININE 0.7 0.7 0.6   CALCIUM 8.8 7.9* 8.2*   ALBUMIN 3.4* 3.1*  --    PROT 6.9 6.4  --    BILITOT 0.2 0.4  --    ALKPHOS 73 71  --    ALT 14 15  --    AST 19 21  --    MG  --  1.7  --    PHOS  --  3.9  --      Recent Labs   Lab 10/03/19  2020 10/03/19  2345   LACTATE 1.2 1.1      Respiratory Culture: No results for input(s): GSRESP, RESPIRATORYC in the last 48 hours.     Significant Imaging:   CT: I have reviewed all pertinent results/findings within the past 24 hours and my personal findings are:  atelectasis of left lung with abrupt cut off of left mainstem.    CXR: I have reviewed all pertinent results/findings within the past 24 hours and my personal findings are:  10/5/19 improve aeration of left hemithorax.    ABGs:   Recent Labs   Lab 10/05/19  0540   PH 7.448   PCO2 35.7   HCO3 24.7   POCSATURATED 100   BE 1     Lactic Acid:   Recent Labs   Lab 10/03/19  2020 10/03/19  2345   LACTATE 1.2 1.1     Respiratory Culture:   Recent Labs   Lab 10/04/19  1355   GSRESP <10 epithelial cells per low power field.  No WBC's or organisms seen

## 2019-10-05 NOTE — PLAN OF CARE
Resting quietly on rate of 22 HOB up 45-60 degrees immediately raises hands to ETT still requires restraints to protect airway. Skin in good condition with Q2hr turns UOP adequate fentanyl initated because higher doses of propofol drops hrt rate ,no temp see flow sheet for v/s.

## 2019-10-05 NOTE — NURSING
[facility]  ICU Multidisciplinary Bedside Rounds   SUMMARY     Date: 10/5/2019    Prehospitalization: group home  Admit Date / LOS : 10/3/2019/ 1 days    Diagnosis: Pneumonia of left lung due to infectious organism    Consults:        Active: Pulm CC       Needed: N/A     Code Status: Full Code   Advanced Directive: <no information>    LDA: Saldivar, OG, PIV and Vent       Central Lines/Site/Justification:Patient Does Not Have Central Line       Urinary Cath/Order/Justification:Critically Ill in ICU    Vasopressors/Infusions:    fentanyl 13 mcg/hr (10/05/19 0600)    propofol 20 mcg/kg/min (10/05/19 0627)          GOALS: Volume/ Hemodynamic: N/A                     RASS: -2  light sedation, briefly awakes to voice (eye opening)    CAM ICU: N/A  Pain Management: none       Pain Controlled: yes     Rhythm: NSR    Respiratory Device: Vent    Vent Mode: PRVC  Oxygen Concentration (%):  [] 40  Resp Rate Total:  [22 br/min-53 br/min] 23 br/min  Vt Set:  [320 mL-350 mL] 350 mL  PEEP/CPAP:  [5 cmH20-15 cmH20] 8 cmH20  Mean Airway Pressure:  [11.5 opQ48-94.4 cmH20] 11.5 cmH20             Most Recent SBT/ SAT: N/A      VTE Prophylaxis: Mechanical  Mobility: Bedrest  Stress Ulcer Prophylaxis: No    Dietary: NPO  Tolerance: not applicable  /  Advancement: no    Isolation: No active isolations    Restraints: Yes    Significant Dates:  Post Op Date: N/A  Rescue Date: N/A  Imaging/ Diagnostics: N/A    Noteworthy Labs:  K+ level 3.2    CBC/Anemia Labs: Coags:    Recent Labs   Lab 10/03/19  2020 10/04/19  0319   WBC 10.32 10.72   HGB 10.5* 11.2*   HCT 34.5* 36.7*   * 129*   MCV 92 93   RDW 14.9* 15.0*    No results for input(s): PT, INR, APTT in the last 168 hours.     Chemistries:   Recent Labs   Lab 10/03/19  2020 10/04/19  0318 10/05/19  0253   * 145 140   K 3.7 3.7 3.2*    110 107   CO2 28 27 25   BUN 15 12 11   CREATININE 0.7 0.7 0.6   CALCIUM 8.8 7.9* 8.2*   PROT 6.9 6.4  --    BILITOT  0.2 0.4  --    ALKPHOS 73 71  --    ALT 14 15  --    AST 19 21  --    MG  --  1.7  --    PHOS  --  3.9  --         Cardiac Enzymes: Ejection Fractions:    No results for input(s): CPK, CPKMB, MB, TROPONINI in the last 72 hours. No results found for: EF     POCT Glucose: HbA1c:    No results for input(s): POCTGLUCOSE in the last 168 hours. No results found for: HGBA1C     Needs from Care Team: none     ICU LOS 1d 4h  Level of Care: Critical Care

## 2019-10-05 NOTE — ASSESSMENT & PLAN NOTE
Agree with zosyn and vanc for aspiration coverage.  Repeat cxr after ett adjustment with significant improvement in aeration.  Still with left perihilar infiltrate.  May consider de-escalation pending clinical course.

## 2019-10-05 NOTE — PROGRESS NOTES
Pharmacokinetic Assessment Follow Up: IV Vancomycin    Vancomycin serum concentration assessment(s):5.2    The trough level was drawn correctly and can be used to guide therapy at this time. The measurement is below the desired definitive target range of 10 to 20 mcg/mL.    Vancomycin Regimen Plan:    Change regimen to Vancomycin 1000 mg IV every 12 hours with next serum trough concentration measured at 1100 prior to 4th dose on 10-    Drug levels (last 3 results):  Recent Labs   Lab Result Units 10/05/19  1104   Vancomycin-Trough ug/mL 5.2*       Pharmacy will continue to follow and monitor vancomycin.    Please contact pharmacy at extension [phone] for questions regarding this assessment.    Thank you for the consult,   Aminta Harris       Patient brief summary:  Karri Galeano is a 24 y.o. male initiated on antimicrobial therapy with IV Vancomycin for treatment of lower respiratory infection    The patient's current regimen is 750mg q12h    Drug Allergies:   Review of patient's allergies indicates:   Allergen Reactions    Clindamycin Hives    Erythromycin Rash       Actual Body Weight:   47.8    Renal Function:   Estimated Creatinine Clearance: 127.2 mL/min (based on SCr of 0.6 mg/dL).,     Dialysis Method (if applicable):  N/A    CBC (last 72 hours):  Recent Labs   Lab Result Units 10/03/19  2020 10/04/19  0319   WBC K/uL 10.32 10.72   Hemoglobin g/dL 10.5* 11.2*   Hematocrit % 34.5* 36.7*   Platelets K/uL 137* 129*   Gran% % 73.2* 71.0   Lymph% % 11.0* 18.0   Mono% % 14.3 9.9   Eosinophil% % 1.4 1.1   Basophil% % 0.3 0.2   Differential Method  Automated Automated       Metabolic Panel (last 72 hours):  Recent Labs   Lab Result Units 10/03/19  2020 10/03/19  2039 10/04/19  0318 10/05/19  0253   Sodium mmol/L 146*  --  145 140   Potassium mmol/L 3.7  --  3.7 3.2*   Chloride mmol/L 109  --  110 107   CO2 mmol/L 28  --  27 25   Glucose mg/dL 116*  --  94 94   Glucose, UA   --  Negative  --   --     BUN, Bld mg/dL 15  --  12 11   Creatinine mg/dL 0.7  --  0.7 0.6   Albumin g/dL 3.4*  --  3.1*  --    Total Bilirubin mg/dL 0.2  --  0.4  --    Alkaline Phosphatase U/L 73  --  71  --    AST U/L 19  --  21  --    ALT U/L 14  --  15  --    Magnesium mg/dL  --   --  1.7  --    Phosphorus mg/dL  --   --  3.9  --        Vancomycin Administrations:  vancomycin given in the last 96 hours                   vancomycin 750 mg in dextrose 5 % 250 mL IVPB (ready to mix system) (mg) 750 mg New Bag 10/05/19 1137     750 mg New Bag 10/04/19 2252     750 mg New Bag  1151    vancomycin 750 mg in dextrose 5 % 250 mL IVPB (ready to mix system) (mg) 750 mg New Bag 10/03/19 2249                Microbiologic Results:  Microbiology Results (last 7 days)     Procedure Component Value Units Date/Time    Culture, Respiratory with Gram Stain [[id number]] Collected:  10/04/19 1355    Order Status:  Completed Specimen:  Respiratory from Endotracheal Aspirate Updated:  10/05/19 1117     Respiratory Culture No Growth     Gram Stain (Respiratory) <10 epithelial cells per low power field.     Gram Stain (Respiratory) No WBC's or organisms seen    Blood culture x two cultures. Draw prior to antibiotics. [[id number]] Collected:  10/03/19 2020    Order Status:  Completed Specimen:  Blood from Peripheral, Forearm, Left Updated:  10/04/19 2303     Blood Culture, Routine No Growth to date      No Growth to date    Narrative:       Aerobic and anaerobic    Blood culture x two cultures. Draw prior to antibiotics. [[id number]] Collected:  10/03/19 2054    Order Status:  Completed Specimen:  Blood from Peripheral, Wrist, Left Updated:  10/04/19 2303     Blood Culture, Routine No Growth to date      No Growth to date    Narrative:       Aerobic and anaerobic

## 2019-10-05 NOTE — PLAN OF CARE
Pt remains on 3 L/M NC. Continue duoneb svn tx and CPT as ordered. Will continue to monitor. DERIC Julien, RRT

## 2019-10-05 NOTE — CARE UPDATE
[facility]  ICU Multidisciplinary Bedside Rounds     UPDATE     Date: 10/5/2019      Plan of care reviewed with the following, Nurse, Charge Nurse, Physician and Resp. Therapist.       Needs/ Goals for the day:  Sedation vacation in progress/possible extubation/doctor to call aunt with update on condition      Level of Care: Critical Care

## 2019-10-05 NOTE — PROGRESS NOTES
[facility]  Pulmonology  Progress Note    Patient Name: Karri Galeano  MRN: [id number]  Admission Date: 10/3/2019  Hospital Length of Stay: 1 days  Code Status: Full Code  Attending Provider: Bryan Hill MD  Primary Care Provider: Echo Reeves MD   Principal Problem: Pneumonia of left lung due to infectious organism    Subjective:     Interval History: no acute issue.  40% fio2.  Sedated overnight with propofol and fentanyl.  Copious nasal discharge but ett suction with minimal secretion.      Objective:     Vital Signs (Most Recent):  Temp: 97.7 °F (36.5 °C) (10/05/19 0730)  Pulse: 68 (10/05/19 0930)  Resp: (!) 29 (10/05/19 0930)  BP: (!) 161/88 (10/05/19 0930)  SpO2: 100 % (10/05/19 0930) Vital Signs (24h Range):  Temp:  [97.6 °F (36.4 °C)-100.1 °F (37.8 °C)] 97.7 °F (36.5 °C)  Pulse:  [] 68  Resp:  [22-44] 29  SpO2:  [90 %-100 %] 100 %  BP: ()/(44-88) 161/88     Weight: 47.8 kg (105 lb 6.1 oz)  Body mass index is 19.91 kg/m².      Intake/Output Summary (Last 24 hours) at 10/5/2019 1002  Last data filed at 10/5/2019 0730  Gross per 24 hour   Intake 2929.21 ml   Output 945 ml   Net 1984.21 ml       Physical Exam   Constitutional: He appears well-developed.   HENT:   Head: Normocephalic and atraumatic.   Mouth/Throat: Oropharynx is clear and moist.   ETT in place   Eyes: Pupils are equal, round, and reactive to light. Conjunctivae and EOM are normal.   Neck: Normal range of motion. Neck supple.   Cardiovascular: Normal rate, regular rhythm and normal heart sounds.   Pulmonary/Chest: Effort normal. He has rales.   Coarse breath sound left more than right.     Abdominal: Soft. Bowel sounds are normal.   Musculoskeletal: Normal range of motion.   Neurological: No cranial nerve deficit.   Sedated on vent.   Skin: Skin is warm.       Vents:  Vent Mode: PRVC (10/05/19 0916)  Ventilator Initiated: Yes (10/03/19 4827)  Set Rate: 22 bmp (10/05/19 0916)  Vt Set: 350 mL (10/05/19  0916)  PEEP/CPAP: 8 cmH20 (10/05/19 0916)  Oxygen Concentration (%): 40 (10/05/19 0930)  Peak Airway Pressure: 25.8 cmH2O (10/05/19 0916)  Total Ve: 8.5 mL (10/05/19 0916)  F/VT Ratio<105 (RSBI): (!) 84.3 (10/05/19 0916)    Lines/Drains/Airways     Drain                 Urethral Catheter 10/04/19 0104 Straight-tip 16 Fr. 1 day         NG/OG Tube 10/04/19 1145 16 Fr. Right mouth less than 1 day          Airway                 Airway - Non-Surgical 10/03/19 2335 Endotracheal Tube 1 day          Peripheral Intravenous Line                 Peripheral IV - Single Lumen 10/03/19 2018 20 G Left Forearm 1 day         Peripheral IV - Single Lumen 10/03/19 2330 18 G Right Forearm 1 day                Significant Labs:    CBC/Anemia Profile:  Recent Labs   Lab 10/03/19  2020 10/04/19  0319   WBC 10.32 10.72   HGB 10.5* 11.2*   HCT 34.5* 36.7*   * 129*   MCV 92 93   RDW 14.9* 15.0*        Chemistries:  Recent Labs   Lab 10/03/19  2020 10/04/19  0318 10/05/19  0253   * 145 140   K 3.7 3.7 3.2*    110 107   CO2 28 27 25   BUN 15 12 11   CREATININE 0.7 0.7 0.6   CALCIUM 8.8 7.9* 8.2*   ALBUMIN 3.4* 3.1*  --    PROT 6.9 6.4  --    BILITOT 0.2 0.4  --    ALKPHOS 73 71  --    ALT 14 15  --    AST 19 21  --    MG  --  1.7  --    PHOS  --  3.9  --      Recent Labs   Lab 10/03/19  2020 10/03/19  2345   LACTATE 1.2 1.1      Respiratory Culture: No results for input(s): GSRESP, RESPIRATORYC in the last 48 hours.     Significant Imaging:   CT: I have reviewed all pertinent results/findings within the past 24 hours and my personal findings are:  atelectasis of left lung with abrupt cut off of left mainstem.    CXR: I have reviewed all pertinent results/findings within the past 24 hours and my personal findings are:  10/5/19 improve aeration of left hemithorax.    ABGs:   Recent Labs   Lab 10/05/19  0540   PH 7.448   PCO2 35.7   HCO3 24.7   POCSATURATED 100   BE 1     Lactic Acid:   Recent Labs   Lab 10/03/19  2020  10/03/19  2345   LACTATE 1.2 1.1     Respiratory Culture:   Recent Labs   Lab 10/04/19  1355   GSRESP <10 epithelial cells per low power field.  No WBC's or organisms seen         Assessment/Plan:     * Pneumonia of left lung due to infectious organism  Agree with zosyn and vanc for aspiration coverage.  Repeat cxr after ett adjustment with significant improvement in aeration.  Still with left perihilar infiltrate.  May consider de-escalation pending clinical course.      Epilepsy  Divalproex at home.  Will d/c fentanyl.      Acute respiratory failure with hypoxia  Left lung atelectasis.  Improve with ett readjustment.  No indication for fob.  Oxygenation much improve.nebs and suction.   sbt today but still with residual sedation.               Rufino Edward MD  Pulmonology  [facility]  Critical Care Time: 35  minutes  Critical secondary to respiratory failure     Critical care was time spent personally by me on the following activities: development of treatment plan with patient or surrogate and bedside caregivers, discussions with consultants, evaluation of patient's response to treatment, examination of patient, ordering and performing treatments and interventions, ordering and review of laboratory studies, ordering and review of radiographic studies, pulse oximetry, re-evaluation of patient's condition.  This critical care time did not overlap with that of any other provider or involve time for any procedures.

## 2019-10-05 NOTE — CARE UPDATE
Pt remains in icu on vent settings prvc rr 22 vt 350 peep 8 fio2 50%.  All alarms are on and working.  Pt is without distress at this time.  Family is at bedside.  Ambu bag and mask at [facility].  Will continue to monitor.

## 2019-10-05 NOTE — ASSESSMENT & PLAN NOTE
Left lung atelectasis.  Improve with ett readjustment.  No indication for fob.  Oxygenation much improve.nebs and suction.   sbt today but still with residual sedation.

## 2019-10-05 NOTE — CARE UPDATE
Pt extubated and placed on 4 L/M NC per Dr. Edward's orders. Pt tolerated extubation well without incidence. AMBU bag and mask remain at the HOB. Will continue to monitor. DERIC Julien, RRT

## 2019-10-05 NOTE — ASSESSMENT & PLAN NOTE
Patient was becoming more tachypneic and obtunded which he was intubated.    He may have had an aspiration event with subsequent pneumonia.    He did meet criteria for severe sepsis and has been started on empiric antibiotic therapy.   Will continue empiric antibiotic therapy with Vanc and Zosyn.

## 2019-10-06 LAB
ALLENS TEST: ABNORMAL
ALLENS TEST: ABNORMAL
ANION GAP SERPL CALC-SCNC: 8 MMOL/L (ref 8–16)
BACTERIA SPEC AEROBE CULT: NO GROWTH
BUN SERPL-MCNC: 10 MG/DL (ref 6–20)
CALCIUM SERPL-MCNC: 8.4 MG/DL (ref 8.7–10.5)
CHLORIDE SERPL-SCNC: 103 MMOL/L (ref 95–110)
CO2 SERPL-SCNC: 27 MMOL/L (ref 23–29)
CREAT SERPL-MCNC: 0.6 MG/DL (ref 0.5–1.4)
DELSYS: ABNORMAL
DELSYS: ABNORMAL
ERYTHROCYTE [SEDIMENTATION RATE] IN BLOOD BY WESTERGREN METHOD: 22 MM/H
ERYTHROCYTE [SEDIMENTATION RATE] IN BLOOD BY WESTERGREN METHOD: 22 MM/H
EST. GFR  (AFRICAN AMERICAN): >60 ML/MIN/1.73 M^2
EST. GFR  (NON AFRICAN AMERICAN): >60 ML/MIN/1.73 M^2
FIO2: 50
FIO2: 60
GLUCOSE SERPL-MCNC: 121 MG/DL (ref 70–110)
GRAM STN SPEC: NORMAL
GRAM STN SPEC: NORMAL
HCO3 UR-SCNC: 27.8 MMOL/L (ref 24–28)
HCO3 UR-SCNC: 28.6 MMOL/L (ref 24–28)
MIN VOL: 8
MIN VOL: 8.2
MODE: ABNORMAL
MODE: ABNORMAL
PCO2 BLDA: 34.7 MMHG (ref 35–45)
PCO2 BLDA: 41.8 MMHG (ref 35–45)
PEEP: 8
PEEP: 8
PH SMN: 7.43 [PH] (ref 7.35–7.45)
PH SMN: 7.52 [PH] (ref 7.35–7.45)
PIP: 28
PIP: 29
PO2 BLDA: 134 MMHG (ref 80–100)
PO2 BLDA: 67 MMHG (ref 80–100)
POC BE: 3 MMOL/L
POC BE: 6 MMOL/L
POC SATURATED O2: 95 % (ref 95–100)
POC SATURATED O2: 99 % (ref 95–100)
POC TCO2: 29 MMOL/L (ref 23–27)
POC TCO2: 30 MMOL/L (ref 23–27)
POTASSIUM SERPL-SCNC: 3.3 MMOL/L (ref 3.5–5.1)
SAMPLE: ABNORMAL
SAMPLE: ABNORMAL
SITE: ABNORMAL
SITE: ABNORMAL
SODIUM SERPL-SCNC: 138 MMOL/L (ref 136–145)
SP02: 100
SP02: 95
VT: 350
VT: 350

## 2019-10-06 PROCEDURE — 63600175 PHARM REV CODE 636 W HCPCS: Performed by: EMERGENCY MEDICINE

## 2019-10-06 PROCEDURE — 99900035 HC TECH TIME PER 15 MIN (STAT)

## 2019-10-06 PROCEDURE — 94003 VENT MGMT INPAT SUBQ DAY: CPT

## 2019-10-06 PROCEDURE — 99291 PR CRITICAL CARE, E/M 30-74 MINUTES: ICD-10-PCS | Mod: GC,,, | Performed by: INTERNAL MEDICINE

## 2019-10-06 PROCEDURE — 94002 VENT MGMT INPAT INIT DAY: CPT

## 2019-10-06 PROCEDURE — 94640 AIRWAY INHALATION TREATMENT: CPT

## 2019-10-06 PROCEDURE — 25000003 PHARM REV CODE 250: Performed by: HOSPITALIST

## 2019-10-06 PROCEDURE — 99900026 HC AIRWAY MAINTENANCE (STAT)

## 2019-10-06 PROCEDURE — 20000000 HC ICU ROOM

## 2019-10-06 PROCEDURE — 82803 BLOOD GASES ANY COMBINATION: CPT

## 2019-10-06 PROCEDURE — 94668 MNPJ CHEST WALL SBSQ: CPT

## 2019-10-06 PROCEDURE — 94761 N-INVAS EAR/PLS OXIMETRY MLT: CPT

## 2019-10-06 PROCEDURE — 99291 CRITICAL CARE FIRST HOUR: CPT | Mod: GC,,, | Performed by: INTERNAL MEDICINE

## 2019-10-06 PROCEDURE — 36415 COLL VENOUS BLD VENIPUNCTURE: CPT

## 2019-10-06 PROCEDURE — 63600175 PHARM REV CODE 636 W HCPCS: Performed by: HOSPITALIST

## 2019-10-06 PROCEDURE — 25000242 PHARM REV CODE 250 ALT 637 W/ HCPCS: Performed by: INTERNAL MEDICINE

## 2019-10-06 PROCEDURE — 36600 WITHDRAWAL OF ARTERIAL BLOOD: CPT

## 2019-10-06 PROCEDURE — 80048 BASIC METABOLIC PNL TOTAL CA: CPT

## 2019-10-06 PROCEDURE — 63600175 PHARM REV CODE 636 W HCPCS: Performed by: INTERNAL MEDICINE

## 2019-10-06 PROCEDURE — 25000003 PHARM REV CODE 250: Performed by: INTERNAL MEDICINE

## 2019-10-06 PROCEDURE — 25000003 PHARM REV CODE 250: Performed by: EMERGENCY MEDICINE

## 2019-10-06 RX ORDER — POTASSIUM CHLORIDE 20 MEQ/15ML
40 SOLUTION ORAL ONCE
Status: COMPLETED | OUTPATIENT
Start: 2019-10-06 | End: 2019-10-06

## 2019-10-06 RX ADMIN — ACETAMINOPHEN 500 MG: 500 TABLET ORAL at 10:10

## 2019-10-06 RX ADMIN — PROPOFOL 40 MCG/KG/MIN: 10 INJECTION, EMULSION INTRAVENOUS at 09:10

## 2019-10-06 RX ADMIN — DIVALPROEX SODIUM 500 MG: 125 CAPSULE, COATED PELLETS ORAL at 12:10

## 2019-10-06 RX ADMIN — PIPERACILLIN AND TAZOBACTAM 4.5 G: 4; .5 INJECTION, POWDER, FOR SOLUTION INTRAVENOUS at 07:10

## 2019-10-06 RX ADMIN — DIVALPROEX SODIUM 500 MG: 125 CAPSULE, COATED PELLETS ORAL at 08:10

## 2019-10-06 RX ADMIN — CHLORHEXIDINE GLUCONATE 0.12% ORAL RINSE 15 ML: 1.2 LIQUID ORAL at 08:10

## 2019-10-06 RX ADMIN — CHLORHEXIDINE GLUCONATE 0.12% ORAL RINSE 15 ML: 1.2 LIQUID ORAL at 09:10

## 2019-10-06 RX ADMIN — PIPERACILLIN AND TAZOBACTAM 4.5 G: 4; .5 INJECTION, POWDER, FOR SOLUTION INTRAVENOUS at 11:10

## 2019-10-06 RX ADMIN — PANTOPRAZOLE SODIUM 40 MG: 40 GRANULE, DELAYED RELEASE ORAL at 08:10

## 2019-10-06 RX ADMIN — TAMSULOSIN HYDROCHLORIDE 0.4 MG: 0.4 CAPSULE ORAL at 08:10

## 2019-10-06 RX ADMIN — IPRATROPIUM BROMIDE AND ALBUTEROL SULFATE 3 ML: .5; 3 SOLUTION RESPIRATORY (INHALATION) at 12:10

## 2019-10-06 RX ADMIN — IPRATROPIUM BROMIDE AND ALBUTEROL SULFATE 3 ML: .5; 3 SOLUTION RESPIRATORY (INHALATION) at 08:10

## 2019-10-06 RX ADMIN — IPRATROPIUM BROMIDE AND ALBUTEROL SULFATE 3 ML: .5; 3 SOLUTION RESPIRATORY (INHALATION) at 01:10

## 2019-10-06 RX ADMIN — CLONAZEPAM 1 MG: 0.5 TABLET ORAL at 08:10

## 2019-10-06 RX ADMIN — VANCOMYCIN HYDROCHLORIDE 1000 MG: 1 INJECTION, POWDER, LYOPHILIZED, FOR SOLUTION INTRAVENOUS at 11:10

## 2019-10-06 RX ADMIN — CLONAZEPAM 1 MG: 0.5 TABLET ORAL at 12:10

## 2019-10-06 RX ADMIN — PIPERACILLIN AND TAZOBACTAM 4.5 G: 4; .5 INJECTION, POWDER, FOR SOLUTION INTRAVENOUS at 12:10

## 2019-10-06 RX ADMIN — DIVALPROEX SODIUM 500 MG: 125 CAPSULE, COATED PELLETS ORAL at 09:10

## 2019-10-06 RX ADMIN — IPRATROPIUM BROMIDE AND ALBUTEROL SULFATE 3 ML: .5; 3 SOLUTION RESPIRATORY (INHALATION) at 07:10

## 2019-10-06 RX ADMIN — CLONAZEPAM 1 MG: 0.5 TABLET ORAL at 09:10

## 2019-10-06 RX ADMIN — PIPERACILLIN AND TAZOBACTAM 4.5 G: 4; .5 INJECTION, POWDER, FOR SOLUTION INTRAVENOUS at 03:10

## 2019-10-06 RX ADMIN — POTASSIUM CHLORIDE 40 MEQ: 20 SOLUTION ORAL at 09:10

## 2019-10-06 RX ADMIN — PROPOFOL 40 MCG/KG/MIN: 10 INJECTION, EMULSION INTRAVENOUS at 01:10

## 2019-10-06 RX ADMIN — VANCOMYCIN HYDROCHLORIDE 1000 MG: 1 INJECTION, POWDER, LYOPHILIZED, FOR SOLUTION INTRAVENOUS at 12:10

## 2019-10-06 RX ADMIN — ENOXAPARIN SODIUM 40 MG: 100 INJECTION SUBCUTANEOUS at 04:10

## 2019-10-06 RX ADMIN — FLUOXETINE HYDROCHLORIDE 60 MG: 20 SOLUTION ORAL at 08:10

## 2019-10-06 RX ADMIN — PROPOFOL 40 MCG/KG/MIN: 10 INJECTION, EMULSION INTRAVENOUS at 05:10

## 2019-10-06 NOTE — NURSING
Notified Dr. Marcos of worsening respiratory problems CXR ordered & bipap if xray shows improvement

## 2019-10-06 NOTE — PROGRESS NOTES
[facility]  Pulmonology  Progress Note    Patient Name: Karri Galeano  MRN: [id number]  Admission Date: 10/3/2019  Hospital Length of Stay: 2 days  Code Status: Full Code  Attending Provider: Bryan Hill MD  Primary Care Provider: Echo Reeves MD   Principal Problem: Pneumonia of left lung due to infectious organism    Subjective:     HPI:  Patient is 24 y.o. male  has a past medical history of Anxiety, Congenital cerebral palsy, Hypotonia, Leukodystrophy, Mental retardation, Retention of urine, Seizures, Stereotyped movement disorder, and Urinary tract infection. presented to [facility] on 10/3/19 due to fever and worsening lethargy.   patietn was intubated due to decreased mentation and tachypnea.  Post intubation cxr and CT with large atelectasis of left lung which was new when compared to initial admitting cxr on 10/3/19.    Upon my initial evaluation, patient was tachypnic with I:E ratio of 1.4:1.  I lowered inspiratory time from .09 to .65 with immediate improvement in respiratory rate and wob.  Currently, patient is hemodynamically stable.      Hospital Course:  Extubated on 10/5  reintubated 10/6     Interval History: patient reintubated this am due to tachypnea and rll atelectasis.  Currently stable on prvc.      Objective:     Vital Signs (Most Recent):  Temp: 98.5 °F (36.9 °C) (10/06/19 0700)  Pulse: 96 (10/06/19 0900)  Resp: (!) 22 (10/06/19 0900)  BP: 135/72 (10/06/19 0900)  SpO2: 96 % (10/06/19 0900) Vital Signs (24h Range):  Temp:  [98.1 °F (36.7 °C)-98.8 °F (37.1 °C)] 98.5 °F (36.9 °C)  Pulse:  [] 96  Resp:  [22-45] 22  SpO2:  [91 %-100 %] 96 %  BP: ()/(50-90) 135/72     Weight: 47.8 kg (105 lb 6.1 oz)  Body mass index is 19.91 kg/m².      Intake/Output Summary (Last 24 hours) at 10/6/2019 0944  Last data filed at 10/6/2019 0900  Gross per 24 hour   Intake 1282.88 ml   Output 1040 ml   Net 242.88 ml       Physical Exam   Constitutional: He appears  well-developed.   HENT:   Head: Normocephalic and atraumatic.   Mouth/Throat: Oropharynx is clear and moist.   ETT in place   Eyes: Pupils are equal, round, and reactive to light. Conjunctivae and EOM are normal.   Neck: Normal range of motion. Neck supple.   Cardiovascular: Normal rate, regular rhythm and normal heart sounds.   Pulmonary/Chest: Effort normal. He has rales.   Coarse breath sound left more than right.     Abdominal: Soft. Bowel sounds are normal.   Musculoskeletal: Normal range of motion.   Neurological: No cranial nerve deficit.   Sedated on vent.   Skin: Skin is warm.       Vents:  Vent Mode: PRVC (10/06/19 0805)  Ventilator Initiated: Yes (10/06/19 0006)  Set Rate: 22 bmp (10/06/19 0805)  Vt Set: 350 mL (10/06/19 0805)  Pressure Support: 5 cmH20 (10/05/19 1220)  PEEP/CPAP: 8 cmH20 (10/06/19 0805)  Oxygen Concentration (%): 50 (10/06/19 0900)  Peak Airway Pressure: 27.6 cmH2O (10/06/19 0805)  Total Ve: 8.1 mL (10/06/19 0805)  F/VT Ratio<105 (RSBI): (!) 57.44 (10/06/19 0805)    Lines/Drains/Airways     Drain                 Urethral Catheter 10/04/19 0104 Straight-tip 16 Fr. 2 days         NG/OG Tube 10/06/19 0000 orogastric 18 Fr. less than 1 day          Airway                 Airway - Non-Surgical 10/05/19 2350 Endotracheal Tube less than 1 day          Peripheral Intravenous Line                 Peripheral IV - Single Lumen 10/03/19 2018 20 G Left Forearm 2 days         Peripheral IV - Single Lumen 10/03/19 2330 18 G Right Forearm 2 days                Significant Labs:    CBC/Anemia Profile:  No results for input(s): WBC, HGB, HCT, PLT, MCV, RDW, IRON, FERRITIN, RETIC, FOLATE, [id number], OCCULTBLOOD in the last 48 hours.     Chemistries:  Recent Labs   Lab 10/05/19  0253 10/06/19  0254    138   K 3.2* 3.3*    103   CO2 25 27   BUN 11 10   CREATININE 0.6 0.6   CALCIUM 8.2* 8.4*     Recent Labs   Lab 10/03/19  2020 10/03/19  2345   LACTATE 1.2 1.1      Respiratory Culture: No results  for input(s): GSRESP, RESPIRATORYC in the last 48 hours.     Significant Imaging:   CT: I have reviewed all pertinent results/findings within the past 24 hours and my personal findings are:  atelectasis of left lung with abrupt cut off of left mainstem.    CXR: I have reviewed all pertinent results/findings within the past 24 hours and my personal findings are:  10/6/19 improve aeration of right hemithorax.    ABGs:   Recent Labs   Lab 10/06/19  0506   PH 7.524*   PCO2 34.7*   HCO3 28.6*   POCSATURATED 95   BE 6     Lactic Acid:   No results for input(s): LACTATE in the last 48 hours.  Respiratory Culture:   Recent Labs   Lab 10/04/19  1355   GSRESP <10 epithelial cells per low power field.  No WBC's or organisms seen   RESPIRATORYC No growth         Assessment/Plan:     * Pneumonia of left lung due to infectious organism  Agree with zosyn and vanc for aspiration coverage.  Repeat cxr after ett adjustment with significant improvement in aeration.  Still with left perihilar infiltrate.  cxr prior to intubation demonstrated rll and rml atelectasis.  cxr after intubation showed improvement.  Ct on 10/4/19 without evidence of endobronchial lesion.  Combination of migrating atelectasis + patient mentation points to inadequate airway clearance.      Severe sepsis  Antibiotics.  Culture no growth to date.  cxr improve with vent.  Recommend de-escalation of antibiotics.      Acute respiratory failure with hypoxia  Left lung atelectasis initially.  Improve with ett readjustment.  reintubated with rml and rll atelectasis.  Currently stable on prvc.  Will need to talk to family about trache for better control of secretion.  Would recommend palliative care inputs to establish goals of care.             Rufino Edward MD  Pulmonology  [facility]    Critical Care Time: 35  minutes  Critical secondary to respiratory failure     Critical care was time spent personally by me on the following activities:  development of treatment plan with patient or surrogate and bedside caregivers, discussions with consultants, evaluation of patient's response to treatment, examination of patient, ordering and performing treatments and interventions, ordering and review of laboratory studies, ordering and review of radiographic studies, pulse oximetry, re-evaluation of patient's condition.  This critical care time did not overlap with that of any other provider or involve time for any procedures.

## 2019-10-06 NOTE — SUBJECTIVE & OBJECTIVE
HPI:  Patient is 24 y.o. male  has a past medical history of Anxiety, Congenital cerebral palsy, Hypotonia, Leukodystrophy, Mental retardation, Retention of urine, Seizures, Stereotyped movement disorder, and Urinary tract infection. presented to [facility] on 10/3/19 due to fever and worsening lethargy.   patietn was intubated due to decreased mentation and tachypnea.  Post intubation cxr and CT with large atelectasis of left lung which was new when compared to initial admitting cxr on 10/3/19.    Upon my initial evaluation, patient was tachypnic with I:E ratio of 1.4:1.  I lowered inspiratory time from .09 to .65 with immediate improvement in respiratory rate and wob.  Currently, patient is hemodynamically stable.      Hospital Course:  Extubated on 10/5  reintubated 10/6     Interval History: patient reintubated this am due to tachypnea and rll atelectasis.  Currently stable on prvc.      Objective:     Vital Signs (Most Recent):  Temp: 98.5 °F (36.9 °C) (10/06/19 0700)  Pulse: 96 (10/06/19 0900)  Resp: (!) 22 (10/06/19 0900)  BP: 135/72 (10/06/19 0900)  SpO2: 96 % (10/06/19 0900) Vital Signs (24h Range):  Temp:  [98.1 °F (36.7 °C)-98.8 °F (37.1 °C)] 98.5 °F (36.9 °C)  Pulse:  [] 96  Resp:  [22-45] 22  SpO2:  [91 %-100 %] 96 %  BP: ()/(50-90) 135/72     Weight: 47.8 kg (105 lb 6.1 oz)  Body mass index is 19.91 kg/m².      Intake/Output Summary (Last 24 hours) at 10/6/2019 0944  Last data filed at 10/6/2019 0900  Gross per 24 hour   Intake 1282.88 ml   Output 1040 ml   Net 242.88 ml       Physical Exam   Constitutional: He appears well-developed.   HENT:   Head: Normocephalic and atraumatic.   Mouth/Throat: Oropharynx is clear and moist.   ETT in place   Eyes: Pupils are equal, round, and reactive to light. Conjunctivae and EOM are normal.   Neck: Normal range of motion. Neck supple.   Cardiovascular: Normal rate, regular rhythm and normal heart sounds.   Pulmonary/Chest: Effort normal. He has  rales.   Coarse breath sound left more than right.     Abdominal: Soft. Bowel sounds are normal.   Musculoskeletal: Normal range of motion.   Neurological: No cranial nerve deficit.   Sedated on vent.   Skin: Skin is warm.       Vents:  Vent Mode: PRVC (10/06/19 0805)  Ventilator Initiated: Yes (10/06/19 0006)  Set Rate: 22 bmp (10/06/19 0805)  Vt Set: 350 mL (10/06/19 0805)  Pressure Support: 5 cmH20 (10/05/19 1220)  PEEP/CPAP: 8 cmH20 (10/06/19 0805)  Oxygen Concentration (%): 50 (10/06/19 0900)  Peak Airway Pressure: 27.6 cmH2O (10/06/19 0805)  Total Ve: 8.1 mL (10/06/19 0805)  F/VT Ratio<105 (RSBI): (!) 57.44 (10/06/19 0805)    Lines/Drains/Airways     Drain                 Urethral Catheter 10/04/19 0104 Straight-tip 16 Fr. 2 days         NG/OG Tube 10/06/19 0000 orogastric 18 Fr. less than 1 day          Airway                 Airway - Non-Surgical 10/05/19 2350 Endotracheal Tube less than 1 day          Peripheral Intravenous Line                 Peripheral IV - Single Lumen 10/03/19 2018 20 G Left Forearm 2 days         Peripheral IV - Single Lumen 10/03/19 2330 18 G Right Forearm 2 days                Significant Labs:    CBC/Anemia Profile:  No results for input(s): WBC, HGB, HCT, PLT, MCV, RDW, IRON, FERRITIN, RETIC, FOLATE, [id number], OCCULTBLOOD in the last 48 hours.     Chemistries:  Recent Labs   Lab 10/05/19  0253 10/06/19  0254    138   K 3.2* 3.3*    103   CO2 25 27   BUN 11 10   CREATININE 0.6 0.6   CALCIUM 8.2* 8.4*     Recent Labs   Lab 10/03/19  2020 10/03/19  2345   LACTATE 1.2 1.1      Respiratory Culture: No results for input(s): GSRESP, RESPIRATORYC in the last 48 hours.     Significant Imaging:   CT: I have reviewed all pertinent results/findings within the past 24 hours and my personal findings are:  atelectasis of left lung with abrupt cut off of left mainstem.    CXR: I have reviewed all pertinent results/findings within the past 24 hours and my personal findings are:   10/6/19 improve aeration of right hemithorax.    ABGs:   Recent Labs   Lab 10/06/19  0506   PH 7.524*   PCO2 34.7*   HCO3 28.6*   POCSATURATED 95   BE 6     Lactic Acid:   No results for input(s): LACTATE in the last 48 hours.  Respiratory Culture:   Recent Labs   Lab 10/04/19  1355   GSRESP <10 epithelial cells per low power field.  No WBC's or organisms seen   RESPIRATORYC No growth

## 2019-10-06 NOTE — ASSESSMENT & PLAN NOTE
Left lung atelectasis initially.  Improve with ett readjustment.  reintubated with rml and rll atelectasis.  Currently stable on prvc.  Will need to talk to family about trache for better control of secretion.  Would recommend palliative care inputs to establish goals of care.

## 2019-10-06 NOTE — CARE UPDATE
[facility]  ICU Multidisciplinary Bedside Rounds     UPDATE     Date: 10/6/2019      Plan of care reviewed with the following, Nurse, Charge Nurse, Physician and Resp. Therapist.       Needs/ Goals for the day: continue ventilator today/consult palliative and spiritual care/treat low potassium/update aunt      Level of Care: Critical Care

## 2019-10-06 NOTE — PLAN OF CARE
Received with shallow rapid respiratory rate resting but does not open eyes to name secretions thick and slightly bld tinged. HOB up 45 degrees perspiring blankets removed will continue to monitor respirations

## 2019-10-06 NOTE — PROGRESS NOTES
[facility] Hospital Medicine  Progress Note    Patient Name: Karri Galeano  MRN: [id number]  Patient Class: IP- Inpatient   Admission Date: 10/3/2019  Length of Stay: 2 days  Attending Physician: Bryan Hill MD  Primary Care Provider: Echo Reeves MD        Subjective:     Principal Problem:Pneumonia of left lung due to infectious organism        HPI:  Mr. Karri Galeano is a 24 y.o. male with cerebral palsy and epilepsy who presents to [facility] ED from an urgent care facility with complaints of fevers today.  He lives in a group home and started to have a nonproductive cough today.  The caregiver did not observe any vomiting.  He was in his usual state of health yesterday but today has become more lethargic and somnolent.  He was taken to an urgent care facility where he was then transferred to this facility for further care.  He was noted there to have a fever of 102.2° F as well as an oxygen saturation of 95% on nasal cannula at 3 liters/minute.  Further history is otherwise limited at this time.    Overview/Hospital Course:  Mr. Karri Galeano is a 24 y.o. male with cerebral palsy and epilepsy who presented to [facility] ED from an urgent care facility with complaints of fevers.  He lives in a group home and started to have a nonproductive cough today.   Patient became more lethargic and somnolent.  He was taken to an urgent care facility where he was then transferred to this facility for further care.  He was noted there to have a fever of 102.2° .  Patient became more lethargic and tachypneic and intubated.  Diagnosed with pneumonia and started on Vanc and Zosyn.  Pulmonary consulted for vent management.  Extubated on 10/5 AM.  Initially did well, but worsening respiratory failure during the day.  Started on Bipap with no improvement and reintubated 10/5 PM.    Interval History: Back on vent.    Review of Systems   Unable to perform ROS: Intubated     Objective:     Vital Signs  (Most Recent):  Temp: 98.5 °F (36.9 °C) (10/06/19 1100)  Pulse: 92 (10/06/19 1100)  Resp: (!) 22 (10/06/19 1100)  BP: (!) 118/56 (10/06/19 1100)  SpO2: 97 % (10/06/19 1100) Vital Signs (24h Range):  Temp:  [98.1 °F (36.7 °C)-98.8 °F (37.1 °C)] 98.5 °F (36.9 °C)  Pulse:  [] 92  Resp:  [22-45] 22  SpO2:  [91 %-100 %] 97 %  BP: ()/(50-90) 118/56     Weight: 47.8 kg (105 lb 6.1 oz)  Body mass index is 19.91 kg/m².    Intake/Output Summary (Last 24 hours) at 10/6/2019 1109  Last data filed at 10/6/2019 1100  Gross per 24 hour   Intake 1282.88 ml   Output 1030 ml   Net 252.88 ml      Physical Exam   Constitutional: He appears well-developed.   HENT:   Head: Normocephalic and atraumatic.   Mouth/Throat: Oropharynx is clear and moist.   ETT in place   Eyes: Pupils are equal, round, and reactive to light. Conjunctivae and EOM are normal.   Neck: Normal range of motion. Neck supple.   Cardiovascular: Normal rate, regular rhythm and normal heart sounds.   Pulmonary/Chest: Effort normal. He has rales.   Coarse breath sound left more than right.     Abdominal: Soft. Bowel sounds are normal.   Musculoskeletal: Normal range of motion.   Neurological: No cranial nerve deficit.   Sedated on vent.   Skin: Skin is warm.       Significant Labs:   BMP:   Recent Labs   Lab 10/06/19  0254   *      K 3.3*      CO2 27   BUN 10   CREATININE 0.6   CALCIUM 8.4*     CBC:   No results for input(s): WBC, HGB, HCT, PLT in the last 48 hours.    Significant Imaging: I have reviewed all pertinent imaging results/findings within the past 24 hours.      Assessment/Plan:      * Pneumonia of left lung due to infectious organism  Patient was becoming more tachypneic and obtunded which he was intubated.    He may have had an aspiration event with subsequent pneumonia.    He did meet criteria for severe sepsis and has been started on empiric antibiotic therapy.   Will continue empiric antibiotic therapy with Vanc and  Zosyn.    Hypokalemia  Replace as needed.      Epilepsy  Stable; continue his home regimen of divalproex.    On mechanically assisted ventilation  As addressed above.    Severe sepsis  This patient meets criteria for severe sepsis given fevers, tachycardia, tachypnea, pneumonia, acute respiratory failure.  Blood cultures are pending; will start IV fluid hydration and broad spectrum antibiotics.    Acute respiratory failure with hypoxia  Appreciate Pulmonary input.  Extubated on 10/5 Am.  Initially did well, but worsening respiratory distress through the day.  Started on Bipap with no improvement.  Reintubated later on 10/5.    Spastic quadriplegic cerebral palsy  Stable; there are no acute issues.      VTE Risk Mitigation (From admission, onward)         Ordered     enoxaparin injection 40 mg  Daily      10/04/19 0152     IP VTE HIGH RISK PATIENT  Once      10/04/19 0152                Critical care time spent on the evaluation and treatment of severe organ dysfunction, review of pertinent labs and imaging studies, discussions with consulting providers and discussions with patient/family: 50 minutes.      Bryan Hill MD  Department of Hospital Medicine   [facility]

## 2019-10-06 NOTE — ASSESSMENT & PLAN NOTE
Antibiotics.  Culture no growth to date.  cxr improve with vent.  Recommend de-escalation of antibiotics.

## 2019-10-06 NOTE — PLAN OF CARE
Problem: Communication Impairment (Mechanical Ventilation, Invasive)  Goal: Effective Communication  Outcome: Ongoing, Progressing     Problem: Device-Related Complication Risk (Mechanical Ventilation, Invasive)  Goal: Optimal Device Function  Outcome: Ongoing, Progressing     Problem: Inability to Wean (Mechanical Ventilation, Invasive)  Goal: Mechanical Ventilation Liberation  Outcome: Ongoing, Progressing     Problem: Skin and Tissue Injury (Mechanical Ventilation, Invasive)  Goal: Absence of Device-Related Skin and Tissue Injury  Outcome: Ongoing, Progressing     Problem: Ventilator-Induced Lung Injury (Mechanical Ventilation, Invasive)  Goal: Absence of Ventilator-Induced Lung Injury  Outcome: Ongoing, Progressing     Problem: Communication Impairment (Artificial Airway)  Goal: Effective Communication  Outcome: Ongoing, Progressing     Problem: Skin and Tissue Injury (Artificial Airway)  Goal: Absence of Device-Related Skin or Tissue Injury  Outcome: Ongoing, Progressing

## 2019-10-06 NOTE — ASSESSMENT & PLAN NOTE
Appreciate Pulmonary input.  Extubated on 10/5 Am.  Initially did well, but worsening respiratory distress through the day.  Started on Bipap with no improvement.  Reintubated later on 10/5.

## 2019-10-06 NOTE — CARE UPDATE
Pt was reintubated by Dr. Marcos 7.5 ett 22@ the teeth and vent settings are as follows PRVC 22/350/+8/ FIO2 40% Ambu bag is at HOB and all alarms are set and working properly.

## 2019-10-06 NOTE — ASSESSMENT & PLAN NOTE
Agree with zosyn and vanc for aspiration coverage.  Repeat cxr after ett adjustment with significant improvement in aeration.  Still with left perihilar infiltrate.  cxr prior to intubation demonstrated rll and rml atelectasis.  cxr after intubation showed improvement.  Ct on 10/4/19 without evidence of endobronchial lesion.  Combination of migrating atelectasis + patient mentation points to inadequate airway clearance.

## 2019-10-06 NOTE — NURSING
Placed on ventilator with 7.5 ETT 23@ the teeth  See respiratory flow sheet for further orders. Continues to require restraints. Tolerated without problems.

## 2019-10-06 NOTE — NURSING
[facility]  ICU Multidisciplinary Bedside Rounds   SUMMARY     Date: 10/6/2019    Prehospitalization:Admit Date / LOS : 10/3/2019/ 2 days group home    Diagnosis: Pneumonia of left lung due to infectious organism    Consults:        Active: Pulm CC       Needed: N/A     Code Status: Full Code   Advanced Directive: <no information>    LDA: BIPAP, Saldivar, OG, PIV and Vent       Central Lines/Site/Justification:Patient Does Not Have Central Line       Urinary Cath/Order/Justification:Critically Ill in ICU    Vasopressors/Infusions:    propofol 40.098 mcg/kg/min (10/06/19 0600)          GOALS: Volume/ Hemodynamic: N/A                     RASS: -2  light sedation, briefly awakes to voice (eye opening)    CAM ICU: N/A  Pain Management: none       Pain Controlled: not applicable     Rhythm: ST    Respiratory Device: Vent    Vent Mode: PRVC  Oxygen Concentration (%):  [40-60] 50  Resp Rate Total:  [15 br/min-30 br/min] 22 br/min  Vt Set:  [350 mL] 350 mL  PEEP/CPAP:  [5 cmH20-8 cmH20] 8 cmH20  Pressure Support:  [5 cmH20-15 cmH20] 5 cmH20  Mean Airway Pressure:  [6.4 alI61-65.5 cmH20] 14 cmH20             Most Recent SBT/ SAT: N/A           VTE Prophylaxis: Mechanical  Mobility: Bedrest  Stress Ulcer Prophylaxis: No    Dietary: NPO  Tolerance: not applicable  /  Advancement: no    Isolation: No active isolations    Restraints: Yes    Significant Dates:  Post Op Date: N/A  Rescue Date: N/A  Imaging/ Diagnostics: N/A    Noteworthy Labs: K+ 3.3    CBC/Anemia Labs: Coags:    Recent Labs   Lab 10/03/19  2020 10/04/19  0319   WBC 10.32 10.72   HGB 10.5* 11.2*   HCT 34.5* 36.7*   * 129*   MCV 92 93   RDW 14.9* 15.0*    No results for input(s): PT, INR, APTT in the last 168 hours.     Chemistries:   Recent Labs   Lab 10/03/19  2020 10/04/19  0318 10/05/19  0253 10/06/19  0254   * 145 140 138   K 3.7 3.7 3.2* 3.3*    110 107 103   CO2 28 27 25 27   BUN 15 12 11 10   CREATININE 0.7 0.7 0.6 0.6    CALCIUM 8.8 7.9* 8.2* 8.4*   PROT 6.9 6.4  --   --    BILITOT 0.2 0.4  --   --    ALKPHOS 73 71  --   --    ALT 14 15  --   --    AST 19 21  --   --    MG  --  1.7  --   --    PHOS  --  3.9  --   --         Cardiac Enzymes: Ejection Fractions:    No results for input(s): CPK, CPKMB, MB, TROPONINI in the last 72 hours. No results found for: EF     POCT Glucose: HbA1c:    No results for input(s): POCTGLUCOSE in the last 168 hours. No results found for: HGBA1C     Needs from Care Team: pt needs nourishment     ICU LOS 2d 5h  Level of Care: Critical Care

## 2019-10-06 NOTE — CARE UPDATE
Cross-Cover Progress Note    I was called to Mr. Karri Galeano's bedside by his nurse to evaluate him for tachypnea.  He was liberated from the ventilator today after being mechanically ventilated for respiratory failure secondary to possible aspiration pneumonia.    Upon my evaluation he was tachypneic with a respiratory rate in the mid 30s to 40s and had increased respiratory effort.  His breath sounds were rhonchus and he was tachycardic.  He appears to be oxygenating well.  He remains nonverbal.      His plain chest radiograph from earlier today revealed improvement in his aeration of the left lung field but with continued opacifications in the left upper lung field.  I will repeat his plain chest radiograph.  He may need NIPPV with BPAP in hopes of avoiding reintubation and mechanical ventilation.    I will continue monitor the patient's progress throughout the night.          Addendum 2356  Unfortunately, the trial BPAP was unsuccessful and he was still with a respiratory rate in the mid 30s to low 40s.  He was exhibiting use of accessory muscles and was unresponsive.  I decided to reintubate is trachea for airway protection and fear of impending respiratory failure.  The procedure proceeded without complications.  Please review my Procedure Note for further details.  Pulmonology is already on the case.          Intubation  Date/Time: 10/5/2019 11:56 PM  Location procedure was performed: [facility] [place] ICU  Performed by: Kiersten Marcos MD  Authorized by: Kiersten Marcos MD   Consent Done: Emergent Situation  Indications: respiratory failure and  airway protection  Intubation method: direct  Preoxygenation: BVM  Pretreatment medications: none  Sedatives: etomidate  Paralytic: succinylcholine  Laryngoscope size: Marie 2  Tube size: 7.5 mm  Tube type: cuffed  Number of attempts: 1  Ventilation between attempts: BVM  Cricoid pressure: yes  Cords visualized: yes  Post-procedure assessment: CO2 detector  Breath sounds: equal  and absent over the epigastrium (rhonchi)  Cuff inflated: yes  ETT to teeth: 22 cm  Tube secured with: ETT quiñones  Chest x-ray interpreted by me.  Chest x-ray findings: endotracheal tube in appropriate position  Patient tolerance: Patient tolerated the procedure well with no immediate complications  Complications: No  Estimated blood loss (mL): 0  Comments:   View: Cormack-Lehane Grade I  Cuff inflated to minimally-occlusive pressure  Teeth intact  Complications: None          Critical care time spent on the evaluation and treatment of severe organ dysfunction, review of pertinent labs and imaging studies, discussions with consulting providers and discussions with patient/family: 60 minutes.               Kiersten Marcos M.D.  Staff [facility]  Department of Hospital Medicine  [facility]  Pager: [phone]              N.B.: Portions of this note was dictated using M*Modal Fluency Direct--there may be voice recognition errors occasionally missed on review.

## 2019-10-06 NOTE — NURSING
Not tolerating bipap respirations increased to 30-40 's  Notified Dr. Marcos  Prepared for emergent intubation

## 2019-10-06 NOTE — SUBJECTIVE & OBJECTIVE
Interval History: Back on vent.    Review of Systems   Unable to perform ROS: Intubated     Objective:     Vital Signs (Most Recent):  Temp: 98.5 °F (36.9 °C) (10/06/19 1100)  Pulse: 92 (10/06/19 1100)  Resp: (!) 22 (10/06/19 1100)  BP: (!) 118/56 (10/06/19 1100)  SpO2: 97 % (10/06/19 1100) Vital Signs (24h Range):  Temp:  [98.1 °F (36.7 °C)-98.8 °F (37.1 °C)] 98.5 °F (36.9 °C)  Pulse:  [] 92  Resp:  [22-45] 22  SpO2:  [91 %-100 %] 97 %  BP: ()/(50-90) 118/56     Weight: 47.8 kg (105 lb 6.1 oz)  Body mass index is 19.91 kg/m².    Intake/Output Summary (Last 24 hours) at 10/6/2019 1109  Last data filed at 10/6/2019 1100  Gross per 24 hour   Intake 1282.88 ml   Output 1030 ml   Net 252.88 ml      Physical Exam   Constitutional: He appears well-developed.   HENT:   Head: Normocephalic and atraumatic.   Mouth/Throat: Oropharynx is clear and moist.   ETT in place   Eyes: Pupils are equal, round, and reactive to light. Conjunctivae and EOM are normal.   Neck: Normal range of motion. Neck supple.   Cardiovascular: Normal rate, regular rhythm and normal heart sounds.   Pulmonary/Chest: Effort normal. He has rales.   Coarse breath sound left more than right.     Abdominal: Soft. Bowel sounds are normal.   Musculoskeletal: Normal range of motion.   Neurological: No cranial nerve deficit.   Sedated on vent.   Skin: Skin is warm.       Significant Labs:   BMP:   Recent Labs   Lab 10/06/19  0254   *      K 3.3*      CO2 27   BUN 10   CREATININE 0.6   CALCIUM 8.4*     CBC:   No results for input(s): WBC, HGB, HCT, PLT in the last 48 hours.    Significant Imaging: I have reviewed all pertinent imaging results/findings within the past 24 hours.

## 2019-10-07 LAB
ALLENS TEST: ABNORMAL
ANION GAP SERPL CALC-SCNC: 7 MMOL/L (ref 8–16)
BUN SERPL-MCNC: 10 MG/DL (ref 6–20)
CALCIUM SERPL-MCNC: 8 MG/DL (ref 8.7–10.5)
CHLORIDE SERPL-SCNC: 102 MMOL/L (ref 95–110)
CO2 SERPL-SCNC: 28 MMOL/L (ref 23–29)
CREAT SERPL-MCNC: 0.6 MG/DL (ref 0.5–1.4)
DELSYS: ABNORMAL
ERYTHROCYTE [SEDIMENTATION RATE] IN BLOOD BY WESTERGREN METHOD: 22 MM/H
EST. GFR  (AFRICAN AMERICAN): >60 ML/MIN/1.73 M^2
EST. GFR  (NON AFRICAN AMERICAN): >60 ML/MIN/1.73 M^2
FIO2: 50
GLUCOSE SERPL-MCNC: 96 MG/DL (ref 70–110)
HCO3 UR-SCNC: 27.3 MMOL/L (ref 24–28)
MODE: ABNORMAL
PCO2 BLDA: 30.8 MMHG (ref 35–45)
PEEP: 8
PH SMN: 7.56 [PH] (ref 7.35–7.45)
PO2 BLDA: 132 MMHG (ref 80–100)
POC BE: 5 MMOL/L
POC SATURATED O2: 99 % (ref 95–100)
POC TCO2: 28 MMOL/L (ref 23–27)
POTASSIUM SERPL-SCNC: 3.5 MMOL/L (ref 3.5–5.1)
SAMPLE: ABNORMAL
SITE: ABNORMAL
SODIUM SERPL-SCNC: 137 MMOL/L (ref 136–145)
VANCOMYCIN TROUGH SERPL-MCNC: 3.9 UG/ML (ref 10–22)
VT: 350

## 2019-10-07 PROCEDURE — 25000003 PHARM REV CODE 250: Performed by: EMERGENCY MEDICINE

## 2019-10-07 PROCEDURE — 63600175 PHARM REV CODE 636 W HCPCS: Performed by: INTERNAL MEDICINE

## 2019-10-07 PROCEDURE — 63600175 PHARM REV CODE 636 W HCPCS: Performed by: EMERGENCY MEDICINE

## 2019-10-07 PROCEDURE — 25000242 PHARM REV CODE 250 ALT 637 W/ HCPCS: Performed by: INTERNAL MEDICINE

## 2019-10-07 PROCEDURE — 25000003 PHARM REV CODE 250: Performed by: HOSPITALIST

## 2019-10-07 PROCEDURE — 25000003 PHARM REV CODE 250: Performed by: INTERNAL MEDICINE

## 2019-10-07 PROCEDURE — 87205 SMEAR GRAM STAIN: CPT

## 2019-10-07 PROCEDURE — 87070 CULTURE OTHR SPECIMN AEROBIC: CPT

## 2019-10-07 PROCEDURE — 94003 VENT MGMT INPAT SUBQ DAY: CPT

## 2019-10-07 PROCEDURE — 94640 AIRWAY INHALATION TREATMENT: CPT

## 2019-10-07 PROCEDURE — 99291 PR CRITICAL CARE, E/M 30-74 MINUTES: ICD-10-PCS | Mod: ,,, | Performed by: EMERGENCY MEDICINE

## 2019-10-07 PROCEDURE — 80048 BASIC METABOLIC PNL TOTAL CA: CPT

## 2019-10-07 PROCEDURE — 99900026 HC AIRWAY MAINTENANCE (STAT)

## 2019-10-07 PROCEDURE — 31624 DX BRONCHOSCOPE/LAVAGE: CPT | Performed by: EMERGENCY MEDICINE

## 2019-10-07 PROCEDURE — 94668 MNPJ CHEST WALL SBSQ: CPT

## 2019-10-07 PROCEDURE — 36415 COLL VENOUS BLD VENIPUNCTURE: CPT

## 2019-10-07 PROCEDURE — 80202 ASSAY OF VANCOMYCIN: CPT

## 2019-10-07 PROCEDURE — 31624 PR BRONCHOSCOPY,DIAG2STIC W LAVAGE: ICD-10-PCS | Mod: RT,,, | Performed by: EMERGENCY MEDICINE

## 2019-10-07 PROCEDURE — 99291 CRITICAL CARE FIRST HOUR: CPT | Mod: ,,, | Performed by: EMERGENCY MEDICINE

## 2019-10-07 PROCEDURE — 97803 MED NUTRITION INDIV SUBSEQ: CPT

## 2019-10-07 PROCEDURE — 87633 RESP VIRUS 12-25 TARGETS: CPT

## 2019-10-07 PROCEDURE — 36600 WITHDRAWAL OF ARTERIAL BLOOD: CPT

## 2019-10-07 PROCEDURE — 27000221 HC OXYGEN, UP TO 24 HOURS

## 2019-10-07 PROCEDURE — 94761 N-INVAS EAR/PLS OXIMETRY MLT: CPT

## 2019-10-07 PROCEDURE — 87632 RESP VIRUS 6-11 TARGETS: CPT

## 2019-10-07 PROCEDURE — 99900035 HC TECH TIME PER 15 MIN (STAT)

## 2019-10-07 PROCEDURE — 31624 DX BRONCHOSCOPE/LAVAGE: CPT | Mod: RT,,, | Performed by: EMERGENCY MEDICINE

## 2019-10-07 PROCEDURE — 63600175 PHARM REV CODE 636 W HCPCS: Performed by: HOSPITALIST

## 2019-10-07 PROCEDURE — 82803 BLOOD GASES ANY COMBINATION: CPT

## 2019-10-07 PROCEDURE — 20000000 HC ICU ROOM

## 2019-10-07 RX ORDER — FUROSEMIDE 10 MG/ML
20 INJECTION INTRAMUSCULAR; INTRAVENOUS ONCE
Status: COMPLETED | OUTPATIENT
Start: 2019-10-07 | End: 2019-10-07

## 2019-10-07 RX ORDER — LIDOCAINE HYDROCHLORIDE 10 MG/ML
10 INJECTION, SOLUTION EPIDURAL; INFILTRATION; INTRACAUDAL; PERINEURAL ONCE
Status: COMPLETED | OUTPATIENT
Start: 2019-10-07 | End: 2019-10-07

## 2019-10-07 RX ORDER — FENTANYL CITRATE 50 UG/ML
100 INJECTION, SOLUTION INTRAMUSCULAR; INTRAVENOUS ONCE
Status: COMPLETED | OUTPATIENT
Start: 2019-10-07 | End: 2019-10-07

## 2019-10-07 RX ORDER — LACTULOSE 10 G/15ML
10 SOLUTION ORAL DAILY
Status: DISCONTINUED | OUTPATIENT
Start: 2019-10-07 | End: 2019-10-10

## 2019-10-07 RX ADMIN — PANTOPRAZOLE SODIUM 40 MG: 40 GRANULE, DELAYED RELEASE ORAL at 08:10

## 2019-10-07 RX ADMIN — PROPOFOL 45 MCG/KG/MIN: 10 INJECTION, EMULSION INTRAVENOUS at 06:10

## 2019-10-07 RX ADMIN — FUROSEMIDE 20 MG: 10 INJECTION, SOLUTION INTRAMUSCULAR; INTRAVENOUS at 12:10

## 2019-10-07 RX ADMIN — FLUOXETINE HYDROCHLORIDE 60 MG: 20 SOLUTION ORAL at 08:10

## 2019-10-07 RX ADMIN — DIVALPROEX SODIUM 500 MG: 125 CAPSULE, COATED PELLETS ORAL at 08:10

## 2019-10-07 RX ADMIN — IPRATROPIUM BROMIDE AND ALBUTEROL SULFATE 3 ML: .5; 3 SOLUTION RESPIRATORY (INHALATION) at 07:10

## 2019-10-07 RX ADMIN — IPRATROPIUM BROMIDE AND ALBUTEROL SULFATE 3 ML: .5; 3 SOLUTION RESPIRATORY (INHALATION) at 01:10

## 2019-10-07 RX ADMIN — CLONAZEPAM 1 MG: 0.5 TABLET ORAL at 09:10

## 2019-10-07 RX ADMIN — FENTANYL CITRATE 100 MCG: 50 INJECTION INTRAMUSCULAR; INTRAVENOUS at 01:10

## 2019-10-07 RX ADMIN — VANCOMYCIN HYDROCHLORIDE 1000 MG: 1 INJECTION, POWDER, LYOPHILIZED, FOR SOLUTION INTRAVENOUS at 12:10

## 2019-10-07 RX ADMIN — PROPOFOL 45 MCG/KG/MIN: 10 INJECTION, EMULSION INTRAVENOUS at 12:10

## 2019-10-07 RX ADMIN — PROPOFOL 45 MCG/KG/MIN: 10 INJECTION, EMULSION INTRAVENOUS at 03:10

## 2019-10-07 RX ADMIN — PIPERACILLIN AND TAZOBACTAM 4.5 G: 4; .5 INJECTION, POWDER, FOR SOLUTION INTRAVENOUS at 03:10

## 2019-10-07 RX ADMIN — CHLORHEXIDINE GLUCONATE 0.12% ORAL RINSE 15 ML: 1.2 LIQUID ORAL at 09:10

## 2019-10-07 RX ADMIN — LIDOCAINE HYDROCHLORIDE 100 MG: 10 INJECTION, SOLUTION EPIDURAL; INFILTRATION; INTRACAUDAL; PERINEURAL at 01:10

## 2019-10-07 RX ADMIN — PIPERACILLIN AND TAZOBACTAM 4.5 G: 4; .5 INJECTION, POWDER, FOR SOLUTION INTRAVENOUS at 07:10

## 2019-10-07 RX ADMIN — CHLORHEXIDINE GLUCONATE 0.12% ORAL RINSE 15 ML: 1.2 LIQUID ORAL at 08:10

## 2019-10-07 RX ADMIN — PIPERACILLIN AND TAZOBACTAM 4.5 G: 4; .5 INJECTION, POWDER, FOR SOLUTION INTRAVENOUS at 10:10

## 2019-10-07 RX ADMIN — DIVALPROEX SODIUM 500 MG: 125 CAPSULE, COATED PELLETS ORAL at 09:10

## 2019-10-07 RX ADMIN — CLONAZEPAM 1 MG: 0.5 TABLET ORAL at 08:10

## 2019-10-07 RX ADMIN — ENOXAPARIN SODIUM 40 MG: 100 INJECTION SUBCUTANEOUS at 05:10

## 2019-10-07 RX ADMIN — LACTULOSE 10 G: 20 SOLUTION ORAL at 10:10

## 2019-10-07 RX ADMIN — TAMSULOSIN HYDROCHLORIDE 0.4 MG: 0.4 CAPSULE ORAL at 08:10

## 2019-10-07 NOTE — PROGRESS NOTES
[facility] Hospital Medicine  Progress Note    Patient Name: Karri Galeano  MRN: [id number]  Patient Class: IP- Inpatient   Admission Date: 10/3/2019  Length of Stay: 3 days  Attending Physician: Bryan Hill MD  Primary Care Provider: Echo Reeves MD        Subjective:     Principal Problem:Pneumonia of left lung due to infectious organism        HPI:  Mr. Karri Galeano is a 24 y.o. male with cerebral palsy and epilepsy who presents to [facility] ED from an urgent care facility with complaints of fevers today.  He lives in a group home and started to have a nonproductive cough today.  The caregiver did not observe any vomiting.  He was in his usual state of health yesterday but today has become more lethargic and somnolent.  He was taken to an urgent care facility where he was then transferred to this facility for further care.  He was noted there to have a fever of 102.2° F as well as an oxygen saturation of 95% on nasal cannula at 3 liters/minute.  Further history is otherwise limited at this time.    Overview/Hospital Course:  Mr. Karri Galeano is a 24 y.o. male with cerebral palsy and epilepsy who presented to [facility] ED from an urgent care facility with complaints of fevers.  He lives in a group home and started to have a nonproductive cough today.   Patient became more lethargic and somnolent.  He was taken to an urgent care facility where he was then transferred to this facility for further care.  He was noted there to have a fever of 102.2° .  Patient became more lethargic and tachypneic and intubated.  Diagnosed with pneumonia and started on Vanc and Zosyn.  Pulmonary consulted for vent management.  Extubated on 10/5 AM.  Initially did well, but worsening respiratory failure during the day.  Started on Bipap with no improvement and reintubated 10/5 PM.    Interval History: Remains on vent.    Review of Systems   Unable to perform ROS: Intubated     Objective:     Vital  Signs (Most Recent):  Temp: 98.2 °F (36.8 °C) (10/07/19 1500)  Pulse: 83 (10/07/19 1530)  Resp: (!) 22 (10/07/19 1530)  BP: (!) 97/52 (10/07/19 1530)  SpO2: (!) 91 % (10/07/19 1530) Vital Signs (24h Range):  Temp:  [97.9 °F (36.6 °C)-101 °F (38.3 °C)] 98.2 °F (36.8 °C)  Pulse:  [] 83  Resp:  [18-34] 22  SpO2:  [91 %-100 %] 91 %  BP: ()/(51-67) 97/52     Weight: 47.8 kg (105 lb 6.1 oz)  Body mass index is 19.91 kg/m².    Intake/Output Summary (Last 24 hours) at 10/7/2019 1608  Last data filed at 10/7/2019 1553  Gross per 24 hour   Intake 2076.62 ml   Output 2365 ml   Net -288.38 ml      Physical Exam   Constitutional: He appears well-developed.   HENT:   Head: Normocephalic and atraumatic.   Mouth/Throat: Oropharynx is clear and moist.   ETT in place   Eyes: Pupils are equal, round, and reactive to light. Conjunctivae and EOM are normal.   Neck: Normal range of motion. Neck supple.   Cardiovascular: Normal rate, regular rhythm and normal heart sounds.   Pulmonary/Chest: Effort normal. He has rales.   Coarse breath sound left more than right.     Abdominal: Soft. Bowel sounds are normal.   Musculoskeletal: Normal range of motion.   Neurological: No cranial nerve deficit.   Sedated on vent.   Skin: Skin is warm.       Significant Labs:   BMP:   Recent Labs   Lab 10/07/19  0431   GLU 96      K 3.5      CO2 28   BUN 10   CREATININE 0.6   CALCIUM 8.0*     CBC:   No results for input(s): WBC, HGB, HCT, PLT in the last 48 hours.    Significant Imaging: I have reviewed all pertinent imaging results/findings within the past 24 hours.      Assessment/Plan:      * Pneumonia of left lung due to infectious organism  Patient was becoming more tachypneic and obtunded which he was intubated.    He may have had an aspiration event with subsequent pneumonia.    He did meet criteria for severe sepsis and has been started on empiric antibiotic therapy.   Will continue empiric antibiotic therapy with Vanc and  Zosyn.    Acute respiratory failure with hypoxia  Appreciate Pulmonary input.  Extubated on 10/5 Am.  Initially did well, but worsening respiratory distress through the day.  Started on Bipap with no improvement.  Reintubated later on 10/5.    Hypokalemia  Replace as needed.      Epilepsy  Stable; continue his home regimen of divalproex.    On mechanically assisted ventilation  As addressed above.    Severe sepsis  This patient meets criteria for severe sepsis given fevers, tachycardia, tachypnea, pneumonia, acute respiratory failure.  Blood cultures are pending; will start IV fluid hydration and broad spectrum antibiotics.    Spastic quadriplegic cerebral palsy  Stable; there are no acute issues.      VTE Risk Mitigation (From admission, onward)         Ordered     enoxaparin injection 40 mg  Daily      10/04/19 0152     IP VTE HIGH RISK PATIENT  Once      10/04/19 0152                Critical care time spent on the evaluation and treatment of severe organ dysfunction, review of pertinent labs and imaging studies, discussions with consulting providers and discussions with patient/family: 50 minutes.      Bryan Hill MD  Department of Hospital Medicine   [facility]

## 2019-10-07 NOTE — PROGRESS NOTES
"[facility]  Adult Nutrition  Progress Note    SUMMARY       Recommendations    1. If/when extubated and cleared by SLP, pureed diet with Boost Plus tid, as pt was on pta.   2. Continue TF Isosource 1.5 initiated @ 45 mL/hr as it is adequate for needs. TF to provide: 1620 kcal (1960 with propofol at 12.9 mL/hr), 73 g pro, 825 ml fluid. Continue fluid flushes 300 mL tid or per MD.     Goals: Pt to receive > 85% needs daily.  Nutrition Goal Status: new  Communication of RD Recs: (POC)    Reason for Assessment    Reason For Assessment: RD follow-up  Diagnosis: pulmonary disease, infection/sepsis  Relevant Medical History: CP, mental retardation  Interdisciplinary Rounds: attended  General Information Comments: Pt started on TF 10/6 and reported to be tolerating per nurse on rounds. IV fluids discountinued and receiving 300mL fluid flushes tid. Still on vent and propofol. Extubated 10/5 AM, but reintubated 10/5 PM. Aid at bedside. NFPE 10/4 not warrented as pt appears to be well nourished.   Nutrition Discharge Planning: (Pending medical course)    Nutrition Risk Screen    Nutrition Risk Screen: tube feeding or parenteral nutrition    Nutrition/Diet History    Patient Reported Diet/Restrictions/Preferences: pureed(reported by )  Spiritual, Cultural Beliefs, Adventist Practices, Values that Affect Care: no  Supplemental Drinks or Food Habits: Ensure Plus(Benecalorie)  Factors Affecting Nutritional Intake: NPO, on mechanical ventilation    Anthropometrics    Temp: 97.9 °F (36.6 °C)  Height Method: Estimated  Height: 5' 1" (154.9 cm)  Height (inches): 61 in  Weight Method: Bed Scale  Weight: 47.8 kg (105 lb 6.1 oz)  Weight (lb): 105.38 lb  Ideal Body Weight (IBW), Male: 112 lb  % Ideal Body Weight, Male (lb): 94.09 lb  BMI (Calculated): 20  BMI Grade: 18.5-24.9 - normal       Lab/Procedures/Meds    Pertinent Labs Reviewed: reviewed  Pertinent Labs Comments: Ca 8.0  Pertinent " Medications Reviewed: reviewed  Pertinent Medications Comments: pantoprazole, abx, vancomycin, propofol, lactulose      Estimated/Assessed Needs    Weight Used For Calorie Calculations: 47.8 kg (105 lb 6.1 oz)  Energy Calorie Requirements (kcal): 1825  Energy Need Method: [facility]  Protein Requirements: 57-72(1.2 - 1.5 g/kg)  Weight Used For Protein Calculations: 47.8 kg (105 lb 6.1 oz)     Estimated Fluid Requirement Method: RDA Method  RDA Method (mL): 1825         Nutrition Prescription Ordered    Current Diet Order: NPO  Current Nutrition Support Formula Ordered: Isosource 1.5  Current Nutrition Support Rate Ordered: 45 (ml)  Current Nutrition Support Frequency Ordered: mL/hr    Evaluation of Received Nutrient/Fluid Intake    Enteral Calories (kcal): 1620  Enteral Protein (gm): 73  Enteral (Free Water) Fluid (mL): 825  Free Water Flush Fluid (mL): 900  Other Calories (kcal): 340(propofol at 12.9 mL/hr)  Total Calories (kcal): 1960  IV Fluid (mL): (IV fluids discountinued)  Total Fluid Intake (mL): 1725  Energy Calories Required: meeting needs  Protein Required: meeting needs  Fluid Required: meeting needs(36 mL/kg)  Comments: LBM 10/3  Tolerance: tolerating  % Intake of Estimated Energy Needs: 75 - 100 %  % Meal Intake: NPO    Nutrition Risk    Level of Risk/Frequency of Follow-up: (F/U 2 x weekly)     Assessment and Plan    Nutrition Problem  Inadequate energy intake     Related to (etiology):   Pneumonia, sepsis     Signs and Symptoms (as evidenced by):   NPO      Interventions/Recommendations (treatment strategy):  Collaboration with other providers  Enteral nutrition     Nutrition Diagnosis Status:   Continues    Monitor and Evaluation    Food and Nutrient Intake: energy intake, enteral nutrition intake, food and beverage intake  Food and Nutrient Adminstration: diet order, enteral and parenteral nutrition administration  Anthropometric Measurements: weight change, weight, body mass index  Biochemical  Data, Medical Tests and Procedures: glucose/endocrine profile, electrolyte and renal panel, lipid profile, inflammatory profile, gastrointestinal profile  Nutrition-Focused Physical Findings: overall appearance, extremities, muscles and bones, head and eyes       Nutrition Follow-Up    RD Follow-up?: Yes

## 2019-10-07 NOTE — ASSESSMENT & PLAN NOTE
Febrile on presentation with likely viral respiratory tract infection vs. PNA. Increased secretions as a result with fleeting mucous plugs and atelectasis secondary to ineffective airway clearance. Currently on minimal vent support with acceptable lung mechanics and adequate gas exchange. Still with endotracheal secretions, but decreasing. AM imaging pending. Overall, he has not had frequent admission and this appears to be his first episode of respiratory failure. Hopefully this is just infectious in etiology and secretions will lessen with time..     -- Please check RVP (ordered)   -- Continue zosyn for now to cover for aspiration and CAP organisms.   -- Add azithromycin for atypicals  -- OK to stop vanc   -- Wean PEEP/FiO2  -- Diuresis given +5L since admission  -- Will await AM chest imaging to evaluate for persistent RLL atelectasis and if present plan for bronchoscopy to facilitate airway clearance   -- Would not pursue tracheostomy yet.. I will give additional trial of extubation.

## 2019-10-07 NOTE — PLAN OF CARE
Pt remains in ICU intubated and sedated. VSS. T max 101, tylenol given as ordered, now afebrile. Tf increased to 40 mL/hour as ordered, goal of 45. No residuals. Free water flushes given as ordered. IV site changed, old IV removed. Saldivar in place draining minimal dark yellow urine. Pt was ST at beginning of shift, now NSR. Turned Q2. Plan of care reviewed with pt at bedside. No falls, injuries or skin breakdown, precautions maintained for all.

## 2019-10-07 NOTE — PLAN OF CARE
Patient is currently on a Mechanical Ventilator with settings as documented.  Will administer Respiratory treatments as ordered.  Will continue to monitor.

## 2019-10-07 NOTE — CARE UPDATE
[facility]  ICU Multidisciplinary Bedside Rounds     UPDATE     Date: 10/7/2019      Plan of care reviewed with the following, Nurse, Charge Nurse, Physician, Resp. Therapist, Infection Prevention and Dietician.       Needs/ Goals for the day: possible bronch per pulm. Decrease secretions. Continue to tolerate tube feedings at goal. Palliative care to see pt      Level of Care: Critical Care

## 2019-10-07 NOTE — CONSULTS
"Consult Note  Palliative Care      Consult Requested By: Bryan Hill MD  Reason for Consult:      Goals of care, code status     SUBJECTIVE:     History of Present Illness:  Patient presents with    Breathing Complaint       pt's caregiver from group home reports that pt was "breathing funny & congested" earlier today, was seen at urgent care, and told to come to ED for low O2 sats & fluid on lungs    Fever      CC: Breathing problems      HPI:  This is a 24 y.o. male with a PMHx of Cerebral palsy, Hypotonia, Leukodystrophy, and Mental Retardation  who presents to the Emergency Department with his care taker with a cc of fever associated with breathing problems. Caregiver reports pt is currently in group home where he usually moves normal but today has been more lethargic than usual. Pt's caregiver reports associated cough. Caregiver notes pt had a normal bowel movement this morning, normal urine, and took his normal medication this morning.    The history is provided by a caregiver. The history is limited by a developmental delay.   Principal Problem:Pneumonia of left lung due to infectious organism           HPI:  Mr. Karri Galeano is a 24 y.o. male with cerebral palsy and epilepsy who presents to [facility] ED from an urgent care facility with complaints of fevers today.  He lives in a group home and started to have a nonproductive cough today.  The caregiver did not observe any vomiting.  He was in his usual state of health yesterday but today has become more lethargic and somnolent.  He was taken to an urgent care facility where he was then transferred to this facility for further care.  He was noted there to have a fever of 102.2° F as well as an oxygen saturation of 95% on nasal cannula at 3 liters/minute.  Further history is otherwise limited at this time.     Overview/Hospital Course:  Mr. Karri Galeano is a 24 y.o. male with cerebral palsy and epilepsy who presented to [facility] ED from an urgent " care facility with complaints of fevers.  He lives in a group home and started to have a nonproductive cough today.   Patient became more lethargic and somnolent.  He was taken to an urgent care facility where he was then transferred to this facility for further care.  He was noted there to have a fever of 102.2° .  Patient became more lethargic and tachypneic and intubated.  Diagnosed with pneumonia and started on Vanc and Zosyn.  Pulmonary consulted for vent management.  Extubated on 10/5 AM.  Initially did well, but worsening respiratory failure during the day.  Started on Bipap with no improvement and reintubated 10/5 PM.     Interval History: Remains on vent.        Past Medical History:   Diagnosis Date    Anxiety     Congenital cerebral palsy     Hypotonia     Leukodystrophy     Mental retardation     Retention of urine     Seizures     Stereotyped movement disorder     Urinary tract infection      History reviewed. No pertinent surgical history.  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  He is nonverbal at baseline.  Currently sedated with Propofol on the vent.      Palliative Performance Score:  10    OBJECTIVE:     Pain Assessment/symptom management:  Patient is intubated on vent support, nonverbal since birth.  Appears comfortable on Propofol for sedation.  See MAR for medication regimen.     Decision-Making Capacity:   Patient unable to speak on his own behalf.      Advanced Directives:  Living Will:    NO      Do Not Resuscitate Status:   FULL CODE    Medical Power of :  NO        Living Arrangements:   Lives at [facility]       Psychosocial,  "Spiritual, Cultural:   Patient is nonverbal, intubated and sedated on vent.   Patient's most important priorities:  Patient's biggest concerns/fears:  Previous death/end of life care history:  Patient's goals/hopes:      ASSESSMENT/PLAN:     Patient lying supine in bed, HOB elevated, appears a little older than stated age of 24 years.  He is intubated and tolerating vent support settings PRVC:  22/8/350/40%.  He I sedated on Propofol, but at baseline is nonverbal and does not follow commands, though he does have some spontaneous movement.      Respiratory effort is even and unlabored on the vent, lung sounds very coarse throughout.  Heart tones audible, regular.  His abdomen is rounded, semi-soft, ? Nontender, with + bowel sounds, TF @ 45 ml/h.  Trace peripheral edema.  VSS as charted  Afebrile oral temp 98.4 F., HR 70's, BP 120s/60s, RR 22, sat 98%.  IVF:  Propofol 45 mcg/kg/min.    Met with patient's aunt, Anne Galeano ([phone]) at the bedside.  She states the patient's mother Torey (her sister)  Has been  for many years.  She states his father is "unknown".  His grandparents are .  He has a half sister, Luisa who lives in [place] (not involved in his care) and a half brother, Joshua Galeano in [place], [place] (keeps up with patient through Anne, but cannot provide any care).  The patient lived with Anne for several years (when his mother was incarcerated) prior to his mother's demise.  As he got older it was too difficult for her to provide physical care for him and that when he was placed in a group home settings.  Previously he was in [place], then [facility], and then here in [place].  Chloe states she is desirous of finding a new place for the patient, and has been concerned about his nutritional status.  She states that Coleen Keyes with [facility] (oversees multiple group homes) has been assisting with this.   Of note, she has consulted an  to see what legal documents she needs " "as his decision maker (interdiction??) and will advise us of his recommendations/provide any documents she is given.      At baseline, he has never walked or talked, but when little he was able to stand up with assistance and could hold a cup, but that was years ago.  She reports that patient is less interactive now then when he was around family and she feels like he craves physical touch - loves to have his back scratched.  He also likes to hear music - reggae and also  a favorite is "You are my Sunshine" which is grandmother used to sing to him.  When he sees her he tries to lean on her or  be close to her. At the group home he gets up in a wheel chair, and they turn on the tv for him, but she feels he is not interested in tv.      Of note, she states that he does not have cerebral palsy, but was born with a rare missing chromosome; she could not remember the name of the syndrome (it is similar to CP)  but will try to provide it to us.      Dr. Vela in the room as I arrived to meet with her , and explained he would do a bronchoscopy today and would try again (likely tomorrow) to extubate him and that it is too early to talk about tracheostomy.      She has a good understanding of his current condition, and that his prognosis is somewhat guarded.      We discussed code status and she wants him to remain FULL CODE at present.  However she does not think she would want to see him prolonged on machines.      Provided literature, "Hard Choices For Harding People" and fact sheet on cpr. To aid  In her understanding.  Ample time was allowed for discussion of concerns and feelings.  She remarks that she has been here 3 times already this month and has been very concerned about him.  She has found someone locally to pay them to come check on him when she is home in [place].  All questions were asked and answered to her satisfaction.  Emotional support provided and she verbalized appreciation for care and concern " given.      Plan:  Educate.  Literature.  Goals of care and code status discussion.  Support.  Consult     Recommendations:   Continue current treatment plan/supportive care.   Continue FULL CODE status.   Getting bedside bronch today.     Wants new group home for patient (ideally in [place], but local is more likely)   Palliative Care will continue to follow.      Thank you for this consult and the opportunity to participate in Mr. Galeano's care.      Discussed above with Dr. Hill and bedside RN Geetha.      Chanelle Burnette, MADAYN, RN, CCRN, CHPN   Palliative Care Nurse Coordinator   [facility]  [phone]       Time Spent: 10 minute bedside assessment, 45 minute family meeting, 30 minute record review and charting, 10 minute team discussion.

## 2019-10-07 NOTE — PLAN OF CARE
Recommendations     1. If/when extubated and cleared by SLP, pureed diet with Boost Plus tid, as pt was on pta.   2. Continue TF Isosource 1.5 initiated @ 45 mL/hr as it is adequate for needs. TF to provide: 1620 kcal (1960 with propofol at 12.9 mL/hr), 73 g pro, 825 ml fluid. Continue fluid flushes 300 mL tid or per MD.      Goals: Pt to receive > 85% needs daily.  Nutrition Goal Status: new  Communication of RD Recs: (POC)

## 2019-10-07 NOTE — SUBJECTIVE & OBJECTIVE
Interval History: Remains on vent.    Review of Systems   Unable to perform ROS: Intubated     Objective:     Vital Signs (Most Recent):  Temp: 98.2 °F (36.8 °C) (10/07/19 1500)  Pulse: 83 (10/07/19 1530)  Resp: (!) 22 (10/07/19 1530)  BP: (!) 97/52 (10/07/19 1530)  SpO2: (!) 91 % (10/07/19 1530) Vital Signs (24h Range):  Temp:  [97.9 °F (36.6 °C)-101 °F (38.3 °C)] 98.2 °F (36.8 °C)  Pulse:  [] 83  Resp:  [18-34] 22  SpO2:  [91 %-100 %] 91 %  BP: ()/(51-67) 97/52     Weight: 47.8 kg (105 lb 6.1 oz)  Body mass index is 19.91 kg/m².    Intake/Output Summary (Last 24 hours) at 10/7/2019 1608  Last data filed at 10/7/2019 1553  Gross per 24 hour   Intake 2076.62 ml   Output 2365 ml   Net -288.38 ml      Physical Exam   Constitutional: He appears well-developed.   HENT:   Head: Normocephalic and atraumatic.   Mouth/Throat: Oropharynx is clear and moist.   ETT in place   Eyes: Pupils are equal, round, and reactive to light. Conjunctivae and EOM are normal.   Neck: Normal range of motion. Neck supple.   Cardiovascular: Normal rate, regular rhythm and normal heart sounds.   Pulmonary/Chest: Effort normal. He has rales.   Coarse breath sound left more than right.     Abdominal: Soft. Bowel sounds are normal.   Musculoskeletal: Normal range of motion.   Neurological: No cranial nerve deficit.   Sedated on vent.   Skin: Skin is warm.       Significant Labs:   BMP:   Recent Labs   Lab 10/07/19  0431   GLU 96      K 3.5      CO2 28   BUN 10   CREATININE 0.6   CALCIUM 8.0*     CBC:   No results for input(s): WBC, HGB, HCT, PLT in the last 48 hours.    Significant Imaging: I have reviewed all pertinent imaging results/findings within the past 24 hours.

## 2019-10-07 NOTE — PROGRESS NOTES
Pharmacokinetic Assessment Follow Up: IV Vancomycin    Vancomycin serum concentration assessment(s):    The trough level was drawn correctly and can be used to guide therapy at this time. The measurement is below the desired definitive target range of 10 to 20 mcg/mL.    Vancomycin Regimen Plan:    Change regimen to Vancomycin 1500 mg IV every 12 hours with next serum trough concentration measured at 11:30 prior to fourth dose on 10/09/2019     Drug levels (last 3 results):  Recent Labs   Lab Result Units 10/05/19  1104 10/07/19  1101   Vancomycin-Trough ug/mL 5.2* 3.9*       Pharmacy will continue to follow and monitor vancomycin.    Please contact pharmacy at extension [phone] for questions regarding this assessment.    Thank you for the consult,   Yana Cheung       Patient brief summary:  Karri Galeano is a 24 y.o. male initiated on antimicrobial therapy with IV Vancomycin for treatment of lower respiratory infection      Drug Allergies:   Review of patient's allergies indicates:   Allergen Reactions    Clindamycin Hives    Erythromycin Rash       Actual Body Weight:   47.8 kg    Renal Function:   Estimated Creatinine Clearance: 127.2 mL/min (based on SCr of 0.6 mg/dL).,     Dialysis Method (if applicable):  N/A    CBC (last 72 hours):  No results for input(s): WHITE BLOOD CELL COUNT, HEMOGLOBIN, HEMATOCRIT, PLATELETS, GRAN%, LYMPH%, MONO%, EOSINOPHIL%, BASOPHIL%, DIFFERENTIAL METHOD in the last 72 hours.    Metabolic Panel (last 72 hours):  Recent Labs   Lab Result Units 10/05/19  0253 10/06/19  0254 10/07/19  0431   Sodium mmol/L 140 138 137   Potassium mmol/L 3.2* 3.3* 3.5   Chloride mmol/L 107 103 102   CO2 mmol/L 25 27 28   Glucose mg/dL 94 121* 96   BUN, Bld mg/dL 11 10 10   Creatinine mg/dL 0.6 0.6 0.6       Vancomycin Administrations:  vancomycin given in the last 96 hours                     vancomycin in dextrose 5 % 1 gram/250 mL IVPB 1,000 mg (mg) 1,000 mg New Bag 10/07/19 1205     1,000 mg New Bag  10/06/19 2317     1,000 mg New Bag  1109     1,000 mg New Bag  0036                      Microbiologic Results:  Microbiology Results (last 7 days)       Procedure Component Value Units Date/Time    Respiratory Infection Panel, Nasopharyngeal [[id number]]     Order Status:  No result Specimen:  Nasopharyngeal Swab     Blood culture x two cultures. Draw prior to antibiotics. [[id number]] Collected:  10/03/19 2054    Order Status:  Completed Specimen:  Blood from Peripheral, Wrist, Left Updated:  10/06/19 2303     Blood Culture, Routine No Growth to date      No Growth to date      No Growth to date      No Growth to date    Narrative:       Aerobic and anaerobic    Blood culture x two cultures. Draw prior to antibiotics. [[id number]] Collected:  10/03/19 2020    Order Status:  Completed Specimen:  Blood from Peripheral, Forearm, Left Updated:  10/06/19 2303     Blood Culture, Routine No Growth to date      No Growth to date      No Growth to date      No Growth to date    Narrative:       Aerobic and anaerobic    Culture, Respiratory with Gram Stain [[id number]] Collected:  10/04/19 1355    Order Status:  Completed Specimen:  Respiratory from Endotracheal Aspirate Updated:  10/06/19 0755     Respiratory Culture No growth     Gram Stain (Respiratory) <10 epithelial cells per low power field.     Gram Stain (Respiratory) No WBC's or organisms seen

## 2019-10-07 NOTE — SUBJECTIVE & OBJECTIVE
HPI:  Patient is 24 y.o. male  has a past medical history of Anxiety, Congenital cerebral palsy, Hypotonia, Leukodystrophy, Mental retardation, Retention of urine, Seizures, Stereotyped movement disorder, and Urinary tract infection. presented to [facility] on 10/3/19 due to fever and worsening lethargy.   patietn was intubated due to decreased mentation and tachypnea.  Post intubation cxr and CT with large atelectasis of left lung which was new when compared to initial admitting cxr on 10/3/19.    Upon my initial evaluation, patient was tachypnic with I:E ratio of 1.4:1.  I lowered inspiratory time from .09 to .65 with immediate improvement in respiratory rate and wob.  Currently, patient is hemodynamically stable.      Hospital Course:  Extubated on 10/5  reintubated 10/6     Interval History: No acute events in last 24 hours. Aunt at bedside, who has really been the primary care giver since death of his mother many years prior.     Currently sedated on propofol. 40%/5PEEP. Still with tracheal secretions. Chest imaging from this AM pending.     Objective:     Vital Signs (Most Recent):  Temp: 97.9 °F (36.6 °C) (10/07/19 0700)  Pulse: 79 (10/07/19 1000)  Resp: (!) 22 (10/07/19 1000)  BP: (!) 120/56 (10/07/19 1000)  SpO2: 97 % (10/07/19 1000) Vital Signs (24h Range):  Temp:  [97.9 °F (36.6 °C)-101 °F (38.3 °C)] 97.9 °F (36.6 °C)  Pulse:  [] 79  Resp:  [22-34] 22  SpO2:  [94 %-100 %] 97 %  BP: ()/(51-67) 120/56     Weight: 47.8 kg (105 lb 6.1 oz)  Body mass index is 19.91 kg/m².      Intake/Output Summary (Last 24 hours) at 10/7/2019 1102  Last data filed at 10/7/2019 1000  Gross per 24 hour   Intake 2113.72 ml   Output 1765 ml   Net 348.72 ml       +5.9L since admission.     Physical Exam   Constitutional: He appears well-developed.   HENT:   Head: Normocephalic and atraumatic.   Mouth/Throat: Oropharynx is clear and moist.   ETT in place   Eyes: Pupils are equal, round, and reactive to light.  Conjunctivae and EOM are normal.   Neck: Normal range of motion. Neck supple.   Cardiovascular: Normal rate, regular rhythm and normal heart sounds.   Pulmonary/Chest: Effort normal. He has rales.   Coarse breath sound left more than right.     Abdominal: Soft. Bowel sounds are normal.   Musculoskeletal: Normal range of motion.   Neurological: No cranial nerve deficit.   Sedated on vent.   Skin: Skin is warm.       Vents:  Vent Mode: PRVC (10/07/19 0749)  Ventilator Initiated: Yes (10/06/19 0006)  Set Rate: 22 bmp (10/07/19 0749)  Vt Set: 350 mL (10/07/19 0749)  Pressure Support: 5 cmH20 (10/05/19 1220)  PEEP/CPAP: 8 cmH20 (10/07/19 0749)  Oxygen Concentration (%): 50 (10/07/19 1000)  Peak Airway Pressure: 19 cmH2O (10/07/19 0749)  Total Ve: 7.1 mL (10/07/19 0749)  F/VT Ratio<105 (RSBI): (!) 95.06 (10/07/19 0749)    Lines/Drains/Airways     Drain                 Urethral Catheter 10/04/19 0104 Straight-tip 16 Fr. 3 days         NG/OG Tube 10/06/19 0000 orogastric 18 Fr. 1 day          Airway                 Airway - Non-Surgical 10/05/19 2350 Endotracheal Tube 1 day          Peripheral Intravenous Line                 Peripheral IV - Single Lumen 10/03/19 2330 18 G Right Forearm 3 days         Peripheral IV - Single Lumen 10/07/19 0348 20 G Left;Lateral Forearm less than 1 day                Significant Labs:    CBC/Anemia Profile:  No results for input(s): WBC, HGB, HCT, PLT, MCV, RDW, IRON, FERRITIN, RETIC, FOLATE, [id number], OCCULTBLOOD in the last 48 hours.     Chemistries:  Recent Labs   Lab 10/06/19  0254 10/07/19  0431    137   K 3.3* 3.5    102   CO2 27 28   BUN 10 10   CREATININE 0.6 0.6   CALCIUM 8.4* 8.0*     Recent Labs   Lab 10/03/19  2020 10/03/19  2345   LACTATE 1.2 1.1      MICROBIOLOGY:     Respiratory Culture No growth    Gram Stain (Respiratory) <10 epithelial cells per low power field.    Gram Stain (Respiratory) No WBC's or organisms seen      Blood cultures- NGTD x 2         Significant Imaging:   CT: I have reviewed all pertinent results/findings within the past 24 hours and my personal findings are:  atelectasis of left lung with abrupt cut off of left mainstem.    CXR: I have reviewed all pertinent results/findings within the past 24 hours and my personal findings are:  10/6/19 improve aeration of right hemithorax.    ABGs:   Recent Labs   Lab 10/07/19  0415   PH 7.556*   PCO2 30.8*   HCO3 27.3   POCSATURATED 99   BE 5       ECHO- None   Stress- None     PFTs- None

## 2019-10-07 NOTE — CARE UPDATE
Pt recieved intubated on Servo i ventilator with the following settings;. PRVC/ 350 TV/ 22 RR/ 50% FI02/ +8 Peep Alarms are set and functioning, Ambu bag at bedside, Pt without distress RT will continue to monitor and wean as tolerated.

## 2019-10-07 NOTE — PROGRESS NOTES
[facility]  Pulmonology  Progress Note    Patient Name: Karri Galeano  MRN: [id number]  Admission Date: 10/3/2019  Hospital Length of Stay: 3 days  Code Status: Full Code  Attending Provider: Bryan Hill MD  Primary Care Provider: Echo Reeves MD   Principal Problem: Pneumonia of left lung due to infectious organism    Subjective:     HPI:  Patient is 24 y.o. male  has a past medical history of Anxiety, Congenital cerebral palsy, Hypotonia, Leukodystrophy, Mental retardation, Retention of urine, Seizures, Stereotyped movement disorder, and Urinary tract infection. presented to [facility] on 10/3/19 due to fever and worsening lethargy.   patietn was intubated due to decreased mentation and tachypnea.  Post intubation cxr and CT with large atelectasis of left lung which was new when compared to initial admitting cxr on 10/3/19.    Upon my initial evaluation, patient was tachypnic with I:E ratio of 1.4:1.  I lowered inspiratory time from .09 to .65 with immediate improvement in respiratory rate and wob.  Currently, patient is hemodynamically stable.      Hospital Course:  Extubated on 10/5  reintubated 10/6     Interval History: No acute events in last 24 hours. Aunt at bedside, who has really been the primary care giver since death of his mother many years prior.     Currently sedated on propofol. 40%/5PEEP. Still with tracheal secretions. Chest imaging from this AM pending.     Objective:     Vital Signs (Most Recent):  Temp: 97.9 °F (36.6 °C) (10/07/19 0700)  Pulse: 79 (10/07/19 1000)  Resp: (!) 22 (10/07/19 1000)  BP: (!) 120/56 (10/07/19 1000)  SpO2: 97 % (10/07/19 1000) Vital Signs (24h Range):  Temp:  [97.9 °F (36.6 °C)-101 °F (38.3 °C)] 97.9 °F (36.6 °C)  Pulse:  [] 79  Resp:  [22-34] 22  SpO2:  [94 %-100 %] 97 %  BP: ()/(51-67) 120/56     Weight: 47.8 kg (105 lb 6.1 oz)  Body mass index is 19.91 kg/m².      Intake/Output Summary (Last 24 hours) at 10/7/2019  1102  Last data filed at 10/7/2019 1000  Gross per 24 hour   Intake 2113.72 ml   Output 1765 ml   Net 348.72 ml       +5.9L since admission.     Physical Exam   Constitutional: He appears well-developed.   HENT:   Head: Normocephalic and atraumatic.   Mouth/Throat: Oropharynx is clear and moist.   ETT in place   Eyes: Pupils are equal, round, and reactive to light. Conjunctivae and EOM are normal.   Neck: Normal range of motion. Neck supple.   Cardiovascular: Normal rate, regular rhythm and normal heart sounds.   Pulmonary/Chest: Effort normal. He has rales.   Coarse breath sound left more than right.     Abdominal: Soft. Bowel sounds are normal.   Musculoskeletal: Normal range of motion.   Neurological: No cranial nerve deficit.   Sedated on vent.   Skin: Skin is warm.       Vents:  Vent Mode: PRVC (10/07/19 0749)  Ventilator Initiated: Yes (10/06/19 0006)  Set Rate: 22 bmp (10/07/19 0749)  Vt Set: 350 mL (10/07/19 0749)  Pressure Support: 5 cmH20 (10/05/19 1220)  PEEP/CPAP: 8 cmH20 (10/07/19 0749)  Oxygen Concentration (%): 50 (10/07/19 1000)  Peak Airway Pressure: 19 cmH2O (10/07/19 0749)  Total Ve: 7.1 mL (10/07/19 0749)  F/VT Ratio<105 (RSBI): (!) 95.06 (10/07/19 0749)    Lines/Drains/Airways     Drain                 Urethral Catheter 10/04/19 0104 Straight-tip 16 Fr. 3 days         NG/OG Tube 10/06/19 0000 orogastric 18 Fr. 1 day          Airway                 Airway - Non-Surgical 10/05/19 2350 Endotracheal Tube 1 day          Peripheral Intravenous Line                 Peripheral IV - Single Lumen 10/03/19 2330 18 G Right Forearm 3 days         Peripheral IV - Single Lumen 10/07/19 0348 20 G Left;Lateral Forearm less than 1 day                Significant Labs:    CBC/Anemia Profile:  No results for input(s): WBC, HGB, HCT, PLT, MCV, RDW, IRON, FERRITIN, RETIC, FOLATE, [id number], OCCULTBLOOD in the last 48 hours.     Chemistries:  Recent Labs   Lab 10/06/19  0254 10/07/19  0431    137   K 3.3* 3.5     102   CO2 27 28   BUN 10 10   CREATININE 0.6 0.6   CALCIUM 8.4* 8.0*     Recent Labs   Lab 10/03/19  2020 10/03/19  2345   LACTATE 1.2 1.1      MICROBIOLOGY:     Respiratory Culture No growth    Gram Stain (Respiratory) <10 epithelial cells per low power field.    Gram Stain (Respiratory) No WBC's or organisms seen      Blood cultures- NGTD x 2        Significant Imaging:   CT: I have reviewed all pertinent results/findings within the past 24 hours and my personal findings are:  atelectasis of left lung with abrupt cut off of left mainstem.    CXR: I have reviewed all pertinent results/findings within the past 24 hours and my personal findings are:  10/6/19 improve aeration of right hemithorax.    ABGs:   Recent Labs   Lab 10/07/19  0415   PH 7.556*   PCO2 30.8*   HCO3 27.3   POCSATURATED 99   BE 5       ECHO- None   Stress- None     PFTs- None         Assessment/Plan:     Epilepsy  Divalproex at home.  Continue. No reported seizure >15 years.     Severe sepsis  Secondary to CAP. Abx as above     Acute respiratory failure with hypoxia  Febrile on presentation with likely viral respiratory tract infection vs. PNA. Increased secretions as a result with fleeting mucous plugs and atelectasis secondary to ineffective airway clearance. Currently on minimal vent support with acceptable lung mechanics and adequate gas exchange. Still with endotracheal secretions, but decreasing. AM imaging pending. Overall, he has not had frequent admission and this appears to be his first episode of respiratory failure. Hopefully this is just infectious in etiology and secretions will lessen with time..     -- Please check RVP (ordered)   -- Continue zosyn for now to cover for aspiration and CAP organisms.   -- Add azithromycin for atypicals  -- OK to stop vanc   -- Wean PEEP/FiO2  -- Diuresis given +5L since admission  -- Will await AM chest imaging to evaluate for persistent RLL atelectasis and if present plan for bronchoscopy  to facilitate airway clearance   -- Would not pursue tracheostomy yet.. I will give additional trial of extubation.        DVT PPX- lovenox   GI PPX- PPI   Nutrition- TF at goal.     Updated his aunt at bedside during rounds     Critical Care Time: 45 minutes  Critical care was time spent personally by me on the following activities: evaluating this patient's organ dysfunction, development of treatment plan, discussing treatment plan with patient or surrogate and bedside caregivers, discussions with consultants, evaluation of patient's response to treatment, examination of patient, ordering and performing treatments and interventions, ordering and review of laboratory studies, ordering and review of radiographic studies, re-evaluation of patient's condition. This critical care time did not overlap with that of any other provider or involve time for any procedures.      David Vela MD  Pulmonology/Critical Care Medicine   [facility]

## 2019-10-07 NOTE — CARE UPDATE
[facility]  ICU Multidisciplinary Bedside Rounds   SUMMARY     Date: 10/7/2019    Prehospitalization: otherGroup home  Admit Date / LOS : 10/3/2019/ 3 days    Diagnosis: Pneumonia of left lung due to infectious organism    Consults:        Active: Palliative and Pulm CC       Needed: N/A     Code Status: Full Code   Advanced Directive: <no information>    LDA: Saldivar, OG, PIV and Vent       Central Lines/Site/Justification:Patient Does Not Have Central Line       Urinary Cath/Order/Justification:Critically Ill in ICU    Vasopressors/Infusions:    propofol 44.979 mcg/kg/min (10/07/19 0500)          GOALS: Volume/ Hemodynamic: N/A                     RASS: -2  light sedation, briefly awakes to voice (eye opening)    CAM ICU: Positive  Pain Management: PO       Pain Controlled: yes     Rhythm: NSR    Respiratory Device: Vent    Vent Mode: PRVC  Oxygen Concentration (%):  [] 50  Resp Rate Total:  [22 br/min-29 br/min] 22 br/min  Vt Set:  [350 mL] 350 mL  PEEP/CPAP:  [8 cmH20] 8 cmH20  Mean Airway Pressure:  [11.8 cwE16-16.9 cmH20] 12.7 cmH20             Most Recent SBT/ SAT: Did not perform       MOVE Screen: PASS    VTE Prophylaxis: Pharm  Mobility: Bedrest  Stress Ulcer Prophylaxis: Yes    Dietary: TF  Tolerance: yes  /  Advancement: yes    Isolation: No active isolations    Restraints: Yes    Significant Dates:  Post Op Date: N/A  Rescue Date: N/A  Imaging/ Diagnostics: CXR 10/6/2019    Noteworthy Labs:  none    CBC/Anemia Labs: Coags:    Recent Labs   Lab 10/03/19  2020 10/04/19  0319   WBC 10.32 10.72   HGB 10.5* 11.2*   HCT 34.5* 36.7*   * 129*   MCV 92 93   RDW 14.9* 15.0*    No results for input(s): PT, INR, APTT in the last 168 hours.     Chemistries:   Recent Labs   Lab 10/03/19  2020 10/04/19  0318 10/05/19  0253 10/06/19  0254 10/07/19  0431   * 145 140 138 137   K 3.7 3.7 3.2* 3.3* 3.5    110 107 103 102   CO2 28 27 25 27 28   BUN 15 12 11 10 10   CREATININE 0.7  0.7 0.6 0.6 0.6   CALCIUM 8.8 7.9* 8.2* 8.4* 8.0*   PROT 6.9 6.4  --   --   --    BILITOT 0.2 0.4  --   --   --    ALKPHOS 73 71  --   --   --    ALT 14 15  --   --   --    AST 19 21  --   --   --    MG  --  1.7  --   --   --    PHOS  --  3.9  --   --   --         Cardiac Enzymes: Ejection Fractions:    No results for input(s): CPK, CPKMB, MB, TROPONINI in the last 72 hours. No results found for: EF     POCT Glucose: HbA1c:    No results for input(s): POCTGLUCOSE in the last 168 hours. No results found for: HGBA1C     Needs from Care Team: none     ICU LOS 3d 4h  Level of Care: Critical Care

## 2019-10-08 LAB
ADENOVIRUS: NOT DETECTED
ALLENS TEST: ABNORMAL
ANION GAP SERPL CALC-SCNC: 9 MMOL/L (ref 8–16)
BACTERIA BLD CULT: NORMAL
BACTERIA BLD CULT: NORMAL
BORDETELLA PARAPERTUSSIS (IS1001): NOT DETECTED
BORDETELLA PERTUSSIS (PTXP): NOT DETECTED
BUN SERPL-MCNC: 12 MG/DL (ref 6–20)
CALCIUM SERPL-MCNC: 8.2 MG/DL (ref 8.7–10.5)
CHLAMYDIA PNEUMONIAE: NOT DETECTED
CHLORIDE SERPL-SCNC: 104 MMOL/L (ref 95–110)
CO2 SERPL-SCNC: 28 MMOL/L (ref 23–29)
CORONAVIRUS 229E, COMMON COLD VIRUS: NOT DETECTED
CORONAVIRUS HKU1, COMMON COLD VIRUS: NOT DETECTED
CORONAVIRUS NL63, COMMON COLD VIRUS: NOT DETECTED
CORONAVIRUS OC43, COMMON COLD VIRUS: NOT DETECTED
CREAT SERPL-MCNC: 0.6 MG/DL (ref 0.5–1.4)
DELSYS: ABNORMAL
ERYTHROCYTE [SEDIMENTATION RATE] IN BLOOD BY WESTERGREN METHOD: 22 MM/H
EST. GFR  (AFRICAN AMERICAN): >60 ML/MIN/1.73 M^2
EST. GFR  (NON AFRICAN AMERICAN): >60 ML/MIN/1.73 M^2
FIO2: 40
FLUBV RNA NPH QL NAA+NON-PROBE: NOT DETECTED
GLUCOSE SERPL-MCNC: 120 MG/DL (ref 70–110)
HCO3 UR-SCNC: 33.5 MMOL/L (ref 24–28)
HPIV1 RNA NPH QL NAA+NON-PROBE: NOT DETECTED
HPIV2 RNA NPH QL NAA+NON-PROBE: NOT DETECTED
HPIV3 RNA NPH QL NAA+NON-PROBE: NOT DETECTED
HPIV4 RNA NPH QL NAA+NON-PROBE: NOT DETECTED
HUMAN METAPNEUMOVIRUS: NOT DETECTED
INFLUENZA A (SUBTYPES H1,H1-2009,H3): NOT DETECTED
MIN VOL: 8.1
MODE: ABNORMAL
MYCOPLASMA PNEUMONIAE: NOT DETECTED
PCO2 BLDA: 33.7 MMHG (ref 35–45)
PEEP: 8
PH SMN: 7.61 [PH] (ref 7.35–7.45)
PIP: 23
PO2 BLDA: 138 MMHG (ref 80–100)
POC BE: 11 MMOL/L
POC SATURATED O2: 100 % (ref 95–100)
POC TCO2: 34 MMOL/L (ref 23–27)
POTASSIUM SERPL-SCNC: 3.2 MMOL/L (ref 3.5–5.1)
RESPIRATORY INFECTION PANEL SOURCE: ABNORMAL
RSV RNA NPH QL NAA+NON-PROBE: NOT DETECTED
RV+EV RNA NPH QL NAA+NON-PROBE: DETECTED
SAMPLE: ABNORMAL
SITE: ABNORMAL
SODIUM SERPL-SCNC: 141 MMOL/L (ref 136–145)
SP02: 99
VT: 350

## 2019-10-08 PROCEDURE — 99900035 HC TECH TIME PER 15 MIN (STAT)

## 2019-10-08 PROCEDURE — 20000000 HC ICU ROOM

## 2019-10-08 PROCEDURE — 25000242 PHARM REV CODE 250 ALT 637 W/ HCPCS: Performed by: INTERNAL MEDICINE

## 2019-10-08 PROCEDURE — 25000003 PHARM REV CODE 250: Performed by: INTERNAL MEDICINE

## 2019-10-08 PROCEDURE — 94667 MNPJ CHEST WALL 1ST: CPT

## 2019-10-08 PROCEDURE — 80048 BASIC METABOLIC PNL TOTAL CA: CPT

## 2019-10-08 PROCEDURE — 94640 AIRWAY INHALATION TREATMENT: CPT

## 2019-10-08 PROCEDURE — 99291 PR CRITICAL CARE, E/M 30-74 MINUTES: ICD-10-PCS | Mod: ,,, | Performed by: NURSE PRACTITIONER

## 2019-10-08 PROCEDURE — 94668 MNPJ CHEST WALL SBSQ: CPT

## 2019-10-08 PROCEDURE — 25000003 PHARM REV CODE 250: Performed by: NURSE PRACTITIONER

## 2019-10-08 PROCEDURE — 36415 COLL VENOUS BLD VENIPUNCTURE: CPT

## 2019-10-08 PROCEDURE — 63600175 PHARM REV CODE 636 W HCPCS: Performed by: INTERNAL MEDICINE

## 2019-10-08 PROCEDURE — 27000221 HC OXYGEN, UP TO 24 HOURS

## 2019-10-08 PROCEDURE — 25000003 PHARM REV CODE 250: Performed by: EMERGENCY MEDICINE

## 2019-10-08 PROCEDURE — 94761 N-INVAS EAR/PLS OXIMETRY MLT: CPT

## 2019-10-08 PROCEDURE — 27200966 HC CLOSED SUCTION SYSTEM

## 2019-10-08 PROCEDURE — 63600175 PHARM REV CODE 636 W HCPCS: Performed by: EMERGENCY MEDICINE

## 2019-10-08 PROCEDURE — 25000003 PHARM REV CODE 250: Performed by: HOSPITALIST

## 2019-10-08 PROCEDURE — 63600175 PHARM REV CODE 636 W HCPCS: Performed by: HOSPITALIST

## 2019-10-08 PROCEDURE — 99900026 HC AIRWAY MAINTENANCE (STAT)

## 2019-10-08 PROCEDURE — 82803 BLOOD GASES ANY COMBINATION: CPT

## 2019-10-08 PROCEDURE — 36600 WITHDRAWAL OF ARTERIAL BLOOD: CPT

## 2019-10-08 PROCEDURE — 94003 VENT MGMT INPAT SUBQ DAY: CPT

## 2019-10-08 PROCEDURE — 99291 CRITICAL CARE FIRST HOUR: CPT | Mod: ,,, | Performed by: NURSE PRACTITIONER

## 2019-10-08 RX ORDER — DEXMEDETOMIDINE HYDROCHLORIDE 4 UG/ML
0.2 INJECTION, SOLUTION INTRAVENOUS CONTINUOUS
Status: DISCONTINUED | OUTPATIENT
Start: 2019-10-08 | End: 2019-10-09

## 2019-10-08 RX ORDER — FUROSEMIDE 10 MG/ML
40 INJECTION INTRAMUSCULAR; INTRAVENOUS ONCE
Status: COMPLETED | OUTPATIENT
Start: 2019-10-08 | End: 2019-10-08

## 2019-10-08 RX ADMIN — PROPOFOL 45 MCG/KG/MIN: 10 INJECTION, EMULSION INTRAVENOUS at 02:10

## 2019-10-08 RX ADMIN — IPRATROPIUM BROMIDE AND ALBUTEROL SULFATE 3 ML: .5; 3 SOLUTION RESPIRATORY (INHALATION) at 08:10

## 2019-10-08 RX ADMIN — IPRATROPIUM BROMIDE AND ALBUTEROL SULFATE 3 ML: .5; 3 SOLUTION RESPIRATORY (INHALATION) at 12:10

## 2019-10-08 RX ADMIN — CHLORHEXIDINE GLUCONATE 0.12% ORAL RINSE 15 ML: 1.2 LIQUID ORAL at 09:10

## 2019-10-08 RX ADMIN — DIVALPROEX SODIUM 500 MG: 125 CAPSULE, COATED PELLETS ORAL at 09:10

## 2019-10-08 RX ADMIN — PANTOPRAZOLE SODIUM 40 MG: 40 GRANULE, DELAYED RELEASE ORAL at 09:10

## 2019-10-08 RX ADMIN — CLONAZEPAM 1 MG: 0.5 TABLET ORAL at 09:10

## 2019-10-08 RX ADMIN — IPRATROPIUM BROMIDE AND ALBUTEROL SULFATE 3 ML: .5; 3 SOLUTION RESPIRATORY (INHALATION) at 07:10

## 2019-10-08 RX ADMIN — VANCOMYCIN HYDROCHLORIDE 1500 MG: 1.5 INJECTION, POWDER, LYOPHILIZED, FOR SOLUTION INTRAVENOUS at 12:10

## 2019-10-08 RX ADMIN — DEXMEDETOMIDINE HYDROCHLORIDE 0.2 MCG/KG/HR: 4 INJECTION, SOLUTION INTRAVENOUS at 02:10

## 2019-10-08 RX ADMIN — VANCOMYCIN HYDROCHLORIDE 1500 MG: 1.5 INJECTION, POWDER, LYOPHILIZED, FOR SOLUTION INTRAVENOUS at 11:10

## 2019-10-08 RX ADMIN — PIPERACILLIN AND TAZOBACTAM 4.5 G: 4; .5 INJECTION, POWDER, FOR SOLUTION INTRAVENOUS at 11:10

## 2019-10-08 RX ADMIN — IPRATROPIUM BROMIDE AND ALBUTEROL SULFATE 3 ML: .5; 3 SOLUTION RESPIRATORY (INHALATION) at 01:10

## 2019-10-08 RX ADMIN — PROPOFOL 34.87 MCG/KG/MIN: 10 INJECTION, EMULSION INTRAVENOUS at 10:10

## 2019-10-08 RX ADMIN — ENOXAPARIN SODIUM 40 MG: 100 INJECTION SUBCUTANEOUS at 04:10

## 2019-10-08 RX ADMIN — FUROSEMIDE 40 MG: 10 INJECTION, SOLUTION INTRAVENOUS at 10:10

## 2019-10-08 RX ADMIN — PIPERACILLIN AND TAZOBACTAM 4.5 G: 4; .5 INJECTION, POWDER, FOR SOLUTION INTRAVENOUS at 07:10

## 2019-10-08 RX ADMIN — TAMSULOSIN HYDROCHLORIDE 0.4 MG: 0.4 CAPSULE ORAL at 09:10

## 2019-10-08 RX ADMIN — FLUOXETINE HYDROCHLORIDE 60 MG: 20 SOLUTION ORAL at 09:10

## 2019-10-08 RX ADMIN — LACTULOSE 10 G: 20 SOLUTION ORAL at 09:10

## 2019-10-08 RX ADMIN — PIPERACILLIN AND TAZOBACTAM 4.5 G: 4; .5 INJECTION, POWDER, FOR SOLUTION INTRAVENOUS at 02:10

## 2019-10-08 NOTE — PLAN OF CARE
Pt remains in ICU intubated and sedated. VSS, afebrile this shift. Saldivar in place putting out adequate UOP. Restraints remains in place, criteria to discontinue not met this shift, no injuries noted. TF continued at goal, no residual. Free water flushes given as ordered. IV site changed, old IV removed. Vent settings adjusted by respiratory per MD d/t ABG results. Turned Q2. Plan of care reviewed with pt at bedside. No falls, injuries or skin breakdown, precautions maintained for all.

## 2019-10-08 NOTE — PLAN OF CARE
Pt remains in the ICU on ventilator. Failed SBT today due to shallow tachypnea. Started on precedex at 0.2 mcg/kg/hr. Tube feedings re-started. BM x1. Aunt at bedside updated throughout day. Lasix given, richardson with excellent output. Remains free from fall, injury, or breakdown throughout shift.

## 2019-10-08 NOTE — CARE UPDATE
[facility]  ICU Multidisciplinary Bedside Rounds   SUMMARY     Date: 10/8/2019    Prehospitalization: other Group home ([facility])  Admit Date / LOS : 10/3/2019/ 4 days    Diagnosis: Pneumonia of left lung due to infectious organism    Consults:        Active: Palliative and Pulm CC       Needed: N/A     Code Status: Full Code   Advanced Directive: <no information>    LDA: Saldivar, OG, PIV and Vent       Central Lines/Site/Justification:Patient Does Not Have Central Line       Urinary Cath/Order/Justification:Critically Ill in ICU    Vasopressors/Infusions:    propofol 44.979 mcg/kg/min (10/08/19 0400)          GOALS: Volume/ Hemodynamic: N/A                     RASS: -2  light sedation, briefly awakes to voice (eye opening)    CAM ICU: Positive  Pain Management: PO       Pain Controlled: yes     Rhythm: NSR    Respiratory Device: Vent    Vent Mode: PRVC  Oxygen Concentration (%):  [] 40  Resp Rate Total:  [22 br/min-27 br/min] 22 br/min  Vt Set:  [350 mL] 350 mL  PEEP/CPAP:  [8 cmH20] 8 cmH20  Mean Airway Pressure:  [9.3 egJ20-96.7 cmH20] 12.4 cmH20             Most Recent SBT/ SAT: Did not perform       MOVE Screen: PASS    VTE Prophylaxis: Pharm  Mobility: Bedrest  Stress Ulcer Prophylaxis: Yes    Dietary: TF  Tolerance: yes  /  Advancement: @ goal    Isolation: No active isolations    Restraints: Yes    Significant Dates:  Post Op Date: N/A  Rescue Date: N/A  Imaging/ Diagnostics: N/A    Noteworthy Labs:  K 3.2    CBC/Anemia Labs: Coags:    Recent Labs   Lab 10/03/19  2020 10/04/19  0319   WBC 10.32 10.72   HGB 10.5* 11.2*   HCT 34.5* 36.7*   * 129*   MCV 92 93   RDW 14.9* 15.0*    No results for input(s): PT, INR, APTT in the last 168 hours.     Chemistries:   Recent Labs   Lab 10/03/19  2020 10/04/19  0318  10/06/19  0254 10/07/19  0431 10/08/19  0250   * 145   < > 138 137 141   K 3.7 3.7   < > 3.3* 3.5 3.2*    110   < > 103 102 104   CO2 28 27   < > 27 28 28   BUN 15  12   < > 10 10 12   CREATININE 0.7 0.7   < > 0.6 0.6 0.6   CALCIUM 8.8 7.9*   < > 8.4* 8.0* 8.2*   PROT 6.9 6.4  --   --   --   --    BILITOT 0.2 0.4  --   --   --   --    ALKPHOS 73 71  --   --   --   --    ALT 14 15  --   --   --   --    AST 19 21  --   --   --   --    MG  --  1.7  --   --   --   --    PHOS  --  3.9  --   --   --   --     < > = values in this interval not displayed.        Cardiac Enzymes: Ejection Fractions:    No results for input(s): CPK, CPKMB, MB, TROPONINI in the last 72 hours. No results found for: EF     POCT Glucose: HbA1c:    No results for input(s): POCTGLUCOSE in the last 168 hours. No results found for: HGBA1C     Needs from Care Team: none     ICU LOS 4d 2h  Level of Care: Critical Care

## 2019-10-08 NOTE — SUBJECTIVE & OBJECTIVE
Interval History: failed SBT this am 2/2 increased resp rate and low tidal volumes. Patient's aunt scheduled to leave town this evening    Objective:     Vital Signs (Most Recent):  Temp: 98.4 °F (36.9 °C) (10/08/19 1115)  Pulse: 85 (10/08/19 1300)  Resp: (!) 21 (10/08/19 1300)  BP: 136/79 (10/08/19 1300)  SpO2: 97 % (10/08/19 1300) Vital Signs (24h Range):  Temp:  [98.2 °F (36.8 °C)-98.9 °F (37.2 °C)] 98.4 °F (36.9 °C)  Pulse:  [69-96] 85  Resp:  [17-26] 21  SpO2:  [91 %-100 %] 97 %  BP: ()/(52-80) 136/79     Weight: 47.8 kg (105 lb 6.1 oz)  Body mass index is 19.91 kg/m².      Intake/Output Summary (Last 24 hours) at 10/8/2019 1321  Last data filed at 10/8/2019 0900  Gross per 24 hour   Intake 2338 ml   Output 1225 ml   Net 1113 ml       Physical Exam   Constitutional: He appears lethargic. He appears cachectic. He is sleeping. He has a sickly appearance (chronic). He is sedated and intubated.   HENT:   Head: Normocephalic and atraumatic.   Eyes: Pupils are equal, round, and reactive to light. No scleral icterus. Right eye exhibits no nystagmus. Left eye exhibits no nystagmus.   Neck: Trachea normal. No JVD present.   Cardiovascular: Normal rate and regular rhythm. Exam reveals no gallop and no friction rub.   No murmur heard.  Pulses:       Radial pulses are 2+ on the right side, and 2+ on the left side.        Dorsalis pedis pulses are 2+ on the right side, and 2+ on the left side.   No edema to note   Pulmonary/Chest: Effort normal. He is intubated. No respiratory distress. He has no wheezes. He has no rhonchi. He has no rales.   Abdominal: Soft. Bowel sounds are normal. He exhibits no distension. There is no tenderness. There is no guarding.   Musculoskeletal: He exhibits deformity.   Contracted right wrist   Neurological: He appears lethargic. No cranial nerve deficit. GCS eye subscore is 2. GCS verbal subscore is 1. GCS motor subscore is 4.   Cough gag reflexes intact, withdraws in all extremities to  pain, no focal deficits to note       Vents:  Vent Mode: PRVC (10/08/19 0900)  Ventilator Initiated: Yes (10/06/19 0006)  Set Rate: 18 bmp (10/08/19 0900)  Vt Set: 350 mL (10/08/19 0900)  Pressure Support: 5 cmH20 (10/05/19 1220)  PEEP/CPAP: 8 cmH20 (10/08/19 0900)  Oxygen Concentration (%): 30 (10/08/19 1300)  Peak Airway Pressure: 22.7 cmH2O (10/08/19 0900)  Total Ve: 6.5 mL (10/08/19 0900)  F/VT Ratio<105 (RSBI): (!) 50.14 (10/08/19 0900)    Lines/Drains/Airways     Drain                 Urethral Catheter 10/04/19 0104 Straight-tip 16 Fr. 4 days         NG/OG Tube 10/06/19 0000 orogastric 18 Fr. 2 days          Airway                 Airway - Non-Surgical 10/05/19 2350 Endotracheal Tube 2 days          Peripheral Intravenous Line                 Peripheral IV - Single Lumen 10/07/19 0348 20 G Left;Lateral Forearm 1 day         Peripheral IV - Single Lumen 10/08/19 0421 20 G Anterior;Right Forearm less than 1 day                Significant Labs:    CBC/Anemia Profile:  No results for input(s): WBC, HGB, HCT, PLT, MCV, RDW, IRON, FERRITIN, RETIC, FOLATE, [id number], OCCULTBLOOD in the last 48 hours.     Chemistries:  Recent Labs   Lab 10/07/19  0431 10/08/19  0250    141   K 3.5 3.2*    104   CO2 28 28   BUN 10 12   CREATININE 0.6 0.6   CALCIUM 8.0* 8.2*       All pertinent labs within the past 24 hours have been reviewed.    Significant Imaging:  I have reviewed all pertinent imaging results/findings within the past 24 hours.

## 2019-10-08 NOTE — PLAN OF CARE
Remains in ICU, intubated and on propofol. Lasix received today x 1 dose. Adequate diuresis. Tube feeds at goal, pt tolerating well. Bronch done today at bedside by Dr Vela. Skin intact. No falls. Aunt has remained at bedside.

## 2019-10-08 NOTE — PROGRESS NOTES
Results reported to Dr Marcos.   Rate decreased to 18, FIO2 30% per MD.   Results for MODESTO CANTU (MRN [id number]) as of 10/8/2019 05:17   Ref. Range 10/8/2019 04:29   POC PH Latest Ref Range: 7.35 - 7.45  7.605 (HH)   POC PCO2 Latest Ref Range: 35 - 45 mmHg 33.7 (L)   POC PO2 Latest Ref Range: 80 - 100 mmHg 138 (H)   POC BE Latest Ref Range: -2 to 2 mmol/L 11   POC HCO3 Latest Ref Range: 24 - 28 mmol/L 33.5 (H)   POC SATURATED O2 Latest Ref Range: 95 - 100 % 100   POC TCO2 Latest Ref Range: 23 - 27 mmol/L 34 (H)   FiO2 Unknown 40   Vt Unknown 350   PiP Unknown 23   PEEP Unknown 8   Sample Unknown ARTERIAL   DelSys Unknown Adult Vent   Allens Test Unknown Pass   Site Unknown LR   Mode Unknown AC/PRVC   Rate Unknown 22

## 2019-10-08 NOTE — PROGRESS NOTES
[facility]  Pulmonology  Progress Note    Patient Name: Karri Galeano  MRN: [id number]  Admission Date: 10/3/2019  Hospital Length of Stay: 4 days  Code Status: Full Code  Attending Provider: Echo Stokes MD  Primary Care Provider: Echo Reeves MD   Principal Problem: Pneumonia of left lung due to infectious organism    Subjective:     Interval History: failed SBT this am 2/2 increased resp rate and low tidal volumes. Patient's aunt scheduled to leave [facility] this evening    Objective:     Vital Signs (Most Recent):  Temp: 98.4 °F (36.9 °C) (10/08/19 1115)  Pulse: 85 (10/08/19 1300)  Resp: (!) 21 (10/08/19 1300)  BP: 136/79 (10/08/19 1300)  SpO2: 97 % (10/08/19 1300) Vital Signs (24h Range):  Temp:  [98.2 °F (36.8 °C)-98.9 °F (37.2 °C)] 98.4 °F (36.9 °C)  Pulse:  [69-96] 85  Resp:  [17-26] 21  SpO2:  [91 %-100 %] 97 %  BP: ()/(52-80) 136/79     Weight: 47.8 kg (105 lb 6.1 oz)  Body mass index is 19.91 kg/m².      Intake/Output Summary (Last 24 hours) at 10/8/2019 1321  Last data filed at 10/8/2019 0900  Gross per 24 hour   Intake 2338 ml   Output 1225 ml   Net 1113 ml       Physical Exam   Constitutional: He appears lethargic. He appears cachectic. He is sleeping. He has a sickly appearance (chronic). He is sedated and intubated.   HENT:   Head: Normocephalic and atraumatic.   Eyes: Pupils are equal, round, and reactive to light. No scleral icterus. Right eye exhibits no nystagmus. Left eye exhibits no nystagmus.   Neck: Trachea normal. No JVD present.   Cardiovascular: Normal rate and regular rhythm. Exam reveals no gallop and no friction rub.   No murmur heard.  Pulses:       Radial pulses are 2+ on the right side, and 2+ on the left side.        Dorsalis pedis pulses are 2+ on the right side, and 2+ on the left side.   No edema to note   Pulmonary/Chest: Effort normal. He is intubated. No respiratory distress. He has no wheezes. He has no rhonchi. He has no rales.   Abdominal:  Soft. Bowel sounds are normal. He exhibits no distension. There is no tenderness. There is no guarding.   Musculoskeletal: He exhibits deformity.   Contracted right wrist   Neurological: He appears lethargic. No cranial nerve deficit. GCS eye subscore is 2. GCS verbal subscore is 1. GCS motor subscore is 4.   Cough gag reflexes intact, withdraws in all extremities to pain, no focal deficits to note       Vents:  Vent Mode: PRVC (10/08/19 0900)  Ventilator Initiated: Yes (10/06/19 0006)  Set Rate: 18 bmp (10/08/19 0900)  Vt Set: 350 mL (10/08/19 0900)  Pressure Support: 5 cmH20 (10/05/19 1220)  PEEP/CPAP: 8 cmH20 (10/08/19 0900)  Oxygen Concentration (%): 30 (10/08/19 1300)  Peak Airway Pressure: 22.7 cmH2O (10/08/19 0900)  Total Ve: 6.5 mL (10/08/19 0900)  F/VT Ratio<105 (RSBI): (!) 50.14 (10/08/19 0900)    Lines/Drains/Airways     Drain                 Urethral Catheter 10/04/19 0104 Straight-tip 16 Fr. 4 days         NG/OG Tube 10/06/19 0000 orogastric 18 Fr. 2 days          Airway                 Airway - Non-Surgical 10/05/19 2350 Endotracheal Tube 2 days          Peripheral Intravenous Line                 Peripheral IV - Single Lumen 10/07/19 0348 20 G Left;Lateral Forearm 1 day         Peripheral IV - Single Lumen 10/08/19 0421 20 G Anterior;Right Forearm less than 1 day                Significant Labs:    CBC/Anemia Profile:  No results for input(s): WBC, HGB, HCT, PLT, MCV, RDW, IRON, FERRITIN, RETIC, FOLATE, [id number], OCCULTBLOOD in the last 48 hours.     Chemistries:  Recent Labs   Lab 10/07/19  0431 10/08/19  0250    141   K 3.5 3.2*    104   CO2 28 28   BUN 10 12   CREATININE 0.6 0.6   CALCIUM 8.0* 8.2*       All pertinent labs within the past 24 hours have been reviewed.    Significant Imaging:  I have reviewed all pertinent imaging results/findings within the past 24 hours.    Assessment/Plan:     Epilepsy  Divalproex at home.  Continue. No reported seizure >15 years.     Severe  sepsis  Secondary to CAP.   --See respiratory failure for plan    Acute respiratory failure with hypoxia  Febrile on presentation with Enterovirus respiratory tract infection +/- PNA. Increased secretions as a result with fleeting mucous plugs and atelectasis secondary to ineffective airway clearance. Currently on minimal vent support with acceptable lung mechanics and adequate gas exchange. Still with endotracheal secretions, but decreasing. Overall, he has not had frequent admission and this appears to be his first episode of respiratory failure.    -- Positive for rhinovirus per RVP  -- Continue zosyn for now to cover for aspiration and CAP organisms.   -- Add azithromycin for atypicals   -- on minimal PEEP/FiO2 for vent support  -- lasix for diuresis with good UO  -- s/p bronch on 10/07 without overt evidence of severe mucus plugging   -- Would not pursue tracheostomy yet.. I will give additional trial of extubation.   -- failed SBT today, will re-attempt in am      PPI ppx: pantoprazole   VTE ppx: enoxaparin    Above plan discussed with Dr. Vela    Critical Care time 55 minutes       Lc Hernandez NP  Pulmonology  [facility] [place]

## 2019-10-08 NOTE — ASSESSMENT & PLAN NOTE
Febrile on presentation with Enterovirus respiratory tract infection +/- PNA. Increased secretions as a result with fleeting mucous plugs and atelectasis secondary to ineffective airway clearance. Currently on minimal vent support with acceptable lung mechanics and adequate gas exchange. Still with endotracheal secretions, but decreasing. Overall, he has not had frequent admission and this appears to be his first episode of respiratory failure.    -- Positive for rhinovirus per RVP  -- Continue zosyn for now to cover for aspiration and CAP organisms.   -- Add azithromycin for atypicals   -- on minimal PEEP/FiO2 for vent support  -- lasix for diuresis with good UO  -- s/p bronch on 10/07 without overt evidence of severe mucus plugging   -- Would not pursue tracheostomy yet.. I will give additional trial of extubation.   -- failed SBT today, will re-attempt in am

## 2019-10-09 LAB
ALLENS TEST: ABNORMAL
ALLENS TEST: ABNORMAL
ANION GAP SERPL CALC-SCNC: 8 MMOL/L (ref 8–16)
BACTERIA SPEC AEROBE CULT: ABNORMAL
BUN SERPL-MCNC: 14 MG/DL (ref 6–20)
CALCIUM SERPL-MCNC: 8.6 MG/DL (ref 8.7–10.5)
CHLORIDE SERPL-SCNC: 105 MMOL/L (ref 95–110)
CO2 SERPL-SCNC: 33 MMOL/L (ref 23–29)
CREAT SERPL-MCNC: 0.7 MG/DL (ref 0.5–1.4)
DELSYS: ABNORMAL
DELSYS: ABNORMAL
ERYTHROCYTE [SEDIMENTATION RATE] IN BLOOD BY WESTERGREN METHOD: 15 MM/H
EST. GFR  (AFRICAN AMERICAN): >60 ML/MIN/1.73 M^2
EST. GFR  (NON AFRICAN AMERICAN): >60 ML/MIN/1.73 M^2
FIO2: 21
FIO2: 30
GLUCOSE SERPL-MCNC: 103 MG/DL (ref 70–110)
GRAM STN SPEC: ABNORMAL
HCO3 UR-SCNC: 31.3 MMOL/L (ref 24–28)
HCO3 UR-SCNC: 33 MMOL/L (ref 24–28)
MIN VOL: 7.4
MODE: ABNORMAL
MODE: ABNORMAL
PCO2 BLDA: 46.4 MMHG (ref 35–45)
PCO2 BLDA: 46.7 MMHG (ref 35–45)
PEEP: 5
PEEP: 5
PH SMN: 7.44 [PH] (ref 7.35–7.45)
PH SMN: 7.46 [PH] (ref 7.35–7.45)
PIP: 20
PO2 BLDA: 55 MMHG (ref 40–60)
PO2 BLDA: 86 MMHG (ref 80–100)
POC BE: 6 MMOL/L
POC BE: 8 MMOL/L
POC SATURATED O2: 89 % (ref 95–100)
POC SATURATED O2: 97 % (ref 95–100)
POC TCO2: 33 MMOL/L (ref 24–29)
POC TCO2: 34 MMOL/L (ref 23–27)
POTASSIUM SERPL-SCNC: 3.6 MMOL/L (ref 3.5–5.1)
PS: 10
SAMPLE: ABNORMAL
SAMPLE: ABNORMAL
SITE: ABNORMAL
SITE: ABNORMAL
SODIUM SERPL-SCNC: 146 MMOL/L (ref 136–145)
SP02: 96
VANCOMYCIN TROUGH SERPL-MCNC: 18.4 UG/ML (ref 10–22)
VT: 350

## 2019-10-09 PROCEDURE — 94668 MNPJ CHEST WALL SBSQ: CPT

## 2019-10-09 PROCEDURE — 99900017 HC EXTUBATION W/PARAMETERS (STAT)

## 2019-10-09 PROCEDURE — 36415 COLL VENOUS BLD VENIPUNCTURE: CPT

## 2019-10-09 PROCEDURE — 25000003 PHARM REV CODE 250: Performed by: EMERGENCY MEDICINE

## 2019-10-09 PROCEDURE — 27200966 HC CLOSED SUCTION SYSTEM

## 2019-10-09 PROCEDURE — 82803 BLOOD GASES ANY COMBINATION: CPT

## 2019-10-09 PROCEDURE — 94640 AIRWAY INHALATION TREATMENT: CPT

## 2019-10-09 PROCEDURE — 99900035 HC TECH TIME PER 15 MIN (STAT)

## 2019-10-09 PROCEDURE — 63600175 PHARM REV CODE 636 W HCPCS: Performed by: HOSPITALIST

## 2019-10-09 PROCEDURE — 63600175 PHARM REV CODE 636 W HCPCS: Performed by: INTERNAL MEDICINE

## 2019-10-09 PROCEDURE — 27000190 HC CPAP FULL FACE MASK W/VALVE

## 2019-10-09 PROCEDURE — 63600175 PHARM REV CODE 636 W HCPCS: Performed by: EMERGENCY MEDICINE

## 2019-10-09 PROCEDURE — 94003 VENT MGMT INPAT SUBQ DAY: CPT

## 2019-10-09 PROCEDURE — 99291 PR CRITICAL CARE, E/M 30-74 MINUTES: ICD-10-PCS | Mod: ,,, | Performed by: NURSE PRACTITIONER

## 2019-10-09 PROCEDURE — 25000242 PHARM REV CODE 250 ALT 637 W/ HCPCS: Performed by: INTERNAL MEDICINE

## 2019-10-09 PROCEDURE — 25000003 PHARM REV CODE 250: Performed by: INTERNAL MEDICINE

## 2019-10-09 PROCEDURE — 80202 ASSAY OF VANCOMYCIN: CPT

## 2019-10-09 PROCEDURE — 27100092 HC HIGH FLOW DELIVERY CANNULA

## 2019-10-09 PROCEDURE — 25000003 PHARM REV CODE 250: Performed by: HOSPITALIST

## 2019-10-09 PROCEDURE — 80048 BASIC METABOLIC PNL TOTAL CA: CPT

## 2019-10-09 PROCEDURE — 27000221 HC OXYGEN, UP TO 24 HOURS

## 2019-10-09 PROCEDURE — 36600 WITHDRAWAL OF ARTERIAL BLOOD: CPT

## 2019-10-09 PROCEDURE — 27100171 HC OXYGEN HIGH FLOW UP TO 24 HOURS

## 2019-10-09 PROCEDURE — 99900026 HC AIRWAY MAINTENANCE (STAT)

## 2019-10-09 PROCEDURE — S0028 INJECTION, FAMOTIDINE, 20 MG: HCPCS | Performed by: HOSPITALIST

## 2019-10-09 PROCEDURE — 99291 CRITICAL CARE FIRST HOUR: CPT | Mod: ,,, | Performed by: NURSE PRACTITIONER

## 2019-10-09 PROCEDURE — 20000000 HC ICU ROOM

## 2019-10-09 PROCEDURE — 94761 N-INVAS EAR/PLS OXIMETRY MLT: CPT

## 2019-10-09 RX ORDER — FAMOTIDINE 10 MG/ML
20 INJECTION INTRAVENOUS 2 TIMES DAILY
Status: DISCONTINUED | OUTPATIENT
Start: 2019-10-09 | End: 2019-11-02 | Stop reason: HOSPADM

## 2019-10-09 RX ORDER — VALPROIC ACID 250 MG/5ML
500 SOLUTION ORAL EVERY 12 HOURS
Status: DISCONTINUED | OUTPATIENT
Start: 2019-10-09 | End: 2019-10-15

## 2019-10-09 RX ADMIN — VANCOMYCIN HYDROCHLORIDE 1250 MG: 1.25 INJECTION, POWDER, LYOPHILIZED, FOR SOLUTION INTRAVENOUS at 11:10

## 2019-10-09 RX ADMIN — TAMSULOSIN HYDROCHLORIDE 0.4 MG: 0.4 CAPSULE ORAL at 08:10

## 2019-10-09 RX ADMIN — PIPERACILLIN AND TAZOBACTAM 4.5 G: 4; .5 INJECTION, POWDER, FOR SOLUTION INTRAVENOUS at 11:10

## 2019-10-09 RX ADMIN — IPRATROPIUM BROMIDE AND ALBUTEROL SULFATE 3 ML: .5; 3 SOLUTION RESPIRATORY (INHALATION) at 02:10

## 2019-10-09 RX ADMIN — IPRATROPIUM BROMIDE AND ALBUTEROL SULFATE 3 ML: .5; 3 SOLUTION RESPIRATORY (INHALATION) at 12:10

## 2019-10-09 RX ADMIN — CLONAZEPAM 1 MG: 0.5 TABLET ORAL at 09:10

## 2019-10-09 RX ADMIN — ENOXAPARIN SODIUM 40 MG: 100 INJECTION SUBCUTANEOUS at 04:10

## 2019-10-09 RX ADMIN — IPRATROPIUM BROMIDE AND ALBUTEROL SULFATE 3 ML: .5; 3 SOLUTION RESPIRATORY (INHALATION) at 08:10

## 2019-10-09 RX ADMIN — FAMOTIDINE 20 MG: 10 INJECTION INTRAVENOUS at 08:10

## 2019-10-09 RX ADMIN — VALPROIC ACID 500 MG: 250 SOLUTION ORAL at 08:10

## 2019-10-09 RX ADMIN — FLUOXETINE HYDROCHLORIDE 60 MG: 20 SOLUTION ORAL at 08:10

## 2019-10-09 RX ADMIN — CHLORHEXIDINE GLUCONATE 0.12% ORAL RINSE 15 ML: 1.2 LIQUID ORAL at 08:10

## 2019-10-09 RX ADMIN — CLONAZEPAM 1 MG: 0.5 TABLET ORAL at 08:10

## 2019-10-09 RX ADMIN — VALPROIC ACID 500 MG: 250 SOLUTION ORAL at 09:10

## 2019-10-09 RX ADMIN — PIPERACILLIN AND TAZOBACTAM 4.5 G: 4; .5 INJECTION, POWDER, FOR SOLUTION INTRAVENOUS at 04:10

## 2019-10-09 RX ADMIN — VANCOMYCIN HYDROCHLORIDE 1500 MG: 1.5 INJECTION, POWDER, LYOPHILIZED, FOR SOLUTION INTRAVENOUS at 12:10

## 2019-10-09 RX ADMIN — IPRATROPIUM BROMIDE AND ALBUTEROL SULFATE 3 ML: .5; 3 SOLUTION RESPIRATORY (INHALATION) at 07:10

## 2019-10-09 RX ADMIN — PIPERACILLIN AND TAZOBACTAM 4.5 G: 4; .5 INJECTION, POWDER, FOR SOLUTION INTRAVENOUS at 07:10

## 2019-10-09 RX ADMIN — FAMOTIDINE 20 MG: 10 INJECTION INTRAVENOUS at 09:10

## 2019-10-09 NOTE — CARE UPDATE
[facility]  ICU Multidisciplinary Bedside Rounds   SUMMARY     Date: 10/9/2019    Prehospitalization: Nursing Home  Admit Date / LOS : 10/3/2019/ 5 days    Diagnosis: Pneumonia of left lung due to infectious organism    Consults:        Active: Palliative and Pulm CC       Needed: N/A     Code Status: Full Code   Advanced Directive: <no information>    LDA: Saldivar, PIV and Vent       Central Lines/Site/Justification:Patient Does Not Have Central Line       Urinary Cath/Order/Justification:Critically Ill in ICU    Vasopressors/Infusions:    dexmedetomidine (PRECEDEX) infusion 0.301 mcg/kg/hr (10/09/19 0600)    propofol Stopped (10/08/19 1100)          GOALS: Volume/ Hemodynamic: N/A                     RASS: -1  awakes to voice (eye opening/contact) > 10 seconds    CAM ICU: Positive  Pain Management: PO       Pain Controlled: yes     Rhythm: NSR    Respiratory Device: Vent    Vent Mode: PRVC  Oxygen Concentration (%):  [30] 30  Resp Rate Total:  [14 br/min-38 br/min] 15 br/min  Vt Set:  [350 mL] 350 mL  PEEP/CPAP:  [5 cmH20-8 cmH20] 5 cmH20  Mean Airway Pressure:  [8.3 lmL48-45.3 cmH20] 14.2 cmH20             Most Recent SBT/ SAT: Fail       MOVE Screen: PASS    VTE Prophylaxis: Pharm  Mobility: Bedrest  Stress Ulcer Prophylaxis: Yes    Dietary: TF  Tolerance: yes  /  Advancement: @ goal    Isolation: No active isolations    Restraints: Yes    Significant Dates:  Post Op Date: N/A  Rescue Date: N/A  Imaging/ Diagnostics: N/A    Noteworthy Labs:  none    CBC/Anemia Labs: Coags:    Recent Labs   Lab 10/03/19  2020 10/04/19  0319   WBC 10.32 10.72   HGB 10.5* 11.2*   HCT 34.5* 36.7*   * 129*   MCV 92 93   RDW 14.9* 15.0*    No results for input(s): PT, INR, APTT in the last 168 hours.     Chemistries:   Recent Labs   Lab 10/03/19  2020 10/04/19  0318  10/07/19  0431 10/08/19  0250 10/09/19  0428   * 145   < > 137 141 146*   K 3.7 3.7   < > 3.5 3.2* 3.6    110   < > 102 104 105    CO2 28 27   < > 28 28 33*   BUN 15 12   < > 10 12 14   CREATININE 0.7 0.7   < > 0.6 0.6 0.7   CALCIUM 8.8 7.9*   < > 8.0* 8.2* 8.6*   PROT 6.9 6.4  --   --   --   --    BILITOT 0.2 0.4  --   --   --   --    ALKPHOS 73 71  --   --   --   --    ALT 14 15  --   --   --   --    AST 19 21  --   --   --   --    MG  --  1.7  --   --   --   --    PHOS  --  3.9  --   --   --   --     < > = values in this interval not displayed.        Cardiac Enzymes: Ejection Fractions:    No results for input(s): CPK, CPKMB, MB, TROPONINI in the last 72 hours. No results found for: EF     POCT Glucose: HbA1c:    No results for input(s): POCTGLUCOSE in the last 168 hours. No results found for: HGBA1C     Needs from Care Team: none     ICU LOS 5d 4h  Level of Care: Critical Care

## 2019-10-09 NOTE — PLAN OF CARE
Pt remains in ICU intubated and sedated. NSR on monitor, VSS. Afebrile this shift. Saldivar in place putting out clear yellow urine. TF continued at goal without complications. Free water flushed given as ordered. Turned Q2. No falls, injuries or skin breakdown this shift, precautions maintained for all.

## 2019-10-09 NOTE — PROGRESS NOTES
[facility]  Pulmonology  Progress Note    Patient Name: Karri Galeano  MRN: [id number]  Admission Date: 10/3/2019  Hospital Length of Stay: 5 days  Code Status: Full Code  Attending Provider: Echo Stokes MD  Primary Care Provider: Echo Reeves MD   Principal Problem: Pneumonia of left lung due to infectious organism    Subjective:     Interval History: Oxygenation continues to improve. Sedation changed to precedex for agitation during SBT. Will reattempt SBT today.     Objective:     Vital Signs (Most Recent):  Temp: 99.5 °F (37.5 °C) (10/09/19 0701)  Pulse: 92 (10/09/19 0900)  Resp: (!) 25 (10/09/19 0900)  BP: (!) 147/87 (10/09/19 0900)  SpO2: (!) 94 % (10/09/19 0900) Vital Signs (24h Range):  Temp:  [97.8 °F (36.6 °C)-99.5 °F (37.5 °C)] 99.5 °F (37.5 °C)  Pulse:  [] 92  Resp:  [15-29] 25  SpO2:  [94 %-100 %] 94 %  BP: (124-164)/(62-91) 147/87     Weight: 47.8 kg (105 lb 6.1 oz)  Body mass index is 19.91 kg/m².      Intake/Output Summary (Last 24 hours) at 10/9/2019 0950  Last data filed at 10/9/2019 0900  Gross per 24 hour   Intake 1757.98 ml   Output 4400 ml   Net -2642.02 ml       Physical Exam   Constitutional: He appears lethargic. He appears cachectic. He is sleeping. He has a sickly appearance (chronic). He is sedated and intubated.   HENT:   Head: Normocephalic and atraumatic.   Eyes: Pupils are equal, round, and reactive to light. No scleral icterus. Right eye exhibits no nystagmus. Left eye exhibits no nystagmus.   Neck: Trachea normal. No JVD present.   Cardiovascular: Normal rate and regular rhythm. Exam reveals no gallop and no friction rub.   No murmur heard.  Pulses:       Radial pulses are 2+ on the right side, and 2+ on the left side.        Dorsalis pedis pulses are 2+ on the right side, and 2+ on the left side.   No edema to note   Pulmonary/Chest: Effort normal. He is intubated. No respiratory distress. He has no wheezes. He has no rhonchi. He has no rales.    Abdominal: Soft. Bowel sounds are normal. He exhibits no distension. There is no tenderness. There is no guarding.   Musculoskeletal: He exhibits deformity.   Contracted right wrist   Neurological: He appears lethargic. No cranial nerve deficit. GCS eye subscore is 2. GCS verbal subscore is 1. GCS motor subscore is 4.   Cough gag reflexes intact, withdraws in all extremities to pain, no focal deficits to note       Vents:  Vent Mode: PRVC (10/09/19 0804)  Ventilator Initiated: Yes (10/06/19 0006)  Set Rate: 15 bmp (10/09/19 0804)  Vt Set: 350 mL (10/09/19 0804)  Pressure Support: 5 cmH20 (10/05/19 1220)  PEEP/CPAP: 5 cmH20 (10/09/19 0804)  Oxygen Concentration (%): 30 (10/09/19 0900)  Peak Airway Pressure: 21.3 cmH2O (10/09/19 0804)  Total Ve: 5.8 mL (10/09/19 0804)  F/VT Ratio<105 (RSBI): (!) 39.41 (10/09/19 0804)    Lines/Drains/Airways     Drain                 Urethral Catheter 10/04/19 0104 Straight-tip 16 Fr. 5 days         NG/OG Tube 10/06/19 0000 orogastric 18 Fr. 3 days          Airway                 Airway - Non-Surgical 10/05/19 2350 Endotracheal Tube 3 days          Peripheral Intravenous Line                 Peripheral IV - Single Lumen 10/07/19 0348 20 G Left;Lateral Forearm 2 days         Peripheral IV - Single Lumen 10/08/19 0421 20 G Anterior;Right Forearm 1 day                Significant Labs:    CBC/Anemia Profile:  No results for input(s): WBC, HGB, HCT, PLT, MCV, RDW, IRON, FERRITIN, RETIC, FOLATE, [id number], OCCULTBLOOD in the last 48 hours.     Chemistries:  Recent Labs   Lab 10/08/19  0250 10/09/19  0428    146*   K 3.2* 3.6    105   CO2 28 33*   BUN 12 14   CREATININE 0.6 0.7   CALCIUM 8.2* 8.6*       All pertinent labs within the past 24 hours have been reviewed.    Significant Imaging:  I have reviewed all pertinent imaging results/findings within the past 24 hours.    Assessment/Plan:     Epilepsy  Divalproex at home.  Continue. No reported seizure >15 years.     Severe  sepsis  Secondary to CAP.   --See respiratory failure for plan    Acute respiratory failure with hypoxia  Febrile on presentation with Rhino/Enterovirus respiratory tract infection +/- PNA. Bronchoscopy with bronchomalacia of the lower lung zones + secretions, but certainly not copious. Suspect atelectasis secondary to ineffective airway clearance.     -- Positive for rhinovirus per RVP.   -- Continue zosyn for now to cover for aspiration and CAP organisms.   -- Add azithromycin for atypicals   -- on minimal PEEP/FiO2 for vent support  -- lasix for diuresis with good UO  -- s/p bronch on 10/07 showing bronchiectasis without overt evidence of severe mucus plugging   -- Would not pursue tracheostomy yet.. I will give additional trial of extubation.   -- Plan for SBT and extubation to NIPPV depending on lung mechanics      PPI ppx: famotidine  VTE ppx: enoxaparin    Above plan discussed with Dr. Vela    Critical Care time 45 minutes     Lc Hernandez NP  Pulmonology  [facility]

## 2019-10-09 NOTE — CARE UPDATE
Pt received orally intubated with a 7.5 ET tube, secured at 23 cm at the lips. Pt on servo I ventilator on documented settings. Alarms are set and functional, Ambu bag and mask at the bedside. NARN at this time, will continue to monitor and wean as tolerated.

## 2019-10-09 NOTE — ASSESSMENT & PLAN NOTE
Appreciate Pulmonary input.  Extubated on 10/5 Am.  Initially did well, but worsening respiratory distress through the day.  Started on Bipap with no improvement.  Reintubated later on 10/5.    10/8- Attempt weaning with daily SBT

## 2019-10-09 NOTE — CARE UPDATE
Patient extubated per MD orders to Bipap with documented settings. Patient did not tolerate mask and became tachypneic.  CRYSTAL Hernandez NP was notified she ordered to change patient to Vapotherm nasal cannula.

## 2019-10-09 NOTE — CARE UPDATE
[facility]  ICU Multidisciplinary Bedside Rounds     UPDATE     Date: 10/9/2019      Plan of care reviewed with the following, Nurse, Charge Nurse, Physician, Pulm CC, Resp. Therapist, Infection Prevention and Dietician.       Needs/ Goals for the day: droplet precautions, SBT      Level of Care: Critical Care

## 2019-10-09 NOTE — NURSING
Spoke with Chloe (primary care taker) regarding sitter for patient. The owner of [employer] is at bedside per her request for sitter.

## 2019-10-09 NOTE — PLAN OF CARE
Patient extubated today and placed on 15L 40% oxygen on Vapotherm. Sitter from ([employer]) at bedside per family request.  Van trough 18.2 continued ABX. Resp Viral Panel by PCR (in process). Restraint continued R/T interfering in treatment, pulling at lines.

## 2019-10-09 NOTE — SUBJECTIVE & OBJECTIVE
Interval History: Oxygenation continues to improve. Sedation changed to precedex for agitation during SBT. Will reattempt SBT today.     Objective:     Vital Signs (Most Recent):  Temp: 99.5 °F (37.5 °C) (10/09/19 0701)  Pulse: 92 (10/09/19 0900)  Resp: (!) 25 (10/09/19 0900)  BP: (!) 147/87 (10/09/19 0900)  SpO2: (!) 94 % (10/09/19 0900) Vital Signs (24h Range):  Temp:  [97.8 °F (36.6 °C)-99.5 °F (37.5 °C)] 99.5 °F (37.5 °C)  Pulse:  [] 92  Resp:  [15-29] 25  SpO2:  [94 %-100 %] 94 %  BP: (124-164)/(62-91) 147/87     Weight: 47.8 kg (105 lb 6.1 oz)  Body mass index is 19.91 kg/m².      Intake/Output Summary (Last 24 hours) at 10/9/2019 0950  Last data filed at 10/9/2019 0900  Gross per 24 hour   Intake 1757.98 ml   Output 4400 ml   Net -2642.02 ml       Physical Exam   Constitutional: He appears lethargic. He appears cachectic. He is sleeping. He has a sickly appearance (chronic). He is sedated and intubated.   HENT:   Head: Normocephalic and atraumatic.   Eyes: Pupils are equal, round, and reactive to light. No scleral icterus. Right eye exhibits no nystagmus. Left eye exhibits no nystagmus.   Neck: Trachea normal. No JVD present.   Cardiovascular: Normal rate and regular rhythm. Exam reveals no gallop and no friction rub.   No murmur heard.  Pulses:       Radial pulses are 2+ on the right side, and 2+ on the left side.        Dorsalis pedis pulses are 2+ on the right side, and 2+ on the left side.   No edema to note   Pulmonary/Chest: Effort normal. He is intubated. No respiratory distress. He has no wheezes. He has no rhonchi. He has no rales.   Abdominal: Soft. Bowel sounds are normal. He exhibits no distension. There is no tenderness. There is no guarding.   Musculoskeletal: He exhibits deformity.   Contracted right wrist   Neurological: He appears lethargic. No cranial nerve deficit. GCS eye subscore is 2. GCS verbal subscore is 1. GCS motor subscore is 4.   Cough gag reflexes intact, withdraws in all  extremities to pain, no focal deficits to note       Vents:  Vent Mode: PRVC (10/09/19 0804)  Ventilator Initiated: Yes (10/06/19 0006)  Set Rate: 15 bmp (10/09/19 0804)  Vt Set: 350 mL (10/09/19 0804)  Pressure Support: 5 cmH20 (10/05/19 1220)  PEEP/CPAP: 5 cmH20 (10/09/19 0804)  Oxygen Concentration (%): 30 (10/09/19 0900)  Peak Airway Pressure: 21.3 cmH2O (10/09/19 0804)  Total Ve: 5.8 mL (10/09/19 0804)  F/VT Ratio<105 (RSBI): (!) 39.41 (10/09/19 0804)    Lines/Drains/Airways     Drain                 Urethral Catheter 10/04/19 0104 Straight-tip 16 Fr. 5 days         NG/OG Tube 10/06/19 0000 orogastric 18 Fr. 3 days          Airway                 Airway - Non-Surgical 10/05/19 2350 Endotracheal Tube 3 days          Peripheral Intravenous Line                 Peripheral IV - Single Lumen 10/07/19 0348 20 G Left;Lateral Forearm 2 days         Peripheral IV - Single Lumen 10/08/19 0421 20 G Anterior;Right Forearm 1 day                Significant Labs:    CBC/Anemia Profile:  No results for input(s): WBC, HGB, HCT, PLT, MCV, RDW, IRON, FERRITIN, RETIC, FOLATE, [id number], OCCULTBLOOD in the last 48 hours.     Chemistries:  Recent Labs   Lab 10/08/19  0250 10/09/19  0428    146*   K 3.2* 3.6    105   CO2 28 33*   BUN 12 14   CREATININE 0.6 0.7   CALCIUM 8.2* 8.6*       All pertinent labs within the past 24 hours have been reviewed.    Significant Imaging:  I have reviewed all pertinent imaging results/findings within the past 24 hours.

## 2019-10-09 NOTE — SUBJECTIVE & OBJECTIVE
Interval History: placed back on vent after SBT     Review of Systems   Unable to perform ROS: Intubated     Objective:     Vital Signs (Most Recent):  Temp: 98.6 °F (37 °C) (10/09/19 0330)  Pulse: 70 (10/09/19 0443)  Resp: 15 (10/09/19 0443)  BP: 134/69 (10/09/19 0415)  SpO2: 96 % (10/09/19 0443) Vital Signs (24h Range):  Temp:  [97.8 °F (36.6 °C)-98.6 °F (37 °C)] 98.6 °F (37 °C)  Pulse:  [] 70  Resp:  [15-29] 15  SpO2:  [93 %-100 %] 96 %  BP: (123-164)/(57-91) 134/69     Weight: 47.8 kg (105 lb 6.1 oz)  Body mass index is 19.91 kg/m².    Intake/Output Summary (Last 24 hours) at 10/9/2019 0603  Last data filed at 10/9/2019 0400  Gross per 24 hour   Intake 1020.84 ml   Output 3100 ml   Net -2079.16 ml      Physical Exam   Constitutional: He appears lethargic. He appears cachectic. He is sleeping. He has a sickly appearance (chronic). He is sedated and intubated.   HENT:   Head: Normocephalic and atraumatic.   Eyes: Pupils are equal, round, and reactive to light. No scleral icterus. Right eye exhibits no nystagmus. Left eye exhibits no nystagmus.   Neck: Trachea normal. No JVD present.   Cardiovascular: Normal rate and regular rhythm. Exam reveals no gallop and no friction rub.   No murmur heard.  Pulses:       Radial pulses are 2+ on the right side, and 2+ on the left side.        Dorsalis pedis pulses are 2+ on the right side, and 2+ on the left side.   No edema to note   Pulmonary/Chest: Effort normal. He is intubated. No respiratory distress. He has no wheezes. He has no rhonchi. He has no rales.   Abdominal: Soft. Bowel sounds are normal. He exhibits no distension. There is no tenderness. There is no guarding.   Musculoskeletal: He exhibits deformity.   Contracted right wrist   Neurological: He appears lethargic. No cranial nerve deficit. GCS eye subscore is 2. GCS verbal subscore is 1. GCS motor subscore is 4.   Cough gag reflexes intact, withdraws in all extremities to pain, no focal deficits to note        Significant Labs: All pertinent labs within the past 24 hours have been reviewed.    Significant Imaging: I have reviewed and interpreted all pertinent imaging results/findings within the past 24 hours.

## 2019-10-09 NOTE — PROGRESS NOTES
[facility] Hospital Medicine  Progress Note    Patient Name: Karri Galeano  MRN: [id number]  Patient Class: IP- Inpatient   Admission Date: 10/3/2019  Length of Stay: 5 days  Attending Physician: Echo Stokes MD  Primary Care Provider: Echo Reeves MD        Subjective:     Principal Problem:Pneumonia of left lung due to infectious organism        HPI:  Mr. Karri Galeano is a 24 y.o. male with cerebral palsy and epilepsy who presents to [facility] ED from an urgent care facility with complaints of fevers today.  He lives in a group home and started to have a nonproductive cough today.  The caregiver did not observe any vomiting.  He was in his usual state of health yesterday but today has become more lethargic and somnolent.  He was taken to an urgent care facility where he was then transferred to this facility for further care.  He was noted there to have a fever of 102.2° F as well as an oxygen saturation of 95% on nasal cannula at 3 liters/minute.  Further history is otherwise limited at this time.    Overview/Hospital Course:  Mr. Karri Galeano is a 24 y.o. male with cerebral palsy and epilepsy who presented to [facility] ED from an urgent care facility with complaints of fevers.  He lives in a group home and started to have a nonproductive cough today.   Patient became more lethargic and somnolent.  He was taken to an urgent care facility where he was then transferred to this facility for further care.  He was noted there to have a fever of 102.2° .  Patient became more lethargic and tachypneic and intubated.  Diagnosed with pneumonia and started on Vanc and Zosyn.  Pulmonary consulted for vent management.  Extubated on 10/5 AM.  Initially did well, but worsening respiratory failure during the day.  Started on Bipap with no improvement and reintubated 10/5 PM.    10/8- SBT today, placed back on vent with change in HR and RR.     Interval History: placed back on vent after SBT      Review of Systems   Unable to perform ROS: Intubated     Objective:     Vital Signs (Most Recent):  Temp: 98.6 °F (37 °C) (10/09/19 0330)  Pulse: 70 (10/09/19 0443)  Resp: 15 (10/09/19 0443)  BP: 134/69 (10/09/19 0415)  SpO2: 96 % (10/09/19 0443) Vital Signs (24h Range):  Temp:  [97.8 °F (36.6 °C)-98.6 °F (37 °C)] 98.6 °F (37 °C)  Pulse:  [] 70  Resp:  [15-29] 15  SpO2:  [93 %-100 %] 96 %  BP: (123-164)/(57-91) 134/69     Weight: 47.8 kg (105 lb 6.1 oz)  Body mass index is 19.91 kg/m².    Intake/Output Summary (Last 24 hours) at 10/9/2019 0603  Last data filed at 10/9/2019 0400  Gross per 24 hour   Intake 1020.84 ml   Output 3100 ml   Net -2079.16 ml      Physical Exam   Constitutional: He appears lethargic. He appears cachectic. He is sleeping. He has a sickly appearance (chronic). He is sedated and intubated.   HENT:   Head: Normocephalic and atraumatic.   Eyes: Pupils are equal, round, and reactive to light. No scleral icterus. Right eye exhibits no nystagmus. Left eye exhibits no nystagmus.   Neck: Trachea normal. No JVD present.   Cardiovascular: Normal rate and regular rhythm. Exam reveals no gallop and no friction rub.   No murmur heard.  Pulses:       Radial pulses are 2+ on the right side, and 2+ on the left side.        Dorsalis pedis pulses are 2+ on the right side, and 2+ on the left side.   No edema to note   Pulmonary/Chest: Effort normal. He is intubated. No respiratory distress. He has no wheezes. He has no rhonchi. He has no rales.   Abdominal: Soft. Bowel sounds are normal. He exhibits no distension. There is no tenderness. There is no guarding.   Musculoskeletal: He exhibits deformity.   Contracted right wrist   Neurological: He appears lethargic. No cranial nerve deficit. GCS eye subscore is 2. GCS verbal subscore is 1. GCS motor subscore is 4.   Cough gag reflexes intact, withdraws in all extremities to pain, no focal deficits to note       Significant Labs: All pertinent labs within  the past 24 hours have been reviewed.    Significant Imaging: I have reviewed and interpreted all pertinent imaging results/findings within the past 24 hours.      Assessment/Plan:      * Pneumonia of left lung due to infectious organism  Patient was becoming more tachypneic and obtunded which he was intubated.    He may have had an aspiration event with subsequent pneumonia.    He did meet criteria for severe sepsis and has been started on empiric antibiotic therapy.   Will continue empiric antibiotic therapy with Vanc and Zosyn.    Hypokalemia  Replace as needed.      Epilepsy  Stable; continue his home regimen of divalproex.    On mechanically assisted ventilation  As addressed above.    Severe sepsis  This patient meets criteria for severe sepsis given fevers, tachycardia, tachypnea, pneumonia, acute respiratory failure.  Blood cultures are pending; will start IV fluid hydration and broad spectrum antibiotics.    Acute respiratory failure with hypoxia  Appreciate Pulmonary input.  Extubated on 10/5 Am.  Initially did well, but worsening respiratory distress through the day.  Started on Bipap with no improvement.  Reintubated later on 10/5.    10/8- Attempt weaning with daily SBT    Spastic quadriplegic cerebral palsy  Stable; there are no acute issues.      VTE Risk Mitigation (From admission, onward)         Ordered     enoxaparin injection 40 mg  Daily      10/04/19 0152     IP VTE HIGH RISK PATIENT  Once      10/04/19 0152                Critical care time spent on the evaluation and treatment of severe organ dysfunction, review of pertinent labs and imaging studies, discussions with consulting providers and discussions with patient/family: 30 minutes.      Echo Stokes MD  Department of Hospital Medicine   [facility]

## 2019-10-09 NOTE — NURSING
Extubated ETT and OG tube with Respiratory, NP and RN at bedside, placed on 5L 02 PNC, no abnormalities noted, continue to monitor for safety.

## 2019-10-09 NOTE — PROGRESS NOTES
Pharmacokinetic Assessment Follow Up: IV Vancomycin    Vancomycin serum concentration assessment(s):    The trough level was drawn correctly and can be used to guide therapy at this time.   Vancomycin Regimen Plan:    Change regimen to Vancomycin 1250 mg IV every 12 hours with next serum trough concentration measured at 23:30 prior to third dose on 10/10/2019     Drug levels (last 3 results):  Recent Labs   Lab Result Units 10/07/19  1101 10/09/19  1136   Vancomycin-Trough ug/mL 3.9* 18.4       Pharmacy will continue to follow and monitor vancomycin.    Please contact pharmacy at extension [phone] for questions regarding this assessment.    Thank you for the consult,   Yana Cheung       Patient brief summary:  Karri Galeano is a 24 y.o. male initiated on antimicrobial therapy with IV Vancomycin for treatment of lower respiratory infection    Drug Allergies:   Review of patient's allergies indicates:   Allergen Reactions    Clindamycin Hives    Erythromycin Rash       Actual Body Weight:   47.8 kg    Renal Function:   Estimated Creatinine Clearance: 109.1 mL/min (based on SCr of 0.7 mg/dL).,     Dialysis Method (if applicable):  N/A    CBC (last 72 hours):  No results for input(s): WHITE BLOOD CELL COUNT, HEMOGLOBIN, HEMATOCRIT, PLATELETS, GRAN%, LYMPH%, MONO%, EOSINOPHIL%, BASOPHIL%, DIFFERENTIAL METHOD in the last 72 hours.    Metabolic Panel (last 72 hours):  Recent Labs   Lab Result Units 10/07/19  0431 10/08/19  0250 10/09/19  0428   Sodium mmol/L 137 141 146*   Potassium mmol/L 3.5 3.2* 3.6   Chloride mmol/L 102 104 105   CO2 mmol/L 28 28 33*   Glucose mg/dL 96 120* 103   BUN, Bld mg/dL 10 12 14   Creatinine mg/dL 0.6 0.6 0.7       Vancomycin Administrations:  vancomycin given in the last 96 hours                     vancomycin 1.5 g in dextrose 5 % 250 mL IVPB (ready to mix) (mg) 1,500 mg New Bag 10/09/19 1243     1,500 mg New Bag 10/08/19 2354     1,500 mg New Bag  1113     1,500 mg New Bag  0016                       Microbiologic Results:  Microbiology Results (last 7 days)       Procedure Component Value Units Date/Time    Culture, Respiratory with Gram Stain [[id number]]  (Abnormal) Collected:  10/07/19 1409    Order Status:  Completed Specimen:  Respiratory from BAL, RML Updated:  10/09/19 0833     Respiratory Culture VANNESSA ALBICANS  Few  no normal arleth isolated       Gram Stain (Respiratory) <10 epithelial cells per low power field.     Gram Stain (Respiratory) Moderate WBC's     Gram Stain (Respiratory) No organisms seen    Blood culture x two cultures. Draw prior to antibiotics. [[id number]] Collected:  10/03/19 2020    Order Status:  Completed Specimen:  Blood from Peripheral, Forearm, Left Updated:  10/08/19 2303     Blood Culture, Routine No growth after 5 days.    Narrative:       Aerobic and anaerobic    Blood culture x two cultures. Draw prior to antibiotics. [[id number]] Collected:  10/03/19 2054    Order Status:  Completed Specimen:  Blood from Peripheral, Wrist, Left Updated:  10/08/19 2303     Blood Culture, Routine No growth after 5 days.    Narrative:       Aerobic and anaerobic    Respiratory Infection Panel, Nasopharyngeal [[id number]]  (Abnormal) Collected:  10/07/19 1520    Order Status:  Completed Specimen:  Nasopharyngeal Swab Updated:  10/08/19 1113     Respiratory Infection Panel Source NP Swab     Adenovirus Not Detected     Coronavirus 229E Not Detected     Coronavirus HKU1 Not Detected     Coronavirus NL63 Not Detected     Coronavirus OC43 Not Detected     Human Metapneumovirus Not Detected     Human Rhinovirus/Enterovirus Detected     Influenza A (subtypes H1, H1-2009,H3) Not Detected     Influenza B Not Detected     Parainfluenza Virus 1 Not Detected     Parainfluenza Virus 2 Not Detected     Parainfluenza Virus 3 Not Detected     Parainfluenza Virus 4 Not Detected     Respiratory Syncytial Virus Not Detected     Bordetella Parapertussis (EE5249) Not Detected     Bordetella pertussis (ptxP)  Not Detected     Chlamydia pneumoniae Not Detected     Mycoplasma pneumoniae Not Detected    Narrative:       For all other respiratory sources order [id number] Respiratory  Viral Panel by PCR (RVPCR)    Respiratory Viral Panel by PCR [facility]; Sputum (BAL RML ) [[id number]] Collected:  10/07/19 1409    Order Status:  Sent Specimen:  Respiratory Updated:  10/07/19 1426    Culture, Respiratory with Gram Stain [[id number]] Collected:  10/04/19 1355    Order Status:  Completed Specimen:  Respiratory from Endotracheal Aspirate Updated:  10/06/19 0755     Respiratory Culture No growth     Gram Stain (Respiratory) <10 epithelial cells per low power field.     Gram Stain (Respiratory) No WBC's or organisms seen

## 2019-10-09 NOTE — ASSESSMENT & PLAN NOTE
Febrile on presentation with Rhino/Enterovirus respiratory tract infection +/- PNA. Bronchoscopy with bronchomalacia of the lower lung zones + secretions, but certainly not copious. Suspect atelectasis secondary to ineffective airway clearance.     -- Positive for rhinovirus per RVP.   -- Continue zosyn for now to cover for aspiration and CAP organisms.   -- Add azithromycin for atypicals   -- on minimal PEEP/FiO2 for vent support  -- lasix for diuresis with good UO  -- s/p bronch on 10/07 showing bronchiectasis without overt evidence of severe mucus plugging   -- Would not pursue tracheostomy yet.. I will give additional trial of extubation.   -- Plan for SBT and extubation to NIPPV depending on lung mechanics

## 2019-10-10 LAB
ANION GAP SERPL CALC-SCNC: 7 MMOL/L (ref 8–16)
BASOPHILS # BLD AUTO: 0.05 K/UL (ref 0–0.2)
BASOPHILS NFR BLD: 0.4 % (ref 0–1.9)
BUN SERPL-MCNC: 16 MG/DL (ref 6–20)
CALCIUM SERPL-MCNC: 8.8 MG/DL (ref 8.7–10.5)
CHLORIDE SERPL-SCNC: 109 MMOL/L (ref 95–110)
CO2 SERPL-SCNC: 33 MMOL/L (ref 23–29)
CREAT SERPL-MCNC: 0.9 MG/DL (ref 0.5–1.4)
DIFFERENTIAL METHOD: ABNORMAL
EOSINOPHIL # BLD AUTO: 0.1 K/UL (ref 0–0.5)
EOSINOPHIL NFR BLD: 0.9 % (ref 0–8)
ERYTHROCYTE [DISTWIDTH] IN BLOOD BY AUTOMATED COUNT: 15.1 % (ref 11.5–14.5)
EST. GFR  (AFRICAN AMERICAN): >60 ML/MIN/1.73 M^2
EST. GFR  (NON AFRICAN AMERICAN): >60 ML/MIN/1.73 M^2
GLUCOSE SERPL-MCNC: 92 MG/DL (ref 70–110)
HCT VFR BLD AUTO: 32.1 % (ref 40–54)
HGB BLD-MCNC: 9.8 G/DL (ref 14–18)
IMM GRANULOCYTES # BLD AUTO: 0.47 K/UL (ref 0–0.04)
IMM GRANULOCYTES NFR BLD AUTO: 3.8 % (ref 0–0.5)
LYMPHOCYTES # BLD AUTO: 1.4 K/UL (ref 1–4.8)
LYMPHOCYTES NFR BLD: 11.8 % (ref 18–48)
MAGNESIUM SERPL-MCNC: 2.4 MG/DL (ref 1.6–2.6)
MCH RBC QN AUTO: 27.8 PG (ref 27–31)
MCHC RBC AUTO-ENTMCNC: 30.5 G/DL (ref 32–36)
MCV RBC AUTO: 91 FL (ref 82–98)
MONOCYTES # BLD AUTO: 2.2 K/UL (ref 0.3–1)
MONOCYTES NFR BLD: 17.6 % (ref 4–15)
NEUTROPHILS # BLD AUTO: 8 K/UL (ref 1.8–7.7)
NEUTROPHILS NFR BLD: 65.5 % (ref 38–73)
NRBC BLD-RTO: 0 /100 WBC
PHOSPHATE SERPL-MCNC: 3.7 MG/DL (ref 2.7–4.5)
PLATELET # BLD AUTO: 223 K/UL (ref 150–350)
PLATELET BLD QL SMEAR: ABNORMAL
PMV BLD AUTO: 10.2 FL (ref 9.2–12.9)
POTASSIUM SERPL-SCNC: 3.5 MMOL/L (ref 3.5–5.1)
RBC # BLD AUTO: 3.53 M/UL (ref 4.6–6.2)
SODIUM SERPL-SCNC: 149 MMOL/L (ref 136–145)
WBC # BLD AUTO: 12.22 K/UL (ref 3.9–12.7)

## 2019-10-10 PROCEDURE — 99233 PR SUBSEQUENT HOSPITAL CARE,LEVL III: ICD-10-PCS | Mod: ,,, | Performed by: EMERGENCY MEDICINE

## 2019-10-10 PROCEDURE — 84100 ASSAY OF PHOSPHORUS: CPT

## 2019-10-10 PROCEDURE — 99233 SBSQ HOSP IP/OBS HIGH 50: CPT | Mod: ,,, | Performed by: EMERGENCY MEDICINE

## 2019-10-10 PROCEDURE — 83735 ASSAY OF MAGNESIUM: CPT

## 2019-10-10 PROCEDURE — 99900026 HC AIRWAY MAINTENANCE (STAT)

## 2019-10-10 PROCEDURE — 97535 SELF CARE MNGMENT TRAINING: CPT

## 2019-10-10 PROCEDURE — 94668 MNPJ CHEST WALL SBSQ: CPT

## 2019-10-10 PROCEDURE — G8996 SWALLOW CURRENT STATUS: HCPCS | Mod: CK

## 2019-10-10 PROCEDURE — 27100171 HC OXYGEN HIGH FLOW UP TO 24 HOURS

## 2019-10-10 PROCEDURE — 85025 COMPLETE CBC W/AUTO DIFF WBC: CPT

## 2019-10-10 PROCEDURE — 25000242 PHARM REV CODE 250 ALT 637 W/ HCPCS: Performed by: INTERNAL MEDICINE

## 2019-10-10 PROCEDURE — 25000003 PHARM REV CODE 250: Performed by: EMERGENCY MEDICINE

## 2019-10-10 PROCEDURE — S0028 INJECTION, FAMOTIDINE, 20 MG: HCPCS | Performed by: HOSPITALIST

## 2019-10-10 PROCEDURE — 36415 COLL VENOUS BLD VENIPUNCTURE: CPT

## 2019-10-10 PROCEDURE — 63600175 PHARM REV CODE 636 W HCPCS: Performed by: INTERNAL MEDICINE

## 2019-10-10 PROCEDURE — 25000003 PHARM REV CODE 250: Performed by: HOSPITALIST

## 2019-10-10 PROCEDURE — 63600175 PHARM REV CODE 636 W HCPCS: Performed by: HOSPITALIST

## 2019-10-10 PROCEDURE — G8997 SWALLOW GOAL STATUS: HCPCS | Mod: CK

## 2019-10-10 PROCEDURE — 25000003 PHARM REV CODE 250: Performed by: INTERNAL MEDICINE

## 2019-10-10 PROCEDURE — 20000000 HC ICU ROOM

## 2019-10-10 PROCEDURE — 99900035 HC TECH TIME PER 15 MIN (STAT)

## 2019-10-10 PROCEDURE — 94761 N-INVAS EAR/PLS OXIMETRY MLT: CPT

## 2019-10-10 PROCEDURE — 92610 EVALUATE SWALLOWING FUNCTION: CPT

## 2019-10-10 PROCEDURE — 27100092 HC HIGH FLOW DELIVERY CANNULA

## 2019-10-10 PROCEDURE — 94640 AIRWAY INHALATION TREATMENT: CPT

## 2019-10-10 PROCEDURE — 80048 BASIC METABOLIC PNL TOTAL CA: CPT

## 2019-10-10 RX ORDER — SCOLOPAMINE TRANSDERMAL SYSTEM 1 MG/1
1 PATCH, EXTENDED RELEASE TRANSDERMAL
Status: DISCONTINUED | OUTPATIENT
Start: 2019-10-10 | End: 2019-11-02 | Stop reason: HOSPADM

## 2019-10-10 RX ADMIN — FAMOTIDINE 20 MG: 10 INJECTION INTRAVENOUS at 09:10

## 2019-10-10 RX ADMIN — VALPROIC ACID 500 MG: 250 SOLUTION ORAL at 09:10

## 2019-10-10 RX ADMIN — VALPROIC ACID 500 MG: 250 SOLUTION ORAL at 08:10

## 2019-10-10 RX ADMIN — ENOXAPARIN SODIUM 40 MG: 100 INJECTION SUBCUTANEOUS at 05:10

## 2019-10-10 RX ADMIN — IPRATROPIUM BROMIDE AND ALBUTEROL SULFATE 3 ML: .5; 3 SOLUTION RESPIRATORY (INHALATION) at 07:10

## 2019-10-10 RX ADMIN — IPRATROPIUM BROMIDE AND ALBUTEROL SULFATE 3 ML: .5; 3 SOLUTION RESPIRATORY (INHALATION) at 01:10

## 2019-10-10 RX ADMIN — SCOPALAMINE 1 PATCH: 1 PATCH, EXTENDED RELEASE TRANSDERMAL at 12:10

## 2019-10-10 RX ADMIN — TAMSULOSIN HYDROCHLORIDE 0.4 MG: 0.4 CAPSULE ORAL at 09:10

## 2019-10-10 RX ADMIN — FLUOXETINE HYDROCHLORIDE 60 MG: 20 SOLUTION ORAL at 09:10

## 2019-10-10 RX ADMIN — CLONAZEPAM 1 MG: 0.5 TABLET ORAL at 09:10

## 2019-10-10 RX ADMIN — FAMOTIDINE 20 MG: 10 INJECTION INTRAVENOUS at 08:10

## 2019-10-10 RX ADMIN — IPRATROPIUM BROMIDE AND ALBUTEROL SULFATE 3 ML: .5; 3 SOLUTION RESPIRATORY (INHALATION) at 12:10

## 2019-10-10 RX ADMIN — CLONAZEPAM 1 MG: 0.5 TABLET ORAL at 08:10

## 2019-10-10 NOTE — ASSESSMENT & PLAN NOTE
Febrile on presentation with Rhino/Enterovirus respiratory tract infection +/- PNA. Bronchoscopy with bronchomalacia of the lower lung zones + secretions, but certainly not copious. Suspect atelectasis secondary to ineffective airway clearance.  Extubated yesterday without issue. Remains on HFNC 15L/40%. Still with ineffective airway clearance that is resulting in hypoxia intermittently. Chest imaging is stable    -- Wean supplemental O2 to maintain SpO2 >88%.. Try to get on standard NC today   -- Continue with aggressive pulmonary toilet/NT suctioning and CPT.. This is his biggest issue as contractures and baseline MS significantly impair his airway clearance. This was discussed with RT at bedside.  -- PT needs to move   -- Agree with discontinuation of Abx   -- Prn bronchodilators

## 2019-10-10 NOTE — PROGRESS NOTES
[facility] Hospital Medicine  Progress Note    Patient Name: Karri Galeano  MRN: [id number]  Patient Class: IP- Inpatient   Admission Date: 10/3/2019  Length of Stay: 6 days  Attending Physician: Echo Stokes MD  Primary Care Provider: Echo Reeves MD        Subjective:     Principal Problem:Pneumonia of left lung due to infectious organism        HPI:  Mr. Karri Galeano is a 24 y.o. male with cerebral palsy and epilepsy who presents to [facility] ED from an urgent care facility with complaints of fevers today.  He lives in a group home and started to have a nonproductive cough today.  The caregiver did not observe any vomiting.  He was in his usual state of health yesterday but today has become more lethargic and somnolent.  He was taken to an urgent care facility where he was then transferred to this facility for further care.  He was noted there to have a fever of 102.2° F as well as an oxygen saturation of 95% on nasal cannula at 3 liters/minute.  Further history is otherwise limited at this time.    Overview/Hospital Course:  Mr. Karri Galeano is a 24 y.o. male with cerebral palsy and epilepsy who presented to [facility] ED from an urgent care facility with complaints of fevers.  He lives in a group home and started to have a nonproductive cough today.   Patient became more lethargic and somnolent.  He was taken to an urgent care facility where he was then transferred to this facility for further care.  He was noted there to have a fever of 102.2° .  Patient became more lethargic and tachypneic and intubated.  Diagnosed with pneumonia and started on Vanc and Zosyn.  Pulmonary consulted for vent management.  Extubated on 10/5 AM.  Initially did well, but worsening respiratory failure during the day.  Started on Bipap with no improvement and reintubated 10/5 PM.    10/8- SBT today, placed back on vent with change in HR and RR.   10/9- pt extubated to bipap, + rhinovirus on cx, will  proceed with planning for group home placement on dispo    Interval History: extubated to bipap    Review of Systems   Unable to perform ROS: Acuity of condition     Objective:     Vital Signs (Most Recent):  Temp: 98.5 °F (36.9 °C) (10/10/19 0400)  Pulse: 74 (10/10/19 0600)  Resp: (!) 32 (10/10/19 0600)  BP: 138/82 (10/10/19 0600)  SpO2: 97 % (10/10/19 0600) Vital Signs (24h Range):  Temp:  [98.5 °F (36.9 °C)-99.6 °F (37.6 °C)] 98.5 °F (36.9 °C)  Pulse:  [67-97] 74  Resp:  [15-57] 32  SpO2:  [85 %-99 %] 97 %  BP: (136-171)/(69-93) 138/82     Weight: 47.8 kg (105 lb 6.1 oz)  Body mass index is 19.91 kg/m².    Intake/Output Summary (Last 24 hours) at 10/10/2019 0705  Last data filed at 10/9/2019 2337  Gross per 24 hour   Intake 1053.54 ml   Output 1725 ml   Net -671.46 ml      Physical Exam   Constitutional: He appears lethargic. He appears cachectic. He is sleeping. He has a sickly appearance (chronic). He is sedated and not intubated.   HENT:   Head: Normocephalic and atraumatic.   Eyes: Pupils are equal, round, and reactive to light. No scleral icterus. Right eye exhibits no nystagmus. Left eye exhibits no nystagmus.   Neck: Trachea normal. No JVD present.   Cardiovascular: Normal rate and regular rhythm. Exam reveals no gallop and no friction rub.   No murmur heard.  Pulses:       Radial pulses are 2+ on the right side, and 2+ on the left side.        Dorsalis pedis pulses are 2+ on the right side, and 2+ on the left side.   No edema to note   Pulmonary/Chest: Effort normal. He is not intubated. No respiratory distress. He has no wheezes. He has no rhonchi. He has no rales.   Abdominal: Soft. Bowel sounds are normal. He exhibits no distension. There is no tenderness. There is no guarding.   Musculoskeletal: He exhibits deformity.   Contracted right wrist   Neurological: He appears lethargic. No cranial nerve deficit. GCS eye subscore is 2. GCS verbal subscore is 1. GCS motor subscore is 4.   Cough gag reflexes  intact, withdraws in all extremities to pain, no focal deficits to note       Significant Labs: All pertinent labs within the past 24 hours have been reviewed.    Significant Imaging: I have reviewed and interpreted all pertinent imaging results/findings within the past 24 hours.      Assessment/Plan:      * Pneumonia of left lung due to infectious organism  Patient was becoming more tachypneic and obtunded which he was intubated.    He may have had an aspiration event with subsequent pneumonia.    He did meet criteria for severe sepsis and has been started on empiric antibiotic therapy.     Dc vanc and zosyn 10/10- completed 7 days   +rhinovirus on sputum cx    Hypokalemia  Replace as needed.      Epilepsy  Stable; continue his home regimen of divalproex.    On mechanically assisted ventilation  As addressed above.    Extubated to bipap 10/9    Severe sepsis  This patient meets criteria for severe sepsis given fevers, tachycardia, tachypnea, pneumonia, acute respiratory failure.  Blood cultures are negative will start IV fluid hydration and broad spectrum antibiotics.    Acute respiratory failure with hypoxia  Appreciate Pulmonary input.  Extubated on 10/5 Am.  Initially did well, but worsening respiratory distress through the day.  Started on Bipap with no improvement.  Reintubated later on 10/5.    10/8- Attempt weaning with daily SBT    Spastic quadriplegic cerebral palsy  Stable; there are no acute issues.    Family would like group home in [place] for dc       VTE Risk Mitigation (From admission, onward)         Ordered     enoxaparin injection 40 mg  Daily      10/04/19 0152     IP VTE HIGH RISK PATIENT  Once      10/04/19 0152                Critical care time spent on the evaluation and treatment of severe organ dysfunction, review of pertinent labs and imaging studies, discussions with consulting providers and discussions with patient/family: 30 minutes.      Echo Stokes MD  Department of Hospital  Medicine   [facility]

## 2019-10-10 NOTE — PLAN OF CARE
Problem: SLP Goal  Goal: SLP Goal  Description  ST. Pt will tolerate PO diet of pureed with thin liquids with no overt s/s of aspiration.   Outcome: Ongoing, Progressing   10/10/19 ST: Rec: PO diet of pureed with thin liquids via small medicine cup. Strict aspiration precautions and supervision. ST will follow to ensure diet tolerance.

## 2019-10-10 NOTE — PLAN OF CARE
Pt remains on Vapotherm at 15 L/M at 60% FIO2. Continue duoneb svn tx, CPT, NTS and O2 titration per orders. Will continue to monitor. DERIC Julien, RRT

## 2019-10-10 NOTE — PROGRESS NOTES
"[facility]  Adult Nutrition  Progress Note    SUMMARY       Recommendations    Recommendation/Intervention:   1. Encourage adequate PO intake of meals and fluids.   2. Boost Pudding bid.   3. Monitor weight weekly to assess adequte intake of PO diet.   4. If poor PO status and/or aspiration continues, rec consider J-tube placement or G-J extention due to provide adequate needs and reduce aspiration events.    Goals: >75% of meal intake daily  Nutrition Goal Status: new  Communication of RD Recs: (POC)    Reason for Assessment    Reason For Assessment: RD follow-up  Diagnosis: pulmonary disease, infection/sepsis  Relevant Medical History: CP, mental retardation  Interdisciplinary Rounds: did not attend  General Information Comments: Pt extubated to bipap today, propofol discontinued. No IV fluids hanging. TF discontinued. Seen by SLP today; PO diet of pureed with thin liquids, strict aspiration precautions. ST to follow to ensure tolerance. Visited pt this PM, brother present stated pudding for lunch. RD to follow diet order and tolerance. NFPE 10/4 not warrented as pt appears to have adequate amount of fat and appropriate muscle tone for CP conditions. Noted wt hx stable since 2/2019.  Nutrition Discharge Planning: Pureed diet    Nutrition Risk Screen    Nutrition Risk Screen: no indicators present    Nutrition/Diet History    Patient Reported Diet/Restrictions/Preferences: pureed(reported by )  Spiritual, Cultural Beliefs, [employer]al Practices, Values that Affect Care: no  Supplemental Drinks or Food Habits: Ensure Plus(Benecalorie)  Factors Affecting Nutritional Intake: impaired cognitive status/motor control, chewing difficulties/inability to chew food, NPO    Anthropometrics    Temp: 97.4 °F (36.3 °C)  Height Method: Estimated  Height: 5' 1" (154.9 cm)  Height (inches): 61 in  Weight Method: Bed Scale  Weight: 47.8 kg (105 lb 6.1 oz)  Weight (lb): 105.38 lb  Ideal Body " Weight (IBW), Male: 112 lb  % Ideal Body Weight, Male (lb): 94.09 lb  BMI (Calculated): 20  BMI Grade: 18.5-24.9 - normal       Lab/Procedures/Meds    Pertinent Labs Reviewed: reviewed  Pertinent Labs Comments: Na 149  Pertinent Medications Reviewed: reviewed  Pertinent Medications Comments: famotidine, enoxaparin, scopolamine    Physical Findings/Assessment         Estimated/Assessed Needs    Weight Used For Calorie Calculations: 47.8 kg (105 lb 6.1 oz)  Energy Calorie Requirements (kcal): 1658  Energy Need Method: [place]-St Jeor(x 1.25)  Protein Requirements: 57-72(1.2 - 1.5 g/kg)  Weight Used For Protein Calculations: 47.8 kg (105 lb 6.1 oz)     Estimated Fluid Requirement Method: RDA Method  RDA Method (mL): 1658         Nutrition Prescription Ordered    Current Diet Order: NPO  Current Nutrition Support Formula Ordered: (Canceled 10/8)  Current Nutrition Support Rate Ordered: (Canceled 10/8)  Current Nutrition Support Frequency Ordered: (Canceled 10/8)    Evaluation of Received Nutrient/Fluid Intake    Enteral Calories (kcal): 0  Enteral Protein (gm): 0  Enteral (Free Water) Fluid (mL): 0  Free Water Flush Fluid (mL): 0  Other Calories (kcal): 0  Total Calories (kcal): 0  IV Fluid (mL): (IV fluids discountinued)  Total Fluid Intake (mL): 0  Energy Calories Required: not meeting needs  Protein Required: not meeting needs  Fluid Required: meeting needs(36 mL/kg)  Comments: LBM 10/10  Tolerance: tolerating  % Intake of Estimated Energy Needs: 0 - 25 %  % Meal Intake: 0 - 25 %   (Recently advanced from NPO)    Nutrition Risk    Level of Risk/Frequency of Follow-up: (F/U 2 x weekly)     Assessment and Plan  Nutrition Problem  Inadequate energy intake     Related to (etiology):   Pneumonia, sepsis     Signs and Symptoms (as evidenced by):   Recently advanced from NPO     Interventions/Recommendations (treatment strategy):  Collaboration with other providers     Nutrition Diagnosis Status:   Continues       Monitor  and Evaluation    Food and Nutrient Intake: energy intake, food and beverage intake  Food and Nutrient Adminstration: diet order  Anthropometric Measurements: weight change, weight, body mass index  Biochemical Data, Medical Tests and Procedures: glucose/endocrine profile, electrolyte and renal panel, lipid profile, inflammatory profile, gastrointestinal profile  Nutrition-Focused Physical Findings: overall appearance, extremities, muscles and bones, head and eyes     Malnutrition Assessment    WDL muscle and fat disease appropriate    Nutrition Follow-Up    RD Follow-up?: Yes

## 2019-10-10 NOTE — PROGRESS NOTES
PALLIATIVE CARE PROGRESS NOTE:    Bedside visit.  Patient awake and alert, moving about in bed.  Of note early this morning he was on vapotherm 15L 45%, but had desat to 81% around 1120 and required increase to 20L 70%; now currently tolerating 15L 65% with sat 98% and no visible distress.      His half-brother Dallas (in from [place]) is present at the bedside. Introduced myself and explained my role in patient's care.  He confirms that his Aunt Anne is the medical decision maker for Karri and he is very satisfied that she has the knowledge and caring to make the best decisions for him.  He states she will be back in [place] on Saturday.      Subsequent to above I provided status update to patient's aunt Chloe/decision maker by telephone.  She will be back here over the weekend and is going to tour the facility in [place] that [facility] has found for Karri and will make a decision as to whether she wants him to be placed there.      We also talked briefly about challenges feeding him and keeping his lungs healthy and that she may need to make decisions going forward, whether now or in foreseeable future.  She voiced appreciation for update and care and concern given.    Palliative Care following.    Chanelle Burnette, BSN, RN, CCRN, CHPN   Palliative Care Nurse Coordinator   [facility]  [phone]

## 2019-10-10 NOTE — PT/OT/SLP EVAL
Speech Language Pathology Evaluation  Bedside Swallow    Patient Name:  Karri Galeano   MRN:  [id number]  Admitting Diagnosis: Pneumonia of left lung due to infectious organism    Recommendations:                 General Recommendations:  Dysphagia therapy  Diet recommendations:  Puree, Thin(via medicine cup)   Aspiration Precautions: 1 bite/sip at a time, Alternating bites/sips, Assistance with meals, Check for pocketing/oral residue, Meds crushed in puree, Monitor for s/s of aspiration, Small bites/sips, Strict aspiration precautions and Wear oxygen during intake   General Precautions: Standard, aspiration, pureed diet  Communication strategies:  yes/no questions only    History:     Past Medical History:   Diagnosis Date    Anxiety     Congenital cerebral palsy     Hypotonia     Leukodystrophy     Mental retardation     Retention of urine     Seizures     Stereotyped movement disorder     Urinary tract infection        Past Surgical History:   Procedure Laterality Date    BRONCHOSCOPY N/A 10/7/2019    Procedure: Bronchoscopy;  Surgeon: David Vela MD;  Location: [facility];  Service: Pulmonary;  Laterality: N/A;       Social History: Patient lives in group home.    Prior Intubation HX:  Intubated 10/5/19, extubated 10/9/19    Modified Barium Swallow: N/A    Chest X-Rays: 10/10/19  Lungs are hypoinflated.  No significant change in cardiopulmonary status from previous exam.  No evidence of new focal consolidation, pneumothorax, or significant pleural effusion.    Prior diet: pureed/thin per brother.    Subjective     Pt awake in bed upon SLP arrival. Pt's brother, nurse and Dr. Stokes present for swallow eval at bedside. Pt sats fluctuated from 85-90% prior to PO trials. SLP waited 20 minutes before administering PO trials once O2 sats were stabilized.      Pain/Comfort:  · Pain Rating 1: 0/10    Objective:     Oral Musculature Evaluation  · Oral Musculature: unable to assess due to poor  participation/comprehension  · Dentition: present and adequate  · Secretion Management: adequate  · Mucosal Quality: good  · Voice Prior to PO Intake: non-verbal    Bedside Swallow Eval:   Consistencies Assessed:  · Thin liquids via spoon x2 and 6 trials via small medicaine cup presented by SLP  · Puree x6 trials     Oral Phase:   · Decreased closure around utensil  · Prolonged mastication    Pharyngeal Phase:   · delayed swallow initation   · O2 sats remained between 93-95% during PO trials     Compensatory Strategies  · None    Treatment: Rec:PO diet of pureed with thin liquids via medicine cup under supervision. Pt will require feeding assistance with meals.  Pt's brother educated on aspiration precautions.  1 bite/sip at a time, Alternating bites/sips, Assistance with meals, Check for pocketing/oral residue, Meds crushed in puree, Monitor for s/s of aspiration, Small bites/sips, Strict aspiration precautions and Wear oxygen during intake     No one from group home present at time of eval.  Assessment:     Karri Galeano is a 24 y.o. male with a dx of Pneumonia of left lung due to infectious organism. Pt presents with moderate oropharyngeal dysphagia c/b prolonged mastication, decreased closure around spoon, and delayed swallow initiation 2/2 history of cerebral palsy.    Goals:   Multidisciplinary Problems     SLP Goals        Problem: SLP Goal    Goal Priority Disciplines Outcome   SLP Goal    Medium SLP Ongoing, Progressing   Description:  ST. Pt will tolerate PO diet of pureed with thin liquids with no overt s/s of aspiration.                    Plan:     · Patient to be seen:  3 x/week, 4 x/week   · Plan of Care expires:     · Plan of Care reviewed with:  patient, sibling   · SLP Follow-Up:  Yes       Discharge recommendations:  other (see comments)(group home)   Barriers to Discharge:  None    Time Tracking:     SLP Treatment Date:   10/10/19  Speech Start Time:  1112  Speech Stop Time:  1149      Speech Total Time (min):  37 min    Billable Minutes: Eval Swallow and Oral Function 26 min and [facility]eld Care/Home Management Training 11 min    Jayde Goodman CCC-SLP  10/10/2019

## 2019-10-10 NOTE — CARE UPDATE
[facility]  ICU Multidisciplinary Bedside Rounds   SUMMARY     Date: 10/10/2019    Prehospitalization: Nursing Home  Admit Date / LOS : 10/3/2019/ 6 days    Diagnosis: Pneumonia of left lung due to infectious organism    Consults:        Active: Palliative and Pulm CC       Needed: N/A     Code Status: Full Code   Advanced Directive: <no information>    LDA: Saldivar       Central Lines/Site/Justification:Patient Does Not Have Central Line       Urinary Cath/Order/Justification:Critically Ill in ICU    Vasopressors/Infusions:        GOALS: Volume/ Hemodynamic: N/A                     RASS: 0  alert and calm    CAM ICU: N/A  Pain Management: none       Pain Controlled: yes     Rhythm: NSR    Respiratory Device: Vapotherm 50%    VTE Prophylaxis: Pharm  Mobility: Bedrest  Stress Ulcer Prophylaxis: Yes    Dietary: NPO    Isolation: No active isolations    Restraints: Yes    Significant Dates:  Post Op Date: N/A  Rescue Date: N/A  Imaging/ Diagnostics: N/A    Noteworthy Labs:  Na 149    CBC/Anemia Labs: Coags:    Recent Labs   Lab 10/03/19  2020 10/04/19  0319 10/10/19  0411   WBC 10.32 10.72 12.22   HGB 10.5* 11.2* 9.8*   HCT 34.5* 36.7* 32.1*   * 129* 223   MCV 92 93 91   RDW 14.9* 15.0* 15.1*    No results for input(s): PT, INR, APTT in the last 168 hours.     Chemistries:   Recent Labs   Lab 10/03/19  2020 10/04/19  0318  10/08/19  0250 10/09/19  0428 10/10/19  0411   * 145   < > 141 146* 149*   K 3.7 3.7   < > 3.2* 3.6 3.5    110   < > 104 105 109   CO2 28 27   < > 28 33* 33*   BUN 15 12   < > 12 14 16   CREATININE 0.7 0.7   < > 0.6 0.7 0.9   CALCIUM 8.8 7.9*   < > 8.2* 8.6* 8.8   PROT 6.9 6.4  --   --   --   --    BILITOT 0.2 0.4  --   --   --   --    ALKPHOS 73 71  --   --   --   --    ALT 14 15  --   --   --   --    AST 19 21  --   --   --   --    MG  --  1.7  --   --   --  2.4   PHOS  --  3.9  --   --   --  3.7    < > = values in this interval not displayed.        Cardiac  Enzymes: Ejection Fractions:    No results for input(s): CPK, CPKMB, MB, TROPONINI in the last 72 hours. No results found for: EF     POCT Glucose: HbA1c:    No results for input(s): POCTGLUCOSE in the last 168 hours. No results found for: HGBA1C     Needs from Care Team: Needs swallow study     ICU LOS 6d 3h  Level of Care: Critical Care

## 2019-10-10 NOTE — SUBJECTIVE & OBJECTIVE
Interval History: Extubated yesterday without issue. Remains on HFNC 15L/40%. Desaturation intermittently that improves with suctioning. Non verbal. More alert and interactive this AM. Afebrile. Not shock. No additional acute issues noted.     Objective:     Vital Signs (Most Recent):  Temp: 97.4 °F (36.3 °C) (10/10/19 0735)  Pulse: 80 (10/10/19 0851)  Resp: (!) 37 (10/10/19 0851)  BP: (!) 160/100 (10/10/19 0800)  SpO2: 96 % (10/10/19 0851) Vital Signs (24h Range):  Temp:  [97.4 °F (36.3 °C)-99.6 °F (37.6 °C)] 97.4 °F (36.3 °C)  Pulse:  [67-97] 80  Resp:  [18-57] 37  SpO2:  [85 %-99 %] 96 %  BP: (134-171)/() 160/100       Review of Systems: unable to obtain 2/2 non-verbal at baseline.       Weight: 47.8 kg (105 lb 6.1 oz)  Body mass index is 19.91 kg/m².      Intake/Output Summary (Last 24 hours) at 10/10/2019 0927  Last data filed at 10/10/2019 0400  Gross per 24 hour   Intake 709.9 ml   Output 2125 ml   Net -1415.1 ml     Cumulative fluid balance- +2.4L/admission     Physical Exam   Constitutional: He appears cachectic. He is active. Sickly appearance: chronic.   HENT:   Head: Normocephalic and atraumatic.   Eyes: Pupils are equal, round, and reactive to light. No scleral icterus. Right eye exhibits no nystagmus. Left eye exhibits no nystagmus.   Neck: Trachea normal. No JVD present.   Cardiovascular: Normal rate and regular rhythm. Exam reveals no gallop and no friction rub.   No murmur heard.  Pulses:       Radial pulses are 2+ on the right side, and 2+ on the left side.        Dorsalis pedis pulses are 2+ on the right side, and 2+ on the left side.   No edema to note   Pulmonary/Chest: Effort normal. No respiratory distress. He has no wheezes. He has no rhonchi. He has no rales.   Abdominal: Soft. Bowel sounds are normal. He exhibits no distension. There is no tenderness. There is no guarding.   Musculoskeletal: He exhibits deformity.   Contracted right wrist   Neurological: He is alert. He displays  atrophy. No cranial nerve deficit.       Vents:  Vent Mode: CPAP (10/09/19 0953)  Ventilator Initiated: Yes (10/06/19 0006)  Set Rate: 15 bmp (10/09/19 0804)  Vt Set: 350 mL (10/09/19 0804)  Pressure Support: 10 cmH20 (10/09/19 0953)  PEEP/CPAP: 5 cmH20 (10/09/19 0953)  Oxygen Concentration (%): 45 (10/10/19 0830)  Peak Airway Pressure: 15.4 cmH2O (10/09/19 0953)  Total Ve: 6.9 mL (10/09/19 0953)  F/VT Ratio<105 (RSBI): (!) 94.7 (10/09/19 0953)    Lines/Drains/Airways     Drain                 Urethral Catheter 10/04/19 0104 Straight-tip 16 Fr. 6 days          Peripheral Intravenous Line                 Peripheral IV - Single Lumen 10/07/19 0348 20 G Left;Lateral Forearm 3 days         Peripheral IV - Single Lumen 10/10/19 0409 20 G Right Forearm less than 1 day                Significant Labs:    CBC/Anemia Profile:  Recent Labs   Lab 10/10/19  0411   WBC 12.22   HGB 9.8*   HCT 32.1*      MCV 91   RDW 15.1*        Chemistries:  Recent Labs   Lab 10/09/19  0428 10/10/19  0411   * 149*   K 3.6 3.5    109   CO2 33* 33*   BUN 14 16   CREATININE 0.7 0.9   CALCIUM 8.6* 8.8   MG  --  2.4   PHOS  --  3.7     MICROBIOLOGY-     Respiratory Culture Abnormal    VANNESSA ALBICANS   Few   no normal arleth isolated     Gram Stain (Respiratory) <10 epithelial cells per low power field.    Gram Stain (Respiratory) Moderate WBC's    Gram Stain (Respiratory) No organisms seen           Respiratory Infection Panel Source Not Detected NP Swab    Adenovirus Not Detected Not Detected    Coronavirus 229E Not Detected Not Detected    Coronavirus HKU1 Not Detected Not Detected    Coronavirus NL63 Not Detected Not Detected    Coronavirus OC43 Not Detected Not Detected    Human Metapneumovirus Not Detected Not Detected    Human Rhinovirus/Enterovirus Not Detected DetectedAbnormal       Blood cultures x 2- NGTD     All pertinent labs within the past 24 hours have been reviewed.    Significant Imaging:  I have reviewed all  pertinent imaging results/findings within the past 24 hours.      CXR- Heart is stable in size.  Lungs are hypoinflated.  No significant change in cardiopulmonary status from previous exam.  No evidence of new focal consolidation, pneumothorax, or significant pleural effusion.  Previously visualized ET tube in enteric tube have been discontinued.

## 2019-10-10 NOTE — PLAN OF CARE
Pt remains in ICU on Vapotherm 50%. Episode of oxygen desaturation overnight shortly after CPT. R lung diminished. CXR obtained, no change from prior study. Pt NT suctioned per RT with improved oxygenation and increased breath sounds to R lung. IV antibiotics. Tmax 99.4. VSS.

## 2019-10-10 NOTE — SUBJECTIVE & OBJECTIVE
Interval History: extubated to bipap    Review of Systems   Unable to perform ROS: Acuity of condition     Objective:     Vital Signs (Most Recent):  Temp: 98.5 °F (36.9 °C) (10/10/19 0400)  Pulse: 74 (10/10/19 0600)  Resp: (!) 32 (10/10/19 0600)  BP: 138/82 (10/10/19 0600)  SpO2: 97 % (10/10/19 0600) Vital Signs (24h Range):  Temp:  [98.5 °F (36.9 °C)-99.6 °F (37.6 °C)] 98.5 °F (36.9 °C)  Pulse:  [67-97] 74  Resp:  [15-57] 32  SpO2:  [85 %-99 %] 97 %  BP: (136-171)/(69-93) 138/82     Weight: 47.8 kg (105 lb 6.1 oz)  Body mass index is 19.91 kg/m².    Intake/Output Summary (Last 24 hours) at 10/10/2019 0705  Last data filed at 10/9/2019 2337  Gross per 24 hour   Intake 1053.54 ml   Output 1725 ml   Net -671.46 ml      Physical Exam   Constitutional: He appears lethargic. He appears cachectic. He is sleeping. He has a sickly appearance (chronic). He is sedated and not intubated.   HENT:   Head: Normocephalic and atraumatic.   Eyes: Pupils are equal, round, and reactive to light. No scleral icterus. Right eye exhibits no nystagmus. Left eye exhibits no nystagmus.   Neck: Trachea normal. No JVD present.   Cardiovascular: Normal rate and regular rhythm. Exam reveals no gallop and no friction rub.   No murmur heard.  Pulses:       Radial pulses are 2+ on the right side, and 2+ on the left side.        Dorsalis pedis pulses are 2+ on the right side, and 2+ on the left side.   No edema to note   Pulmonary/Chest: Effort normal. He is not intubated. No respiratory distress. He has no wheezes. He has no rhonchi. He has no rales.   Abdominal: Soft. Bowel sounds are normal. He exhibits no distension. There is no tenderness. There is no guarding.   Musculoskeletal: He exhibits deformity.   Contracted right wrist   Neurological: He appears lethargic. No cranial nerve deficit. GCS eye subscore is 2. GCS verbal subscore is 1. GCS motor subscore is 4.   Cough gag reflexes intact, withdraws in all extremities to pain, no focal  deficits to note       Significant Labs: All pertinent labs within the past 24 hours have been reviewed.    Significant Imaging: I have reviewed and interpreted all pertinent imaging results/findings within the past 24 hours.

## 2019-10-10 NOTE — ASSESSMENT & PLAN NOTE
Patient was becoming more tachypneic and obtunded which he was intubated.    He may have had an aspiration event with subsequent pneumonia.    He did meet criteria for severe sepsis and has been started on empiric antibiotic therapy.     Dc vanc and zosyn 10/10- completed 7 days   +rhinovirus on sputum cx

## 2019-10-10 NOTE — PROGRESS NOTES
"PALLIATIVE CARE PROGRESS NOTE:    Patient awake in bed without distress, sitter at bedside.  Patient very awake and attentive during performance of his favorite song "You Are My Sunshine" performed for him by guest hernández (Omid).  Patient subsequently reached out to touch my arm - first interaction since he has been here.     Tolerating extubation; on vapotherm 15L 40%.      Will continue providing support to family.    Chanelle Burnette, MADAYN, RN, CCRN, CHPN   Palliative Care Nurse Coordinator   [facility]  [phone]        "

## 2019-10-10 NOTE — ASSESSMENT & PLAN NOTE
This patient meets criteria for severe sepsis given fevers, tachycardia, tachypnea, pneumonia, acute respiratory failure.  Blood cultures are negative will start IV fluid hydration and broad spectrum antibiotics.

## 2019-10-10 NOTE — PROGRESS NOTES
[facility]  Pulmonology  Progress Note    Patient Name: Karri Galeano  MRN: [id number]  Admission Date: 10/3/2019  Hospital Length of Stay: 6 days  Code Status: Full Code  Attending Provider: Echo Stokes MD  Primary Care Provider: Echo Reeves MD   Principal Problem: Pneumonia of left lung due to infectious organism    Subjective:     Interval History: Extubated yesterday without issue. Remains on HFNC 15L/40%. Desaturation intermittently that improves with suctioning. Non verbal. More alert and interactive this AM. Afebrile. Not shock. No additional acute issues noted.     Objective:     Vital Signs (Most Recent):  Temp: 97.4 °F (36.3 °C) (10/10/19 0735)  Pulse: 80 (10/10/19 0851)  Resp: (!) 37 (10/10/19 0851)  BP: (!) 160/100 (10/10/19 0800)  SpO2: 96 % (10/10/19 0851) Vital Signs (24h Range):  Temp:  [97.4 °F (36.3 °C)-99.6 °F (37.6 °C)] 97.4 °F (36.3 °C)  Pulse:  [67-97] 80  Resp:  [18-57] 37  SpO2:  [85 %-99 %] 96 %  BP: (134-171)/() 160/100       Review of Systems: unable to obtain 2/2 non-verbal at baseline.       Weight: 47.8 kg (105 lb 6.1 oz)  Body mass index is 19.91 kg/m².      Intake/Output Summary (Last 24 hours) at 10/10/2019 0927  Last data filed at 10/10/2019 0400  Gross per 24 hour   Intake 709.9 ml   Output 2125 ml   Net -1415.1 ml     Cumulative fluid balance- +2.4L/admission     Physical Exam   Constitutional: He appears cachectic. He is active. Sickly appearance: chronic.   HENT:   Head: Normocephalic and atraumatic.   Eyes: Pupils are equal, round, and reactive to light. No scleral icterus. Right eye exhibits no nystagmus. Left eye exhibits no nystagmus.   Neck: Trachea normal. No JVD present.   Cardiovascular: Normal rate and regular rhythm. Exam reveals no gallop and no friction rub.   No murmur heard.  Pulses:       Radial pulses are 2+ on the right side, and 2+ on the left side.        Dorsalis pedis pulses are 2+ on the right side, and 2+ on the left  side.   No edema to note   Pulmonary/Chest: Effort normal. No respiratory distress. He has no wheezes. He has no rhonchi. He has no rales.   Abdominal: Soft. Bowel sounds are normal. He exhibits no distension. There is no tenderness. There is no guarding.   Musculoskeletal: He exhibits deformity.   Contracted right wrist   Neurological: He is alert. He displays atrophy. No cranial nerve deficit.       Vents:  Vent Mode: CPAP (10/09/19 0953)  Ventilator Initiated: Yes (10/06/19 0006)  Set Rate: 15 bmp (10/09/19 0804)  Vt Set: 350 mL (10/09/19 0804)  Pressure Support: 10 cmH20 (10/09/19 0953)  PEEP/CPAP: 5 cmH20 (10/09/19 0953)  Oxygen Concentration (%): 45 (10/10/19 0830)  Peak Airway Pressure: 15.4 cmH2O (10/09/19 0953)  Total Ve: 6.9 mL (10/09/19 0953)  F/VT Ratio<105 (RSBI): (!) 94.7 (10/09/19 0953)    Lines/Drains/Airways     Drain                 Urethral Catheter 10/04/19 0104 Straight-tip 16 Fr. 6 days          Peripheral Intravenous Line                 Peripheral IV - Single Lumen 10/07/19 0348 20 G Left;Lateral Forearm 3 days         Peripheral IV - Single Lumen 10/10/19 0409 20 G Right Forearm less than 1 day                Significant Labs:    CBC/Anemia Profile:  Recent Labs   Lab 10/10/19  0411   WBC 12.22   HGB 9.8*   HCT 32.1*      MCV 91   RDW 15.1*        Chemistries:  Recent Labs   Lab 10/09/19  0428 10/10/19  0411   * 149*   K 3.6 3.5    109   CO2 33* 33*   BUN 14 16   CREATININE 0.7 0.9   CALCIUM 8.6* 8.8   MG  --  2.4   PHOS  --  3.7     MICROBIOLOGY-     Respiratory Culture Abnormal    VANNESSA ALBICANS   Few   no normal arleth isolated     Gram Stain (Respiratory) <10 epithelial cells per low power field.    Gram Stain (Respiratory) Moderate WBC's    Gram Stain (Respiratory) No organisms seen           Respiratory Infection Panel Source Not Detected NP Swab    Adenovirus Not Detected Not Detected    Coronavirus 229E Not Detected Not Detected    Coronavirus HKU1 Not Detected  Not Detected    Coronavirus NL63 Not Detected Not Detected    Coronavirus OC43 Not Detected Not Detected    Human Metapneumovirus Not Detected Not Detected    Human Rhinovirus/Enterovirus Not Detected DetectedAbnormal       Blood cultures x 2- NGTD     All pertinent labs within the past 24 hours have been reviewed.    Significant Imaging:  I have reviewed all pertinent imaging results/findings within the past 24 hours.      CXR- Heart is stable in size.  Lungs are hypoinflated.  No significant change in cardiopulmonary status from previous exam.  No evidence of new focal consolidation, pneumothorax, or significant pleural effusion.  Previously visualized ET tube in enteric tube have been discontinued.    Assessment/Plan:     Epilepsy  Divalproex at home.  Continue. No reported seizure >15 years.     Severe sepsis  Secondary to CAP.   --See respiratory failure for plan    Acute respiratory failure with hypoxia  Febrile on presentation with Rhino/Enterovirus respiratory tract infection +/- PNA. Bronchoscopy with bronchomalacia of the lower lung zones + secretions, but certainly not copious. Suspect atelectasis secondary to ineffective airway clearance.  Extubated yesterday without issue. Remains on HFNC 15L/40%. Still with ineffective airway clearance that is resulting in hypoxia intermittently. Chest imaging is stable    -- Wean supplemental O2 to maintain SpO2 >88%.. Try to get on standard NC today   -- Continue with aggressive pulmonary toilet/NT suctioning and CPT.. This is his biggest issue as contractures and baseline MS significantly impair his airway clearance. This was discussed with RT at bedside.  -- PT needs to move   -- Agree with discontinuation of Abx   -- Prn bronchodilators                Hypernatremia  Replace free water.       DVT PPX- lovenox     GI PPX- H2 antagonist not indicated at present.. OK to discontinue     Nutrition- Speech to evaluate swallowing. Reportedly tolerating PO diet prior to  admission.. Need to ensure this is safe.        I will follow up with patient.       David Vela MD  Pulmonology/Critical Care Medicine   [facility]

## 2019-10-11 PROBLEM — J96.01 ACUTE RESPIRATORY FAILURE WITH HYPOXIA: Status: RESOLVED | Noted: 2019-10-04 | Resolved: 2019-10-11

## 2019-10-11 PROBLEM — J96.01 ACUTE RESPIRATORY FAILURE WITH HYPOXIA: Status: ACTIVE | Noted: 2019-10-11

## 2019-10-11 PROBLEM — A41.9 SEVERE SEPSIS: Status: RESOLVED | Noted: 2019-10-04 | Resolved: 2019-10-11

## 2019-10-11 PROBLEM — E87.0 HYPERNATREMIA: Status: RESOLVED | Noted: 2017-06-04 | Resolved: 2019-10-11

## 2019-10-11 PROBLEM — R65.20 SEVERE SEPSIS: Status: RESOLVED | Noted: 2019-10-04 | Resolved: 2019-10-11

## 2019-10-11 LAB
ANION GAP SERPL CALC-SCNC: 12 MMOL/L (ref 8–16)
BASOPHILS # BLD AUTO: 0.09 K/UL (ref 0–0.2)
BASOPHILS NFR BLD: 0.7 % (ref 0–1.9)
BUN SERPL-MCNC: 19 MG/DL (ref 6–20)
CALCIUM SERPL-MCNC: 9 MG/DL (ref 8.7–10.5)
CHLORIDE SERPL-SCNC: 111 MMOL/L (ref 95–110)
CO2 SERPL-SCNC: 28 MMOL/L (ref 23–29)
CREAT SERPL-MCNC: 0.8 MG/DL (ref 0.5–1.4)
DIFFERENTIAL METHOD: ABNORMAL
EOSINOPHIL # BLD AUTO: 0 K/UL (ref 0–0.5)
EOSINOPHIL NFR BLD: 0.2 % (ref 0–8)
ERYTHROCYTE [DISTWIDTH] IN BLOOD BY AUTOMATED COUNT: 14.9 % (ref 11.5–14.5)
EST. GFR  (AFRICAN AMERICAN): >60 ML/MIN/1.73 M^2
EST. GFR  (NON AFRICAN AMERICAN): >60 ML/MIN/1.73 M^2
GLUCOSE SERPL-MCNC: 92 MG/DL (ref 70–110)
HCT VFR BLD AUTO: 32.3 % (ref 40–54)
HGB BLD-MCNC: 9.9 G/DL (ref 14–18)
IMM GRANULOCYTES # BLD AUTO: 0.32 K/UL (ref 0–0.04)
IMM GRANULOCYTES NFR BLD AUTO: 2.6 % (ref 0–0.5)
LYMPHOCYTES # BLD AUTO: 1.3 K/UL (ref 1–4.8)
LYMPHOCYTES NFR BLD: 10.9 % (ref 18–48)
MAGNESIUM SERPL-MCNC: 2.3 MG/DL (ref 1.6–2.6)
MCH RBC QN AUTO: 28 PG (ref 27–31)
MCHC RBC AUTO-ENTMCNC: 30.7 G/DL (ref 32–36)
MCV RBC AUTO: 91 FL (ref 82–98)
MONOCYTES # BLD AUTO: 2 K/UL (ref 0.3–1)
MONOCYTES NFR BLD: 15.9 % (ref 4–15)
NEUTROPHILS # BLD AUTO: 8.6 K/UL (ref 1.8–7.7)
NEUTROPHILS NFR BLD: 69.7 % (ref 38–73)
NRBC BLD-RTO: 0 /100 WBC
PHOSPHATE SERPL-MCNC: 3.8 MG/DL (ref 2.7–4.5)
PLATELET # BLD AUTO: 260 K/UL (ref 150–350)
PMV BLD AUTO: 10 FL (ref 9.2–12.9)
POTASSIUM SERPL-SCNC: 3.4 MMOL/L (ref 3.5–5.1)
RBC # BLD AUTO: 3.54 M/UL (ref 4.6–6.2)
SODIUM SERPL-SCNC: 151 MMOL/L (ref 136–145)
WBC # BLD AUTO: 12.33 K/UL (ref 3.9–12.7)

## 2019-10-11 PROCEDURE — S0028 INJECTION, FAMOTIDINE, 20 MG: HCPCS | Performed by: HOSPITALIST

## 2019-10-11 PROCEDURE — 25000003 PHARM REV CODE 250: Performed by: INTERNAL MEDICINE

## 2019-10-11 PROCEDURE — 25000242 PHARM REV CODE 250 ALT 637 W/ HCPCS: Performed by: HOSPITALIST

## 2019-10-11 PROCEDURE — 25000003 PHARM REV CODE 250: Performed by: HOSPITALIST

## 2019-10-11 PROCEDURE — 27100171 HC OXYGEN HIGH FLOW UP TO 24 HOURS

## 2019-10-11 PROCEDURE — 83735 ASSAY OF MAGNESIUM: CPT

## 2019-10-11 PROCEDURE — 63600175 PHARM REV CODE 636 W HCPCS: Performed by: HOSPITALIST

## 2019-10-11 PROCEDURE — 97535 SELF CARE MNGMENT TRAINING: CPT

## 2019-10-11 PROCEDURE — 94761 N-INVAS EAR/PLS OXIMETRY MLT: CPT

## 2019-10-11 PROCEDURE — 25000242 PHARM REV CODE 250 ALT 637 W/ HCPCS: Performed by: INTERNAL MEDICINE

## 2019-10-11 PROCEDURE — 21400001 HC TELEMETRY ROOM

## 2019-10-11 PROCEDURE — 36415 COLL VENOUS BLD VENIPUNCTURE: CPT

## 2019-10-11 PROCEDURE — 94668 MNPJ CHEST WALL SBSQ: CPT

## 2019-10-11 PROCEDURE — 84100 ASSAY OF PHOSPHORUS: CPT

## 2019-10-11 PROCEDURE — 80048 BASIC METABOLIC PNL TOTAL CA: CPT

## 2019-10-11 PROCEDURE — 27000221 HC OXYGEN, UP TO 24 HOURS

## 2019-10-11 PROCEDURE — 25000003 PHARM REV CODE 250: Performed by: EMERGENCY MEDICINE

## 2019-10-11 PROCEDURE — 92526 ORAL FUNCTION THERAPY: CPT

## 2019-10-11 PROCEDURE — 94667 MNPJ CHEST WALL 1ST: CPT

## 2019-10-11 PROCEDURE — 99233 PR SUBSEQUENT HOSPITAL CARE,LEVL III: ICD-10-PCS | Mod: ,,, | Performed by: EMERGENCY MEDICINE

## 2019-10-11 PROCEDURE — 85025 COMPLETE CBC W/AUTO DIFF WBC: CPT

## 2019-10-11 PROCEDURE — 27100092 HC HIGH FLOW DELIVERY CANNULA

## 2019-10-11 PROCEDURE — 63600175 PHARM REV CODE 636 W HCPCS: Performed by: INTERNAL MEDICINE

## 2019-10-11 PROCEDURE — 94640 AIRWAY INHALATION TREATMENT: CPT

## 2019-10-11 PROCEDURE — 99233 SBSQ HOSP IP/OBS HIGH 50: CPT | Mod: ,,, | Performed by: EMERGENCY MEDICINE

## 2019-10-11 RX ADMIN — CLONAZEPAM 1 MG: 0.5 TABLET ORAL at 08:10

## 2019-10-11 RX ADMIN — HYDRALAZINE HYDROCHLORIDE 10 MG: 20 INJECTION INTRAMUSCULAR; INTRAVENOUS at 10:10

## 2019-10-11 RX ADMIN — VALPROIC ACID 500 MG: 250 SOLUTION ORAL at 09:10

## 2019-10-11 RX ADMIN — IPRATROPIUM BROMIDE AND ALBUTEROL SULFATE 3 ML: .5; 3 SOLUTION RESPIRATORY (INHALATION) at 01:10

## 2019-10-11 RX ADMIN — FAMOTIDINE 20 MG: 10 INJECTION INTRAVENOUS at 08:10

## 2019-10-11 RX ADMIN — VALPROIC ACID 500 MG: 250 SOLUTION ORAL at 08:10

## 2019-10-11 RX ADMIN — CLONAZEPAM 1 MG: 0.5 TABLET ORAL at 09:10

## 2019-10-11 RX ADMIN — IPRATROPIUM BROMIDE AND ALBUTEROL SULFATE 3 ML: .5; 3 SOLUTION RESPIRATORY (INHALATION) at 07:10

## 2019-10-11 RX ADMIN — IPRATROPIUM BROMIDE AND ALBUTEROL SULFATE 3 ML: .5; 3 SOLUTION RESPIRATORY (INHALATION) at 08:10

## 2019-10-11 RX ADMIN — FLUOXETINE HYDROCHLORIDE 60 MG: 20 SOLUTION ORAL at 08:10

## 2019-10-11 RX ADMIN — TAMSULOSIN HYDROCHLORIDE 0.4 MG: 0.4 CAPSULE ORAL at 08:10

## 2019-10-11 RX ADMIN — HYDRALAZINE HYDROCHLORIDE 10 MG: 20 INJECTION INTRAMUSCULAR; INTRAVENOUS at 06:10

## 2019-10-11 RX ADMIN — FAMOTIDINE 20 MG: 10 INJECTION INTRAVENOUS at 10:10

## 2019-10-11 RX ADMIN — ENOXAPARIN SODIUM 40 MG: 100 INJECTION SUBCUTANEOUS at 04:10

## 2019-10-11 NOTE — CARE UPDATE
[facility]  ICU Multidisciplinary Bedside Rounds   SUMMARY     Date: 10/11/2019    Prehospitalization: Nursing Home  Admit Date / LOS : 10/3/2019/ 7 days    Diagnosis: Pneumonia of left lung due to infectious organism    Consults:        Active: Palliative and Pulm CC       Needed: N/A     Code Status: Full Code   Advanced Directive: <no information>    LDA: Saldivar and PIV       Central Lines/Site/Justification:Patient Does Not Have Central Line       Urinary Cath/Order/Justification:Critically Ill in ICU    Vasopressors/Infusions:        GOALS: Volume/ Hemodynamic: N/A                     RASS: N/A    CAM ICU: N/A  Pain Management: none       Pain Controlled: yes     Rhythm: NSR    Respiratory Device: Vapotherm    Oxygen Concentration (%):  [45-70] 60             Most Recent SBT/ SAT: N/A       MOVE Screen: FAIL    VTE Prophylaxis: Pharm  Mobility: Bedrest  Stress Ulcer Prophylaxis: Yes    Dietary: PO  Tolerance: no  /  Advancement: no    Isolation: No active isolations    Restraints: Yes    Significant Dates:  Post Op Date: N/A  Rescue Date: N/A  Imaging/ Diagnostics: N/A    Noteworthy Labs:  none    CBC/Anemia Labs: Coags:    Recent Labs   Lab 10/10/19  0411 10/11/19  0401   WBC 12.22 12.33   HGB 9.8* 9.9*   HCT 32.1* 32.3*    260   MCV 91 91   RDW 15.1* 14.9*    No results for input(s): PT, INR, APTT in the last 168 hours.     Chemistries:   Recent Labs   Lab 10/09/19  0428 10/10/19  0411 10/11/19  0401   * 149* 151*   K 3.6 3.5 3.4*    109 111*   CO2 33* 33* 28   BUN 14 16 19   CREATININE 0.7 0.9 0.8   CALCIUM 8.6* 8.8 9.0   MG  --  2.4 2.3   PHOS  --  3.7 3.8        Cardiac Enzymes: Ejection Fractions:    No results for input(s): CPK, CPKMB, MB, TROPONINI in the last 72 hours. No results found for: EF     POCT Glucose: HbA1c:    No results for input(s): POCTGLUCOSE in the last 168 hours. No results found for: HGBA1C     Needs from Care Team: none     ICU LOS 7d 4h  Level of  Care: Critical Care

## 2019-10-11 NOTE — ASSESSMENT & PLAN NOTE
Stable; there are no acute issues.    Family would like group home in [place] for dc   CP not actual diagnosis but has dysphagia issues  Puree diet - not tolerating - consider peg tube placement

## 2019-10-11 NOTE — ASSESSMENT & PLAN NOTE
Febrile on presentation with Rhino respiratory tract infection +/- PNA. Bronchoscopy with bronchomalacia of the lower lung zones + secretions, but certainly not copious. Suspect atelectasis secondary to ineffective airway clearance.  Extubated 10/9 without issue.  Still with ineffective airway clearance that is resulting in hypoxia intermittently. Appropriate improvement with suctioning. On RA.  Chest imaging is stable    -- Wean supplemental O2 to maintain SpO2 >88%..   -- Continue with aggressive pulmonary toilet/NT suctioning and CPT.. This is his biggest issue as contractures and baseline MS significantly impair his airway clearance. This was discussed with RT at bedside.  -- PT needs to move   -- Agree with discontinuation of Abx   -- Prn bronchodilators    -- PO intake seems risky and such small volume would certainly not allow him to meet nutritional requirement. Need to consider PEG placement.     Overall, no quick fix for this young man. His contractures and CP are severe and limiting.. Since this is his first admission for respiratory failure I will push him in effort to avoid tracheostomy, but if this becomes recurrent it may be the safest option to manage him moving forward.

## 2019-10-11 NOTE — PLAN OF CARE
RN observed pt coughing after a few sips of fluid, stopped until Informed ST to evaluate if fluids need to be thickened. Fluids changed by ST to nectar thick. Pt is now on 2L NC and tolerating well. Orders for GI to eval for peg placement. NPO after midnight. Orders to transfer to telemetry when bed available. No skin breakdown, no falls

## 2019-10-11 NOTE — PHYSICIAN QUERY
PT Name: Karri Galeano  MR #: [id number]    Physician Query Form - Cause and Effect Relationship Clarification      CDS/: Afua Sims               Contact information:[email or web address]    This form is a permanent document in the medical record.     Query Date: October 11, 2019    By submitting this query, we are merely seeking further clarification of documentation. Please utilize your independent clinical judgment when addressing the question(s) below.    The Medical record contains the following:  Supporting Clinical Findings   Location in record      Pneumonia of left lung due to infectious organism                                                                                      Dc vanc and zosyn 10/10- completed 7 days   +rhinovirus on sputum cx                                  This patient meets criteria for severe sepsis given fevers, tachycardia, tachypnea, pneumonia, acute respiratory failure.  Blood cultures are negative will start IV fluid hydration and broad spectrum antibiotics                                                HM PN 10/10     Volume loss in left hemithorax.  Large area of atelectasis and/or consolidation in the left lung.                                                                                                                                                                                         CT chest/abd/pelvis 10/4         Provider, please clarify if there is any correlation between _Pneumonia__ and __rhinovirus__.           Are the conditions:      [ x ] Due to or associated with each other   [  ] Unrelated to each other   [  ] Other (Please Specify): _________________________   [  ] Clinically Undetermined

## 2019-10-11 NOTE — SUBJECTIVE & OBJECTIVE
Interval History: No acute events overnight. On HFNC, weaned to RA this AM and maintaining adequate SpO2. Does have intermittent desats, that respond to suctioning.     Seen by speech.. Only small volume feeds through medicine cup. Aunt not at bedside. Will be back in town tomorrow.     Afebrile.     Objective:     Vital Signs (Most Recent):  Temp: 98.8 °F (37.1 °C) (10/11/19 1100)  Pulse: 99 (10/11/19 1200)  Resp: (!) 42 (10/11/19 1200)  BP: 138/83 (10/11/19 1200)  SpO2: 96 % (10/11/19 1200) Vital Signs (24h Range):  Temp:  [98 °F (36.7 °C)-98.8 °F (37.1 °C)] 98.8 °F (37.1 °C)  Pulse:  [] 99  Resp:  [18-42] 42  SpO2:  [89 %-100 %] 96 %  BP: (121-177)/() 138/83       Review of Systems: unable to obtain 2/2 non-verbal at baseline.       Weight: 45.1 kg (99 lb 6.8 oz)  Body mass index is 18.79 kg/m².      Intake/Output Summary (Last 24 hours) at 10/11/2019 1252  Last data filed at 10/11/2019 1000  Gross per 24 hour   Intake 168 ml   Output 2610 ml   Net -2442 ml     Cumulative fluid balance- +30cc.     Physical Exam   Constitutional: He appears cachectic. He is active. Sickly appearance: chronic.   HENT:   Head: Normocephalic and atraumatic.   Eyes: Pupils are equal, round, and reactive to light. No scleral icterus. Right eye exhibits no nystagmus. Left eye exhibits no nystagmus.   Neck: Trachea normal. No JVD present.   Cardiovascular: Normal rate and regular rhythm. Exam reveals no gallop and no friction rub.   No murmur heard.  Pulses:       Radial pulses are 2+ on the right side, and 2+ on the left side.        Dorsalis pedis pulses are 2+ on the right side, and 2+ on the left side.   No edema to note   Pulmonary/Chest: Effort normal. No respiratory distress. He has no wheezes. He has no rhonchi. He has no rales.   Abdominal: Soft. Bowel sounds are normal. He exhibits no distension. There is no tenderness. There is no guarding.   Musculoskeletal: He exhibits deformity.   Contracted right wrist    Neurological: He is alert. He displays atrophy. No cranial nerve deficit.       Vents: None       Lines/Drains/Airways     Drain                 Urethral Catheter 10/04/19 0104 Straight-tip 16 Fr. 7 days          Peripheral Intravenous Line                 Peripheral IV - Single Lumen 10/10/19 2000 20 G Left;Posterior Forearm less than 1 day         Peripheral IV - Single Lumen 10/10/19 2000 20 G Left;Posterior Upper Arm less than 1 day                Significant Labs:    CBC/Anemia Profile:  Recent Labs   Lab 10/10/19  0411 10/11/19  0401   WBC 12.22 12.33   HGB 9.8* 9.9*   HCT 32.1* 32.3*    260   MCV 91 91   RDW 15.1* 14.9*        Chemistries:  Recent Labs   Lab 10/10/19  0411 10/11/19  0401   * 151*   K 3.5 3.4*    111*   CO2 33* 28   BUN 16 19   CREATININE 0.9 0.8   CALCIUM 8.8 9.0   MG 2.4 2.3   PHOS 3.7 3.8     MICROBIOLOGY-     Respiratory Culture Abnormal    VANNESSA ALBICANS   Few   no normal arleth isolated     Gram Stain (Respiratory) <10 epithelial cells per low power field.    Gram Stain (Respiratory) Moderate WBC's    Gram Stain (Respiratory) No organisms seen           Respiratory Infection Panel Source Not Detected NP Swab    Adenovirus Not Detected Not Detected    Coronavirus 229E Not Detected Not Detected    Coronavirus HKU1 Not Detected Not Detected    Coronavirus NL63 Not Detected Not Detected    Coronavirus OC43 Not Detected Not Detected    Human Metapneumovirus Not Detected Not Detected    Human Rhinovirus/Enterovirus Not Detected DetectedAbnormal       Blood cultures x 2- NGTD     All pertinent labs within the past 24 hours have been reviewed.    Significant Imaging:  I have reviewed all pertinent imaging results/findings within the past 24 hours.      CXR- Heart is stable in size.  Lungs are hypoinflated.  No significant change in cardiopulmonary status from previous exam.  No evidence of new focal consolidation, pneumothorax, or significant pleural effusion.  Previously  visualized ET tube in enteric tube have been discontinued.

## 2019-10-11 NOTE — NURSING
Pt holding food in mouth when RN attempted to feed him. Coughing after sips of liquid. Feeding and drinking attempts stopped. Will report to [facility] Dr Stokes this morning after bkfst

## 2019-10-11 NOTE — CONSULTS
Gastroenterology Consult    10/11/2019 3:32 PM    Patient Name: Karri Galeano  MRN: [id number]  Admission Date: 10/3/2019  Hospital Length of Stay: 7 days  Code Status: Full Code   Primary Care Physician: Echo Reeves MD  Principal Problem:Pneumonia of left lung due to infectious organism  Consulting Physician: Inpatient consult to Gastroenterology  Consult performed by: Melina Garcia MD  Consult ordered by: Echo Stokes MD      Reason for consultation: PEG    HPI:  Karri Galeano is a 24 y.o. male with a history of cerebral palsy, MR, seizures, anxiety and urinary retention who presented with fever, cough and lethargy and was diagnosed with left lung pneumonia and acute hypoxic respiratory failure.  He has rhinovirus on culture.  He did require extubation, but was extubated to bipap on 10/9 and is now on nasal cannula.  Speech was consulted.  He is having some coughing and holding food in his mouth when attempting to swallow.  GI consulted for possible PEG placement per speech therapy recs.  Nutritionist also suggested possible G-J tube to help reduce aspiration risk.  History obtained from chart review and pt's brother who was at the bedside as pt is non-verbal.  He does not think the patient has ever had a PEG placed before.  He is concerned about a feeding tube as he thinks the patient is likely to pull it.  He is currently in restraints to avoid pulling the nasal cannula off.    Past Medical History:   Diagnosis Date    Anxiety     Congenital cerebral palsy     Hypotonia     Leukodystrophy     Mental retardation     Retention of urine     Seizures     Stereotyped movement disorder     Urinary tract infection        Past Surgical History:   Procedure Laterality Date    BRONCHOSCOPY N/A 10/7/2019    Procedure: Bronchoscopy;  Surgeon: David Vela MD;  Location: [facility];  Service: Pulmonary;  Laterality: N/A;       Social History     Tobacco Use    Smoking status: Never Smoker     Smokeless tobacco: Never Used   Substance Use Topics    Alcohol use: No    Drug use: No        Family History   Problem Relation Age of Onset    No Known Problems Mother     No Known Problems Father     Prostate cancer Neg Hx     Kidney disease Neg Hx          Review of patient's allergies indicates:   Allergen Reactions    Clindamycin Hives    Erythromycin Rash         Current Facility-Administered Medications:     acetaminophen tablet 500 mg, 500 mg, Oral, Q6H PRN, Kiersten Marcos MD, 500 mg at 10/06/19 2222    albuterol-ipratropium 2.5 mg-0.5 mg/3 mL nebulizer solution 3 mL, 3 mL, Nebulization, Q6H PRN, Kiersten Marcos MD, 3 mL at 10/04/19 0740    albuterol-ipratropium 2.5 mg-0.5 mg/3 mL nebulizer solution 3 mL, 3 mL, Nebulization, Q6H, Rufino Edward MD, 3 mL at 10/11/19 1354    clonazePAM tablet 1 mg, 1 mg, Oral, BID, Domenic Laughlin MD, 1 mg at 10/11/19 0856    enoxaparin injection 40 mg, 40 mg, Subcutaneous, Daily, Kiersten Marcos MD, 40 mg at 10/10/19 1731    famotidine (PF) injection 20 mg, 20 mg, Intravenous, BID, Echo Stokes MD, 20 mg at 10/11/19 0856    FLUoxetine 20 mg/5 mL (4 mg/mL) solution 60 mg, 60 mg, Oral, Daily, Bryan Hill MD, 60 mg at 10/11/19 0856    hydrALAZINE injection 10 mg, 10 mg, Intravenous, Q6H PRN, Kiersten Marcos MD, 10 mg at 10/11/19 0638    ondansetron injection 8 mg, 8 mg, Intravenous, Q8H PRN, Kiersten Marcos MD    promethazine (PHENERGAN) 12.5 mg in dextrose 5 % 50 mL IVPB, 12.5 mg, Intravenous, Q6H PRN, Kiersten Marcos MD    ramelteon tablet 8 mg, 8 mg, Oral, Nightly PRN, Kiersten Marcos MD    scopolamine 1.3-1.5 mg (1 mg over 3 days) 1 patch, 1 patch, Transdermal, Q3 Days, Echo Stokes MD, 1 patch at 10/10/19 1221    senna-docusate 8.6-50 mg per tablet 1 tablet, 1 tablet, Oral, BID PRN, Kiersten Marcos MD    tamsulosin 24 hr capsule 0.4 mg, 1 capsule, Oral, Daily, Kiersten Marcos MD, 0.4 mg at 10/11/19 0856    valproic acid (as sodium salt) 250 mg/5  "mL (5 mL) syrup Soln 500 mg, 500 mg, Oral, Q12H, Echo Stokes MD, 500 mg at 10/11/19 0856    Review of Systems   Unable to perform ROS: Mental acuity   Nonverbal    BP (!) 140/79   Pulse 85   Temp 98.8 °F (37.1 °C) (Axillary)   Resp (!) 30   Ht 5' 1" (1.549 m)   Wt 45.1 kg (99 lb 6.8 oz)   SpO2 98%   BMI 18.79 kg/m²   Physical Exam   Constitutional: He appears well-developed. No distress.   malnourished   HENT:   Head: Normocephalic and atraumatic.   Mouth/Throat: Oropharynx is clear and moist.   Nasal cannula in place   Eyes: Pupils are equal, round, and reactive to light. EOM are normal. No scleral icterus.   Cardiovascular: Normal rate, regular rhythm and normal heart sounds.   No murmur heard.  Pulmonary/Chest: Effort normal and breath sounds normal. No respiratory distress.   Abdominal: Soft. Bowel sounds are normal. He exhibits no distension. There is no tenderness. There is no rebound and no guarding.   Musculoskeletal: He exhibits no edema.   Contracture of right wrist, legs crossed and bent up to abdomen, but per pt's brother, he is able to straighten them out, he just prefers to remain curled up that way   Neurological: He is alert.   Unable to assess orientation - non-verbal   Skin: Skin is warm and dry. No rash noted.   Vitals reviewed.      Labs:  Lab Results   Component Value Date/Time    WBC 12.33 10/11/2019 04:01 AM    HGB 9.9 (L) 10/11/2019 04:01 AM    HCT 32.3 (L) 10/11/2019 04:01 AM    HCT 34 (L) 06/03/2017 03:41 PM     10/11/2019 04:01 AM    MCV 91 10/11/2019 04:01 AM     (H) 10/11/2019 04:01 AM    K 3.4 (L) 10/11/2019 04:01 AM     (H) 10/11/2019 04:01 AM    CO2 28 10/11/2019 04:01 AM    BUN 19 10/11/2019 04:01 AM    CREATININE 0.8 10/11/2019 04:01 AM    GLU 92 10/11/2019 04:01 AM    CALCIUM 9.0 10/11/2019 04:01 AM    MG 2.3 10/11/2019 04:01 AM    PHOS 3.8 10/11/2019 04:01 AM    INR 1.4 (H) 06/03/2017 03:20 PM    APTT 28.0 06/03/2017 03:20 PM   ]  Lab Results "   Component Value Date/Time    PROT 6.4 10/04/2019 03:18 AM    ALBUMIN 3.1 (L) 10/04/2019 03:18 AM    BILITOT 0.4 10/04/2019 03:18 AM    AST 21 10/04/2019 03:18 AM    ALT 15 10/04/2019 03:18 AM    ALKPHOS 71 10/04/2019 03:18 AM   ]    Imaging and Procedures:  I personally reviewed the imaging/procedures below.  CXR 10/10/19:  Heart is stable in size.  Lungs are hypoinflated.  No significant change in cardiopulmonary status from previous exam.  No evidence of new focal consolidation, pneumothorax, or significant pleural effusion.  Previously visualized ET tube in enteric tube have been discontinued.    Assessment:  Karri Galeano is a 24 y.o. male with a history of cerebral palsy, MR, seizures, anxiety and urinary retention who presented with fever, cough and lethargy and was diagnosed with left lung pneumonia and acute hypoxic respiratory failure.  GI consulted for PEG placement due to dysphagia/aspiration.    Plan:  - PEG-J likely Monday or Tuesday, assuming patient's aunt Crystal (who makes his medical decisions per pt's brother) provides consent and the PEG-J tube is available (if not, will need to order it)  - will reassess on Sunday to see if patient's table for procedure from respiratory standpoint  - NPO after midnight Sunday night  - dysphagia diet over the weekend  - aspiration precautions    Melina Garcia MD

## 2019-10-11 NOTE — PT/OT/SLP PROGRESS
Speech Language Pathology Treatment    Patient Name:  Karri Galeano   MRN:  [id number]  Admitting Diagnosis: Pneumonia of left lung due to infectious organism    Recommendations:                 General Recommendations:  Dysphagia therapy  Diet recommendations:  Puree, Liquid Diet Level: Nectar Thickened Liquids (via medicine cup)   Aspiration Precautions: Alternating bites/sips, Alternate means of nutrition/hydration, Assistance with meals and Assistance with thickening liquids, Check for pocketing/oral residue, Feed only when awake/alert, HOB to 90 degrees, Monitor for s/s of aspiration, No straws, Standard aspiration precautions and Wear oxygen during intake   General Precautions: Standard, aspiration, nectar thick, pureed diet  Communication strategies:  yes/no questions only    Subjective   Pt's family member in room upon ST arrival and provided hx with regard to pt's prior level of function. Pt's HOB adjusted to upright position. He was in bed with his legs in contracted position and wrists in restraints d/t frequent attempts to remove his nasal canula per pt's brother.     Pain/Comfort:  · Pain Rating 1: 0/10    Objective:     Has the patient been evaluated by SLP for swallowing?   Yes  Keep patient NPO? No   Current Respiratory Status: nasal cannula      Pt assessed with thin liquid and NTL via spoon and medicine cup sip. He attempted to perform adequate labial seal around spoon, min anterior sulci stasis noted following both thin liquid and NTL trials. However, he demo improved labial function with medicine cup sip of both liquid consistencies. Assessed pt with puree item, pt demo infrequent anterior oral hold of bolus, requiring consecutive spoon presentations needed to initiate swallow response, with mild anterior sulci stasis noted. Pt's attempt at mastication was poor, he was given visual model, however,  pt demo s/s of  oral apraxia.     Per pt's brother, pt rejects foods he dislikes. ST recommended  alternate nutrition/hydration method d/t pt's decreased ability to perform adequate A-P transfer during thin liq/NTL and puree trials. Per nursing, during evening meal pt rejected all puree items but accepted dessert of which he consumed 100%. Also, decrease anterior sulci stasis noted with dessert in addition to decreased oral hold of bolus. Per nursing pt demonstrated increased coughing with thin liq during afternoon meal, ST recommend pt downgrade to NTL, nursing informed.        Assessment:     Karri Galeano is a 24 y.o. male with diagnosis of Pneumonia of left lung due to infectious organism. He presents with suspected moderate oral pharyngeal dysphagia.     Goals:   Multidisciplinary Problems     SLP Goals        Problem: SLP Goal    Goal Priority Disciplines Outcome   SLP Goal    Medium SLP Ongoing, Not Progressing   Description:  ST. Pt will tolerate PO diet of pureed with Nectar thickened liquids with no overt s/s of aspiration. (goal downgraded 10/11/19)                     Plan:     · Patient to be seen:  3 x/week, 4 x/week   · Plan of Care expires:     · Plan of Care reviewed with:  patient, sibling   · SLP Follow-Up:  Yes       Discharge recommendations:  other (see comments)(group home)   Barriers to Discharge:  Level of Skilled Assistance Needed .    Time Tracking:     SLP Treatment Date:   10/11/19  Speech Start Time:  1540  Speech Stop Time:  1605     Speech Total Time (min):  25 min    Billable Minutes: Treatment Swallowing Dysfunction . 25 min    Joi Gardner CF-SLP  10/11/2019

## 2019-10-11 NOTE — PLAN OF CARE
10/10/19 1215   Discharge Reassessment   Assessment Type Discharge Planning Reassessment   Do you have any problems affording any of your prescribed medications? No   Discharge Plan A Group Home  (new group home in [place], [place] when medically ready for d/c)   DME Needed Upon Discharge    (TBD)   Patient choice form signed by patient/caregiver N/A   Anticipated Discharge Disposition   (group home)   Can the patient answer the patient profile reliably? No, cognitively impaired   How does the patient rate their overall health at the present time?   (not able to assess)   Describe the patient's ability to walk at the present time. Does not walk or unable to take any steps at all   How often would a person be available to care for the patient? Often   Number of comorbid conditions (as recorded on the chart) Five or more   During the past month, has the patient often been bothered by feeling down, depressed or hopeless?   (TIFF)   During the past month, has the patient often been bothered by little interest or pleasure in doing things? No  (TIFF)   Post-Acute Status   Post-Acute Authorization Other  (Group Home in [place], [place] )   Discharge Delays (!) Change in Medical Condition  (pt is now not tolerating diet PO and may need PEG tube placement and a trach at some point )

## 2019-10-11 NOTE — CARE UPDATE
[facility]  ICU Multidisciplinary Bedside Rounds     UPDATE     Date: 10/11/2019      Plan of care reviewed with the following, Nurse, Charge Nurse, Physician, Pulm CC, , Resp. Therapist, Infection Prevention and Dietician.       Needs/ Goals for the day: consult GI for possible peg placement. Dr Stokes to discuss with pulmonology on possible trach placement. Increase fluid and food intake      Level of Care: Critical Care

## 2019-10-11 NOTE — SUBJECTIVE & OBJECTIVE
Interval History: pt stable but not eating much and tolerance is an issue, Gi consulted for peg tube placement    Review of Systems   Unable to perform ROS: Acuity of condition     Objective:     Vital Signs (Most Recent):  Temp: 98.8 °F (37.1 °C) (10/11/19 1100)  Pulse: 85 (10/11/19 1354)  Resp: (!) 30 (10/11/19 1354)  BP: (!) 140/79 (10/11/19 1300)  SpO2: 98 % (10/11/19 1354) Vital Signs (24h Range):  Temp:  [98 °F (36.7 °C)-98.8 °F (37.1 °C)] 98.8 °F (37.1 °C)  Pulse:  [] 85  Resp:  [18-42] 30  SpO2:  [89 %-100 %] 98 %  BP: (121-177)/() 140/79     Weight: 45.1 kg (99 lb 6.8 oz)  Body mass index is 18.79 kg/m².    Intake/Output Summary (Last 24 hours) at 10/11/2019 1545  Last data filed at 10/11/2019 1300  Gross per 24 hour   Intake 168 ml   Output 2640 ml   Net -2472 ml      Physical Exam   Constitutional: He appears lethargic. He appears cachectic. He is sleeping. He has a sickly appearance (chronic). He is sedated and not intubated.   HENT:   Head: Normocephalic and atraumatic.   Eyes: Pupils are equal, round, and reactive to light. No scleral icterus. Right eye exhibits no nystagmus. Left eye exhibits no nystagmus.   Neck: Trachea normal. No JVD present.   Cardiovascular: Normal rate and regular rhythm. Exam reveals no gallop and no friction rub.   No murmur heard.  Pulses:       Radial pulses are 2+ on the right side, and 2+ on the left side.        Dorsalis pedis pulses are 2+ on the right side, and 2+ on the left side.   No edema to note   Pulmonary/Chest: Effort normal. He is not intubated. No respiratory distress. He has no wheezes. He has no rhonchi. He has no rales.   Abdominal: Soft. Bowel sounds are normal. He exhibits no distension. There is no tenderness. There is no guarding.   Musculoskeletal: He exhibits deformity.   Contracted right wrist   Neurological: He appears lethargic. No cranial nerve deficit. GCS eye subscore is 2. GCS verbal subscore is 1. GCS motor subscore is 4.   Cough  gag reflexes intact, withdraws in all extremities to pain, no focal deficits to note       Significant Labs:   BMP:   Recent Labs   Lab 10/11/19  0401   GLU 92   *   K 3.4*   *   CO2 28   BUN 19   CREATININE 0.8   CALCIUM 9.0   MG 2.3     CBC:   Recent Labs   Lab 10/10/19  0411 10/11/19  0401   WBC 12.22 12.33   HGB 9.8* 9.9*   HCT 32.1* 32.3*    260       Significant Imaging: I have reviewed and interpreted all pertinent imaging results/findings within the past 24 hours.

## 2019-10-11 NOTE — PROGRESS NOTES
[facility]  Pulmonology  Progress Note    Patient Name: Karri Galeano  MRN: [id number]  Admission Date: 10/3/2019  Hospital Length of Stay: 7 days  Code Status: Full Code  Attending Provider: Echo Stokes MD  Primary Care Provider: Echo Reeves MD   Principal Problem: Pneumonia of left lung due to infectious organism    Subjective:     Interval History: No acute events overnight. On HFNC, weaned to RA this AM and maintaining adequate SpO2. Does have intermittent desats, that respond to suctioning.     Seen by speech.. Only small volume feeds through medicine cup. Aunt not at bedside. Will be back in town tomorrow.     Afebrile.     Objective:     Vital Signs (Most Recent):  Temp: 98.8 °F (37.1 °C) (10/11/19 1100)  Pulse: 99 (10/11/19 1200)  Resp: (!) 42 (10/11/19 1200)  BP: 138/83 (10/11/19 1200)  SpO2: 96 % (10/11/19 1200) Vital Signs (24h Range):  Temp:  [98 °F (36.7 °C)-98.8 °F (37.1 °C)] 98.8 °F (37.1 °C)  Pulse:  [] 99  Resp:  [18-42] 42  SpO2:  [89 %-100 %] 96 %  BP: (121-177)/() 138/83       Review of Systems: unable to obtain 2/2 non-verbal at baseline.       Weight: 45.1 kg (99 lb 6.8 oz)  Body mass index is 18.79 kg/m².      Intake/Output Summary (Last 24 hours) at 10/11/2019 1252  Last data filed at 10/11/2019 1000  Gross per 24 hour   Intake 168 ml   Output 2610 ml   Net -2442 ml     Cumulative fluid balance- +30cc.     Physical Exam   Constitutional: He appears cachectic. He is active. Sickly appearance: chronic.   HENT:   Head: Normocephalic and atraumatic.   Eyes: Pupils are equal, round, and reactive to light. No scleral icterus. Right eye exhibits no nystagmus. Left eye exhibits no nystagmus.   Neck: Trachea normal. No JVD present.   Cardiovascular: Normal rate and regular rhythm. Exam reveals no gallop and no friction rub.   No murmur heard.  Pulses:       Radial pulses are 2+ on the right side, and 2+ on the left side.        Dorsalis pedis pulses are 2+  on the right side, and 2+ on the left side.   No edema to note   Pulmonary/Chest: Effort normal. No respiratory distress. He has no wheezes. He has no rhonchi. He has no rales.   Abdominal: Soft. Bowel sounds are normal. He exhibits no distension. There is no tenderness. There is no guarding.   Musculoskeletal: He exhibits deformity.   Contracted right wrist   Neurological: He is alert. He displays atrophy. No cranial nerve deficit.       Vents: None       Lines/Drains/Airways     Drain                 Urethral Catheter 10/04/19 0104 Straight-tip 16 Fr. 7 days          Peripheral Intravenous Line                 Peripheral IV - Single Lumen 10/10/19 2000 20 G Left;Posterior Forearm less than 1 day         Peripheral IV - Single Lumen 10/10/19 2000 20 G Left;Posterior Upper Arm less than 1 day                Significant Labs:    CBC/Anemia Profile:  Recent Labs   Lab 10/10/19  0411 10/11/19  0401   WBC 12.22 12.33   HGB 9.8* 9.9*   HCT 32.1* 32.3*    260   MCV 91 91   RDW 15.1* 14.9*        Chemistries:  Recent Labs   Lab 10/10/19  0411 10/11/19  0401   * 151*   K 3.5 3.4*    111*   CO2 33* 28   BUN 16 19   CREATININE 0.9 0.8   CALCIUM 8.8 9.0   MG 2.4 2.3   PHOS 3.7 3.8     MICROBIOLOGY-     Respiratory Culture Abnormal    VANNESSA ALBICANS   Few   no normal arleth isolated     Gram Stain (Respiratory) <10 epithelial cells per low power field.    Gram Stain (Respiratory) Moderate WBC's    Gram Stain (Respiratory) No organisms seen           Respiratory Infection Panel Source Not Detected NP Swab    Adenovirus Not Detected Not Detected    Coronavirus 229E Not Detected Not Detected    Coronavirus HKU1 Not Detected Not Detected    Coronavirus NL63 Not Detected Not Detected    Coronavirus OC43 Not Detected Not Detected    Human Metapneumovirus Not Detected Not Detected    Human Rhinovirus/Enterovirus Not Detected DetectedAbnormal       Blood cultures x 2- NGTD     All pertinent labs within the past 24  hours have been reviewed.    Significant Imaging:  I have reviewed all pertinent imaging results/findings within the past 24 hours.      CXR- Heart is stable in size.  Lungs are hypoinflated.  No significant change in cardiopulmonary status from previous exam.  No evidence of new focal consolidation, pneumothorax, or significant pleural effusion.  Previously visualized ET tube in enteric tube have been discontinued.    Assessment/Plan:     Epilepsy  Divalproex at home.  Continue. No reported seizure >15 years.     Severe sepsis  Secondary to CAP.   --See respiratory failure for plan    Acute respiratory failure with hypoxia  Febrile on presentation with Rhino respiratory tract infection +/- PNA. Bronchoscopy with bronchomalacia of the lower lung zones + secretions, but certainly not copious. Suspect atelectasis secondary to ineffective airway clearance.  Extubated 10/9 without issue.  Still with ineffective airway clearance that is resulting in hypoxia intermittently. Appropriate improvement with suctioning. On RA.  Chest imaging is stable    -- Wean supplemental O2 to maintain SpO2 >88%..   -- Continue with aggressive pulmonary toilet/NT suctioning and CPT.. This is his biggest issue as contractures and baseline MS significantly impair his airway clearance. This was discussed with RT at bedside.  -- PT needs to move   -- Agree with discontinuation of Abx   -- Prn bronchodilators    -- PO intake seems risky and such small volume would certainly not allow him to meet nutritional requirement. Need to consider PEG placement.     Overall, no quick fix for this young man. His contractures and CP are severe and limiting.. Since this is his first admission for respiratory failure I will push him in effort to avoid tracheostomy, but if this becomes recurrent it may be the safest option to manage him moving forward.                Hypernatremia  Replace free water. He will not be able to tolerate sufficient volumes PO..  Start D5W infusion.          I will follow peripherally and be available if any issues. Please let me know if any change in clinical status. He is not critically ill, but RT needs and monitoring required can only be accomplished in ICU setting. We need to meet with his aunt in the coming days to discuss what his future will likely look like.       David Vela MD  Pulmonology/Critical Care Medicine   [facility]

## 2019-10-11 NOTE — PROGRESS NOTES
[facility] Hospital Medicine  Progress Note    Patient Name: Karri Galeano  MRN: [id number]  Patient Class: IP- Inpatient   Admission Date: 10/3/2019  Length of Stay: 6 days  Attending Physician: Echo Stokes MD  Primary Care Provider: Echo Reeves MD        Subjective:     Principal Problem:Pneumonia of left lung due to infectious organism        HPI:  Mr. Karri Galeano is a 24 y.o. male with cerebral palsy and epilepsy who presents to [facility] ED from an urgent care facility with complaints of fevers today.  He lives in a group home and started to have a nonproductive cough today.  The caregiver did not observe any vomiting.  He was in his usual state of health yesterday but today has become more lethargic and somnolent.  He was taken to an urgent care facility where he was then transferred to this facility for further care.  He was noted there to have a fever of 102.2° F as well as an oxygen saturation of 95% on nasal cannula at 3 liters/minute.  Further history is otherwise limited at this time.    Overview/Hospital Course:  Mr. Karri Galeano is a 24 y.o. male with cerebral palsy and epilepsy who presented to [facility] ED from an urgent care facility with complaints of fevers.  He lives in a group home and started to have a nonproductive cough today.   Patient became more lethargic and somnolent.  He was taken to an urgent care facility where he was then transferred to this facility for further care.  He was noted there to have a fever of 102.2° .  Patient became more lethargic and tachypneic and intubated.  Diagnosed with pneumonia and started on Vanc and Zosyn.  Pulmonary consulted for vent management.  Extubated on 10/5 AM.  Initially did well, but worsening respiratory failure during the day.  Started on Bipap with no improvement and reintubated 10/5 PM.    10/8- SBT today, placed back on vent with change in HR and RR.   10/9- pt extubated to bipap, + rhinovirus on cx, will  proceed with planning for group home placement on dispo  10/10- pt had some drops in O2 sats both at rest and positional in bed. Held transfer to floor as he had increased cough with feeding pudding/puree diet.      Interval History: alert and able to participate in swallow eval today + cough     Review of Systems   Unable to perform ROS: Acuity of condition     Objective:     Vital Signs (Most Recent):  Temp: 97.4 °F (36.3 °C) (10/10/19 1137)  Pulse: 88 (10/10/19 1935)  Resp: (!) 22 (10/10/19 1935)  BP: (!) 154/95 (10/10/19 1800)  SpO2: 97 % (10/10/19 1935) Vital Signs (24h Range):  Temp:  [97.4 °F (36.3 °C)-99.6 °F (37.6 °C)] 97.4 °F (36.3 °C)  Pulse:  [60-91] 88  Resp:  [22-37] 22  SpO2:  [85 %-99 %] 97 %  BP: (134-160)/() 154/95     Weight: 47.8 kg (105 lb 6.1 oz)  Body mass index is 19.91 kg/m².    Intake/Output Summary (Last 24 hours) at 10/10/2019 2220  Last data filed at 10/10/2019 1830  Gross per 24 hour   Intake 350 ml   Output 2655 ml   Net -2305 ml      Physical Exam   Constitutional: He appears lethargic. He appears cachectic. He is sleeping. He has a sickly appearance (chronic). He is sedated and not intubated.   HENT:   Head: Normocephalic and atraumatic.   Eyes: Pupils are equal, round, and reactive to light. No scleral icterus. Right eye exhibits no nystagmus. Left eye exhibits no nystagmus.   Neck: Trachea normal. No JVD present.   Cardiovascular: Normal rate and regular rhythm. Exam reveals no gallop and no friction rub.   No murmur heard.  Pulses:       Radial pulses are 2+ on the right side, and 2+ on the left side.        Dorsalis pedis pulses are 2+ on the right side, and 2+ on the left side.   No edema to note   Pulmonary/Chest: Effort normal. He is not intubated. No respiratory distress. He has no wheezes. He has no rhonchi. He has no rales.   Abdominal: Soft. Bowel sounds are normal. He exhibits no distension. There is no tenderness. There is no guarding.   Musculoskeletal: He  exhibits deformity.   Contracted right wrist   Neurological: He appears lethargic. No cranial nerve deficit. GCS eye subscore is 2. GCS verbal subscore is 1. GCS motor subscore is 4.   Cough gag reflexes intact, withdraws in all extremities to pain, no focal deficits to note       Significant Labs: All pertinent labs within the past 24 hours have been reviewed.    Significant Imaging: I have reviewed and interpreted all pertinent imaging results/findings within the past 24 hours.      Assessment/Plan:      * Pneumonia of left lung due to infectious organism  Patient was becoming more tachypneic and obtunded which he was intubated.    He may have had an aspiration event with subsequent pneumonia.    He did meet criteria for severe sepsis and has been started on empiric antibiotic therapy.     Dc vanc and zosyn 10/10- completed 7 days   +rhinovirus on sputum cx    Hypokalemia  Replace as needed.      Epilepsy  Stable; continue his home regimen of divalproex.    On mechanically assisted ventilation  As addressed above.    Extubated to bipap 10/9    Severe sepsis  This patient meets criteria for severe sepsis given fevers, tachycardia, tachypnea, pneumonia, acute respiratory failure.  Blood cultures are negative will start IV fluid hydration and broad spectrum antibiotics.    Acute respiratory failure with hypoxia  Appreciate Pulmonary input.  Extubated on 10/5 Am.  Initially did well, but worsening respiratory distress through the day.  Started on Bipap with no improvement.  Reintubated later on 10/5.    10/8- Attempt weaning with daily SBT    Spastic quadriplegic cerebral palsy  Stable; there are no acute issues.    Family would like group home in [place] for dc       VTE Risk Mitigation (From admission, onward)         Ordered     enoxaparin injection 40 mg  Daily      10/04/19 0152     IP VTE HIGH RISK PATIENT  Once      10/04/19 0152                Critical care time spent on the evaluation and treatment of  severe organ dysfunction, review of pertinent labs and imaging studies, discussions with consulting providers and discussions with patient/family: 40 minutes.      Echo Stokes MD  Department of Hospital Medicine   [facility]

## 2019-10-11 NOTE — SUBJECTIVE & OBJECTIVE
Interval History: alert and able to participate in swallow eval today + cough     Review of Systems   Unable to perform ROS: Acuity of condition     Objective:     Vital Signs (Most Recent):  Temp: 97.4 °F (36.3 °C) (10/10/19 1137)  Pulse: 88 (10/10/19 1935)  Resp: (!) 22 (10/10/19 1935)  BP: (!) 154/95 (10/10/19 1800)  SpO2: 97 % (10/10/19 1935) Vital Signs (24h Range):  Temp:  [97.4 °F (36.3 °C)-99.6 °F (37.6 °C)] 97.4 °F (36.3 °C)  Pulse:  [60-91] 88  Resp:  [22-37] 22  SpO2:  [85 %-99 %] 97 %  BP: (134-160)/() 154/95     Weight: 47.8 kg (105 lb 6.1 oz)  Body mass index is 19.91 kg/m².    Intake/Output Summary (Last 24 hours) at 10/10/2019 2220  Last data filed at 10/10/2019 1830  Gross per 24 hour   Intake 350 ml   Output 2655 ml   Net -2305 ml      Physical Exam   Constitutional: He appears lethargic. He appears cachectic. He is sleeping. He has a sickly appearance (chronic). He is sedated and not intubated.   HENT:   Head: Normocephalic and atraumatic.   Eyes: Pupils are equal, round, and reactive to light. No scleral icterus. Right eye exhibits no nystagmus. Left eye exhibits no nystagmus.   Neck: Trachea normal. No JVD present.   Cardiovascular: Normal rate and regular rhythm. Exam reveals no gallop and no friction rub.   No murmur heard.  Pulses:       Radial pulses are 2+ on the right side, and 2+ on the left side.        Dorsalis pedis pulses are 2+ on the right side, and 2+ on the left side.   No edema to note   Pulmonary/Chest: Effort normal. He is not intubated. No respiratory distress. He has no wheezes. He has no rhonchi. He has no rales.   Abdominal: Soft. Bowel sounds are normal. He exhibits no distension. There is no tenderness. There is no guarding.   Musculoskeletal: He exhibits deformity.   Contracted right wrist   Neurological: He appears lethargic. No cranial nerve deficit. GCS eye subscore is 2. GCS verbal subscore is 1. GCS motor subscore is 4.   Cough gag reflexes intact, withdraws  in all extremities to pain, no focal deficits to note       Significant Labs: All pertinent labs within the past 24 hours have been reviewed.    Significant Imaging: I have reviewed and interpreted all pertinent imaging results/findings within the past 24 hours.

## 2019-10-11 NOTE — PLAN OF CARE
Pt continues in the ICU.  Pt is alert but does not follow commands. Pt is sinus rhythm on the monitor.  Sitter at bedside. Hydralazine given x 1 for elevated b/p.  No falls/ injuries this shift.

## 2019-10-11 NOTE — PROGRESS NOTES
[facility] Hospital Medicine  Progress Note    Patient Name: Karri Galeano  MRN: [id number]  Patient Class: IP- Inpatient   Admission Date: 10/3/2019  Length of Stay: 7 days  Attending Physician: Echo Stokes MD  Primary Care Provider: Echo Reeves MD        Subjective:     Principal Problem:Pneumonia of left lung due to infectious organism        HPI:  Mr. Karri Galeano is a 24 y.o. male with cerebral palsy and epilepsy who presents to [facility] ED from an urgent care facility with complaints of fevers today.  He lives in a group home and started to have a nonproductive cough today.  The caregiver did not observe any vomiting.  He was in his usual state of health yesterday but today has become more lethargic and somnolent.  He was taken to an urgent care facility where he was then transferred to this facility for further care.  He was noted there to have a fever of 102.2° F as well as an oxygen saturation of 95% on nasal cannula at 3 liters/minute.  Further history is otherwise limited at this time.    Overview/Hospital Course:  Mr. Karri Galeano is a 24 y.o. male with cerebral palsy and epilepsy who presented to [facility] ED from an urgent care facility with complaints of fevers.  He lives in a group home and started to have a nonproductive cough today.   Patient became more lethargic and somnolent.  He was taken to an urgent care facility where he was then transferred to this facility for further care.  He was noted there to have a fever of 102.2° .  Patient became more lethargic and tachypneic and intubated.  Diagnosed with pneumonia and started on Vanc and Zosyn.  Pulmonary consulted for vent management.  Extubated on 10/5 AM.  Initially did well, but worsening respiratory failure during the day.  Started on Bipap with no improvement and reintubated 10/5 PM.    10/8- SBT today, placed back on vent with change in HR and RR.   10/9- pt extubated to bipap, + rhinovirus on cx, will  proceed with planning for group home placement on dispo  10/10- pt had some drops in O2 sats both at rest and positional in bed. Held transfer to floor as he had increased cough with feeding pudding/puree diet.    10/11- vitals stable, transfer to floor. Gi consulted for peg tube placement    Interval History: pt stable but not eating much and tolerance is an issue, Gi consulted for peg tube placement    Review of Systems   Unable to perform ROS: Acuity of condition     Objective:     Vital Signs (Most Recent):  Temp: 98.8 °F (37.1 °C) (10/11/19 1100)  Pulse: 85 (10/11/19 1354)  Resp: (!) 30 (10/11/19 1354)  BP: (!) 140/79 (10/11/19 1300)  SpO2: 98 % (10/11/19 1354) Vital Signs (24h Range):  Temp:  [98 °F (36.7 °C)-98.8 °F (37.1 °C)] 98.8 °F (37.1 °C)  Pulse:  [] 85  Resp:  [18-42] 30  SpO2:  [89 %-100 %] 98 %  BP: (121-177)/() 140/79     Weight: 45.1 kg (99 lb 6.8 oz)  Body mass index is 18.79 kg/m².    Intake/Output Summary (Last 24 hours) at 10/11/2019 1545  Last data filed at 10/11/2019 1300  Gross per 24 hour   Intake 168 ml   Output 2640 ml   Net -2472 ml      Physical Exam   Constitutional: He appears lethargic. He appears cachectic. He is sleeping. He has a sickly appearance (chronic). He is sedated and not intubated.   HENT:   Head: Normocephalic and atraumatic.   Eyes: Pupils are equal, round, and reactive to light. No scleral icterus. Right eye exhibits no nystagmus. Left eye exhibits no nystagmus.   Neck: Trachea normal. No JVD present.   Cardiovascular: Normal rate and regular rhythm. Exam reveals no gallop and no friction rub.   No murmur heard.  Pulses:       Radial pulses are 2+ on the right side, and 2+ on the left side.        Dorsalis pedis pulses are 2+ on the right side, and 2+ on the left side.   No edema to note   Pulmonary/Chest: Effort normal. He is not intubated. No respiratory distress. He has no wheezes. He has no rhonchi. He has no rales.   Abdominal: Soft. Bowel sounds are  normal. He exhibits no distension. There is no tenderness. There is no guarding.   Musculoskeletal: He exhibits deformity.   Contracted right wrist   Neurological: He appears lethargic. No cranial nerve deficit. GCS eye subscore is 2. GCS verbal subscore is 1. GCS motor subscore is 4.   Cough gag reflexes intact, withdraws in all extremities to pain, no focal deficits to note       Significant Labs:   BMP:   Recent Labs   Lab 10/11/19  0401   GLU 92   *   K 3.4*   *   CO2 28   BUN 19   CREATININE 0.8   CALCIUM 9.0   MG 2.3     CBC:   Recent Labs   Lab 10/10/19  0411 10/11/19  0401   WBC 12.22 12.33   HGB 9.8* 9.9*   HCT 32.1* 32.3*    260       Significant Imaging: I have reviewed and interpreted all pertinent imaging results/findings within the past 24 hours.      Assessment/Plan:      * Pneumonia of left lung due to infectious organism  Patient was becoming more tachypneic and obtunded which he was intubated.    He may have had an aspiration event with subsequent pneumonia.    He did meet criteria for severe sepsis and has been started on empiric antibiotic therapy.     Dc vanc and zosyn 10/10- completed 7 days   +rhinovirus on sputum cx    Acute respiratory failure with hypoxia    Secondary to pneumonia - completed antibiotics  + rhinovirus  Wean O2 as tolerated  Nebs     Hypokalemia  Replace as needed.      Epilepsy  Stable; continue his home regimen of divalproex.    On mechanically assisted ventilation  As addressed above.    Extubated to bipap 10/9  Weaned to NC O2     Spastic quadriplegic cerebral palsy  Stable; there are no acute issues.    Family would like group home in [place] for dc   CP not actual diagnosis but has dysphagia issues  Puree diet - not tolerating - consider peg tube placement       VTE Risk Mitigation (From admission, onward)         Ordered     enoxaparin injection 40 mg  Daily      10/04/19 0152     IP VTE HIGH RISK PATIENT  Once      10/04/19 0152                 Critical care time spent on the evaluation and treatment of severe organ dysfunction, review of pertinent labs and imaging studies, discussions with consulting providers and discussions with patient/family: 40 minutes.      Echo Stokes MD  Department of Hospital Medicine   [facility]

## 2019-10-12 PROBLEM — E43 SEVERE MALNUTRITION: Status: ACTIVE | Noted: 2019-10-12

## 2019-10-12 LAB
ANION GAP SERPL CALC-SCNC: 10 MMOL/L (ref 8–16)
BASOPHILS # BLD AUTO: 0.07 K/UL (ref 0–0.2)
BASOPHILS NFR BLD: 0.5 % (ref 0–1.9)
BUN SERPL-MCNC: 21 MG/DL (ref 6–20)
CALCIUM SERPL-MCNC: 9.1 MG/DL (ref 8.7–10.5)
CHLORIDE SERPL-SCNC: 113 MMOL/L (ref 95–110)
CO2 SERPL-SCNC: 30 MMOL/L (ref 23–29)
CREAT SERPL-MCNC: 0.9 MG/DL (ref 0.5–1.4)
DIFFERENTIAL METHOD: ABNORMAL
EOSINOPHIL # BLD AUTO: 0.1 K/UL (ref 0–0.5)
EOSINOPHIL NFR BLD: 0.5 % (ref 0–8)
ERYTHROCYTE [DISTWIDTH] IN BLOOD BY AUTOMATED COUNT: 15.3 % (ref 11.5–14.5)
EST. GFR  (AFRICAN AMERICAN): >60 ML/MIN/1.73 M^2
EST. GFR  (NON AFRICAN AMERICAN): >60 ML/MIN/1.73 M^2
GLUCOSE SERPL-MCNC: 101 MG/DL (ref 70–110)
HCT VFR BLD AUTO: 36 % (ref 40–54)
HGB BLD-MCNC: 10.6 G/DL (ref 14–18)
IMM GRANULOCYTES # BLD AUTO: 0.3 K/UL (ref 0–0.04)
IMM GRANULOCYTES NFR BLD AUTO: 2 % (ref 0–0.5)
LYMPHOCYTES # BLD AUTO: 1.7 K/UL (ref 1–4.8)
LYMPHOCYTES NFR BLD: 11.4 % (ref 18–48)
MAGNESIUM SERPL-MCNC: 2.5 MG/DL (ref 1.6–2.6)
MCH RBC QN AUTO: 27.4 PG (ref 27–31)
MCHC RBC AUTO-ENTMCNC: 29.4 G/DL (ref 32–36)
MCV RBC AUTO: 93 FL (ref 82–98)
MONOCYTES # BLD AUTO: 2.5 K/UL (ref 0.3–1)
MONOCYTES NFR BLD: 17.1 % (ref 4–15)
NEUTROPHILS # BLD AUTO: 10.2 K/UL (ref 1.8–7.7)
NEUTROPHILS NFR BLD: 68.5 % (ref 38–73)
NRBC BLD-RTO: 0 /100 WBC
PHOSPHATE SERPL-MCNC: 3.7 MG/DL (ref 2.7–4.5)
PLATELET # BLD AUTO: 334 K/UL (ref 150–350)
PLATELET BLD QL SMEAR: ABNORMAL
PMV BLD AUTO: 10.3 FL (ref 9.2–12.9)
POTASSIUM SERPL-SCNC: 3.5 MMOL/L (ref 3.5–5.1)
RBC # BLD AUTO: 3.87 M/UL (ref 4.6–6.2)
SODIUM SERPL-SCNC: 153 MMOL/L (ref 136–145)
WBC # BLD AUTO: 14.86 K/UL (ref 3.9–12.7)

## 2019-10-12 PROCEDURE — S0028 INJECTION, FAMOTIDINE, 20 MG: HCPCS | Performed by: HOSPITALIST

## 2019-10-12 PROCEDURE — 94668 MNPJ CHEST WALL SBSQ: CPT

## 2019-10-12 PROCEDURE — 94640 AIRWAY INHALATION TREATMENT: CPT

## 2019-10-12 PROCEDURE — 83735 ASSAY OF MAGNESIUM: CPT

## 2019-10-12 PROCEDURE — 25000003 PHARM REV CODE 250: Performed by: HOSPITALIST

## 2019-10-12 PROCEDURE — 27000221 HC OXYGEN, UP TO 24 HOURS

## 2019-10-12 PROCEDURE — 80048 BASIC METABOLIC PNL TOTAL CA: CPT

## 2019-10-12 PROCEDURE — 36415 COLL VENOUS BLD VENIPUNCTURE: CPT

## 2019-10-12 PROCEDURE — 21400001 HC TELEMETRY ROOM

## 2019-10-12 PROCEDURE — 84100 ASSAY OF PHOSPHORUS: CPT

## 2019-10-12 PROCEDURE — 63600175 PHARM REV CODE 636 W HCPCS: Performed by: HOSPITALIST

## 2019-10-12 PROCEDURE — 94667 MNPJ CHEST WALL 1ST: CPT

## 2019-10-12 PROCEDURE — 25000242 PHARM REV CODE 250 ALT 637 W/ HCPCS: Performed by: HOSPITALIST

## 2019-10-12 PROCEDURE — 85025 COMPLETE CBC W/AUTO DIFF WBC: CPT

## 2019-10-12 PROCEDURE — 94761 N-INVAS EAR/PLS OXIMETRY MLT: CPT

## 2019-10-12 RX ORDER — METOPROLOL TARTRATE 1 MG/ML
5 INJECTION, SOLUTION INTRAVENOUS EVERY 5 MIN PRN
Status: COMPLETED | OUTPATIENT
Start: 2019-10-12 | End: 2019-10-12

## 2019-10-12 RX ORDER — SODIUM CHLORIDE 450 MG/100ML
INJECTION, SOLUTION INTRAVENOUS CONTINUOUS
Status: DISCONTINUED | OUTPATIENT
Start: 2019-10-12 | End: 2019-10-17

## 2019-10-12 RX ADMIN — IPRATROPIUM BROMIDE AND ALBUTEROL SULFATE 3 ML: .5; 3 SOLUTION RESPIRATORY (INHALATION) at 01:10

## 2019-10-12 RX ADMIN — VALPROIC ACID 500 MG: 250 SOLUTION ORAL at 08:10

## 2019-10-12 RX ADMIN — ACETAMINOPHEN 500 MG: 500 TABLET ORAL at 06:10

## 2019-10-12 RX ADMIN — SODIUM CHLORIDE: 0.45 INJECTION, SOLUTION INTRAVENOUS at 11:10

## 2019-10-12 RX ADMIN — TAMSULOSIN HYDROCHLORIDE 0.4 MG: 0.4 CAPSULE ORAL at 09:10

## 2019-10-12 RX ADMIN — IPRATROPIUM BROMIDE AND ALBUTEROL SULFATE 3 ML: .5; 3 SOLUTION RESPIRATORY (INHALATION) at 12:10

## 2019-10-12 RX ADMIN — FAMOTIDINE 20 MG: 10 INJECTION INTRAVENOUS at 08:10

## 2019-10-12 RX ADMIN — ENOXAPARIN SODIUM 40 MG: 100 INJECTION SUBCUTANEOUS at 04:10

## 2019-10-12 RX ADMIN — IPRATROPIUM BROMIDE AND ALBUTEROL SULFATE 3 ML: .5; 3 SOLUTION RESPIRATORY (INHALATION) at 07:10

## 2019-10-12 RX ADMIN — CLONAZEPAM 1 MG: 0.5 TABLET ORAL at 09:10

## 2019-10-12 RX ADMIN — FAMOTIDINE 20 MG: 10 INJECTION INTRAVENOUS at 10:10

## 2019-10-12 RX ADMIN — CLONAZEPAM 1 MG: 0.5 TABLET ORAL at 08:10

## 2019-10-12 RX ADMIN — RAMELTEON 8 MG: 8 TABLET ORAL at 08:10

## 2019-10-12 RX ADMIN — VALPROIC ACID 500 MG: 250 SOLUTION ORAL at 10:10

## 2019-10-12 RX ADMIN — FLUOXETINE HYDROCHLORIDE 60 MG: 20 SOLUTION ORAL at 10:10

## 2019-10-12 RX ADMIN — METOPROLOL TARTRATE 5 MG: 5 INJECTION INTRAVENOUS at 06:10

## 2019-10-12 NOTE — NURSING
Patient's . Lasted about 3 minutes. Dr. Louis in with patient , NS started @100 to ABIOLA. Will monitor HR.

## 2019-10-12 NOTE — PLAN OF CARE
Renewed restrain orders at [facility] due to pt still removing nasal cannular from nose and removing telemetry monitor. Turned every two hours. Skin remains intact. Preventive foam dressing to sacral area. No injuries during shift. NPO for GI consult,.

## 2019-10-12 NOTE — PROGRESS NOTES
Chief Complaint / Reason for Consult: PEG placement  23 yo cerebral palsy patient was admitted for aspiration pneumonia and has been treated. Looks good today .    ROS: not obtainable    Patient Vitals for the past 24 hrs:   BP Temp Temp src Pulse Resp SpO2   10/12/19 1123 132/75 97.9 °F (36.6 °C) Axillary (!) 113 18 (!) 94 %   10/12/19 0808 (!) 155/84 97.6 °F (36.4 °C) Axillary 78 18 100 %   10/12/19 0726 -- -- -- 87 15 95 %   10/12/19 0411 (!) 148/97 99 °F (37.2 °C) Axillary 86 17 98 %   10/12/19 0048 (!) 147/77 98.2 °F (36.8 °C) Oral 103 17 96 %   10/12/19 0000 -- -- -- 100 18 97 %   10/11/19 2325 130/88 -- -- (!) 117 -- (!) 93 %   10/11/19 2254 (!) 141/91 -- -- (!) 122 20 (!) 91 %   10/11/19 2113 (!) 178/75 -- -- 90 -- --   10/11/19 2002 -- -- -- 91 18 96 %   10/11/19 1952 (!) 167/108 98.4 °F (36.9 °C) Oral 79 17 96 %   10/11/19 1800 (!) 156/86 -- -- 92 20 96 %   10/11/19 1700 (!) 155/80 -- -- 103 (!) 25 97 %   10/11/19 1600 (!) 158/95 -- -- 98 20 98 %   10/11/19 1500 (!) 160/93 98.8 °F (37.1 °C) Axillary 91 20 96 %   10/11/19 1400 (!) 153/95 -- -- 82 18 98 %   10/11/19 1354 -- -- -- 85 (!) 30 98 %   10/11/19 1300 (!) 140/79 -- -- 98 20 96 %   10/11/19 1200 138/83 -- -- 99 (!) 42 96 %       Physical Exam:  Gen - Well developed, well nourished, no apparent distress  HEENT - Anicteric  CV - S1, S2, no murmurs/rubs  Lungs - CTA-B, normal excursion  Abd - Soft, NT, ND, normal BS's, no HSM.  Ext - No c/c/e  Neuro - No asterixis    Labs:  Recent Labs   Lab 10/12/19  0505   WBC 14.86*   RBC 3.87*   HGB 10.6*   HCT 36.0*      MCV 93   MCH 27.4   MCHC 29.4*     Recent Labs   Lab 10/12/19  0505   CALCIUM 9.1   *   K 3.5   CO2 30*   *   BUN 21*   CREATININE 0.9     No results for input(s): PT, INR, APTT in the last 24 hours.      Assessment:  This patient is a 24 y.o. male with:   1 aspiration pneumonia   2. Poor oral intake  3. dyspahagia  Recommendations:  1. Will schedule for PEG on 10/14.

## 2019-10-13 LAB
ANION GAP SERPL CALC-SCNC: 10 MMOL/L (ref 8–16)
BASOPHILS # BLD AUTO: 0.06 K/UL (ref 0–0.2)
BASOPHILS NFR BLD: 0.5 % (ref 0–1.9)
BUN SERPL-MCNC: 16 MG/DL (ref 6–20)
CALCIUM SERPL-MCNC: 8.6 MG/DL (ref 8.7–10.5)
CHLORIDE SERPL-SCNC: 115 MMOL/L (ref 95–110)
CO2 SERPL-SCNC: 28 MMOL/L (ref 23–29)
CREAT SERPL-MCNC: 0.8 MG/DL (ref 0.5–1.4)
DIFFERENTIAL METHOD: ABNORMAL
EOSINOPHIL # BLD AUTO: 0.3 K/UL (ref 0–0.5)
EOSINOPHIL NFR BLD: 2.1 % (ref 0–8)
ERYTHROCYTE [DISTWIDTH] IN BLOOD BY AUTOMATED COUNT: 15.3 % (ref 11.5–14.5)
EST. GFR  (AFRICAN AMERICAN): >60 ML/MIN/1.73 M^2
EST. GFR  (NON AFRICAN AMERICAN): >60 ML/MIN/1.73 M^2
GLUCOSE SERPL-MCNC: 100 MG/DL (ref 70–110)
HCT VFR BLD AUTO: 33 % (ref 40–54)
HGB BLD-MCNC: 9.7 G/DL (ref 14–18)
IMM GRANULOCYTES # BLD AUTO: 0.2 K/UL (ref 0–0.04)
IMM GRANULOCYTES NFR BLD AUTO: 1.5 % (ref 0–0.5)
LYMPHOCYTES # BLD AUTO: 2 K/UL (ref 1–4.8)
LYMPHOCYTES NFR BLD: 15.2 % (ref 18–48)
MAGNESIUM SERPL-MCNC: 2.2 MG/DL (ref 1.6–2.6)
MCH RBC QN AUTO: 27.4 PG (ref 27–31)
MCHC RBC AUTO-ENTMCNC: 29.4 G/DL (ref 32–36)
MCV RBC AUTO: 93 FL (ref 82–98)
MONOCYTES # BLD AUTO: 1.9 K/UL (ref 0.3–1)
MONOCYTES NFR BLD: 14.2 % (ref 4–15)
NEUTROPHILS # BLD AUTO: 8.8 K/UL (ref 1.8–7.7)
NEUTROPHILS NFR BLD: 66.5 % (ref 38–73)
NRBC BLD-RTO: 0 /100 WBC
PHOSPHATE SERPL-MCNC: 3.5 MG/DL (ref 2.7–4.5)
PLATELET # BLD AUTO: 362 K/UL (ref 150–350)
PMV BLD AUTO: 9.4 FL (ref 9.2–12.9)
POTASSIUM SERPL-SCNC: 3.3 MMOL/L (ref 3.5–5.1)
RBC # BLD AUTO: 3.54 M/UL (ref 4.6–6.2)
SODIUM SERPL-SCNC: 153 MMOL/L (ref 136–145)
WBC # BLD AUTO: 13.16 K/UL (ref 3.9–12.7)

## 2019-10-13 PROCEDURE — S0028 INJECTION, FAMOTIDINE, 20 MG: HCPCS | Performed by: HOSPITALIST

## 2019-10-13 PROCEDURE — 85025 COMPLETE CBC W/AUTO DIFF WBC: CPT

## 2019-10-13 PROCEDURE — 94761 N-INVAS EAR/PLS OXIMETRY MLT: CPT

## 2019-10-13 PROCEDURE — 94668 MNPJ CHEST WALL SBSQ: CPT

## 2019-10-13 PROCEDURE — 25000003 PHARM REV CODE 250: Performed by: HOSPITALIST

## 2019-10-13 PROCEDURE — 80048 BASIC METABOLIC PNL TOTAL CA: CPT

## 2019-10-13 PROCEDURE — 94640 AIRWAY INHALATION TREATMENT: CPT

## 2019-10-13 PROCEDURE — 36415 COLL VENOUS BLD VENIPUNCTURE: CPT

## 2019-10-13 PROCEDURE — 83735 ASSAY OF MAGNESIUM: CPT

## 2019-10-13 PROCEDURE — 63600175 PHARM REV CODE 636 W HCPCS: Performed by: HOSPITALIST

## 2019-10-13 PROCEDURE — 25000242 PHARM REV CODE 250 ALT 637 W/ HCPCS: Performed by: HOSPITALIST

## 2019-10-13 PROCEDURE — 21400001 HC TELEMETRY ROOM

## 2019-10-13 PROCEDURE — 99900035 HC TECH TIME PER 15 MIN (STAT)

## 2019-10-13 PROCEDURE — 84100 ASSAY OF PHOSPHORUS: CPT

## 2019-10-13 RX ADMIN — SODIUM CHLORIDE: 0.45 INJECTION, SOLUTION INTRAVENOUS at 09:10

## 2019-10-13 RX ADMIN — VALPROIC ACID 500 MG: 250 SOLUTION ORAL at 09:10

## 2019-10-13 RX ADMIN — SCOPALAMINE 1 PATCH: 1 PATCH, EXTENDED RELEASE TRANSDERMAL at 12:10

## 2019-10-13 RX ADMIN — IPRATROPIUM BROMIDE AND ALBUTEROL SULFATE 3 ML: .5; 3 SOLUTION RESPIRATORY (INHALATION) at 01:10

## 2019-10-13 RX ADMIN — ENOXAPARIN SODIUM 40 MG: 100 INJECTION SUBCUTANEOUS at 06:10

## 2019-10-13 RX ADMIN — FAMOTIDINE 20 MG: 10 INJECTION INTRAVENOUS at 09:10

## 2019-10-13 RX ADMIN — VALPROIC ACID 500 MG: 250 SOLUTION ORAL at 10:10

## 2019-10-13 RX ADMIN — TAMSULOSIN HYDROCHLORIDE 0.4 MG: 0.4 CAPSULE ORAL at 09:10

## 2019-10-13 RX ADMIN — IPRATROPIUM BROMIDE AND ALBUTEROL SULFATE 3 ML: .5; 3 SOLUTION RESPIRATORY (INHALATION) at 08:10

## 2019-10-13 RX ADMIN — IPRATROPIUM BROMIDE AND ALBUTEROL SULFATE 3 ML: .5; 3 SOLUTION RESPIRATORY (INHALATION) at 12:10

## 2019-10-13 RX ADMIN — FLUOXETINE HYDROCHLORIDE 60 MG: 20 SOLUTION ORAL at 09:10

## 2019-10-13 RX ADMIN — IPRATROPIUM BROMIDE AND ALBUTEROL SULFATE 3 ML: .5; 3 SOLUTION RESPIRATORY (INHALATION) at 07:10

## 2019-10-13 RX ADMIN — CLONAZEPAM 1 MG: 0.5 TABLET ORAL at 09:10

## 2019-10-13 NOTE — PROGRESS NOTES
[facility] Hospital Medicine  Progress Note    Patient Name: Karri Galeano  MRN: [id number]  Patient Class: IP- Inpatient   Admission Date: 10/3/2019  Length of Stay: 9 days  Attending Physician: Kiersten Marcos MD  Primary Care Provider: Echo Reeves MD        Subjective:     Principal Problem:Pneumonia of left lung due to infectious organism    HPI:  Mr. Karri Galeano is a 24 y.o. male with cerebral palsy and epilepsy who presents to [facility] ED from an urgent care facility with complaints of fevers today.  He lives in a group home and started to have a nonproductive cough today.  The caregiver did not observe any vomiting.  He was in his usual state of health yesterday but today has become more lethargic and somnolent.  He was taken to an urgent care facility where he was then transferred to this facility for further care.  He was noted there to have a fever of 102.2° F as well as an oxygen saturation of 95% on nasal cannula at 3 liters/minute.  Further history is otherwise limited at this time.    Overview/Hospital Course:  Mr. Galeano presented with fever, cough and lethargy.  Respiratory status decompensated and he required intubation in the ER.  Workup with imaging consistent with possible pneumonia.  Empiric Zosyn and vancomycin initiated.  Pulmonary consulted for vent management.  Extubated on 10/5 in the morning.  Initially did well, but had worsening respiratory failure during the day.  Started on Bipap with no improvement and reintubated on 10/5 in the afternoon.  Blood cultures no growth to date.  Respiratory viral panel came back positive for rhino virus.  Antibiotics stopped.  Extubated on 10/09 to BiPAP.  Respiratory status stable.  Increased cough with feeding patient and he remained in the ICU.  Speech therapy recommended pureed diet.  He was stepped down to the floor on 10/11.  Patient with poor intake along with increased risk for aspiration.  GI consulted and plan to  place PEG on Monday.  Social service is working on placement for group home when ready for discharge.     Interval History: 10/13/2019: Patient's aunt reports that the patient is back to his baseline mentation and appears to be more active and interested in music again.  She is concerned about him returning back to his group [facility] is looking at one in [place], [place].    Review of Systems   Unable to perform ROS: Patient nonverbal     Objective:     Vital Signs (Most Recent):  Temp: 98.2 °F (36.8 °C) (10/13/19 1120)  Pulse: 97 (10/13/19 1120)  Resp: 18 (10/13/19 1120)  BP: 136/81 (10/13/19 1120)  SpO2: (!) 92 % (10/13/19 1120) Vital Signs (24h Range):  Temp:  [97.1 °F (36.2 °C)-101.1 °F (38.4 °C)] 98.2 °F (36.8 °C)  Pulse:  [] 97  Resp:  [17-19] 18  SpO2:  [88 %-98 %] 92 %  BP: (136-154)/(81-94) 136/81     Weight: 45.1 kg (99 lb 6.8 oz)  Body mass index is 18.79 kg/m².    Intake/Output Summary (Last 24 hours) at 10/13/2019 1230  Last data filed at 10/13/2019 0100  Gross per 24 hour   Intake 90 ml   Output 825 ml   Net -735 ml      Physical Exam   Constitutional: He appears well-developed. No distress.   cachetic   HENT:   Head: Normocephalic and atraumatic.   Right Ear: External ear normal.   Left Ear: External ear normal.   Nose: Nose normal.   Eyes: Right eye exhibits no discharge. Left eye exhibits no discharge.   Neck: Normal range of motion.   Cardiovascular:   tachcyardic but regular rate, no murmurs or gallops   Pulmonary/Chest: Effort normal and breath sounds normal. No stridor. No respiratory distress. He has no wheezes. He has no rales. He exhibits no tenderness.   Abdominal: Soft. Bowel sounds are normal. He exhibits no distension. There is no tenderness. There is no rebound and no guarding.   Musculoskeletal: Normal range of motion. He exhibits no edema.   Neurological:   Alert and awake but is nonverbal   Skin: Skin is warm and dry. He is not diaphoretic. No erythema.   Nursing note and vitals  reviewed.      Significant Labs:   CMP:   Recent Labs   Lab 10/12/19  0505 10/13/19  0616   * 153*   K 3.5 3.3*   * 115*   CO2 30* 28    100   BUN 21* 16   CREATININE 0.9 0.8   CALCIUM 9.1 8.6*   ANIONGAP 10 10   EGFRNONAA >60 >60       Significant Imaging: I have reviewed and interpreted all pertinent imaging results/findings within the past 24 hours.      Assessment/Plan:      * Pneumonia of left lung due to infectious organism  Patient was becoming more tachypneic and obtunded which he was intubated.    He may have had an aspiration event with subsequent pneumonia.    He did meet criteria for severe sepsis and has been started on empiric antibiotic therapy.   Patient completed 7 days of Zosyn and vancomycin, antibiotic stopped on 10/10  +rhinovirus on sputum culture likely contributed    On mechanically assisted ventilation  As addressed above.  Extubated to bipap 10/9  Weaned to NC O2     Spastic quadriplegic cerebral palsy  Family would like group home in [place] when ready for discharge  Will consult social work  CP not actual diagnosis but has dysphagia issues  Puree diet - not tolerating very well  Plan for PEG on Monday, October 14, 2019 per Gastroenterology    Epilepsy  Continue his home regimen of divalproex.    Severe malnutrition  In adequate intake of pureed diet even with assistance and concern for possible aspiration  Plan for PEG on Monday  Will need nutrition consult on Monday for tube feed recommendations    Acute respiratory failure with hypoxia  Secondary to pneumonia and rhino virus  Completed antibiotic treatment prior to stepped down to the floor  Wean O2 as tolerated and nebulizer treatment  Will need to monitor with repeat CBC given WBC increased  Aspiration precautions    Hypokalemia  Replace as needed.  Will continue to monitor    Hypernatremia  Worsening hyponatremia likely secondary to inadequate free water intake  Will start half normal saline and repeat laboratory  testing  Plan for PEG placement address inadequate hydration and nutrition      VTE Risk Mitigation (From admission, onward)         Ordered     enoxaparin injection 40 mg  Daily      10/04/19 0152     IP VTE HIGH RISK PATIENT  Once      10/04/19 0152                Kiersten Marcos M.D.  Staff Nocturnist  Department of Hospital Medicine  [facility]  Pager: [phone]          N.B.: Portions of this note was dictated using M*Modal Fluency Direct--there may be voice recognition errors occasionally missed on review.

## 2019-10-13 NOTE — SUBJECTIVE & OBJECTIVE
Interval History:  Patient not able to eat very much, minimal intake of pureed diet with assistance.  No acute events.    Review of Systems   Unable to perform ROS: Patient nonverbal     Objective:     Vital Signs (Most Recent):  Temp: 98.2 °F (36.8 °C) (10/12/19 1955)  Pulse: 110 (10/12/19 1955)  Resp: 18 (10/12/19 1955)  BP: (!) 142/87 (10/12/19 1955)  SpO2: (!) 90 % (10/12/19 1955) Vital Signs (24h Range):  Temp:  [97.6 °F (36.4 °C)-101.1 °F (38.4 °C)] 98.2 °F (36.8 °C)  Pulse:  [] 110  Resp:  [15-19] 18  SpO2:  [88 %-100 %] 90 %  BP: (130-155)/(75-97) 142/87     Weight: 45.1 kg (99 lb 6.8 oz)  Body mass index is 18.79 kg/m².    Intake/Output Summary (Last 24 hours) at 10/12/2019 2316  Last data filed at 10/12/2019 1800  Gross per 24 hour   Intake 90 ml   Output 875 ml   Net -785 ml      Physical Exam   Constitutional: He appears lethargic. He appears cachectic. He is sleeping. He has a sickly appearance (chronic). He is sedated and not intubated.   HENT:   Head: Normocephalic and atraumatic.   Eyes: Pupils are equal, round, and reactive to light. No scleral icterus. Right eye exhibits no nystagmus. Left eye exhibits no nystagmus.   Neck: Trachea normal. No JVD present.   Cardiovascular: Normal rate and regular rhythm. Exam reveals no gallop and no friction rub.   No murmur heard.  Pulses:       Radial pulses are 2+ on the right side, and 2+ on the left side.        Dorsalis pedis pulses are 2+ on the right side, and 2+ on the left side.   No edema to note   Pulmonary/Chest: Effort normal. He is not intubated. No respiratory distress. He has no wheezes. He has no rhonchi. He has no rales.   Abdominal: Soft. Bowel sounds are normal. He exhibits no distension. There is no tenderness. There is no guarding.   Musculoskeletal: He exhibits deformity.   Contracted right wrist; wasted extremities   Neurological: He appears lethargic. No cranial nerve deficit. GCS eye subscore is 2. GCS verbal subscore is 1. GCS  motor subscore is 4.   Cough gag reflexes intact, withdraws in all extremities to pain, no focal deficits to note       Significant Labs:   BMP:   Recent Labs   Lab 10/12/19  0505      *   K 3.5   *   CO2 30*   BUN 21*   CREATININE 0.9   CALCIUM 9.1   MG 2.5     CBC:   Recent Labs   Lab 10/11/19  0401 10/12/19  0505   WBC 12.33 14.86*   HGB 9.9* 10.6*   HCT 32.3* 36.0*    334       Significant Imaging: I have reviewed and interpreted all pertinent imaging results/findings within the past 24 hours.

## 2019-10-13 NOTE — ASSESSMENT & PLAN NOTE
Secondary to pneumonia and rhino virus  Completed antibiotic treatment prior to stepped down to the floor  Wean O2 as tolerated and nebulizer treatment  Will need to monitor with repeat CBC given WBC increased  Aspiration precautions

## 2019-10-13 NOTE — ASSESSMENT & PLAN NOTE
In adequate intake of pureed diet even with assistance and concern for possible aspiration  Plan for PEG on Monday  Will need nutrition consult on Monday for tube feed recommendations

## 2019-10-13 NOTE — PLAN OF CARE
Pt remains on RA. Continue duoneb svn tx, CPT, NTS per orders. Will continue to monitor. DERIC Julien, RRT

## 2019-10-13 NOTE — ASSESSMENT & PLAN NOTE
Worsening hyponatremia likely secondary to inadequate free water intake  Will start half normal saline and repeat laboratory testing  Plan for PEG placement address inadequate hydration and nutrition

## 2019-10-13 NOTE — PROGRESS NOTES
[facility] Hospital Medicine  Progress Note    Patient Name: Karri Galeano  MRN: [id number]  Patient Class: IP- Inpatient   Admission Date: 10/3/2019  Length of Stay: 8 days  Attending Physician: Vanna Louis MD  Primary Care Provider: Echo Reeves MD        Subjective:     Principal Problem:Pneumonia of left lung due to infectious organism        HPI:  Mr. Karri Galeano is a 24 y.o. male with cerebral palsy and epilepsy who presents to [facility] ED from an urgent care facility with complaints of fevers today.  He lives in a group home and started to have a nonproductive cough today.  The caregiver did not observe any vomiting.  He was in his usual state of health yesterday but today has become more lethargic and somnolent.  He was taken to an urgent care facility where he was then transferred to this facility for further care.  He was noted there to have a fever of 102.2° F as well as an oxygen saturation of 95% on nasal cannula at 3 liters/minute.  Further history is otherwise limited at this time.    Overview/Hospital Course:  Mr. Galeano presented with fever, cough and lethargy.  Respiratory status decompensated and he required intubation in the ER.  Workup with imaging consistent with possible pneumonia.  Empiric Zosyn and vancomycin initiated.  Pulmonary consulted for vent management.  Extubated on 10/5 in the morning.  Initially did well, but had worsening respiratory failure during the day.  Started on Bipap with no improvement and reintubated on 10/5 in the afternoon.  Blood cultures no growth to date.  Respiratory viral panel came back positive for rhino virus.  Antibiotics stopped.  Extubated on 10/09 to BiPAP.  Respiratory status stable.  Increased cough with feeding patient and he remained in the ICU.  Speech therapy recommended pureed diet.  He was stepped down to the floor on 10/11.  Patient with poor intake along with increased risk for aspiration.  GI consulted and plan  to place PEG on Monday.  Social service is working on placement for group home when ready for discharge.     Interval History:  Patient not able to eat very much, minimal intake of pureed diet with assistance.  No acute events.    Review of Systems   Unable to perform ROS: Patient nonverbal     Objective:     Vital Signs (Most Recent):  Temp: 98.2 °F (36.8 °C) (10/12/19 1955)  Pulse: 110 (10/12/19 1955)  Resp: 18 (10/12/19 1955)  BP: (!) 142/87 (10/12/19 1955)  SpO2: (!) 90 % (10/12/19 1955) Vital Signs (24h Range):  Temp:  [97.6 °F (36.4 °C)-101.1 °F (38.4 °C)] 98.2 °F (36.8 °C)  Pulse:  [] 110  Resp:  [15-19] 18  SpO2:  [88 %-100 %] 90 %  BP: (130-155)/(75-97) 142/87     Weight: 45.1 kg (99 lb 6.8 oz)  Body mass index is 18.79 kg/m².    Intake/Output Summary (Last 24 hours) at 10/12/2019 2316  Last data filed at 10/12/2019 1800  Gross per 24 hour   Intake 90 ml   Output 875 ml   Net -785 ml      Physical Exam   Constitutional: He appears lethargic. He appears cachectic. He is sleeping. He has a sickly appearance (chronic). He is sedated and not intubated.   HENT:   Head: Normocephalic and atraumatic.   Eyes: Pupils are equal, round, and reactive to light. No scleral icterus. Right eye exhibits no nystagmus. Left eye exhibits no nystagmus.   Neck: Trachea normal. No JVD present.   Cardiovascular: Normal rate and regular rhythm. Exam reveals no gallop and no friction rub.   No murmur heard.  Pulses:       Radial pulses are 2+ on the right side, and 2+ on the left side.        Dorsalis pedis pulses are 2+ on the right side, and 2+ on the left side.   No edema to note   Pulmonary/Chest: Effort normal. He is not intubated. No respiratory distress. He has no wheezes. He has no rhonchi. He has no rales.   Abdominal: Soft. Bowel sounds are normal. He exhibits no distension. There is no tenderness. There is no guarding.   Musculoskeletal: He exhibits deformity.   Contracted right wrist; wasted extremities    Neurological: He appears lethargic. No cranial nerve deficit. GCS eye subscore is 2. GCS verbal subscore is 1. GCS motor subscore is 4.   Cough gag reflexes intact, withdraws in all extremities to pain, no focal deficits to note       Significant Labs:   BMP:   Recent Labs   Lab 10/12/19  0505      *   K 3.5   *   CO2 30*   BUN 21*   CREATININE 0.9   CALCIUM 9.1   MG 2.5     CBC:   Recent Labs   Lab 10/11/19  0401 10/12/19  0505   WBC 12.33 14.86*   HGB 9.9* 10.6*   HCT 32.3* 36.0*    334       Significant Imaging: I have reviewed and interpreted all pertinent imaging results/findings within the past 24 hours.      Assessment/Plan:      * Pneumonia of left lung due to infectious organism  Patient was becoming more tachypneic and obtunded which he was intubated.    He may have had an aspiration event with subsequent pneumonia.    He did meet criteria for severe sepsis and has been started on empiric antibiotic therapy.   Patient completed 7 days of Zosyn and vancomycin, antibiotic stopped on 10/10  +rhinovirus on sputum culture likely contributed    Acute respiratory failure with hypoxia  Secondary to pneumonia and rhino virus  Completed antibiotic treatment prior to stepped down to the floor  Wean O2 as tolerated and nebulizer treatment  Will need to monitor with repeat CBC given WBC increased  Aspiration precautions    Hypernatremia  Worsening hyponatremia likely secondary to inadequate free water intake  Will start half normal saline and repeat laboratory testing  Plan for PEG placement address inadequate hydration and nutrition    Severe malnutrition  In adequate intake of pureed diet even with assistance and concern for possible aspiration  Plan for PEG on Monday  Will need nutrition consult on Monday for tube feed recommendations    Hypokalemia  Replace as needed.  Will continue to monitor    Epilepsy  Continue his home regimen of divalproex.    On mechanically assisted  ventilation  As addressed above.  Extubated to bipap 10/9  Weaned to NC O2     Spastic quadriplegic cerebral palsy  Family would like group home in [place] when ready for discharge  CP not actual diagnosis but has dysphagia issues  Puree diet - not tolerating very well  Plan for PEG      VTE Risk Mitigation (From admission, onward)         Ordered     enoxaparin injection 40 mg  Daily      10/04/19 0152     IP VTE HIGH RISK PATIENT  Once      10/04/19 0152                      Vanna Louis MD  Department of Hospital Medicine   [facility]

## 2019-10-13 NOTE — ASSESSMENT & PLAN NOTE
Patient was becoming more tachypneic and obtunded which he was intubated.    He may have had an aspiration event with subsequent pneumonia.    He did meet criteria for severe sepsis and has been started on empiric antibiotic therapy.   Patient completed 7 days of Zosyn and vancomycin, antibiotic stopped on 10/10  +rhinovirus on sputum culture likely contributed

## 2019-10-13 NOTE — PROGRESS NOTES
Chief Complaint / Reason for Consult: PEG consult    consulted for PEG mother next to bed, thinks he is eating good and no need for PEG.    ROS: not obtainable    Patient Vitals for the past 24 hrs:   BP Temp Temp src Pulse Resp SpO2   10/13/19 1335 -- -- -- 106 19 98 %   10/13/19 1120 136/81 98.2 °F (36.8 °C) Axillary 97 18 (!) 92 %   10/13/19 0806 -- -- -- 81 18 98 %   10/13/19 0750 (!) 141/94 97.9 °F (36.6 °C) Axillary 78 18 (!) 93 %   10/13/19 0424 (!) 149/88 97.1 °F (36.2 °C) Oral 92 18 96 %   10/13/19 0027 -- -- -- 91 17 (!) 94 %   10/12/19 2347 138/81 97.5 °F (36.4 °C) Oral 94 18 (!) 92 %   10/12/19 1955 (!) 142/87 98.2 °F (36.8 °C) Oral 110 18 (!) 90 %   10/12/19 1927 -- -- -- (!) 112 19 (!) 88 %   10/12/19 1831 -- (!) 101.1 °F (38.4 °C) -- -- -- --   10/12/19 1507 (!) 154/92 97.9 °F (36.6 °C) Axillary 105 18 (!) 93 %       Physical Exam:  Gen - Well developed, well nourished, no apparent distress  HEENT - Anicteric  CV - S1, S2, no murmurs/rubs  Lungs - CTA-B, normal excursion  Abd - Soft, NT, ND, normal BS's, no HSM.  Ext - No c/c/e  Neuro - No asterixis    Labs:  Recent Labs   Lab 10/13/19  0616   WBC 13.16*   RBC 3.54*   HGB 9.7*   HCT 33.0*   *   MCV 93   MCH 27.4   MCHC 29.4*     Recent Labs   Lab 10/13/19  0616   CALCIUM 8.6*   *   K 3.3*   CO2 28   *   BUN 16   CREATININE 0.8     No results for input(s): PT, INR, APTT in the last 24 hours.      Assessment:  This patient is a 24 y.o. male with:   1. Aspiration pneumonia  2.cerebral palsy  3. anemia    Recommendations:  1.  Family does not want to have PEG.

## 2019-10-13 NOTE — ASSESSMENT & PLAN NOTE
Family would like group home in [place] when ready for discharge  CP not actual diagnosis but has dysphagia issues  Puree diet - not tolerating very well  Plan for PEG   Refilled coumadin per direction of Dr Roe 1 month supply with 3 refills.

## 2019-10-13 NOTE — SUBJECTIVE & OBJECTIVE
Interval History: 10/13/2019: Patient's aunt reports that the patient is back to his baseline mentation and appears to be more active and interested in music again.  She is concerned about him returning back to his group [facility] morales is looking at one in [place], [place].    Review of Systems   Unable to perform ROS: Patient nonverbal     Objective:     Vital Signs (Most Recent):  Temp: 98.2 °F (36.8 °C) (10/13/19 1120)  Pulse: 97 (10/13/19 1120)  Resp: 18 (10/13/19 1120)  BP: 136/81 (10/13/19 1120)  SpO2: (!) 92 % (10/13/19 1120) Vital Signs (24h Range):  Temp:  [97.1 °F (36.2 °C)-101.1 °F (38.4 °C)] 98.2 °F (36.8 °C)  Pulse:  [] 97  Resp:  [17-19] 18  SpO2:  [88 %-98 %] 92 %  BP: (136-154)/(81-94) 136/81     Weight: 45.1 kg (99 lb 6.8 oz)  Body mass index is 18.79 kg/m².    Intake/Output Summary (Last 24 hours) at 10/13/2019 1230  Last data filed at 10/13/2019 0100  Gross per 24 hour   Intake 90 ml   Output 825 ml   Net -735 ml      Physical Exam   Constitutional: He appears well-developed. No distress.   cachetic   HENT:   Head: Normocephalic and atraumatic.   Right Ear: External ear normal.   Left Ear: External ear normal.   Nose: Nose normal.   Eyes: Right eye exhibits no discharge. Left eye exhibits no discharge.   Neck: Normal range of motion.   Cardiovascular:   tachcyardic but regular rate, no murmurs or gallops   Pulmonary/Chest: Effort normal and breath sounds normal. No stridor. No respiratory distress. He has no wheezes. He has no rales. He exhibits no tenderness.   Abdominal: Soft. Bowel sounds are normal. He exhibits no distension. There is no tenderness. There is no rebound and no guarding.   Musculoskeletal: Normal range of motion. He exhibits no edema.   Neurological:   Alert and awake but is nonverbal   Skin: Skin is warm and dry. He is not diaphoretic. No erythema.   Nursing note and vitals reviewed.      Significant Labs:   CMP:   Recent Labs   Lab 10/12/19  0505 10/13/19  0616   * 153*   K  3.5 3.3*   * 115*   CO2 30* 28    100   BUN 21* 16   CREATININE 0.9 0.8   CALCIUM 9.1 8.6*   ANIONGAP 10 10   EGFRNONAA >60 >60       Significant Imaging: I have reviewed and interpreted all pertinent imaging results/findings within the past 24 hours.

## 2019-10-13 NOTE — ASSESSMENT & PLAN NOTE
Family would like group home in [place] when ready for discharge  Will consult social work  CP not actual diagnosis but has dysphagia issues  Puree diet - not tolerating very well  Plan for PEG on Monday, October 14, 2019 per Gastroenterology

## 2019-10-13 NOTE — NURSING
Awake calm no signs of distress.0.45 NS infusing to  Left upper arm. clean dry intact no redness no swelling. Sitter at bedside. Bed low  SR up by 2 call light in reach.Assessment completed.

## 2019-10-14 LAB
ALLENS TEST: ABNORMAL
ALLENS TEST: ABNORMAL
ANION GAP SERPL CALC-SCNC: 10 MMOL/L (ref 8–16)
BASOPHILS # BLD AUTO: 0.05 K/UL (ref 0–0.2)
BASOPHILS NFR BLD: 0.3 % (ref 0–1.9)
BUN SERPL-MCNC: 10 MG/DL (ref 6–20)
CALCIUM SERPL-MCNC: 8.6 MG/DL (ref 8.7–10.5)
CHLORIDE SERPL-SCNC: 111 MMOL/L (ref 95–110)
CO2 SERPL-SCNC: 27 MMOL/L (ref 23–29)
CREAT SERPL-MCNC: 0.8 MG/DL (ref 0.5–1.4)
DELSYS: ABNORMAL
DELSYS: ABNORMAL
DIFFERENTIAL METHOD: ABNORMAL
ENTEROVIRUS: NOT DETECTED
EOSINOPHIL # BLD AUTO: 0.5 K/UL (ref 0–0.5)
EOSINOPHIL NFR BLD: 3.1 % (ref 0–8)
ERYTHROCYTE [DISTWIDTH] IN BLOOD BY AUTOMATED COUNT: 15.2 % (ref 11.5–14.5)
EST. GFR  (AFRICAN AMERICAN): >60 ML/MIN/1.73 M^2
EST. GFR  (NON AFRICAN AMERICAN): >60 ML/MIN/1.73 M^2
FLOW: 15
FLOW: 15
GLUCOSE SERPL-MCNC: 108 MG/DL (ref 70–110)
HCO3 UR-SCNC: 27.3 MMOL/L (ref 24–28)
HCO3 UR-SCNC: 28.6 MMOL/L (ref 24–28)
HCT VFR BLD AUTO: 30.8 % (ref 40–54)
HGB BLD-MCNC: 9.6 G/DL (ref 14–18)
HUMAN BOCAVIRUS: NOT DETECTED
HUMAN CORONAVIRUS, COMMON COLD VIRUS: NOT DETECTED
IMM GRANULOCYTES # BLD AUTO: 0.13 K/UL (ref 0–0.04)
IMM GRANULOCYTES NFR BLD AUTO: 0.9 % (ref 0–0.5)
INFLUENZA A - H1N1-09: NOT DETECTED
LYMPHOCYTES # BLD AUTO: 2.3 K/UL (ref 1–4.8)
LYMPHOCYTES NFR BLD: 15.6 % (ref 18–48)
MAGNESIUM SERPL-MCNC: 1.9 MG/DL (ref 1.6–2.6)
MCH RBC QN AUTO: 28.3 PG (ref 27–31)
MCHC RBC AUTO-ENTMCNC: 31.2 G/DL (ref 32–36)
MCV RBC AUTO: 91 FL (ref 82–98)
MODE: ABNORMAL
MODE: ABNORMAL
MONOCYTES # BLD AUTO: 1.6 K/UL (ref 0.3–1)
MONOCYTES NFR BLD: 10.5 % (ref 4–15)
NEUTROPHILS # BLD AUTO: 10.3 K/UL (ref 1.8–7.7)
NEUTROPHILS NFR BLD: 69.6 % (ref 38–73)
NRBC BLD-RTO: 0 /100 WBC
PARAINFLUENZA: NOT DETECTED
PCO2 BLDA: 39.4 MMHG (ref 35–45)
PCO2 BLDA: 42 MMHG (ref 35–45)
PH SMN: 7.44 [PH] (ref 7.35–7.45)
PH SMN: 7.45 [PH] (ref 7.35–7.45)
PHOSPHATE SERPL-MCNC: 3.6 MG/DL (ref 2.7–4.5)
PLATELET # BLD AUTO: 412 K/UL (ref 150–350)
PMV BLD AUTO: 10.2 FL (ref 9.2–12.9)
PO2 BLDA: 35 MMHG (ref 80–100)
PO2 BLDA: 41 MMHG (ref 80–100)
POC BE: 3 MMOL/L
POC BE: 4 MMOL/L
POC SATURATED O2: 69 % (ref 95–100)
POC SATURATED O2: 78 % (ref 95–100)
POC TCO2: 28 MMOL/L (ref 23–27)
POC TCO2: 30 MMOL/L (ref 23–27)
POTASSIUM SERPL-SCNC: 3.4 MMOL/L (ref 3.5–5.1)
RBC # BLD AUTO: 3.39 M/UL (ref 4.6–6.2)
RVP - ADENOVIRUS: NOT DETECTED
RVP - HUMAN METAPNEUMOVIRUS (HMPV): NOT DETECTED
RVP - INFLUENZA A: NOT DETECTED
RVP - INFLUENZA B: NOT DETECTED
RVP - RESPIRATORY SYNCTIAL VIRUS (RSV) A: NOT DETECTED
RVP - RESPIRATORY VIRAL PANEL, SOURCE: ABNORMAL
RVP - RHINOVIRUS: POSITIVE
SAMPLE: ABNORMAL
SAMPLE: ABNORMAL
SITE: ABNORMAL
SITE: ABNORMAL
SODIUM SERPL-SCNC: 148 MMOL/L (ref 136–145)
SP02: 77
WBC # BLD AUTO: 14.8 K/UL (ref 3.9–12.7)

## 2019-10-14 PROCEDURE — 94761 N-INVAS EAR/PLS OXIMETRY MLT: CPT

## 2019-10-14 PROCEDURE — 87040 BLOOD CULTURE FOR BACTERIA: CPT | Mod: 59

## 2019-10-14 PROCEDURE — 25000003 PHARM REV CODE 250: Performed by: HOSPITALIST

## 2019-10-14 PROCEDURE — 97803 MED NUTRITION INDIV SUBSEQ: CPT

## 2019-10-14 PROCEDURE — 84100 ASSAY OF PHOSPHORUS: CPT

## 2019-10-14 PROCEDURE — S0028 INJECTION, FAMOTIDINE, 20 MG: HCPCS | Performed by: HOSPITALIST

## 2019-10-14 PROCEDURE — 82803 BLOOD GASES ANY COMBINATION: CPT

## 2019-10-14 PROCEDURE — 25000242 PHARM REV CODE 250 ALT 637 W/ HCPCS: Performed by: HOSPITALIST

## 2019-10-14 PROCEDURE — 85025 COMPLETE CBC W/AUTO DIFF WBC: CPT

## 2019-10-14 PROCEDURE — 92611 MOTION FLUOROSCOPY/SWALLOW: CPT

## 2019-10-14 PROCEDURE — 21400001 HC TELEMETRY ROOM

## 2019-10-14 PROCEDURE — 94640 AIRWAY INHALATION TREATMENT: CPT

## 2019-10-14 PROCEDURE — 94667 MNPJ CHEST WALL 1ST: CPT

## 2019-10-14 PROCEDURE — 36415 COLL VENOUS BLD VENIPUNCTURE: CPT

## 2019-10-14 PROCEDURE — 83735 ASSAY OF MAGNESIUM: CPT

## 2019-10-14 PROCEDURE — 25000003 PHARM REV CODE 250: Performed by: INTERNAL MEDICINE

## 2019-10-14 PROCEDURE — 99900035 HC TECH TIME PER 15 MIN (STAT)

## 2019-10-14 PROCEDURE — 63600175 PHARM REV CODE 636 W HCPCS: Performed by: HOSPITALIST

## 2019-10-14 PROCEDURE — 94660 CPAP INITIATION&MGMT: CPT

## 2019-10-14 PROCEDURE — 97535 SELF CARE MNGMENT TRAINING: CPT

## 2019-10-14 PROCEDURE — 92526 ORAL FUNCTION THERAPY: CPT

## 2019-10-14 PROCEDURE — 80048 BASIC METABOLIC PNL TOTAL CA: CPT

## 2019-10-14 PROCEDURE — 94668 MNPJ CHEST WALL SBSQ: CPT

## 2019-10-14 PROCEDURE — 36600 WITHDRAWAL OF ARTERIAL BLOOD: CPT

## 2019-10-14 PROCEDURE — 27000190 HC CPAP FULL FACE MASK W/VALVE

## 2019-10-14 RX ORDER — POTASSIUM CHLORIDE 20 MEQ/15ML
40 SOLUTION ORAL ONCE
Status: COMPLETED | OUTPATIENT
Start: 2019-10-14 | End: 2019-10-14

## 2019-10-14 RX ADMIN — FAMOTIDINE 20 MG: 10 INJECTION INTRAVENOUS at 09:10

## 2019-10-14 RX ADMIN — IPRATROPIUM BROMIDE AND ALBUTEROL SULFATE 3 ML: .5; 3 SOLUTION RESPIRATORY (INHALATION) at 07:10

## 2019-10-14 RX ADMIN — POTASSIUM CHLORIDE 40 MEQ: 20 SOLUTION ORAL at 09:10

## 2019-10-14 RX ADMIN — CLONAZEPAM 1 MG: 0.5 TABLET ORAL at 09:10

## 2019-10-14 RX ADMIN — VALPROIC ACID 500 MG: 250 SOLUTION ORAL at 09:10

## 2019-10-14 RX ADMIN — ENOXAPARIN SODIUM 40 MG: 100 INJECTION SUBCUTANEOUS at 05:10

## 2019-10-14 RX ADMIN — IPRATROPIUM BROMIDE AND ALBUTEROL SULFATE 3 ML: .5; 3 SOLUTION RESPIRATORY (INHALATION) at 12:10

## 2019-10-14 RX ADMIN — TAMSULOSIN HYDROCHLORIDE 0.4 MG: 0.4 CAPSULE ORAL at 09:10

## 2019-10-14 RX ADMIN — IPRATROPIUM BROMIDE AND ALBUTEROL SULFATE 3 ML: .5; 3 SOLUTION RESPIRATORY (INHALATION) at 01:10

## 2019-10-14 RX ADMIN — ACETAMINOPHEN 500 MG: 500 TABLET ORAL at 04:10

## 2019-10-14 RX ADMIN — ACETAMINOPHEN 500 MG: 500 TABLET ORAL at 09:10

## 2019-10-14 RX ADMIN — FLUOXETINE HYDROCHLORIDE 60 MG: 20 SOLUTION ORAL at 09:10

## 2019-10-14 NOTE — PROCEDURES
Modified Barium Swallow    Patient Name:  Karri Galeano   MRN:  [id number]      Recommendations:     Recommendations:                General Recommendations:  Dysphagia therapy and family ed  Diet recommendations:  NPO reconsider PEG (with future pleasure feeds after resolve of acute illness.)  Aspiration Precautions: Strict aspiration precautions   General Precautions: Standard, aspiration  Communication strategies:  Gestures and modeling    Referral     Reason for Referral  Patient was referred for a Modified Barium Swallow Study to assess the efficiency of his/her swallow function, rule out aspiration and make recommendations regarding safe dietary consistencies, effective compensatory strategies, and safe eating environment. Questionable tolerance of diet at bedside.     Diagnosis: Pneumonia of left lung due to infectious organism       History:     Past Medical History:   Diagnosis Date    Anxiety     Congenital cerebral palsy     Hypotonia     Leukodystrophy     Mental retardation     Retention of urine     Seizures     Stereotyped movement disorder     Urinary tract infection        Objective:     Current Respiratory Status: 10/14/19 room air    Alert: yes    Cooperative: inconsistent    Follows Directions: no    Visualization  · Patient was seen in the lateral view    Oral Peripheral Examination  · Oral Musculature: unable to assess due to poor participation/comprehension  · Dentition: present and adequate  · Secretion Management: left corner drooling  · Mucosal Quality: good  · Voice Prior to PO Intake: non-verbal    Consistencies Assessed  · Thin via spoon X2 and kelvin 15 ml via medicine cup  · Nectar thick X1 via spoon  · Honey thickX2 via spoon  · Puree X3 via spoon    Oral Preparation/Oral Phase  · Tongue pumping  · Premature spillage to valleculae and into vestibule- thin  · Inconsistent premature spillage to vestibule- nectar/honey/puree  · Decreased A-P transport across  consistencies  · Anterior loss of bolus, across consistencies    · Apraxia of swallow  · Pt did not readily initiate swallow for spoon presentations of liquids, requiring 2nd presentation before initiating pharyngeal phase of swallow.   · Severe oral reside post swallow across consistencies  · Decreased closure around spoon.     Pharyngeal Phase   Variable swallow timing throughout. Decreased base of tongue retraction led to variable trace-mild vallecular stasis across consistencies which increased in volume as oral residue was noted to migrate to valleculae.  In addition, vallecular stasis was noted to migrate to vestibule across consistencies between presentations. Pt was unable to perform CUED throat clear and did not respond to tactile stimulation to perform dry swallow; trace-mild vestibular stasis was subsequently aspirated across consistencies.    Decreased laryngeal excursion  led to silent aspiration during the swallow of thin liquids and nectar thick liquids and X1 silent aspiration was noted of min pyriform stasis of thin liquids.     Decreased pharyngeal constriction led to inconsistent residue of puree on posterior pharyngeal wall post swallow. Pt unable to perform cued throat clear/dry swallow to clear pharyngeal residue.     Cervical Esophageal Phase  · Decreased UES opening with decreased laryngeal excursion led to inconsistent pyriform stasis across consistencies ranging from trace-moderate amounts.    Assessment:     Impressions  ·  Pt presents with moderate to severe oropharyngeal dysphagia c/b silent aspiration of pharyngeal stasis across consistencies after initial swallow and leah silent aspiration during the swallow of thin and nectar thick consistencies.   · It should be noted SAFEST form of po baseline based on today's study is honey thick liquids and puree however anticipate some aspiration of pharyngeal stasis of these consistencies. If pleasure feeds are initiated after Pt is no longer  acutely ill, puree and honey thick liquids  recommended if high aspiration risk is accepted by medical decision maker.      Prognosis: Fair    Barriers:  · CP   · Decreased ability to perform commands; specifically throat clear    Plan  Trial dysphagia therapy, ongoing family education.     Education  ST met with Pt's medical decision maker and caregiver regarding results of MBSS which were discussed with recordings of today's study presented via computer. Specifically, silent aspiration was shown to Pt's Aunt and how Pt's swallow safety is negatively impacted by his lack of ability to clear throat or perform dry swallow to clear pharyngeal stasis and vestibular residue before it is aspirated.     Goals:   Multidisciplinary Problems     SLP Goals        Problem: SLP Goal    Goal Priority Disciplines Outcome   SLP Goal    Medium SLP Ongoing, Not Progressing   Description:  ST. Pt will tolerate PO diet of pureed with Nectar thickened liquids with no overt s/s of aspiration. (goal downgraded 10/11/19)    2. Pt will participate in MBSS- GOAL MET 10/14/19  3. Family will demonstrate knowledge of MBSS results.                      Plan:   · Patient to be seen:  Therapy Frequency: 3 x/week, 4 x/week   · Plan of Care expires:   10/30  · Plan of Care reviewed with:  caregiver        Discharge recommendations:  other (see comments)(group home)   Barriers to Discharge:  Diet tolerance    Time Tracking:   SLP Treatment Date:   10/14/19  Speech Start Time:  1320  Speech Stop Time:  1420     Speech Total Time (min):  60 min    Kenya Renteria CCC-SLP  10/14/2019

## 2019-10-14 NOTE — CONSULTS
"[facility]  Adult Nutrition  Progress Note    SUMMARY       Recommendations    Recommendation/Intervention:   1. Encourage adequate PO intake of meals and fluids.   2. Continue Boost Pudding BID  3. Monitor weight weekly to assess adequte intake of PO diet.   4. If poor PO status and/or aspiration continues, rec consider J-tube placement or G-J extention due to provide adequate needs and reduce aspiration events.    Goals: >75% of meal intake daily  Nutrition Goal Status: new  Communication of RD Recs: (POC)    Reason for Assessment    Reason For Assessment: consult(+ F/U)  Diagnosis: pulmonary disease, infection/sepsis  Relevant Medical History: CP, mental retardation  Interdisciplinary Rounds: did not attend  General Information Comments: Pt was supposed to get a PEG tube placed today due to poor PO intake. Family decided to postpone PEG placement to see if pt intake improves upon d/c. Pt currently consuming < 50% EEN, EPN. Denies N/V/D/C. Pt sometimes tolerates boost pudding. SLP following.   Nutrition Discharge Planning: d/c on pureed diet with nectar thick liquids, if PO intake remains poor consider PEG placement    Nutrition Risk Screen    Nutrition Risk Screen: no indicators present    Nutrition/Diet History    Patient Reported Diet/Restrictions/Preferences: pureed(reported by )  Spiritual, Cultural Beliefs, Catholic Practices, Values that Affect Care: no  Supplemental Drinks or Food Habits: Ensure Plus(Benecalorie)  Factors Affecting Nutritional Intake: impaired cognitive status/motor control, chewing difficulties/inability to chew food, NPO    Anthropometrics    Temp: 98.8 °F (37.1 °C)  Height Method: Estimated  Height: 5' 1" (154.9 cm)  Height (inches): 61 in  Weight Method: Bed Scale  Weight: 45.1 kg (99 lb 6.8 oz)  Weight (lb): 99.43 lb  Ideal Body Weight (IBW), Male: 112 lb  % Ideal Body Weight, Male (lb): 94.09 lb  BMI (Calculated): 20  BMI Grade: 18.5-24.9 - " normal     Lab/Procedures/Meds    Pertinent Labs Reviewed: reviewed  Pertinent Labs Comments: Na 148, K 3.4, Cl 111, Ca 8.6  Pertinent Medications Reviewed: reviewed  Pertinent Medications Comments: famotidine, enoxaparin, scopolamine    Estimated/Assessed Needs    Weight Used For Calorie Calculations: 47.8 kg (105 lb 6.1 oz)  Energy Calorie Requirements (kcal): 1658  Energy Need Method: Turner-St Jeor(x 1.25)  Protein Requirements: 57-72(1.2 - 1.5 g/kg)  Weight Used For Protein Calculations: 47.8 kg (105 lb 6.1 oz)  Estimated Fluid Requirement Method: RDA Method  RDA Method (mL): 1658    Nutrition Prescription Ordered    Current Diet Order: NPO(for PEG placement that did not occur)  Current Nutrition Support Formula Ordered: (Canceled 10/8)  Current Nutrition Support Rate Ordered: (Canceled 10/8)  Current Nutrition Support Frequency Ordered: (Canceled 10/8)  Oral Nutrition Supplement: Boost Pudding TID    Evaluation of Received Nutrient/Fluid Intake    Enteral Calories (kcal): 0  Enteral Protein (gm): 0  Enteral (Free Water) Fluid (mL): 0  Free Water Flush Fluid (mL): 0  Other Calories (kcal): 0  Total Calories (kcal): 0  IV Fluid (mL): (IV fluids discountinued)  Total Fluid Intake (mL): 0  Energy Calories Required: not meeting needs  Protein Required: not meeting needs  Fluid Required: meeting needs(36 mL/kg)  Comments: LBM: 10/11  Tolerance: tolerating  % Intake of Estimated Energy Needs: 25 - 50 %   % Meal Intake: 25 - 50 %     Nutrition Risk    Level of Risk/Frequency of Follow-up: (F/U 2 x weekly)     Assessment and Plan    Nutrition Problem  Inadequate oral intake     Related to (etiology):   Dysphagia      Signs and Symptoms (as evidenced by):   PO intake < 50% EEN, EPN      Interventions/Recommendations (treatment strategy):  Collaboration with other providers  Commercial produce - boost pudding BID   Modified texture - pureed diet with nectar thick liquids     Nutrition Diagnosis Status:   Continues      Monitor and Evaluation    Food and Nutrient Intake: energy intake, food and beverage intake  Food and Nutrient Adminstration: diet order  Anthropometric Measurements: weight change, weight, body mass index  Biochemical Data, Medical Tests and Procedures: glucose/endocrine profile, electrolyte and renal panel, lipid profile, inflammatory profile, gastrointestinal profile  Nutrition-Focused Physical Findings: overall appearance, extremities, muscles and bones, head and eyes     Nutrition Follow-Up    RD Follow-up?: Yes

## 2019-10-14 NOTE — PROGRESS NOTES
"[facility]  [place]ult [place]  Progress Note    SUMMARY       Recommendations    Recommendation/Intervention:   1. Encourage adequate PO intake of meals and fluids.   2. Continue Boost Pudding BID  3. Monitor weight weekly to assess adequte intake of PO diet.   4. If poor PO status and/or aspiration continues, rec consider J-tube placement or G-J extention due to provide adequate needs and reduce aspiration events.    Goals: >75% of meal intake daily  Nutrition Goal Status: new  Communication of RD Recs: (POC)    Reason for Assessment    Reason For Assessment: consult(+ F/U)  Diagnosis: pulmonary disease, infection/sepsis  Relevant Medical History: CP, mental retardation  Interdisciplinary Rounds: did not attend  General Information Comments: Pt was supposed to get a PEG tube placed today due to poor PO intake. Family decided to postpone PEG placement to see if pt intake improves upon d/c. Pt currently consuming < 50% EEN, EPN. Denies N/V/D/C. Pt sometimes tolerates boost pudding. SLP following.   Nutrition Discharge Planning: d/c on pureed diet with nectar thick liquids, if PO intake remains poor consider PEG placement    Nutrition Risk Screen    Nutrition Risk Screen: no indicators present    Nutrition/Diet History    Patient Reported Diet/Restrictions/Preferences: pureed(reported by )  Spiritual, Cultural Beliefs, Mosque Practices, Values that Affect Care: no  Supplemental Drinks or Food Habits: Ensure Plus(Benecalorie)  Factors Affecting Nutritional Intake: impaired cognitive status/motor control, chewing difficulties/inability to chew food, NPO    Anthropometrics    Temp: 98.8 °F (37.1 °C)  Height Method: Estimated  Height: 5' 1" (154.9 cm)  Height (inches): 61 in  Weight Method: Bed Scale  Weight: 45.1 kg (99 lb 6.8 oz)  Weight (lb): 99.43 lb  Ideal Body Weight (IBW), Male: 112 lb  % Ideal Body Weight, Male (lb): 94.09 lb  BMI (Calculated): 20  BMI Grade: 18.5-24.9 - " normal     Lab/Procedures/Meds    Pertinent Labs Reviewed: reviewed  Pertinent Labs Comments: Na 148, K 3.4, Cl 111, Ca 8.6  Pertinent Medications Reviewed: reviewed  Pertinent Medications Comments: famotidine, enoxaparin, scopolamine    Estimated/Assessed Needs    Weight Used For Calorie Calculations: 47.8 kg (105 lb 6.1 oz)  Energy Calorie Requirements (kcal): 1658  Energy Need Method: Starkweather-St Jeor(x 1.25)  Protein Requirements: 57-72(1.2 - 1.5 g/kg)  Weight Used For Protein Calculations: 47.8 kg (105 lb 6.1 oz)  Estimated Fluid Requirement Method: RDA Method  RDA Method (mL): 1658    Nutrition Prescription Ordered    Current Diet Order: NPO(for PEG placement that did not occur)  Current Nutrition Support Formula Ordered: (Canceled 10/8)  Current Nutrition Support Rate Ordered: (Canceled 10/8)  Current Nutrition Support Frequency Ordered: (Canceled 10/8)  Oral Nutrition Supplement: Boost Pudding TID    Evaluation of Received Nutrient/Fluid Intake    Enteral Calories (kcal): 0  Enteral Protein (gm): 0  Enteral (Free Water) Fluid (mL): 0  Free Water Flush Fluid (mL): 0  Other Calories (kcal): 0  Total Calories (kcal): 0  IV Fluid (mL): (IV fluids discountinued)  Total Fluid Intake (mL): 0  Energy Calories Required: not meeting needs  Protein Required: not meeting needs  Fluid Required: meeting needs(36 mL/kg)  Comments: LBM: 10/11  Tolerance: tolerating  % Intake of Estimated Energy Needs: 25 - 50 %   % Meal Intake: 25 - 50 %     Nutrition Risk    Level of Risk/Frequency of Follow-up: (F/U 2 x weekly)     Assessment and Plan    Nutrition Problem  Inadequate oral intake     Related to (etiology):   Dysphagia      Signs and Symptoms (as evidenced by):   PO intake < 50% EEN, EPN      Interventions/Recommendations (treatment strategy):  Collaboration with other providers  Commercial produce - boost pudding BID   Modified texture - pureed diet with nectar thick liquids     Nutrition Diagnosis Status:   Continues      Monitor and Evaluation    Food and Nutrient Intake: energy intake, food and beverage intake  Food and Nutrient Adminstration: diet order  Anthropometric Measurements: weight change, weight, body mass index  Biochemical Data, Medical Tests and Procedures: glucose/endocrine profile, electrolyte and renal panel, lipid profile, inflammatory profile, gastrointestinal profile  Nutrition-Focused Physical Findings: overall appearance, extremities, muscles and bones, head and eyes     Nutrition Follow-Up    RD Follow-up?: Yes

## 2019-10-14 NOTE — ASSESSMENT & PLAN NOTE
Worsening hyponatremia likely secondary to inadequate free water intake, but is improved today from 153 to 148.  Will continue half normal saline and repeat laboratory testing  PEG tube initially planned but patient is tolerating orals well.

## 2019-10-14 NOTE — PROGRESS NOTES
[facility] Hospital Medicine  Progress Note    Patient Name: Karri Galeano  MRN: [id number]  Patient Class: IP- Inpatient   Admission Date: 10/3/2019  Length of Stay: 10 days  Attending Physician: Kiersten Marcos MD  Primary Care Provider: Echo Reeves MD        Subjective:     Principal Problem:Pneumonia of left lung due to infectious organism    HPI:  Mr. Karri Galeano is a 24 y.o. male with cerebral palsy and epilepsy who presents to [facility] ED from an urgent care facility with complaints of fevers today.  He lives in a group home and started to have a nonproductive cough today.  The caregiver did not observe any vomiting.  He was in his usual state of health yesterday but today has become more lethargic and somnolent.  He was taken to an urgent care facility where he was then transferred to this facility for further care.  He was noted there to have a fever of 102.2° F as well as an oxygen saturation of 95% on nasal cannula at 3 liters/minute.  Further history is otherwise limited at this time.    Overview/Hospital Course:  Mr. Galeano presented with fever, cough and lethargy.  Respiratory status decompensated and he required intubation in the ER.  Workup with imaging consistent with possible pneumonia.  Empiric Zosyn and vancomycin initiated.  Pulmonary consulted for vent management.  Extubated on 10/5 in the morning.  Initially did well, but had worsening respiratory failure during the day.  Started on Bipap with no improvement and reintubated on 10/5 in the afternoon.  Blood cultures no growth to date.  Respiratory viral panel came back positive for rhino virus.  Antibiotics stopped.  Extubated on 10/09 to BiPAP.  Respiratory status stable.  Increased cough with feeding patient and he remained in the ICU.  Speech therapy recommended pureed diet.  He was stepped down to the floor on 10/11.  Patient with poor intake along with increased risk for aspiration.  GI consulted and plan to  place PEG on Monday.  Social service is working on placement for group home when ready for discharge.     Interval History: 10/13/2019: Patient's aunt reports that the patient is back to his baseline mentation and appears to be more active and interested in music again.  She is concerned about him returning back to his group amaris morales is looking at one in [place], [place].  10/14/2019: Patient spiked a fever this morning with associated tachycardia.  Repeat blood cultures are pending.  Still awaiting placement.    Review of Systems   Unable to perform ROS: Patient nonverbal     Objective:     Vital Signs (Most Recent):  Temp: 100 °F (37.8 °C) (10/14/19 0932)  Pulse: 108 (10/14/19 0739)  Resp: 18 (10/14/19 0739)  BP: (!) 149/95 (10/14/19 0739)  SpO2: (!) 91 % (10/14/19 0739) Vital Signs (24h Range):  Temp:  [98.1 °F (36.7 °C)-102.1 °F (38.9 °C)] 100 °F (37.8 °C)  Pulse:  [] 108  Resp:  [17-20] 18  SpO2:  [91 %-99 %] 91 %  BP: (136-157)/() 149/95     Weight: 45.1 kg (99 lb 6.8 oz)  Body mass index is 18.79 kg/m².    Intake/Output Summary (Last 24 hours) at 10/14/2019 0958  Last data filed at 10/14/2019 0600  Gross per 24 hour   Intake 340 ml   Output 1950 ml   Net -1610 ml      Physical Exam   Constitutional: He appears well-developed. No distress.   cachetic   HENT:   Head: Normocephalic and atraumatic.   Right Ear: External ear normal.   Left Ear: External ear normal.   Nose: Nose normal.   Eyes: Right eye exhibits no discharge. Left eye exhibits no discharge.   Neck: Normal range of motion.   Cardiovascular:   tachcyardic but regular rate, no murmurs or gallops   Pulmonary/Chest: Effort normal and breath sounds normal. No stridor. No respiratory distress. He has no wheezes. He has no rales. He exhibits no tenderness.   Abdominal: Soft. Bowel sounds are normal. He exhibits no distension. There is no tenderness. There is no rebound and no guarding.   Musculoskeletal: Normal range of motion. He exhibits no  edema.   Neurological:   Alert and awake but is nonverbal   Skin: Skin is warm and dry. He is not diaphoretic. No erythema.   Nursing note and vitals reviewed.      Significant Labs:   CMP:   Recent Labs   Lab 10/13/19  0616 10/14/19  0444   * 148*   K 3.3* 3.4*   * 111*   CO2 28 27    108   BUN 16 10   CREATININE 0.8 0.8   CALCIUM 8.6* 8.6*   ANIONGAP 10 10   EGFRNONAA >60 >60       Significant Imaging: I have reviewed and interpreted all pertinent imaging results/findings within the past 24 hours.      Assessment/Plan:      * Pneumonia of left lung due to infectious organism  10/14/2019:  Patient spiked a fever of 102.1° F and has a leukocytosis.  Repeat blood cultures are pending.  Patient was becoming more tachypneic and obtunded which he was intubated.    He may have had an aspiration event with subsequent pneumonia.    He did meet criteria for severe sepsis and has been started on empiric antibiotic therapy.   Patient completed 7 days of Zosyn and vancomycin, antibiotic stopped on 10/10  +rhinovirus on sputum culture likely contributed    On mechanically assisted ventilation  As addressed above.  Extubated to bipap 10/9  Weaned to NC O2     Spastic quadriplegic cerebral palsy  Family would like group home in [place] when ready for discharge  Will consult Social Work  CP not actual diagnosis but has dysphagia issues  Puree diet - not tolerating very well    Epilepsy  Continue his home regimen of divalproex.    Severe malnutrition  In adequate intake of pureed diet even with assistance and concern for possible aspiration  Will need nutrition consult on Monday for tube feed recommendations    Acute respiratory failure with hypoxia  Secondary to pneumonia and rhino virus  Completed antibiotic treatment prior to stepped down to the floor  Wean O2 as tolerated and nebulizer treatment  Will need to monitor with repeat CBC given WBC increased  Aspiration precautions    Hypokalemia  Replace as  needed.  Will continue to monitor    Hypernatremia  Worsening hyponatremia likely secondary to inadequate free water intake, but is improved today from 153 to 148.  Will continue half normal saline and repeat laboratory testing  PEG tube initially planned but patient is tolerating orals well.      VTE Risk Mitigation (From admission, onward)         Ordered     enoxaparin injection 40 mg  Daily      10/04/19 0152     IP VTE HIGH RISK PATIENT  Once      10/04/19 0152                Kiersten Marcos M.D.  Staff Nocturnist  Department of Hospital Medicine  [facility]  Pager: [phone]          N.B.: Portions of this note was dictated using M*Modal Fluency Direct--there may be voice recognition errors occasionally missed on review.

## 2019-10-14 NOTE — PROGRESS NOTES
PALLIATIVE CARE PROGRESS NOTE:    Reviewed notes.  Left voice mail message for patient's aunt/decision maker Chloe Galeano to followup regarding possible PEG tube placement and also new group home placement (in [place]?).  Awaiting call back.    ([id number]  ADDENDUM)  Received callback from patient's aunt Chloe.  She has just toured the [facility] facility in [place] and is very pleased with it.  She had spoken to [facility] Director of Nursing Coleen Keyes ([phone]) and that are making sure all of the necessary paper work is being completed so that when patient discharged from here he can be transferred to [place].  We also discussed PEG tube.  She wants to hold off on the PEG and wait to see if aspiration issues recur.  She states her father had a PEG tube so she is aware of pros and cons and complications with that.  She is hopeful that once patient is in the new facility, he will settle down, have bettercare and feeding  and not need PEG.    Chanelle Burnette, MADAYN, RN, CCRN, CHPN   Palliative Care Nurse Coordinator   [facility]  [phone]

## 2019-10-14 NOTE — ASSESSMENT & PLAN NOTE
In adequate intake of pureed diet even with assistance and concern for possible aspiration  Will need nutrition consult on Monday for tube feed recommendations

## 2019-10-14 NOTE — PROGRESS NOTES
PALLIATIVE CARE PROGRESS NOTE:    Received call from patient's aunt Chloe.  She states she spoke to ST Zambrano and saw the films of his aspiration problems.  She is now open to having PEG placed (was hoping to wait until after he was settled in at the new group home in [place]).  She states that the group home where he was in prior to admit -[facility] - as well as the new one in [place], do NOT TAKE PATIENT'S WITH PEG TUBEs.  The Res Care supervisor (they oversee the group homes) is now looking for a new location (??may  Be out of state??).  I did explain option for hospice care that would allow pleasure feeds.      Plan/recommendations;   Need team meeting with hospitalist, palliative care, and GI if at all possible tomorrow to discuss PEG.   Jose has past history of PEG placement in the aunt's father - 10 years, and complications.   Will meet with her tomorrow morning and set up the meeting.   Would also like input from case management -  Delays for discharge due to placement issues.    Chanelle Burnette, BSN, RN, CCRN, CHPN   Palliative Care Nurse Coordinator   [facility]  [phone]

## 2019-10-14 NOTE — NURSING
19:15 Received bedside report from KAYDEN Graham. Patient alert and non verbal. No sign of pain. No sign of distress. IV line intact over Lt Upper arm - saline locked and Left Fa - infusing with  0.45 % Nacl at 100 mls/hr. Sitter at bedside all the time. Call bell given by sitter side, instructed to call for assistance all the time. Bed a alarm on. Bed on lowest position. Safety maintained.

## 2019-10-14 NOTE — ASSESSMENT & PLAN NOTE
10/14/2019:  Patient spiked a fever of 102.1° F and has a leukocytosis.  Repeat blood cultures are pending.  Patient was becoming more tachypneic and obtunded which he was intubated.    He may have had an aspiration event with subsequent pneumonia.    He did meet criteria for severe sepsis and has been started on empiric antibiotic therapy.   Patient completed 7 days of Zosyn and vancomycin, antibiotic stopped on 10/10  +rhinovirus on sputum culture likely contributed

## 2019-10-14 NOTE — PLAN OF CARE
Problem: Oral Intake Inadequate  Goal: Improved Oral Intake  Outcome: Ongoing, Progressing  Intervention: Promote and Optimize Oral Intake  Flowsheets (Taken 10/14/2019 1551)  Oral Nutrition Promotion: nutritional therapy counseling provided; other (see comments) (Modified food textures and possible PEG placement)     Problem: Oral Intake Inadequate  Goal: Improved Oral Intake  Outcome: Ongoing, Progressing  Intervention: Promote and Optimize Oral Intake  Flowsheets (Taken 10/14/2019 1551)  Oral Nutrition Promotion: nutritional therapy counseling provided; other (see comments) (Modified food textures and possible PEG placement)

## 2019-10-14 NOTE — ASSESSMENT & PLAN NOTE
Family would like group home in [place] when ready for discharge  Will consult Social Work  CP not actual diagnosis but has dysphagia issues  Puree diet - not tolerating very well

## 2019-10-14 NOTE — PLAN OF CARE
Problem: Skin Injury Risk Increased  Goal: Skin Health and Integrity  Intervention: Optimize Skin Protection  Flowsheets (Taken 10/14/2019 8061)  Pressure Reduction Techniques: frequent weight shift encouraged; heels elevated off bed  Pressure Reduction Devices: heel offloading device utilized; positioning supports utilized; pressure-redistributing mattress utilized  Skin Protection: incontinence pads utilized; tubing/devices free from skin contact  Head of Bed (HOB): HOB elevated; HOB at 20-30 degrees; HOB at 30-45 degrees

## 2019-10-14 NOTE — PT/OT/SLP EVAL
Speech Language Pathology  Bedside Swallow Re-eval    Patient Name:  Karri Galeano   MRN:  [id number]  Admitting Diagnosis: Pneumonia of left lung due to infectious organism    Recommendations:                 General Recommendations:  Dysphagia therapy, MBSS  Diet recommendations:  NPO(MBSS this pm), meds crused in puree okay  Aspiration Precautions: 1 bite/sip at a time and HOB to 90 degrees   General Precautions: Standard, aspiration  Communication strategies:  yes/no questions only    History:     Past Medical History:   Diagnosis Date    Anxiety     Congenital cerebral palsy     Hypotonia     Leukodystrophy     Mental retardation     Retention of urine     Seizures     Stereotyped movement disorder     Urinary tract infection        Past Surgical History:   Procedure Laterality Date    BRONCHOSCOPY N/A 10/7/2019    Procedure: Bronchoscopy;  Surgeon: David Vela MD;  Location: [facility];  Service: Pulmonary;  Laterality: N/A;       [facility] [employer] [facility] COURSE:  10/1419-   Karri Galeano is a 24 y.o. male with a dx of Pneumonia of left lung due to infectious organism. Pt presents with moderate oropharyngeal dysphagia c/b prolonged mastication, decreased closure around spoon, and delayed swallow initiation 2/2 history of cerebral palsy. RECS: puree with thin liquids via medicine cup    10/11/19- Karri Galeano is a 24 y.o. male with diagnosis of Pneumonia of left lung due to infectious organism. He presents with suspected moderate oral pharyngeal dysphagia. RECS: downgrade to nectar thick liquids, overt s/s of aspiration of thin liquids.        Subjective   Pt's caregiver reporting Pt's mental status/alertness is at baseline. Caregiver reporting Pt usually drinks with a dysphagia cup. She is currently in the process to have this cup brought to the hospital for MBSS this pm.   Patient goals: per family avoid PEG    Pain/Comfort:  · Pain Rating 1: 0/10    Objective:     Oral Musculature  Evaluation  · Oral Musculature: unable to assess due to poor participation/comprehension  · Dentition: present and adequate  · Secretion Management: left corner drooling  · Mucosal Quality: good  · Voice Prior to PO Intake: non-verbal    Bedside Swallow Eval:   Consistencies Assessed:  · Thin liquids multiple cup sips across kelvin. 2oz  · Puree X2     Oral Phase:   · Decreased A-P transport   · Unable to draw from straw  · Oral residue post swallow anterior sulci  · Holding of puree/ oral apraxia  · Tongue pumping during oral prep  · Tongue thrust    Pharyngeal Phase:   · Coughing/choking- delayed across consistencies  · decreased hyolaryngeal excursion to palpation  · delayed swallow initation  · multiple spontaneous swallows  · wet vocal quality after swallow    Compensatory Strategies  · dysphagia cup unavailable at this time, ST requested it for MBSS    Treatment: Questionable tolerance of puree and thin liquids, ST RECS MBSS, order botained, to be performed this pm.     Assessment:     Karri Galeano is a 24 y.o. male with dx of Pneumonia of left lung due to infectious organism he presents with mod-severe oral dysphagia and pharyngeal dysphagia of a yet to be determined severity.     Goals:   Multidisciplinary Problems     SLP Goals        Problem: SLP Goal    Goal Priority Disciplines Outcome   SLP Goal    Medium SLP Ongoing, Not Progressing   Description:  ST. Pt will tolerate PO diet of pureed with Nectar thickened liquids with no overt s/s of aspiration. (goal downgraded 10/11/19)    2. Pt will participate in MBSS  3. Family will demonstrate knowledge of MBSS results.                     Plan:     · Patient to be seen:  3 x/week, 4 x/week   · Plan of Care expires:   10/30  · Plan of Care reviewed with:  caregiver   · SLP Follow-Up:  Yes       Discharge recommendations:  other (see comments)(group home)   Barriers to Discharge:  Level of Skilled Assistance Needed .    Time Tracking:     SLP Treatment  Date:   10/14/19  Speech Start Time:  1100  Speech Stop Time:  1120     Speech Total Time (min):  20 min    Billable Minutes: Treatment Swallowing Dysfunction 20    Kenya Renteria CCC-SLP  10/14/2019

## 2019-10-14 NOTE — SUBJECTIVE & OBJECTIVE
Interval History: 10/13/2019: Patient's aunt reports that the patient is back to his baseline mentation and appears to be more active and interested in music again.  She is concerned about him returning back to his group amaris morales is looking at one in [place], [place].  10/14/2019: Patient spiked a fever this morning with associated tachycardia.  Repeat blood cultures are pending.  Still awaiting placement.    Review of Systems   Unable to perform ROS: Patient nonverbal     Objective:     Vital Signs (Most Recent):  Temp: 100 °F (37.8 °C) (10/14/19 0932)  Pulse: 108 (10/14/19 0739)  Resp: 18 (10/14/19 0739)  BP: (!) 149/95 (10/14/19 0739)  SpO2: (!) 91 % (10/14/19 0739) Vital Signs (24h Range):  Temp:  [98.1 °F (36.7 °C)-102.1 °F (38.9 °C)] 100 °F (37.8 °C)  Pulse:  [] 108  Resp:  [17-20] 18  SpO2:  [91 %-99 %] 91 %  BP: (136-157)/() 149/95     Weight: 45.1 kg (99 lb 6.8 oz)  Body mass index is 18.79 kg/m².    Intake/Output Summary (Last 24 hours) at 10/14/2019 0958  Last data filed at 10/14/2019 0600  Gross per 24 hour   Intake 340 ml   Output 1950 ml   Net -1610 ml      Physical Exam   Constitutional: He appears well-developed. No distress.   cachetic   HENT:   Head: Normocephalic and atraumatic.   Right Ear: External ear normal.   Left Ear: External ear normal.   Nose: Nose normal.   Eyes: Right eye exhibits no discharge. Left eye exhibits no discharge.   Neck: Normal range of motion.   Cardiovascular:   tachcyardic but regular rate, no murmurs or gallops   Pulmonary/Chest: Effort normal and breath sounds normal. No stridor. No respiratory distress. He has no wheezes. He has no rales. He exhibits no tenderness.   Abdominal: Soft. Bowel sounds are normal. He exhibits no distension. There is no tenderness. There is no rebound and no guarding.   Musculoskeletal: Normal range of motion. He exhibits no edema.   Neurological:   Alert and awake but is nonverbal   Skin: Skin is warm and dry. He is not  diaphoretic. No erythema.   Nursing note and vitals reviewed.      Significant Labs:   CMP:   Recent Labs   Lab 10/13/19  0616 10/14/19  0444   * 148*   K 3.3* 3.4*   * 111*   CO2 28 27    108   BUN 16 10   CREATININE 0.8 0.8   CALCIUM 8.6* 8.6*   ANIONGAP 10 10   EGFRNONAA >60 >60       Significant Imaging: I have reviewed and interpreted all pertinent imaging results/findings within the past 24 hours.

## 2019-10-15 PROBLEM — Z99.11 ON MECHANICALLY ASSISTED VENTILATION: Status: RESOLVED | Noted: 2019-10-04 | Resolved: 2019-10-15

## 2019-10-15 PROBLEM — R13.19 OTHER DYSPHAGIA: Status: ACTIVE | Noted: 2019-10-15

## 2019-10-15 LAB
ALLENS TEST: ABNORMAL
ANION GAP SERPL CALC-SCNC: 7 MMOL/L (ref 8–16)
BASOPHILS # BLD AUTO: 0.07 K/UL (ref 0–0.2)
BASOPHILS NFR BLD: 0.4 % (ref 0–1.9)
BUN SERPL-MCNC: 9 MG/DL (ref 6–20)
CALCIUM SERPL-MCNC: 8.3 MG/DL (ref 8.7–10.5)
CHLORIDE SERPL-SCNC: 106 MMOL/L (ref 95–110)
CO2 SERPL-SCNC: 28 MMOL/L (ref 23–29)
CREAT SERPL-MCNC: 0.7 MG/DL (ref 0.5–1.4)
DELSYS: ABNORMAL
DIFFERENTIAL METHOD: ABNORMAL
EOSINOPHIL # BLD AUTO: 0.6 K/UL (ref 0–0.5)
EOSINOPHIL NFR BLD: 3.4 % (ref 0–8)
ERYTHROCYTE [DISTWIDTH] IN BLOOD BY AUTOMATED COUNT: 15.2 % (ref 11.5–14.5)
EST. GFR  (AFRICAN AMERICAN): >60 ML/MIN/1.73 M^2
EST. GFR  (NON AFRICAN AMERICAN): >60 ML/MIN/1.73 M^2
GLUCOSE SERPL-MCNC: 82 MG/DL (ref 70–110)
HCO3 UR-SCNC: 29 MMOL/L (ref 24–28)
HCT VFR BLD AUTO: 28.6 % (ref 40–54)
HGB BLD-MCNC: 8.8 G/DL (ref 14–18)
IMM GRANULOCYTES # BLD AUTO: 0.13 K/UL (ref 0–0.04)
IMM GRANULOCYTES NFR BLD AUTO: 0.8 % (ref 0–0.5)
LYMPHOCYTES # BLD AUTO: 2.1 K/UL (ref 1–4.8)
LYMPHOCYTES NFR BLD: 12.4 % (ref 18–48)
MAGNESIUM SERPL-MCNC: 1.7 MG/DL (ref 1.6–2.6)
MCH RBC QN AUTO: 27.9 PG (ref 27–31)
MCHC RBC AUTO-ENTMCNC: 30.8 G/DL (ref 32–36)
MCV RBC AUTO: 91 FL (ref 82–98)
MODE: ABNORMAL
MONOCYTES # BLD AUTO: 1.3 K/UL (ref 0.3–1)
MONOCYTES NFR BLD: 7.9 % (ref 4–15)
NEUTROPHILS # BLD AUTO: 12.5 K/UL (ref 1.8–7.7)
NEUTROPHILS NFR BLD: 75.1 % (ref 38–73)
NRBC BLD-RTO: 0 /100 WBC
PCO2 BLDA: 42.5 MMHG (ref 35–45)
PEEP: 10
PH SMN: 7.44 [PH] (ref 7.35–7.45)
PHOSPHATE SERPL-MCNC: 3.7 MG/DL (ref 2.7–4.5)
PLATELET # BLD AUTO: 426 K/UL (ref 150–350)
PMV BLD AUTO: 10.2 FL (ref 9.2–12.9)
PO2 BLDA: 63 MMHG (ref 80–100)
POC BE: 4 MMOL/L
POC SATURATED O2: 92 % (ref 95–100)
POC TCO2: 30 MMOL/L (ref 23–27)
POTASSIUM SERPL-SCNC: 4 MMOL/L (ref 3.5–5.1)
RBC # BLD AUTO: 3.15 M/UL (ref 4.6–6.2)
SAMPLE: ABNORMAL
SITE: ABNORMAL
SODIUM SERPL-SCNC: 141 MMOL/L (ref 136–145)
SP02: 99
WBC # BLD AUTO: 16.67 K/UL (ref 3.9–12.7)

## 2019-10-15 PROCEDURE — S0028 INJECTION, FAMOTIDINE, 20 MG: HCPCS | Performed by: HOSPITALIST

## 2019-10-15 PROCEDURE — 84100 ASSAY OF PHOSPHORUS: CPT

## 2019-10-15 PROCEDURE — 94668 MNPJ CHEST WALL SBSQ: CPT

## 2019-10-15 PROCEDURE — 94761 N-INVAS EAR/PLS OXIMETRY MLT: CPT

## 2019-10-15 PROCEDURE — 27100171 HC OXYGEN HIGH FLOW UP TO 24 HOURS

## 2019-10-15 PROCEDURE — 25000003 PHARM REV CODE 250: Performed by: HOSPITALIST

## 2019-10-15 PROCEDURE — 92526 ORAL FUNCTION THERAPY: CPT

## 2019-10-15 PROCEDURE — 80048 BASIC METABOLIC PNL TOTAL CA: CPT

## 2019-10-15 PROCEDURE — 36415 COLL VENOUS BLD VENIPUNCTURE: CPT

## 2019-10-15 PROCEDURE — 25000242 PHARM REV CODE 250 ALT 637 W/ HCPCS: Performed by: INTERNAL MEDICINE

## 2019-10-15 PROCEDURE — 85025 COMPLETE CBC W/AUTO DIFF WBC: CPT

## 2019-10-15 PROCEDURE — 94640 AIRWAY INHALATION TREATMENT: CPT

## 2019-10-15 PROCEDURE — 94660 CPAP INITIATION&MGMT: CPT

## 2019-10-15 PROCEDURE — 82803 BLOOD GASES ANY COMBINATION: CPT

## 2019-10-15 PROCEDURE — 36600 WITHDRAWAL OF ARTERIAL BLOOD: CPT

## 2019-10-15 PROCEDURE — 83735 ASSAY OF MAGNESIUM: CPT

## 2019-10-15 PROCEDURE — 63600175 PHARM REV CODE 636 W HCPCS: Performed by: HOSPITALIST

## 2019-10-15 PROCEDURE — 25000242 PHARM REV CODE 250 ALT 637 W/ HCPCS: Performed by: HOSPITALIST

## 2019-10-15 PROCEDURE — 21400001 HC TELEMETRY ROOM

## 2019-10-15 PROCEDURE — 97535 SELF CARE MNGMENT TRAINING: CPT

## 2019-10-15 PROCEDURE — 25000003 PHARM REV CODE 250: Performed by: INTERNAL MEDICINE

## 2019-10-15 PROCEDURE — 99900035 HC TECH TIME PER 15 MIN (STAT)

## 2019-10-15 RX ORDER — CLONAZEPAM 0.5 MG/1
1 TABLET ORAL 2 TIMES DAILY
Status: DISCONTINUED | OUTPATIENT
Start: 2019-10-15 | End: 2019-11-02 | Stop reason: HOSPADM

## 2019-10-15 RX ORDER — TAMSULOSIN HYDROCHLORIDE 0.4 MG/1
1 CAPSULE ORAL DAILY
Status: DISCONTINUED | OUTPATIENT
Start: 2019-10-16 | End: 2019-10-25

## 2019-10-15 RX ORDER — IPRATROPIUM BROMIDE AND ALBUTEROL SULFATE 2.5; .5 MG/3ML; MG/3ML
3 SOLUTION RESPIRATORY (INHALATION) EVERY 4 HOURS
Status: DISCONTINUED | OUTPATIENT
Start: 2019-10-15 | End: 2019-11-01

## 2019-10-15 RX ORDER — FLUOXETINE 20 MG/5ML
60 SOLUTION ORAL DAILY
Status: DISCONTINUED | OUTPATIENT
Start: 2019-10-16 | End: 2019-10-25

## 2019-10-15 RX ORDER — VALPROIC ACID 250 MG/5ML
500 SOLUTION ORAL EVERY 12 HOURS
Status: DISCONTINUED | OUTPATIENT
Start: 2019-10-15 | End: 2019-10-25

## 2019-10-15 RX ADMIN — IPRATROPIUM BROMIDE AND ALBUTEROL SULFATE 3 ML: .5; 3 SOLUTION RESPIRATORY (INHALATION) at 07:10

## 2019-10-15 RX ADMIN — IPRATROPIUM BROMIDE AND ALBUTEROL SULFATE 3 ML: .5; 3 SOLUTION RESPIRATORY (INHALATION) at 08:10

## 2019-10-15 RX ADMIN — VALPROIC ACID 500 MG: 250 SOLUTION ORAL at 08:10

## 2019-10-15 RX ADMIN — FLUOXETINE HYDROCHLORIDE 60 MG: 20 SOLUTION ORAL at 08:10

## 2019-10-15 RX ADMIN — VALPROIC ACID 500 MG: 250 SOLUTION ORAL at 09:10

## 2019-10-15 RX ADMIN — IPRATROPIUM BROMIDE AND ALBUTEROL SULFATE 3 ML: .5; 3 SOLUTION RESPIRATORY (INHALATION) at 11:10

## 2019-10-15 RX ADMIN — FAMOTIDINE 20 MG: 10 INJECTION INTRAVENOUS at 09:10

## 2019-10-15 RX ADMIN — TAMSULOSIN HYDROCHLORIDE 0.4 MG: 0.4 CAPSULE ORAL at 08:10

## 2019-10-15 RX ADMIN — FAMOTIDINE 20 MG: 10 INJECTION INTRAVENOUS at 08:10

## 2019-10-15 RX ADMIN — CLONAZEPAM 1 MG: 0.5 TABLET ORAL at 09:10

## 2019-10-15 RX ADMIN — ENOXAPARIN SODIUM 40 MG: 100 INJECTION SUBCUTANEOUS at 05:10

## 2019-10-15 RX ADMIN — IPRATROPIUM BROMIDE AND ALBUTEROL SULFATE 3 ML: .5; 3 SOLUTION RESPIRATORY (INHALATION) at 01:10

## 2019-10-15 RX ADMIN — SODIUM CHLORIDE: 0.45 INJECTION, SOLUTION INTRAVENOUS at 06:10

## 2019-10-15 RX ADMIN — SODIUM CHLORIDE: 0.45 INJECTION, SOLUTION INTRAVENOUS at 04:10

## 2019-10-15 RX ADMIN — CLONAZEPAM 1 MG: 0.5 TABLET ORAL at 08:10

## 2019-10-15 NOTE — PROGRESS NOTES
[facility] Hospital Medicine  Progress Note    Patient Name: Karri Galeano  MRN: [id number]  Patient Class: IP- Inpatient   Admission Date: 10/3/2019  Length of Stay: 11 days  Attending Physician: Carole Hernandez MD  Primary Care Provider: Echo Reeves MD        Subjective:     Principal Problem:Acute respiratory failure with hypoxia        HPI:  Mr. Karri Galeano is a 24 y.o. male with cerebral palsy and epilepsy who presents to [facility] ED from an urgent care facility with complaints of fevers today.  He lives in a group home and started to have a nonproductive cough today.  The caregiver did not observe any vomiting.  He was in his usual state of health yesterday but today has become more lethargic and somnolent.  He was taken to an urgent care facility where he was then transferred to this facility for further care.  He was noted there to have a fever of 102.2° F as well as an oxygen saturation of 95% on nasal cannula at 3 liters/minute.  Further history is otherwise limited at this time.    Overview/Hospital Course:  Mr. Galeano presented with fever, cough and lethargy.  Respiratory status decompensated and he required intubation in the ER.  Workup with imaging consistent with possible pneumonia.  Empiric Zosyn and vancomycin initiated.  Pulmonary consulted for vent management.  Extubated on 10/5 in the morning.  Initially did well, but had worsening respiratory failure during the day.  Started on Bipap with no improvement and reintubated on 10/5 in the afternoon.  Blood cultures no growth to date.  Respiratory viral panel came back positive for rhino virus.  Antibiotics stopped.  Extubated on 10/09 to BiPAP.  Respiratory status stable.  Increased cough with feeding patient and he remained in the ICU.  Speech therapy recommended pureed diet.  He was stepped down to the floor on 10/11.  Patient with poor intake along with increased risk for aspiration.  GI consulted and plan to  place PEG on Monday.  Social service is working on placement for group home when ready for discharge.     Interval History: wet cough, drooling but in no respiratory distress. Was on CPAP overnight for hypoxia. Currently breathing comfortably on comfort flow. MBBS positive for aspiration.     Review of Systems   Unable to perform ROS: Patient nonverbal     Objective:     Vital Signs (Most Recent):  Temp: 98.2 °F (36.8 °C) (10/15/19 0743)  Pulse: 101 (10/15/19 0748)  Resp: (!) 26 (10/15/19 0748)  BP: (!) 146/82 (10/15/19 0743)  SpO2: (!) 93 % (10/15/19 0748) Vital Signs (24h Range):  Temp:  [98.1 °F (36.7 °C)-100 °F (37.8 °C)] 98.2 °F (36.8 °C)  Pulse:  [] 101  Resp:  [18-26] 26  SpO2:  [78 %-97 %] 93 %  BP: (132-153)/(80-93) 146/82     Weight: 45.1 kg (99 lb 6.8 oz)  Body mass index is 18.79 kg/m².    Intake/Output Summary (Last 24 hours) at 10/15/2019 0914  Last data filed at 10/15/2019 0645  Gross per 24 hour   Intake --   Output 2400 ml   Net -2400 ml      Physical Exam   Constitutional: He appears well-developed. No distress.   Cardiovascular:   Sinus tachycardia (102 bpm)   Pulmonary/Chest: No stridor. No respiratory distress. He has no wheezes. He has no rales.   Breathing comfortably on comfort flow  Drooling. Wet cough  Coarse breath sounds   Abdominal: Soft. Bowel sounds are normal.   Neurological: He is alert.   Does not follow commands  Briefly makes eye contact but unsure if this is intentional    Skin: Skin is warm and dry. Capillary refill takes less than 2 seconds. He is not diaphoretic.   Nursing note and vitals reviewed.      Significant Labs: All pertinent labs within the past 24 hours have been reviewed.    Significant Imaging: I have reviewed all pertinent imaging results/findings within the past 24 hours.  I have reviewed and interpreted all pertinent imaging results/findings within the past 24 hours.      Assessment/Plan:      * Acute respiratory failure with hypoxia  Secondary to  aspiration pneumonia and rhino virus  Completed antibiotic treatment prior to stepping down to floor but I am concerned patient continues to aspirate oral secretions and some puree/oral medications when given.   MBBS positive for aspiration. Patient is drooling and experiencing wet cough  Is also on comfort flow supplemental O2 which is significant amount of supplemental O2  Continue nebulizer treatments and keep HOB elevated  Famotidine IV. Scopolamine patch in place.   NPO for now. Will discuss PEG with family today along with palliative care service  If still in disagreement with PEG, will consider NGT placement to start tube feeds and given meds through this      Other dysphagia  As above      Pneumonia of left lung due to infectious organism  Management as above    Severe malnutrition  Will plan for NGT soon if no decision made regarding PEG  On isotonic fluids for now    Hypokalemia  Replace as needed.  Will continue to monitor    Epilepsy  Continue his home regimen of divalproex.  Also on clonazepam    Spastic quadriplegic cerebral palsy  Family would like group home in [place] when ready for discharge      Hypernatremia  Secondary to inadequate free water intake.  Resolved with isotonic fluids. Unable to properly keep oral hydration due to dysphagia  Will continue half normal saline         VTE Risk Mitigation (From admission, onward)         Ordered     enoxaparin injection 40 mg  Daily      10/04/19 0152     IP VTE HIGH RISK PATIENT  Once      10/04/19 0152                      Carole Lomeli MD  Department of Hospital Medicine   [facility]

## 2019-10-15 NOTE — PT/OT/SLP PROGRESS
Speech Language Pathology Treatment    Patient Name:  Karri Galeano   MRN:  [id number]  Admitting Diagnosis: Acute respiratory failure with hypoxia    Recommendations:                 General Recommendations:  Dysphagia therapy  Diet recommendations:  NPO, Liquid Diet Level: NPO   Aspiration Precautions: Alternate means of nutrition/hydration and Frequent oral care   General Precautions: Standard, aspiration  Communication strategies:  Gestures    Subjective     Pt's aunt present for tx session. She reported overt decline in pt's status in Saturday. Aunt verbalized understanding of diet recs and PEG recommendation 2/2 silent aspiration across consistencies.      Pain/Comfort:  · Pain Rating 1: 0/10    Objective:     Has the patient been evaluated by SLP for swallowing?   Yes  Keep patient NPO? Yes   Current Respiratory Status: other (comment)(high flow)      Pt repositioned prior to introducing PO trials. Pt accepted trials of HTL via spoon with overt s/ s of aspiration (throat clearing, reduced laryngeal elevation, & watery eyes) on 4/4 trials. Tongue pumping and delayed swallow initiation also observed with HTL trials. Pt refused to accept pureed trials.    Ongoing education provided to pt's aunt regarding MBSS results and NPO recs. Aunt is in agreement with PEG. Challenge now is placement if pt [receeds with Peg placement.    Assessment:     · Karri Galeano is a 24 y.o. male with a dx of Pneumonia of left lung due to infectious organism.  Pt presents with moderate to severe oropharyngeal dysphagia c/b silent aspiration of pharyngeal stasis across consistencies after initial swallow and leah silent aspiration during the swallow of thin and nectar thick consistencies.   It should be noted SAFEST form of po baseline based on today's study is honey thick liquids and puree however anticipate some aspiration of pharyngeal stasis of these consistencies. If pleasure feeds are initiated after Pt is no longer acutely  ill, puree and honey thick liquids  recommended if high aspiration risk is accepted by medical decision maker.      Goals:   Multidisciplinary Problems     SLP Goals        Problem: SLP Goal    Goal Priority Disciplines Outcome   SLP Goal    Medium SLP Ongoing, Progressing   Description:  ST. Pt will tolerate PO diet of pureed with Nectar thickened liquids with no overt s/s of aspiration. (goal downgraded 10/11/19)    2. Pt will participate in MBSS- GOAL MET 10/14/19  3. Family will demonstrate knowledge of MBSS results.                      Plan:     · Patient to be seen:  3 x/week, 4 x/week   · Plan of Care expires:     · Plan of Care reviewed with:  patient, other (see comments)(aunt)   · SLP Follow-Up:  Yes       Discharge recommendations:  other (see comments)(group home)   Barriers to Discharge:  None    Time Tracking:     SLP Treatment Date:   10/15/19  Speech Start Time:  1410  Speech Stop Time:  1429     Speech Total Time (min):  19 min    Billable Minutes: Treatment Swallowing Dysfunction 10 min and Seld Care/Home Management Training 9 min    Jayde Goodman CCC-SLP  10/15/2019

## 2019-10-15 NOTE — ASSESSMENT & PLAN NOTE
Secondary to inadequate free water intake.  Resolved with isotonic fluids. Unable to properly keep oral hydration due to dysphagia  Will continue half normal saline

## 2019-10-15 NOTE — PLAN OF CARE
10/15/19 1552   Post-Acute Status   Post-Acute Authorization Placement   Post-Acute Placement Status   (Aunt Chloe Galeano [phone], lives in [place], [place] is here today.  She is now in areement with PEG placement.  She says she found a plae in [place], [place] with [employer].  She will call [place] back with phone number and name of .)   Discharge Delays   (Waiting for Aunt to call [place] with place she would like to have nephew placed.)

## 2019-10-15 NOTE — SUBJECTIVE & OBJECTIVE
Interval History: wet cough, drooling but in no respiratory distress. Was on CPAP overnight for hypoxia. Currently breathing comfortably on comfort flow. MBBS positive for aspiration.     Review of Systems   Unable to perform ROS: Patient nonverbal     Objective:     Vital Signs (Most Recent):  Temp: 98.2 °F (36.8 °C) (10/15/19 0743)  Pulse: 101 (10/15/19 0748)  Resp: (!) 26 (10/15/19 0748)  BP: (!) 146/82 (10/15/19 0743)  SpO2: (!) 93 % (10/15/19 0748) Vital Signs (24h Range):  Temp:  [98.1 °F (36.7 °C)-100 °F (37.8 °C)] 98.2 °F (36.8 °C)  Pulse:  [] 101  Resp:  [18-26] 26  SpO2:  [78 %-97 %] 93 %  BP: (132-153)/(80-93) 146/82     Weight: 45.1 kg (99 lb 6.8 oz)  Body mass index is 18.79 kg/m².    Intake/Output Summary (Last 24 hours) at 10/15/2019 0914  Last data filed at 10/15/2019 0645  Gross per 24 hour   Intake --   Output 2400 ml   Net -2400 ml      Physical Exam   Constitutional: He appears well-developed. No distress.   Cardiovascular:   Sinus tachycardia (102 bpm)   Pulmonary/Chest: No stridor. No respiratory distress. He has no wheezes. He has no rales.   Breathing comfortably on comfort flow  Drooling. Wet cough  Coarse breath sounds   Abdominal: Soft. Bowel sounds are normal.   Neurological: He is alert.   Does not follow commands  Briefly makes eye contact but unsure if this is intentional    Skin: Skin is warm and dry. Capillary refill takes less than 2 seconds. He is not diaphoretic.   Nursing note and vitals reviewed.      Significant Labs: All pertinent labs within the past 24 hours have been reviewed.    Significant Imaging: I have reviewed all pertinent imaging results/findings within the past 24 hours.  I have reviewed and interpreted all pertinent imaging results/findings within the past 24 hours.

## 2019-10-15 NOTE — PLAN OF CARE
Problem: SLP Goal  Goal: SLP Goal  Description  ST. Pt will tolerate PO diet of pureed with Nectar thickened liquids with no overt s/s of aspiration. (goal downgraded 10/11/19)    2. Pt will participate in MBSS- GOAL MET 10/14/19  3. Family will demonstrate knowledge of MBSS results.     Outcome: Ongoing, Progressing  Pt refused pureed trials. Pt accepted trials of HTL via spoon with delayed swallow and positive s/s of aspiration(watery eyes, throat clear). Discussed results with pt's aunt.

## 2019-10-15 NOTE — CARE UPDATE
Met with patient's aunt today. Discussed PEG as best option for proper nutrition and intake given his significant dysphagia. Agrees with PEG. My concern is his breathing status. Per aunt, he is less interactive and requiring more O2 today. Patient was intubated on presentation and is at risk for continued decline and re-intubation. Will stop all oral intake and place NGT for meds and nutrition pending optimization of his respiratory status. GI aware of decision and will meet with aunt for consent. Patient's respiratory status needs to improve prior to undergoing PEG placement.

## 2019-10-15 NOTE — PROGRESS NOTES
PALLIATIVE CARE PROGRESS NOTE:    Follow-up visit.  Patient has been seen by Dr. Lomeli who discussed with his Aunt Chloe plan going forward - requiring more oxygen, place NGT interim, and then PEG tube.   Awaiting visit from Dr. Capps/MAYI to discuss plans for PEG.    Anne states she has found a group home in [place] (which is good for them since that is there home town) with a different company called [employer].  LENNY Lynch called her during my visit to followup on those arrangements.    Palliative Care will follow for support.    Chanelle Burnette, MADAYN, RN, CCRN, CHPN   Palliative Care Nurse Coordinator   [facility]  [phone]

## 2019-10-15 NOTE — PLAN OF CARE
10/15/19 1150   Discharge Reassessment   Assessment Type Discharge Planning Reassessment   Do you have any problems affording any of your prescribed medications? No   Discharge Plan B Group home   DME Needed Upon Discharge    (TBD which facility he goes to)   Anticipated Discharge Disposition   (Group home)   Can the patient answer the patient profile reliably? No, cognitively impaired   How does the patient rate their overall health at the present time? Fair   How often would a person be available to care for the patient? Often   Number of comorbid conditions (as recorded on the chart) Five or more   During the past month, has the patient often been bothered by feeling down, depressed or hopeless?   (Unable to assess)   During the past month, has the patient often been bothered by little interest or pleasure in doing things? No  (TIFF)   Post-Acute Status   Post-Acute Authorization Placement;Other  (Group home placement)   Post-Acute Placement Status   (Awaiting decision on PEG and where POA would like to have patient placed.)   Other Status See Comments   Discharge Delays (!) Other  (Fevers, still PEG placement is in question )   Cris Monahan, RN, BSN, STN [facility]  10/15/2019

## 2019-10-15 NOTE — ASSESSMENT & PLAN NOTE
Secondary to aspiration pneumonia and rhino virus  Completed antibiotic treatment prior to stepping down to floor but I am concerned patient continues to aspirate oral secretions and some puree/oral medications when given.   MBBS positive for aspiration. Patient is drooling and experiencing wet cough  Is also on comfort flow supplemental O2 which is significant amount of supplemental O2  Continue nebulizer treatments and keep HOB elevated  Famotidine IV. Scopolamine patch in place.   NPO for now. Will discuss PEG with family today along with palliative care service  If still in disagreement with PEG, will consider NGT placement to start tube feeds and given meds through this

## 2019-10-15 NOTE — PLAN OF CARE
"  Problem: Fall Injury Risk  Goal: Absence of Fall and Fall-Related Injury  Outcome: Ongoing, Progressing  Intervention: Identify and Manage Contributors to Fall Injury Risk  Flowsheets (Taken 10/15/2019 1448)  Medication Review/Management: medications reviewed; high risk medications identified     Problem: Adult Inpatient Plan of Care  Goal: Plan of Care Review  Description  Patient remains intubated today and to ventilator, Propofol drip continues for sedation,  Tube feeds started per OGT, tolerating without difficulty.  Saldivar cath remains patent.  Aunt updated on patient condition, she states "I'm on the way there"   Outcome: Ongoing, Progressing  Flowsheets (Taken 10/15/2019 1448)  Plan of Care Reviewed With: patient; caregiver     Problem: Infection  Goal: Infection Symptom Resolution  Outcome: Ongoing, Progressing  Intervention: Prevent or Manage Infection  Flowsheets (Taken 10/15/2019 1448)  Infection Management: aseptic technique maintained     Problem: Fall Injury Risk  Goal: Absence of Fall and Fall-Related Injury  Outcome: Ongoing, Progressing  Intervention: Identify and Manage Contributors to Fall Injury Risk  Flowsheets (Taken 10/15/2019 1448)  Medication Review/Management: medications reviewed; high risk medications identified     Problem: Fall Injury Risk  Goal: Absence of Fall and Fall-Related Injury  Intervention: Identify and Manage Contributors to Fall Injury Risk  Flowsheets (Taken 10/15/2019 1448)  Medication Review/Management: medications reviewed; high risk medications identified     Problem: Adult Inpatient Plan of Care  Goal: Plan of Care Review  Description  Patient remains intubated today and to ventilator, Propofol drip continues for sedation,  Tube feeds started per OGT, tolerating without difficulty.  Saldivar cath remains patent.  Aunt updated on patient condition, she states "I'm on the way there"   Outcome: Ongoing, Progressing  Flowsheets (Taken 10/15/2019 1448)  Plan of Care Reviewed " With: patient; caregiver     Problem: Infection  Goal: Infection Symptom Resolution  Outcome: Ongoing, Progressing  Intervention: Prevent or Manage Infection  Flowsheets (Taken 10/15/2019 1448)  Infection Management: aseptic technique maintained     Problem: Infection  Goal: Infection Symptom Resolution  Intervention: Prevent or Manage Infection  Flowsheets (Taken 10/15/2019 1448)  Infection Management: aseptic technique maintained     Problem: Infection  Goal: Infection Symptom Resolution  Intervention: Prevent or Manage Infection  Flowsheets (Taken 10/15/2019 1448)  Infection Management: aseptic technique maintained

## 2019-10-15 NOTE — PROGRESS NOTES
PALLIATIVE CARE PROGRESS NOTE:    Brief bedside visit.  Patient very awake, looking around the room, SHAY.  Back on HFNC.      Spoke to his Aunt Chloe.  Explained we will talk more about the need for PEG tube with Dr. Lomeli when she makes rounds later today. I will also reach out to GI to see if they can provide input.    Barriers include finding a facility that will accept patient with PEG tube.  The original group home [facility] will not take PEG tubes, and the facility they found in [place] also will not accept PEG tube.  [facility] has been trying to find another facility for him that accepts PEG tubes; they did locate one but they do not have an available bed.  Patient's aunt wants to continue trying to find an accepting facility closer to her home in [place] (she has to fly down here multiple times a year).  I did give her some basic information about [facility].      Left message for LENNY Lynch to assist with placement issues.    Left message for Dr. Capps/MAYI.      Chanelle Burnette, BSN, RN, CCRN, CHPN   Palliative Care Nurse Coordinator   [facility]  [phone]

## 2019-10-15 NOTE — PLAN OF CARE
"  Problem: Fall Injury Risk  Goal: Absence of Fall and Fall-Related Injury  Outcome: Ongoing, Progressing     Problem: Skin Injury Risk Increased  Goal: Skin Health and Integrity  Outcome: Ongoing, Progressing     Problem: Adult Inpatient Plan of Care  Goal: Plan of Care Review  Description  Patient remains intubated today and to ventilator, Propofol drip continues for sedation,  Tube feeds started per OGT, tolerating without difficulty.  Saldivar cath remains patent.  Aunt updated on patient condition, she states "I'm on the way there"   Outcome: Ongoing, Progressing  Goal: Patient-Specific Goal (Individualization)  Outcome: Ongoing, Progressing  Goal: Absence of Hospital-Acquired Illness or Injury  Outcome: Ongoing, Progressing  Goal: Optimal Comfort and Wellbeing  Outcome: Ongoing, Progressing  Goal: Readiness for Transition of Care  Outcome: Ongoing, Progressing  Goal: Rounds/Family Conference  Outcome: Ongoing, Progressing     Problem: Noninvasive Ventilation Acute  Goal: Effective Unassisted Ventilation and Oxygenation  Outcome: Ongoing, Progressing     Problem: Restraint, Nonbehavioral (Nonviolent)  Goal: Discontinuation Criteria Achieved  Outcome: Ongoing, Progressing  Goal: Personal Dignity and Safety Maintained  Outcome: Ongoing, Progressing     Problem: Coping Ineffective  Goal: Effective Coping  Outcome: Ongoing, Progressing     Problem: Oral Intake Inadequate  Goal: Improved Oral Intake  Outcome: Ongoing, Progressing     "

## 2019-10-15 NOTE — PROGRESS NOTES
[facility]  Gastroenterology  Progress Note    Patient Name: Karri Galeano  MRN: [id number]  Admission Date: 10/3/2019  Hospital Length of Stay: 11 days  Code Status: Full Code   Attending Provider: Carole Hernandez MD  Primary Care Physician: Echo Reeves MD  Principal Problem: Acute respiratory failure with hypoxia    Subjective:     CC= Dysphagia, aspiration pneumonia      Interval History: Patient's aunt is now agreeable to PEG tube placement.  Patient has required more oxygen today.     Review of systems:  Unable to obtain from patient due to clinical condition.     Objective:     Vital Signs (Most Recent):  Temp: 98.6 °F (37 °C) (10/15/19 1125)  Pulse: 90 (10/15/19 1307)  Resp: 19 (10/15/19 1307)  BP: (!) 140/90 (10/15/19 1125)  SpO2: (!) 90 % (10/15/19 1307) Vital Signs (24h Range):  Temp:  [98.1 °F (36.7 °C)-99.4 °F (37.4 °C)] 98.6 °F (37 °C)  Pulse:  [] 90  Resp:  [18-26] 19  SpO2:  [78 %-97 %] 90 %  BP: (132-153)/(82-93) 140/90     Physical examination:  GEN: Thin male in no apparent distress.  Non-verbal.   HENT: Normocephalic, anicteric sclera, on oxygen   Cardiovascular: Regular rate and rhythm. No murmurs appreciated.   Chest: Non-labored respirations. Coarse breath sounds.  Abdomen: Soft, NTND, normoactive BS  Psych: Unable to accurately assess.  Non-combative.   Extermities: No C/C/E.     Recent Labs   Lab 10/14/19  0444 10/15/19  0533   WBC 14.80* 16.67*   HGB 9.6* 8.8*   HCT 30.8* 28.6*   * 426*       Imaging:  Modified barium swallow study (10/14/19):  Impression:  Patient had significantly delayed oral phase of deglutition.  There is vallecular pooling as well as spillage of the thin liquids into the larynx with penetration and subglottic aspiration.  Patient has silent aspiration without any significant cough reflex.  Also with pudding there was minimal penetration and subglottic aspiration.  With honey thick liquids no significant aspiration was  noted.      Assessment:   24 year old male with a history of cerebral palsy and epilepsy admitted with aspiration pneumonia.  GI consulted for PEG tube placement.     Plan:   1.  EGD/PEG tube placement later this week, once improved from respiratory standpoint.  2.  Procedure discussed with the patient's aunt who is now agreeable.  3.  Case discussed with Dr. Hernandez.       Jeanine Medina PA-C  Gastroenterology  [facility]

## 2019-10-16 LAB
ANION GAP SERPL CALC-SCNC: 10 MMOL/L (ref 8–16)
BASOPHILS # BLD AUTO: 0.07 K/UL (ref 0–0.2)
BASOPHILS NFR BLD: 0.3 % (ref 0–1.9)
BUN SERPL-MCNC: 13 MG/DL (ref 6–20)
CALCIUM SERPL-MCNC: 8.6 MG/DL (ref 8.7–10.5)
CHLORIDE SERPL-SCNC: 102 MMOL/L (ref 95–110)
CO2 SERPL-SCNC: 26 MMOL/L (ref 23–29)
CREAT SERPL-MCNC: 0.8 MG/DL (ref 0.5–1.4)
DIFFERENTIAL METHOD: ABNORMAL
EOSINOPHIL # BLD AUTO: 0.4 K/UL (ref 0–0.5)
EOSINOPHIL NFR BLD: 1.6 % (ref 0–8)
ERYTHROCYTE [DISTWIDTH] IN BLOOD BY AUTOMATED COUNT: 14.6 % (ref 11.5–14.5)
EST. GFR  (AFRICAN AMERICAN): >60 ML/MIN/1.73 M^2
EST. GFR  (NON AFRICAN AMERICAN): >60 ML/MIN/1.73 M^2
GLUCOSE SERPL-MCNC: 80 MG/DL (ref 70–110)
HCT VFR BLD AUTO: 31.8 % (ref 40–54)
HGB BLD-MCNC: 10 G/DL (ref 14–18)
IMM GRANULOCYTES # BLD AUTO: 0.21 K/UL (ref 0–0.04)
IMM GRANULOCYTES NFR BLD AUTO: 0.9 % (ref 0–0.5)
LYMPHOCYTES # BLD AUTO: 1.2 K/UL (ref 1–4.8)
LYMPHOCYTES NFR BLD: 4.8 % (ref 18–48)
MAGNESIUM SERPL-MCNC: 1.6 MG/DL (ref 1.6–2.6)
MCH RBC QN AUTO: 27.9 PG (ref 27–31)
MCHC RBC AUTO-ENTMCNC: 31.4 G/DL (ref 32–36)
MCV RBC AUTO: 89 FL (ref 82–98)
MONOCYTES # BLD AUTO: 1.5 K/UL (ref 0.3–1)
MONOCYTES NFR BLD: 6.2 % (ref 4–15)
NEUTROPHILS # BLD AUTO: 21 K/UL (ref 1.8–7.7)
NEUTROPHILS NFR BLD: 86.2 % (ref 38–73)
NRBC BLD-RTO: 0 /100 WBC
PHOSPHATE SERPL-MCNC: 3.7 MG/DL (ref 2.7–4.5)
PLATELET # BLD AUTO: 411 K/UL (ref 150–350)
PMV BLD AUTO: 11.2 FL (ref 9.2–12.9)
POTASSIUM SERPL-SCNC: 4.1 MMOL/L (ref 3.5–5.1)
RBC # BLD AUTO: 3.58 M/UL (ref 4.6–6.2)
SODIUM SERPL-SCNC: 138 MMOL/L (ref 136–145)
WBC # BLD AUTO: 24.39 K/UL (ref 3.9–12.7)

## 2019-10-16 PROCEDURE — 99900035 HC TECH TIME PER 15 MIN (STAT)

## 2019-10-16 PROCEDURE — 25000003 PHARM REV CODE 250: Performed by: HOSPITALIST

## 2019-10-16 PROCEDURE — 80048 BASIC METABOLIC PNL TOTAL CA: CPT

## 2019-10-16 PROCEDURE — 36415 COLL VENOUS BLD VENIPUNCTURE: CPT

## 2019-10-16 PROCEDURE — S0028 INJECTION, FAMOTIDINE, 20 MG: HCPCS | Performed by: HOSPITALIST

## 2019-10-16 PROCEDURE — 94640 AIRWAY INHALATION TREATMENT: CPT

## 2019-10-16 PROCEDURE — 25000003 PHARM REV CODE 250: Performed by: INTERNAL MEDICINE

## 2019-10-16 PROCEDURE — G8998 SWALLOW D/C STATUS: HCPCS | Mod: CN

## 2019-10-16 PROCEDURE — 85025 COMPLETE CBC W/AUTO DIFF WBC: CPT

## 2019-10-16 PROCEDURE — 84100 ASSAY OF PHOSPHORUS: CPT

## 2019-10-16 PROCEDURE — 83735 ASSAY OF MAGNESIUM: CPT

## 2019-10-16 PROCEDURE — 27100092 HC HIGH FLOW DELIVERY CANNULA

## 2019-10-16 PROCEDURE — 63600175 PHARM REV CODE 636 W HCPCS: Performed by: INTERNAL MEDICINE

## 2019-10-16 PROCEDURE — 25000242 PHARM REV CODE 250 ALT 637 W/ HCPCS: Performed by: INTERNAL MEDICINE

## 2019-10-16 PROCEDURE — 94668 MNPJ CHEST WALL SBSQ: CPT

## 2019-10-16 PROCEDURE — 21400001 HC TELEMETRY ROOM

## 2019-10-16 PROCEDURE — 97535 SELF CARE MNGMENT TRAINING: CPT

## 2019-10-16 PROCEDURE — G8996 SWALLOW CURRENT STATUS: HCPCS | Mod: CN

## 2019-10-16 PROCEDURE — 63600175 PHARM REV CODE 636 W HCPCS: Performed by: HOSPITALIST

## 2019-10-16 RX ORDER — LEVOFLOXACIN 5 MG/ML
750 INJECTION, SOLUTION INTRAVENOUS
Status: COMPLETED | OUTPATIENT
Start: 2019-10-16 | End: 2019-10-22

## 2019-10-16 RX ADMIN — CLONAZEPAM 1 MG: 0.5 TABLET ORAL at 09:10

## 2019-10-16 RX ADMIN — IPRATROPIUM BROMIDE AND ALBUTEROL SULFATE 3 ML: .5; 3 SOLUTION RESPIRATORY (INHALATION) at 11:10

## 2019-10-16 RX ADMIN — VALPROIC ACID 500 MG: 250 SOLUTION ORAL at 09:10

## 2019-10-16 RX ADMIN — IPRATROPIUM BROMIDE AND ALBUTEROL SULFATE 3 ML: .5; 3 SOLUTION RESPIRATORY (INHALATION) at 03:10

## 2019-10-16 RX ADMIN — SODIUM PHOSPHATE, DIBASIC AND SODIUM PHOSPHATE, MONOBASIC 1 ENEMA: 7; 19 ENEMA RECTAL at 12:10

## 2019-10-16 RX ADMIN — IPRATROPIUM BROMIDE AND ALBUTEROL SULFATE 3 ML: .5; 3 SOLUTION RESPIRATORY (INHALATION) at 07:10

## 2019-10-16 RX ADMIN — SODIUM CHLORIDE: 0.45 INJECTION, SOLUTION INTRAVENOUS at 06:10

## 2019-10-16 RX ADMIN — TAMSULOSIN HYDROCHLORIDE 0.4 MG: 0.4 CAPSULE ORAL at 09:10

## 2019-10-16 RX ADMIN — SCOPALAMINE 1 PATCH: 1 PATCH, EXTENDED RELEASE TRANSDERMAL at 11:10

## 2019-10-16 RX ADMIN — ENOXAPARIN SODIUM 40 MG: 100 INJECTION SUBCUTANEOUS at 04:10

## 2019-10-16 RX ADMIN — FAMOTIDINE 20 MG: 10 INJECTION INTRAVENOUS at 09:10

## 2019-10-16 RX ADMIN — ACETAMINOPHEN 500 MG: 500 TABLET ORAL at 11:10

## 2019-10-16 RX ADMIN — FLUOXETINE HYDROCHLORIDE 60 MG: 20 SOLUTION ORAL at 09:10

## 2019-10-16 RX ADMIN — LEVOFLOXACIN 750 MG: 750 INJECTION, SOLUTION INTRAVENOUS at 12:10

## 2019-10-16 NOTE — PT/OT/SLP PROGRESS
Speech Language Pathology Treatment    Patient Name:  Karri Galeano   MRN:  [id number]  Admitting Diagnosis: Acute respiratory failure with hypoxia    Recommendations:                 General Recommendations:  Follow-up not indicated  Diet recommendations:  NPO, Liquid Diet Level: NPO   Aspiration Precautions: Alternate means of nutrition/hydration and Frequent oral care   General Precautions: Standard, aspiration, NPO  Communication strategies:  gestures    Subjective     Pt's aunt present upon SLP arrival. Pt currently with NG tube in place.    Patient goals: Want pt to get better so be can be discharged per pt's aunt.    Pain/Comfort:  · Pain Rating 1: 0/10    Objective:     Has the patient been evaluated by SLP for swallowing?   Yes  Keep patient NPO? Yes   Current Respiratory Status: other (comment)(high flow)      Provided ongoing education regarding MBSS results, aspiration precautions, and importance of frequent oral care. Pt's aunt verbalized understanding of education provided. No PO trials introduced today. Pt receiving nutrition via NG tube. Pt scheduled for PEG once respiratory status improves.    Assessment:     · Karri Galeano is a 24 y.o. male with a dx of Pneumonia of left lung due to infectious organism.  Pt presents with moderate to severe oropharyngeal dysphagia c/b silent aspiration of pharyngeal stasis across consistencies after initial swallow and leah silent aspiration during the swallow of thin and nectar thick consistencies.   It should be noted SAFEST form of po baseline based on today's study is honey thick liquids and puree however anticipate some aspiration of pharyngeal stasis of these consistencies. If pleasure feeds are initiated after Pt is no longer acutely ill, puree and honey thick liquids  recommended if high aspiration risk is accepted by medical decision maker.  No further ST is indicated at this time.       Goals:   Multidisciplinary Problems     SLP Goals     Not on file           Multidisciplinary Problems (Resolved)        Problem: SLP Goal    Goal Priority Disciplines Outcome   SLP Goal   (Resolved)    Medium SLP Met   Description:  ST. Pt will tolerate PO diet of pureed with Nectar thickened liquids with no overt s/s of aspiration. (goal downgraded 10/11/19)    2. Pt will participate in MBSS- GOAL MET 10/14/19  3. Family will demonstrate knowledge of MBSS results.  -GOAL MET 10/16/19                     Plan:     · Patient to be seen:  3 x/week, 4 x/week   · Plan of Care expires:  10/09/19  · Plan of Care reviewed with:  patient, other (see comments)(aunt)   · SLP Follow-Up:  No       Discharge recommendations:  other (see comments)(group home)   Barriers to Discharge:  None    Time Tracking:     SLP Treatment Date:   10/16/19  Speech Start Time:  141  Speech Stop Time:  1429     Speech Total Time (min):  12 min    Billable Minutes: [facility]/Home Management Training 12 min    Jayde Goodman CCC-SLP  10/16/2019

## 2019-10-16 NOTE — PROGRESS NOTES
"Gastroenterology    CC: Dysphagia    HPI 24 y.o. male with high flow O2 requirement.  No N/V.  NGT in place      Past Medical History:   Diagnosis Date    Anxiety     Congenital cerebral palsy     Hypotonia     Leukodystrophy     Mental retardation     Retention of urine     Seizures     Stereotyped movement disorder     Urinary tract infection        Physical Examination  BP (!) 167/86 (BP Location: Right arm, Patient Position: Lying)   Pulse 101   Temp 97.9 °F (36.6 °C) (Axillary)   Resp 19   Ht 5' 1" (1.549 m)   Wt 45.1 kg (99 lb 6.8 oz)   SpO2 (!) 92%   BMI 18.79 kg/m²   General appearance: alert, cooperative, no distress  HENT: Normocephalic, atraumatic, neck symmetrical, no nasal discharge   Eyes: conjunctivae/corneas clear, no icterus  Lungs: resp non-labored  Heart: regular rate   Abdomen: soft, non-tender; bowel sounds normoactive; no organomegaly    Labs:  WBC up    Imaging:  Recent Xrays reviewed    Assessment:     Aspiration PNA  Pneumoperitoneum on Xray with follow up showing dilated SB loops - exam benign.      Plan:   - CT pending  - PEG when more acute issues addressed and resp status allows        "

## 2019-10-16 NOTE — NURSING
Upon departure from floor to CT scan: cardiac monitor in progress, patient awake, alert, and oriented x 4. No apparent distress noted at this time.

## 2019-10-16 NOTE — UM SECONDARY REVIEW
[facility]    IP Extended Stay > 10     Case discussed during LLOS. Plan is to place PEG tube when respiratory status improves. Pt on 10L high flow O2    LOS: approved w/o recommendations due to severity of clinical picture

## 2019-10-16 NOTE — SUBJECTIVE & OBJECTIVE
Interval History: stable on comfort flow. XRay obtained to confirm placement incidentally showed distended loop of bowels of unknown significance. Abd exam is benign but does have hypoactive bowel sounds.    Review of Systems   Unable to perform ROS: Patient nonverbal     Objective:     Vital Signs (Most Recent):  Temp: 97.9 °F (36.6 °C) (10/16/19 0736)  Pulse: 101 (10/16/19 0736)  Resp: 19 (10/16/19 0736)  BP: (!) 167/86 (10/16/19 0736)  SpO2: (!) 92 % (10/16/19 0825) Vital Signs (24h Range):  Temp:  [97.9 °F (36.6 °C)-98.6 °F (37 °C)] 97.9 °F (36.6 °C)  Pulse:  [] 101  Resp:  [18-26] 19  SpO2:  [80 %-97 %] 92 %  BP: (140-167)/(84-92) 167/86     Weight: 45.1 kg (99 lb 6.8 oz)  Body mass index is 18.79 kg/m².    Intake/Output Summary (Last 24 hours) at 10/16/2019 1053  Last data filed at 10/15/2019 1800  Gross per 24 hour   Intake 1200 ml   Output --   Net 1200 ml      Physical Exam   Constitutional: He appears well-developed. No distress.   HENT:   NGT in place   Cardiovascular:   Sinus tachycardia (100 bpm)   Pulmonary/Chest: No stridor. No respiratory distress. He has no wheezes. He has no rales.   Breathing comfortably on comfort flow  Drooling. Wet cough  Clear breath sounds   Abdominal: Soft.   Hypoactive bowel sounds   Neurological: He is alert.   Does not follow commands  Briefly makes eye contact but unsure if this is intentional    Skin: Skin is warm and dry. Capillary refill takes less than 2 seconds. He is not diaphoretic.   Nursing note and vitals reviewed.      Significant Labs: All pertinent labs within the past 24 hours have been reviewed.    Significant Imaging: I have reviewed all pertinent imaging results/findings within the past 24 hours.  I have reviewed and interpreted all pertinent imaging results/findings within the past 24 hours.

## 2019-10-16 NOTE — PROGRESS NOTES
[facility] Hospital Medicine  Progress Note    Patient Name: Karri Galeano  MRN: [id number]  Patient Class: IP- Inpatient   Admission Date: 10/3/2019  Length of Stay: 12 days  Attending Physician: Carole Hernandez MD  Primary Care Provider: Echo Reeves MD        Subjective:     Principal Problem:Acute respiratory failure with hypoxia        HPI:  Mr. Karri Galeano is a 24 y.o. male with cerebral palsy and epilepsy who presents to [facility] ED from an urgent care facility with complaints of fevers today.  He lives in a group home and started to have a nonproductive cough today.  The caregiver did not observe any vomiting.  He was in his usual state of health yesterday but today has become more lethargic and somnolent.  He was taken to an urgent care facility where he was then transferred to this facility for further care.  He was noted there to have a fever of 102.2° F as well as an oxygen saturation of 95% on nasal cannula at 3 liters/minute.  Further history is otherwise limited at this time.    Overview/Hospital Course:  Mr. Galeano presented with fever, cough and lethargy.  Respiratory status decompensated and he required intubation in the ER.  Workup with imaging consistent with possible pneumonia.  Empiric Zosyn and vancomycin initiated.  Pulmonary consulted for vent management.  Extubated on 10/5 in the morning.  Initially did well, but had worsening respiratory failure during the day.  Started on Bipap with no improvement and reintubated on 10/5 in the afternoon.  Blood cultures no growth to date.  Respiratory viral panel came back positive for rhino virus.  Antibiotics stopped.  Extubated on 10/09 to BiPAP.  Respiratory status stable.  Increased cough with feeding patient and he remained in the ICU.  Speech therapy recommended pureed diet.  He was stepped down to the floor on 10/11.  Patient with poor intake along with increased risk for aspiration. Per aunt, patient was fed  full meals. Speech re-evaluated with MBBS and recommended NPO given evidence of aspiration. Patient had also worsened clinically and oxygen requirements increased. Placed NPO. NGT placed. Discussed PEG tube placement with aunt. Palliative care was involved in conversation as initially aunt preferred continued oral intake. Aunt agreed given evidence of aspiration. GI consulted for PEG placement once respiratory status improves.    Interval History: stable on comfort flow. XRay obtained to confirm placement incidentally showed distended loop of bowels of unknown significance. Abd exam is benign but does have hypoactive bowel sounds.    Review of Systems   Unable to perform ROS: Patient nonverbal     Objective:     Vital Signs (Most Recent):  Temp: 97.9 °F (36.6 °C) (10/16/19 0736)  Pulse: 101 (10/16/19 0736)  Resp: 19 (10/16/19 0736)  BP: (!) 167/86 (10/16/19 0736)  SpO2: (!) 92 % (10/16/19 0825) Vital Signs (24h Range):  Temp:  [97.9 °F (36.6 °C)-98.6 °F (37 °C)] 97.9 °F (36.6 °C)  Pulse:  [] 101  Resp:  [18-26] 19  SpO2:  [80 %-97 %] 92 %  BP: (140-167)/(84-92) 167/86     Weight: 45.1 kg (99 lb 6.8 oz)  Body mass index is 18.79 kg/m².    Intake/Output Summary (Last 24 hours) at 10/16/2019 1053  Last data filed at 10/15/2019 1800  Gross per 24 hour   Intake 1200 ml   Output --   Net 1200 ml      Physical Exam   Constitutional: He appears well-developed. No distress.   HENT:   NGT in place   Cardiovascular:   Sinus tachycardia (100 bpm)   Pulmonary/Chest: No stridor. No respiratory distress. He has no wheezes. He has no rales.   Breathing comfortably on comfort flow  Drooling. Wet cough  Clear breath sounds   Abdominal: Soft.   Hypoactive bowel sounds   Neurological: He is alert.   Does not follow commands  Briefly makes eye contact but unsure if this is intentional    Skin: Skin is warm and dry. Capillary refill takes less than 2 seconds. He is not diaphoretic.   Nursing note and vitals  reviewed.      Significant Labs: All pertinent labs within the past 24 hours have been reviewed.    Significant Imaging: I have reviewed all pertinent imaging results/findings within the past 24 hours.  I have reviewed and interpreted all pertinent imaging results/findings within the past 24 hours.      Assessment/Plan:      * Acute respiratory failure with hypoxia  Secondary to aspiration pneumonia and rhino virus  Completed antibiotic treatment prior to stepping down to floor but I am concerned patient continues to aspirate oral secretions and some puree/oral medications when given.   MBBS positive for aspiration. Patient is drooling and experiencing wet cough  Per aunt, he was fed full meals once stepped down. He has clinically worsened since which confirms aspiration is issue  Is also on comfort flow supplemental O2 which is significant amount of supplemental O2  Continue nebulizer treatments and keep HOB elevated  Famotidine IV. Scopolamine patch in place.   NPO for now.  Aunt (next of kin) agrees with PEG placement  Will be done once respiratory status has improved. NGT in place in the meantime  Of note, distended loops of bowel incidentally found on XRay obtained to confirm NGT placement. CT of abdomen earlier during this admission showed plenty of stool in colon. I wonder if this is fecal impaction +/- air from CPAP. Abd exam benign. CT ordered to further evaluate prior to starting tube feeds. Results pending. Discussed with aunt at bedside.   Low threshold to restarting Abx      Other dysphagia  As above      Pneumonia of left lung due to infectious organism  Management as above    Severe malnutrition  NGT for now. PEG once respiratory status improves  Start tube feeds once cleared to do so. On isotonic fluids in the meantime    Hypokalemia  Replace as needed.  Will continue to monitor    Epilepsy  Continue his home regimen of divalproex.  Also on clonazepam    Spastic quadriplegic cerebral palsy  Family  would like group home in [place] when ready for discharge      Hypernatremia  Secondary to inadequate free water intake.  Resolved with isotonic fluids. Unable to properly keep oral hydration due to dysphagia  Will continue half normal saline       VTE Risk Mitigation (From admission, onward)         Ordered     enoxaparin injection 40 mg  Daily      10/04/19 0152     IP VTE HIGH RISK PATIENT  Once      10/04/19 0152              Dispo: group home once ready for discharge    Addendum: CT showed distended loops of bowel, oral contrast still present from prior CT obtained days ago and stool retained in rectum. Patient had hypoactive bowel sounds on exam therefore ileus unlikely. There is no evidence of obstruction on CT either. Will start with trickle feeds and give enema. Obtain a KUB in 2 days.    Carole Lomeli MD  Department of Hospital Medicine   [facility]

## 2019-10-16 NOTE — ASSESSMENT & PLAN NOTE
NGT for now. PEG once respiratory status improves  Start tube feeds once cleared to do so. On isotonic fluids in the meantime

## 2019-10-16 NOTE — PLAN OF CARE
Problem: SLP Goal  Goal: SLP Goal  Description  ST. Pt will tolerate PO diet of pureed with Nectar thickened liquids with no overt s/s of aspiration. (goal downgraded 10/11/19)    2. Pt will participate in MBSS- GOAL MET 10/14/19  3. Family will demonstrate knowledge of MBSS results.  -GOAL MET 10/16/19    Outcome: Met     Pt with NG tube in place. No further ST is indicated at this time. Pt will be scheduled for PEG when respiratory status improves.

## 2019-10-16 NOTE — PROGRESS NOTES
PALLIATIVE CARE PROGRESS NOTE:    Patient being wheeled off unit for CT scan.  Will continue to follow as needed for support.    MADAY RodriguezN, RN, CCRN, CHPN   Palliative Care Nurse Coordinator   [facility]  [phone]

## 2019-10-16 NOTE — ASSESSMENT & PLAN NOTE
Secondary to aspiration pneumonia and rhino virus  Completed antibiotic treatment prior to stepping down to floor but I am concerned patient continues to aspirate oral secretions and some puree/oral medications when given.   MBBS positive for aspiration. Patient is drooling and experiencing wet cough  Per aunt, he was fed full meals once stepped down. He has clinically worsened since which confirms aspiration is issue  Is also on comfort flow supplemental O2 which is significant amount of supplemental O2  Continue nebulizer treatments and keep HOB elevated  Famotidine IV. Scopolamine patch in place.   NPO for now.  Aunt (next of kin) agrees with PEG placement  Will be done once respiratory status has improved. NGT in place in the meantime  Of note, distended loops of bowel incidentally found on XRay obtained to confirm NGT placement. CT of abdomen earlier during this admission showed plenty of stool in colon. I wonder if this is fecal impaction +/- air from CPAP. Abd exam benign. CT ordered to further evaluate prior to starting tube feeds. Results pending. Discussed with aunt at bedside.   Low threshold to restarting Abx

## 2019-10-16 NOTE — PLAN OF CARE
"  Problem: Fall Injury Risk  Goal: Absence of Fall and Fall-Related Injury  Outcome: Ongoing, Progressing  Intervention: Identify and Manage Contributors to Fall Injury Risk  Flowsheets (Taken 10/16/2019 1543)  Self-Care Promotion: independence encouraged; BADL personal objects within reach  Medication Review/Management: medications reviewed; high risk medications identified; infusion initiated     Problem: Adult Inpatient Plan of Care  Goal: Plan of Care Review  Description  Patient remains intubated today and to ventilator, Propofol drip continues for sedation,  Tube feeds started per OGT, tolerating without difficulty.  Saldivar cath remains patent.  Aunt updated on patient condition, she states "I'm on the way there"   Outcome: Ongoing, Progressing  Flowsheets (Taken 10/16/2019 1543)  Plan of Care Reviewed With: patient; caregiver     Problem: Fall Injury Risk  Goal: Absence of Fall and Fall-Related Injury  Intervention: Identify and Manage Contributors to Fall Injury Risk  Flowsheets (Taken 10/16/2019 1543)  Self-Care Promotion: independence encouraged; BADL personal objects within reach  Medication Review/Management: medications reviewed; high risk medications identified; infusion initiated     Problem: Adult Inpatient Plan of Care  Goal: Plan of Care Review  Description  Patient remains intubated today and to ventilator, Propofol drip continues for sedation,  Tube feeds started per OGT, tolerating without difficulty.  Saldivar cath remains patent.  Aunt updated on patient condition, she states "I'm on the way there"   Outcome: Ongoing, Progressing  Flowsheets (Taken 10/16/2019 1543)  Plan of Care Reviewed With: patient; caregiver     "

## 2019-10-16 NOTE — PLAN OF CARE
"  Problem: Fall Injury Risk  Goal: Absence of Fall and Fall-Related Injury  Outcome: Ongoing, Progressing     Problem: Skin Injury Risk Increased  Goal: Skin Health and Integrity  Outcome: Ongoing, Progressing     Problem: Adult Inpatient Plan of Care  Goal: Plan of Care Review  Description  Patient remains intubated today and to ventilator, Propofol drip continues for sedation,  Tube feeds started per OGT, tolerating without difficulty.  Saldivar cath remains patent.  Aunt updated on patient condition, she states "I'm on the way there"   Outcome: Ongoing, Progressing  Goal: Patient-Specific Goal (Individualization)  Outcome: Ongoing, Progressing  Goal: Absence of Hospital-Acquired Illness or Injury  Outcome: Ongoing, Progressing  Goal: Optimal Comfort and Wellbeing  Outcome: Ongoing, Progressing  Goal: Readiness for Transition of Care  Outcome: Ongoing, Progressing  Goal: Rounds/Family Conference  Outcome: Ongoing, Progressing     Problem: Noninvasive Ventilation Acute  Goal: Effective Unassisted Ventilation and Oxygenation  Outcome: Ongoing, Progressing     Problem: Infection  Goal: Infection Symptom Resolution  Outcome: Ongoing, Progressing     Problem: Restraint, Nonbehavioral (Nonviolent)  Goal: Discontinuation Criteria Achieved  Outcome: Ongoing, Progressing  Goal: Personal Dignity and Safety Maintained  Outcome: Ongoing, Progressing     Problem: Coping Ineffective  Goal: Effective Coping  Outcome: Ongoing, Progressing     Problem: Oral Intake Inadequate  Goal: Improved Oral Intake  Outcome: Ongoing, Progressing     "

## 2019-10-17 LAB
ANION GAP SERPL CALC-SCNC: 7 MMOL/L (ref 8–16)
BASOPHILS # BLD AUTO: 0.03 K/UL (ref 0–0.2)
BASOPHILS NFR BLD: 0.2 % (ref 0–1.9)
BUN SERPL-MCNC: 11 MG/DL (ref 6–20)
CALCIUM SERPL-MCNC: 8.2 MG/DL (ref 8.7–10.5)
CHLORIDE SERPL-SCNC: 100 MMOL/L (ref 95–110)
CO2 SERPL-SCNC: 28 MMOL/L (ref 23–29)
CREAT SERPL-MCNC: 0.7 MG/DL (ref 0.5–1.4)
DIFFERENTIAL METHOD: ABNORMAL
EOSINOPHIL # BLD AUTO: 0.3 K/UL (ref 0–0.5)
EOSINOPHIL NFR BLD: 1.6 % (ref 0–8)
ERYTHROCYTE [DISTWIDTH] IN BLOOD BY AUTOMATED COUNT: 14.6 % (ref 11.5–14.5)
EST. GFR  (AFRICAN AMERICAN): >60 ML/MIN/1.73 M^2
EST. GFR  (NON AFRICAN AMERICAN): >60 ML/MIN/1.73 M^2
GLUCOSE SERPL-MCNC: 120 MG/DL (ref 70–110)
HCT VFR BLD AUTO: 30.9 % (ref 40–54)
HGB BLD-MCNC: 9.7 G/DL (ref 14–18)
IMM GRANULOCYTES # BLD AUTO: 0.13 K/UL (ref 0–0.04)
IMM GRANULOCYTES NFR BLD AUTO: 0.7 % (ref 0–0.5)
LYMPHOCYTES # BLD AUTO: 1.4 K/UL (ref 1–4.8)
LYMPHOCYTES NFR BLD: 7.1 % (ref 18–48)
MAGNESIUM SERPL-MCNC: 1.7 MG/DL (ref 1.6–2.6)
MCH RBC QN AUTO: 27.7 PG (ref 27–31)
MCHC RBC AUTO-ENTMCNC: 31.4 G/DL (ref 32–36)
MCV RBC AUTO: 88 FL (ref 82–98)
MONOCYTES # BLD AUTO: 1.4 K/UL (ref 0.3–1)
MONOCYTES NFR BLD: 7.4 % (ref 4–15)
NEUTROPHILS # BLD AUTO: 16 K/UL (ref 1.8–7.7)
NEUTROPHILS NFR BLD: 83 % (ref 38–73)
NRBC BLD-RTO: 0 /100 WBC
PHOSPHATE SERPL-MCNC: 2.7 MG/DL (ref 2.7–4.5)
PLATELET # BLD AUTO: 407 K/UL (ref 150–350)
PMV BLD AUTO: 10.2 FL (ref 9.2–12.9)
POTASSIUM SERPL-SCNC: 3.6 MMOL/L (ref 3.5–5.1)
RBC # BLD AUTO: 3.5 M/UL (ref 4.6–6.2)
SODIUM SERPL-SCNC: 135 MMOL/L (ref 136–145)
WBC # BLD AUTO: 19.22 K/UL (ref 3.9–12.7)

## 2019-10-17 PROCEDURE — 80048 BASIC METABOLIC PNL TOTAL CA: CPT

## 2019-10-17 PROCEDURE — 94667 MNPJ CHEST WALL 1ST: CPT

## 2019-10-17 PROCEDURE — 63600175 PHARM REV CODE 636 W HCPCS: Performed by: INTERNAL MEDICINE

## 2019-10-17 PROCEDURE — 25000242 PHARM REV CODE 250 ALT 637 W/ HCPCS: Performed by: INTERNAL MEDICINE

## 2019-10-17 PROCEDURE — 25000003 PHARM REV CODE 250: Performed by: HOSPITALIST

## 2019-10-17 PROCEDURE — 27100171 HC OXYGEN HIGH FLOW UP TO 24 HOURS

## 2019-10-17 PROCEDURE — S0028 INJECTION, FAMOTIDINE, 20 MG: HCPCS | Performed by: HOSPITALIST

## 2019-10-17 PROCEDURE — 85025 COMPLETE CBC W/AUTO DIFF WBC: CPT

## 2019-10-17 PROCEDURE — 94668 MNPJ CHEST WALL SBSQ: CPT

## 2019-10-17 PROCEDURE — 99233 PR SUBSEQUENT HOSPITAL CARE,LEVL III: ICD-10-PCS | Mod: ,,, | Performed by: INTERNAL MEDICINE

## 2019-10-17 PROCEDURE — 25000003 PHARM REV CODE 250: Performed by: INTERNAL MEDICINE

## 2019-10-17 PROCEDURE — 83735 ASSAY OF MAGNESIUM: CPT

## 2019-10-17 PROCEDURE — 63600175 PHARM REV CODE 636 W HCPCS: Performed by: HOSPITALIST

## 2019-10-17 PROCEDURE — 94640 AIRWAY INHALATION TREATMENT: CPT

## 2019-10-17 PROCEDURE — 99233 SBSQ HOSP IP/OBS HIGH 50: CPT | Mod: ,,, | Performed by: INTERNAL MEDICINE

## 2019-10-17 PROCEDURE — 21400001 HC TELEMETRY ROOM

## 2019-10-17 PROCEDURE — 27100092 HC HIGH FLOW DELIVERY CANNULA

## 2019-10-17 PROCEDURE — 84100 ASSAY OF PHOSPHORUS: CPT

## 2019-10-17 PROCEDURE — 94761 N-INVAS EAR/PLS OXIMETRY MLT: CPT

## 2019-10-17 PROCEDURE — 36415 COLL VENOUS BLD VENIPUNCTURE: CPT

## 2019-10-17 RX ORDER — SODIUM CHLORIDE FOR INHALATION 3 %
4 VIAL, NEBULIZER (ML) INHALATION EVERY 12 HOURS
Status: DISCONTINUED | OUTPATIENT
Start: 2019-10-17 | End: 2019-10-31

## 2019-10-17 RX ADMIN — IPRATROPIUM BROMIDE AND ALBUTEROL SULFATE 3 ML: .5; 3 SOLUTION RESPIRATORY (INHALATION) at 03:10

## 2019-10-17 RX ADMIN — CLONAZEPAM 1 MG: 0.5 TABLET ORAL at 09:10

## 2019-10-17 RX ADMIN — IPRATROPIUM BROMIDE AND ALBUTEROL SULFATE 3 ML: .5; 3 SOLUTION RESPIRATORY (INHALATION) at 12:10

## 2019-10-17 RX ADMIN — CLONAZEPAM 1 MG: 0.5 TABLET ORAL at 10:10

## 2019-10-17 RX ADMIN — IPRATROPIUM BROMIDE AND ALBUTEROL SULFATE 3 ML: .5; 3 SOLUTION RESPIRATORY (INHALATION) at 07:10

## 2019-10-17 RX ADMIN — VALPROIC ACID 500 MG: 250 SOLUTION ORAL at 09:10

## 2019-10-17 RX ADMIN — FAMOTIDINE 20 MG: 10 INJECTION INTRAVENOUS at 09:10

## 2019-10-17 RX ADMIN — SODIUM CHLORIDE 30 MG/ML INHALATION SOLUTION 4 ML: 30 SOLUTION INHALANT at 12:10

## 2019-10-17 RX ADMIN — FAMOTIDINE 20 MG: 10 INJECTION INTRAVENOUS at 10:10

## 2019-10-17 RX ADMIN — FLUOXETINE HYDROCHLORIDE 60 MG: 20 SOLUTION ORAL at 09:10

## 2019-10-17 RX ADMIN — LEVOFLOXACIN 750 MG: 750 INJECTION, SOLUTION INTRAVENOUS at 02:10

## 2019-10-17 RX ADMIN — TAMSULOSIN HYDROCHLORIDE 0.4 MG: 0.4 CAPSULE ORAL at 09:10

## 2019-10-17 RX ADMIN — SODIUM CHLORIDE 30 MG/ML INHALATION SOLUTION 4 ML: 30 SOLUTION INHALANT at 07:10

## 2019-10-17 RX ADMIN — ENOXAPARIN SODIUM 40 MG: 100 INJECTION SUBCUTANEOUS at 05:10

## 2019-10-17 RX ADMIN — SODIUM CHLORIDE: 0.45 INJECTION, SOLUTION INTRAVENOUS at 05:10

## 2019-10-17 RX ADMIN — VALPROIC ACID 500 MG: 250 SOLUTION ORAL at 10:10

## 2019-10-17 NOTE — PLAN OF CARE
Problem: Skin Injury Risk Increased  Goal: Skin Health and Integrity  Outcome: Ongoing, Progressing  Note that patient had inflammation and blister to left FA;   IV removed and ROM performed;   Right medline place today;   Patient continues in 2 point soft-wrist restraints;      Problem: Adult Inpatient Plan of Care  Goal: Optimal Comfort and Wellbeing  Outcome: Ongoing, Progressing

## 2019-10-17 NOTE — PROGRESS NOTES
[facility]  Gastroenterology  Progress Note    Patient Name: Karri Galeano  MRN: [id number]  Admission Date: 10/3/2019  Hospital Length of Stay: 13 days  Code Status: Full Code   Attending Provider: Carole Hernandez MD  Primary Care Physician: Echo Reeves MD  Principal Problem: Acute respiratory failure with hypoxia    Subjective:     CC= Dysphagia, aspiration pneumonia     Interval History: Patient remains on high flow oxygen.  Tolerating TF's via NG tube.  No vomiting. Having BM's.  Aunt at the bedside.    Review of systems:  Unable to obtain from patient due to clinical condition.     Objective:     Vital Signs (Most Recent):  Temp: 98.8 °F (37.1 °C) (10/17/19 1151)  Pulse: 93 (10/17/19 1151)  Resp: 18 (10/17/19 1151)  BP: 133/79 (10/17/19 1151)  SpO2: 99 % (10/17/19 1151) Vital Signs (24h Range):  Temp:  [97.8 °F (36.6 °C)-98.8 °F (37.1 °C)] 98.8 °F (37.1 °C)  Pulse:  [] 93  Resp:  [16-20] 18  SpO2:  [88 %-99 %] 99 %  BP: (133-143)/(72-91) 133/79     Physical examination:  GEN: Thin male in no apparent distress.  Non-verbal.   HENT: Normocephalic, anicteric sclera, on oxygen, NG tube in place.   Cardiovascular: Regular rate and rhythm. No murmurs appreciated.   Chest: Non-labored respirations. Coarse breath sounds.  Abdomen: Soft, NTND, normoactive BS  Psych: Unable to accurately assess.  Non-combative.   Extermities: No C/C/E.     Recent Labs   Lab 10/16/19  0514 10/17/19  0542   WBC 24.39* 19.22*   HGB 10.0* 9.7*   HCT 31.8* 30.9*   * 407*       Imaging:  Modified barium swallow study (10/14/19):  Impression:  Patient had significantly delayed oral phase of deglutition.  There is vallecular pooling as well as spillage of the thin liquids into the larynx with penetration and subglottic aspiration.  Patient has silent aspiration without any significant cough reflex.  Also with pudding there was minimal penetration and subglottic aspiration.  With honey thick liquids no  significant aspiration was noted.        Assessment:   24 year old male with a history of cerebral palsy and epilepsy admitted with aspiration pneumonia.  GI consulted for PEG tube placement.      Plan:   1.  EGD/PEG tube placement early next week, once stable from respiratory standpoint.   2.  Will follow-up on Sunday.   3.  Procedure discussed with patient's aunt who is agreeable.       Jeanine Medina PA-C  Gastroenterology  [facility]

## 2019-10-17 NOTE — SUBJECTIVE & OBJECTIVE
Interval History: Pulmonary asked to re evaluate due to worsening hypoxia in the setting of likely recurrent aspiration, body contracture, poor airway clearance and bronchomalacia.    Objective:     Vital Signs (Most Recent):  Temp: 98 °F (36.7 °C) (10/17/19 0839)  Pulse: 100 (10/17/19 0839)  Resp: 18 (10/17/19 0839)  BP: 134/78 (10/17/19 0839)  SpO2: 96 % (10/17/19 0839) Vital Signs (24h Range):  Temp:  [97.8 °F (36.6 °C)-100.6 °F (38.1 °C)] 98 °F (36.7 °C)  Pulse:  [] 100  Resp:  [16-20] 18  SpO2:  [86 %-98 %] 96 %  BP: (134-143)/(72-91) 134/78     Weight: 40.6 kg (89 lb 8.1 oz)  Body mass index is 16.91 kg/m².      Intake/Output Summary (Last 24 hours) at 10/17/2019 1042  Last data filed at 10/17/2019 0000  Gross per 24 hour   Intake 2050 ml   Output 1 ml   Net 2049 ml       Physical Exam   Constitutional: He appears cachectic. He is active. Sickly appearance: chronic.   HENT:   Head: Normocephalic and atraumatic.   Eyes: Pupils are equal, round, and reactive to light. No scleral icterus. Right eye exhibits no nystagmus. Left eye exhibits no nystagmus.   Neck: Trachea normal. No JVD present.   Cardiovascular: Normal rate and regular rhythm. Exam reveals no gallop and no friction rub.   No murmur heard.  Pulses:       Radial pulses are 2+ on the right side, and 2+ on the left side.        Dorsalis pedis pulses are 2+ on the right side, and 2+ on the left side.   No edema to note   Pulmonary/Chest: Effort normal. No respiratory distress. He has no wheezes. He has no rhonchi. He has no rales.   Abdominal: Soft. Bowel sounds are normal. He exhibits no distension. There is no tenderness. There is no guarding.   Musculoskeletal: He exhibits deformity.   Contracted right wrist   Neurological: He is alert. He displays atrophy. No cranial nerve deficit.       Vents:  Vent Mode: CPAP (10/09/19 0953)  Ventilator Initiated: Yes (10/06/19 0006)  Set Rate: 15 bmp (10/09/19 0804)  Vt Set: 350 mL (10/09/19 0804)  Pressure  Support: 10 cmH20 (10/09/19 0953)  PEEP/CPAP: 5 cmH20 (10/09/19 0953)  Oxygen Concentration (%): 60 (10/11/19 0715)  Peak Airway Pressure: 15.4 cmH2O (10/09/19 0953)  Total Ve: 6.9 mL (10/09/19 0953)  F/VT Ratio<105 (RSBI): (!) 94.7 (10/09/19 0953)    Lines/Drains/Airways     Drain                 NG/OG Tube 10/15/19 1400 [place] sump Right nostril 1 day                Significant Labs:    CBC/Anemia Profile:  Recent Labs   Lab 10/16/19  0514 10/17/19  0542   WBC 24.39* 19.22*   HGB 10.0* 9.7*   HCT 31.8* 30.9*   * 407*   MCV 89 88   RDW 14.6* 14.6*        Chemistries:  Recent Labs   Lab 10/16/19  0514 10/17/19  0542    135*   K 4.1 3.6    100   CO2 26 28   BUN 13 11   CREATININE 0.8 0.7   CALCIUM 8.6* 8.2*   MG 1.6 1.7   PHOS 3.7 2.7       All pertinent labs within the past 24 hours have been reviewed.    Significant Imaging:  I have reviewed and interpreted all pertinent imaging results/findings within the past 24 hours.

## 2019-10-17 NOTE — PROGRESS NOTES
PALLIATIVE CARE PROGRESS NOTE:    Bedside visit with Dr. Lomeli this morning.  Patient still on HFNC, congested.  Discussion with his Aunt Chloe regarding challenges with pulmonary issues - may need intermittent Bipap and change in medications.  Chloe states she will be here until Saturday.  He is tolerating TF via NGT.  Had large BM yesterday.  Dr. Lomeli will discuss with Pulmonary.    Palliative Care will continue to follow for support.    MADAY RodriguezN, RN, CCRN, CHPN   Palliative Care Nurse Coordinator   [facility]  [phone]

## 2019-10-17 NOTE — NURSING
Note that patient with eyes open, non-verbal, disoriented, on high-flow oxygen;  Patient NPO in 2-point soft wrist restrains;  NG tube to right nostril  w/ continuous feeding at 45 cc/hr;   Maternal aunt at bedside;     Note that left IV site inflamed w/ blister draining clear fluid;  IV removed and dressed w/ gauze;   Dr. Hernandez notified;      Will continue to f/u;

## 2019-10-17 NOTE — PROGRESS NOTES
Feeding increased to 45cc/hr at 0400 hours.  NV restraint order renewed.  Restraint charting updated.   Patient's nose cleaned and NG tube re-secured.

## 2019-10-17 NOTE — SUBJECTIVE & OBJECTIVE
Interval History: respiratory status remains grossly unchanged. Had large BM yesterday after enema given. Tolerating tube feeds well. Afebrile. Leukocytosis improved.     Review of Systems   Unable to perform ROS: Patient nonverbal     Objective:     Vital Signs (Most Recent):  Temp: 98.8 °F (37.1 °C) (10/17/19 1151)  Pulse: 93 (10/17/19 1151)  Resp: 18 (10/17/19 1151)  BP: 133/79 (10/17/19 1151)  SpO2: 99 % (10/17/19 1151) Vital Signs (24h Range):  Temp:  [97.8 °F (36.6 °C)-98.8 °F (37.1 °C)] 98.8 °F (37.1 °C)  Pulse:  [] 93  Resp:  [16-20] 18  SpO2:  [88 %-99 %] 99 %  BP: (133-143)/(72-91) 133/79     Weight: 40.6 kg (89 lb 8.1 oz)  Body mass index is 16.91 kg/m².    Intake/Output Summary (Last 24 hours) at 10/17/2019 1223  Last data filed at 10/17/2019 0000  Gross per 24 hour   Intake 2050 ml   Output 1 ml   Net 2049 ml      Physical Exam   Constitutional: He appears well-developed. No distress.   Contracted    HENT:   NGT in place   Cardiovascular: Normal rate and regular rhythm.   Pulmonary/Chest: No stridor. No respiratory distress. He has no wheezes. He has no rales.   Breathing comfortably on comfort flow  Drooling.   Clear breath sounds except for some possible dry rales to left lower lobe   Abdominal: Soft. Bowel sounds are normal. He exhibits no distension. There is no tenderness.   Neurological: He is alert.   Does not follow commands  Briefly makes eye contact but unsure if this is intentional    Skin: Skin is warm and dry. Capillary refill takes less than 2 seconds. He is not diaphoretic.   Nursing note and vitals reviewed.      Significant Labs: All pertinent labs within the past 24 hours have been reviewed.    Significant Imaging: I have reviewed all pertinent imaging results/findings within the past 24 hours.  I have reviewed and interpreted all pertinent imaging results/findings within the past 24 hours.

## 2019-10-17 NOTE — ASSESSMENT & PLAN NOTE
NGT for now. PEG once respiratory status improves  Tolerating tube feeds   Patient/Caregiver provided printed discharge information.

## 2019-10-17 NOTE — PROGRESS NOTES
Feeding rate increased to 40cc/hr at 0002 hours.  300mL free water administered through NG tube.  Vaseline applied to patient's lips for flaking/dryness.

## 2019-10-17 NOTE — PROGRESS NOTES
[facility] Hospital Medicine  Progress Note    Patient Name: Karri Galeano  MRN: [id number]  Patient Class: IP- Inpatient   Admission Date: 10/3/2019  Length of Stay: 13 days  Attending Physician: Carole Hernandez MD  Primary Care Provider: Echo Reeves MD        Subjective:     Principal Problem:Acute respiratory failure with hypoxia        HPI:  Mr. Karri Galeano is a 24 y.o. male with cerebral palsy and epilepsy who presents to [facility] ED from an urgent care facility with complaints of fevers today.  He lives in a group home and started to have a nonproductive cough today.  The caregiver did not observe any vomiting.  He was in his usual state of health yesterday but today has become more lethargic and somnolent.  He was taken to an urgent care facility where he was then transferred to this facility for further care.  He was noted there to have a fever of 102.2° F as well as an oxygen saturation of 95% on nasal cannula at 3 liters/minute.  Further history is otherwise limited at this time.    Overview/Hospital Course:  Mr. Galeano presented with fever, cough and lethargy.  Respiratory status decompensated and he required intubation in the ER.  Workup with imaging consistent with possible pneumonia.  Empiric Zosyn and vancomycin initiated.  Pulmonary consulted for vent management.  Extubated on 10/5 in the morning.  Initially did well, but had worsening respiratory failure during the day.  Started on Bipap with no improvement and reintubated on 10/5 in the afternoon.  Blood cultures no growth to date.  Respiratory viral panel came back positive for rhino virus.  Antibiotics stopped.  Extubated on 10/09 to BiPAP.  Respiratory status stable.  Increased cough with feeding patient and he remained in the ICU.  Speech therapy recommended pureed diet.  He was stepped down to the floor on 10/11.  Patient with poor intake along with increased risk for aspiration. Per aunt, patient was fed  full meals. Speech re-evaluated with MBBS and recommended NPO given evidence of aspiration. Patient had also worsened clinically and oxygen requirements increased. Placed NPO. NGT placed. Discussed PEG tube placement with aunt. Palliative care was involved in conversation as initially aunt preferred continued oral intake. Aunt agreed given evidence of aspiration. GI consulted for PEG placement once respiratory status improves.    Interval History: respiratory status remains grossly unchanged. Had large BM yesterday after enema given. Tolerating tube feeds well. Afebrile. Leukocytosis improved.     Review of Systems   Unable to perform ROS: Patient nonverbal     Objective:     Vital Signs (Most Recent):  Temp: 98.8 °F (37.1 °C) (10/17/19 1151)  Pulse: 93 (10/17/19 1151)  Resp: 18 (10/17/19 1151)  BP: 133/79 (10/17/19 1151)  SpO2: 99 % (10/17/19 1151) Vital Signs (24h Range):  Temp:  [97.8 °F (36.6 °C)-98.8 °F (37.1 °C)] 98.8 °F (37.1 °C)  Pulse:  [] 93  Resp:  [16-20] 18  SpO2:  [88 %-99 %] 99 %  BP: (133-143)/(72-91) 133/79     Weight: 40.6 kg (89 lb 8.1 oz)  Body mass index is 16.91 kg/m².    Intake/Output Summary (Last 24 hours) at 10/17/2019 1223  Last data filed at 10/17/2019 0000  Gross per 24 hour   Intake 2050 ml   Output 1 ml   Net 2049 ml      Physical Exam   Constitutional: He appears well-developed. No distress.   Contracted    HENT:   NGT in place   Cardiovascular: Normal rate and regular rhythm.   Pulmonary/Chest: No stridor. No respiratory distress. He has no wheezes. He has no rales.   Breathing comfortably on comfort flow  Drooling.   Clear breath sounds except for some possible dry rales to left lower lobe   Abdominal: Soft. Bowel sounds are normal. He exhibits no distension. There is no tenderness.   Neurological: He is alert.   Does not follow commands  Briefly makes eye contact but unsure if this is intentional    Skin: Skin is warm and dry. Capillary refill takes less than 2 seconds. He is  not diaphoretic.   Nursing note and vitals reviewed.      Significant Labs: All pertinent labs within the past 24 hours have been reviewed.    Significant Imaging: I have reviewed all pertinent imaging results/findings within the past 24 hours.  I have reviewed and interpreted all pertinent imaging results/findings within the past 24 hours.      Assessment/Plan:      * Acute respiratory failure with hypoxia  Secondary to aspiration pneumonia and rhino virus  Completed antibiotic treatment prior to stepping down to floor however patient became septic again after eating pureed diet. MBBS did show evidence of aspiration therefore placed NPO.  Abx restarted 10/16. NGT placed to bypass all oral intake pending PEG tube placement which will be done once respiratory status improves.   Continue comfort flow supplemental O2, nebulized treatments and keep HOB elevated  Bowel regimen to diminish air in bowels 2/2 constipation  Discussed case with pulmonology who recommends nebulized hypertonic saline, BiPAP QHS (for lung re-expansion as he cannot participate with incentive spirometry) and chest physiotherapy.   Famotidine IV. Scopolamine patch in place.       Other dysphagia  As above      Pneumonia of left lung due to infectious organism  Management as above    Severe malnutrition  NGT for now. PEG once respiratory status improves  Tolerating tube feeds    Hypokalemia  Replace as needed.  Will continue to monitor    Epilepsy  Continue his home regimen of divalproex.  Also on clonazepam    Spastic quadriplegic cerebral palsy  Family would like group home in [place] when ready for discharge      Hypernatremia  Secondary to inadequate free water intake.  Resolved with isotonic fluids. Unable to properly keep oral hydration due to dysphagia  Free water with tube feeds  BMP daily      VTE Risk Mitigation (From admission, onward)         Ordered     enoxaparin injection 40 mg  Daily      10/04/19 0152     IP VTE HIGH RISK PATIENT   Once      10/04/19 0152                      Carole Lomeli MD  Department of Hospital Medicine   [facility]

## 2019-10-17 NOTE — ASSESSMENT & PLAN NOTE
Secondary to inadequate free water intake.  Resolved with isotonic fluids. Unable to properly keep oral hydration due to dysphagia  Free water with tube feeds  BMP daily

## 2019-10-17 NOTE — ASSESSMENT & PLAN NOTE
Secondary to aspiration pneumonia and rhino virus  Completed antibiotic treatment prior to stepping down to floor however patient became septic again after eating pureed diet. MBBS did show evidence of aspiration therefore placed NPO.  Abx restarted 10/16. NGT placed to bypass all oral intake pending PEG tube placement which will be done once respiratory status improves.   Continue comfort flow supplemental O2, nebulized treatments and keep HOB elevated  Bowel regimen to diminish air in bowels 2/2 constipation  Discussed case with pulmonology who recommends nebulized hypertonic saline, BiPAP QHS (for lung re-expansion as he cannot participate with incentive spirometry) and chest physiotherapy.   Famotidine IV. Scopolamine patch in place.

## 2019-10-18 LAB
ANION GAP SERPL CALC-SCNC: 10 MMOL/L (ref 8–16)
BASOPHILS # BLD AUTO: 0.03 K/UL (ref 0–0.2)
BASOPHILS NFR BLD: 0.2 % (ref 0–1.9)
BUN SERPL-MCNC: 11 MG/DL (ref 6–20)
CALCIUM SERPL-MCNC: 8.5 MG/DL (ref 8.7–10.5)
CHLORIDE SERPL-SCNC: 102 MMOL/L (ref 95–110)
CO2 SERPL-SCNC: 29 MMOL/L (ref 23–29)
CREAT SERPL-MCNC: 0.7 MG/DL (ref 0.5–1.4)
DIFFERENTIAL METHOD: ABNORMAL
EOSINOPHIL # BLD AUTO: 0.3 K/UL (ref 0–0.5)
EOSINOPHIL NFR BLD: 1.9 % (ref 0–8)
ERYTHROCYTE [DISTWIDTH] IN BLOOD BY AUTOMATED COUNT: 14.6 % (ref 11.5–14.5)
EST. GFR  (AFRICAN AMERICAN): >60 ML/MIN/1.73 M^2
EST. GFR  (NON AFRICAN AMERICAN): >60 ML/MIN/1.73 M^2
GLUCOSE SERPL-MCNC: 97 MG/DL (ref 70–110)
HCT VFR BLD AUTO: 29 % (ref 40–54)
HGB BLD-MCNC: 9.2 G/DL (ref 14–18)
IMM GRANULOCYTES # BLD AUTO: 0.08 K/UL (ref 0–0.04)
IMM GRANULOCYTES NFR BLD AUTO: 0.6 % (ref 0–0.5)
LYMPHOCYTES # BLD AUTO: 1.6 K/UL (ref 1–4.8)
LYMPHOCYTES NFR BLD: 12 % (ref 18–48)
MAGNESIUM SERPL-MCNC: 1.8 MG/DL (ref 1.6–2.6)
MCH RBC QN AUTO: 27.9 PG (ref 27–31)
MCHC RBC AUTO-ENTMCNC: 31.7 G/DL (ref 32–36)
MCV RBC AUTO: 88 FL (ref 82–98)
MONOCYTES # BLD AUTO: 1.4 K/UL (ref 0.3–1)
MONOCYTES NFR BLD: 10.5 % (ref 4–15)
NEUTROPHILS # BLD AUTO: 9.9 K/UL (ref 1.8–7.7)
NEUTROPHILS NFR BLD: 74.8 % (ref 38–73)
NRBC BLD-RTO: 0 /100 WBC
PHOSPHATE SERPL-MCNC: 3.1 MG/DL (ref 2.7–4.5)
PLATELET # BLD AUTO: 377 K/UL (ref 150–350)
PMV BLD AUTO: 10.4 FL (ref 9.2–12.9)
POTASSIUM SERPL-SCNC: 3.9 MMOL/L (ref 3.5–5.1)
RBC # BLD AUTO: 3.3 M/UL (ref 4.6–6.2)
SODIUM SERPL-SCNC: 141 MMOL/L (ref 136–145)
WBC # BLD AUTO: 13.21 K/UL (ref 3.9–12.7)

## 2019-10-18 PROCEDURE — 25000003 PHARM REV CODE 250: Performed by: INTERNAL MEDICINE

## 2019-10-18 PROCEDURE — 83735 ASSAY OF MAGNESIUM: CPT

## 2019-10-18 PROCEDURE — 63600175 PHARM REV CODE 636 W HCPCS: Performed by: INTERNAL MEDICINE

## 2019-10-18 PROCEDURE — 97803 MED NUTRITION INDIV SUBSEQ: CPT

## 2019-10-18 PROCEDURE — S0028 INJECTION, FAMOTIDINE, 20 MG: HCPCS | Performed by: HOSPITALIST

## 2019-10-18 PROCEDURE — 99900035 HC TECH TIME PER 15 MIN (STAT)

## 2019-10-18 PROCEDURE — 80048 BASIC METABOLIC PNL TOTAL CA: CPT

## 2019-10-18 PROCEDURE — S0028 INJECTION, FAMOTIDINE, 20 MG: HCPCS | Performed by: INTERNAL MEDICINE

## 2019-10-18 PROCEDURE — 36415 COLL VENOUS BLD VENIPUNCTURE: CPT

## 2019-10-18 PROCEDURE — 27100171 HC OXYGEN HIGH FLOW UP TO 24 HOURS

## 2019-10-18 PROCEDURE — 27100092 HC HIGH FLOW DELIVERY CANNULA

## 2019-10-18 PROCEDURE — 94640 AIRWAY INHALATION TREATMENT: CPT

## 2019-10-18 PROCEDURE — 25000003 PHARM REV CODE 250: Performed by: HOSPITALIST

## 2019-10-18 PROCEDURE — 84100 ASSAY OF PHOSPHORUS: CPT

## 2019-10-18 PROCEDURE — 20000000 HC ICU ROOM

## 2019-10-18 PROCEDURE — 94761 N-INVAS EAR/PLS OXIMETRY MLT: CPT

## 2019-10-18 PROCEDURE — 25000242 PHARM REV CODE 250 ALT 637 W/ HCPCS: Performed by: INTERNAL MEDICINE

## 2019-10-18 PROCEDURE — 85025 COMPLETE CBC W/AUTO DIFF WBC: CPT

## 2019-10-18 PROCEDURE — 94668 MNPJ CHEST WALL SBSQ: CPT

## 2019-10-18 RX ORDER — DEXTROMETHORPHAN POLISTIREX 30 MG/5 ML
1 SUSPENSION, EXTENDED RELEASE 12 HR ORAL ONCE
Status: COMPLETED | OUTPATIENT
Start: 2019-10-18 | End: 2019-10-18

## 2019-10-18 RX ORDER — MICONAZOLE NITRATE 2 %
POWDER (GRAM) TOPICAL 2 TIMES DAILY
Status: DISCONTINUED | OUTPATIENT
Start: 2019-10-18 | End: 2019-11-02 | Stop reason: HOSPADM

## 2019-10-18 RX ORDER — POLYETHYLENE GLYCOL 3350 17 G/17G
17 POWDER, FOR SOLUTION ORAL DAILY
Status: DISCONTINUED | OUTPATIENT
Start: 2019-10-18 | End: 2019-10-25

## 2019-10-18 RX ORDER — ALBUTEROL SULFATE 2.5 MG/.5ML
2.5 SOLUTION RESPIRATORY (INHALATION) EVERY 4 HOURS PRN
Status: DISCONTINUED | OUTPATIENT
Start: 2019-10-18 | End: 2019-11-02 | Stop reason: HOSPADM

## 2019-10-18 RX ADMIN — FAMOTIDINE 20 MG: 10 INJECTION INTRAVENOUS at 09:10

## 2019-10-18 RX ADMIN — VALPROIC ACID 500 MG: 250 SOLUTION ORAL at 09:10

## 2019-10-18 RX ADMIN — ALBUTEROL SULFATE 2.5 MG: 2.5 SOLUTION RESPIRATORY (INHALATION) at 10:10

## 2019-10-18 RX ADMIN — MICONAZOLE NITRATE: 20 POWDER TOPICAL at 09:10

## 2019-10-18 RX ADMIN — SODIUM CHLORIDE 30 MG/ML INHALATION SOLUTION 4 ML: 30 SOLUTION INHALANT at 07:10

## 2019-10-18 RX ADMIN — IPRATROPIUM BROMIDE AND ALBUTEROL SULFATE 3 ML: .5; 3 SOLUTION RESPIRATORY (INHALATION) at 04:10

## 2019-10-18 RX ADMIN — LEVOFLOXACIN 750 MG: 750 INJECTION, SOLUTION INTRAVENOUS at 12:10

## 2019-10-18 RX ADMIN — POLYETHYLENE GLYCOL 3350 17 G: 17 POWDER, FOR SOLUTION ORAL at 12:10

## 2019-10-18 RX ADMIN — ENOXAPARIN SODIUM 40 MG: 100 INJECTION SUBCUTANEOUS at 05:10

## 2019-10-18 RX ADMIN — SODIUM CHLORIDE 30 MG/ML INHALATION SOLUTION 4 ML: 30 SOLUTION INHALANT at 10:10

## 2019-10-18 RX ADMIN — IPRATROPIUM BROMIDE AND ALBUTEROL SULFATE 3 ML: .5; 3 SOLUTION RESPIRATORY (INHALATION) at 07:10

## 2019-10-18 RX ADMIN — IPRATROPIUM BROMIDE AND ALBUTEROL SULFATE 3 ML: .5; 3 SOLUTION RESPIRATORY (INHALATION) at 08:10

## 2019-10-18 RX ADMIN — CLONAZEPAM 1 MG: 0.5 TABLET ORAL at 09:10

## 2019-10-18 RX ADMIN — FLUOXETINE HYDROCHLORIDE 60 MG: 20 SOLUTION ORAL at 09:10

## 2019-10-18 RX ADMIN — IPRATROPIUM BROMIDE AND ALBUTEROL SULFATE 3 ML: .5; 3 SOLUTION RESPIRATORY (INHALATION) at 12:10

## 2019-10-18 RX ADMIN — TAMSULOSIN HYDROCHLORIDE 0.4 MG: 0.4 CAPSULE ORAL at 09:10

## 2019-10-18 RX ADMIN — MINERAL OIL 1 ENEMA: 100 ENEMA RECTAL at 02:10

## 2019-10-18 NOTE — NURSING
Note that shift-change report given to KAYDEN Kessler;     Chest Xray indicating a left-lung collapse;      Dr. Marcos notified;

## 2019-10-18 NOTE — PROGRESS NOTES
[facility]  Adult Nutrition  Progress Note    SUMMARY       Recommendations    Recommendation/Intervention:   1. Initiate previous TF when medically able of  Isosource 1.5 initiate @10 mL/hr and advanced 10 mL q 4 hours to goal rate of 45 mL/hr. TF to provide: 1620 kcal (1847 with propofol at 8.6 mL/hr), 73 g pro, 825 ml fluid. Recommend fluid flushes 300 mL tid or per MD.   2. If no BM today, initiate TPN tomorrow of Clinimix 4.25/5 @ 50mL/hr to provide 490 kcal and 61g protein + 20% Lipids @ 20.8 mL/hr to provide 500 kcal.     Goals: Initate nutrition support within 48 hrs  Nutrition Goal Status: new  Communication of RD Recs: (POC)    Reason for Assessment    Reason For Assessment: RD follow-up  Diagnosis: pulmonary disease, infection/sepsis  Relevant Medical History: CP, mental retardation  Interdisciplinary Rounds: did not attend  General Information Comments: Pt continued with poor PO intake over the past 3 days, began TFs through NG tube yesterday. PEG placement planned for early next week. Moved from telemetry back to ICU today. Pt has constipation x 2 days, possible SBO. TF to be restarted after BM. If no BM today, pt will start TPN tomorrow. Pt has lost 14# over the past week due to poor PO intake. Although weight may not be accurate. NFPE completed 10/18, pt appears to have an adequate amount of fat and appropriate muscle tone for CP conditions. Aunt at bedside stated pt was 77# x 2 months ago and has gained weight. Weights in hospital may not be accurate as she does not believe pt has ever weighed #.   Nutrition Discharge Planning: too soon to determine    Nutrition Risk Screen    Nutrition Risk Screen: tube feeding or parenteral nutrition    Nutrition/Diet History    Patient Reported Diet/Restrictions/Preferences: pureed(reported by )  Spiritual, Cultural Beliefs, Christianity Practices, Values that Affect Care: no  Supplemental Drinks or Food Habits: Ensure  "Plus(Benecalorie)  Factors Affecting Nutritional Intake: impaired cognitive status/motor control, chewing difficulties/inability to chew food, NPO    Anthropometrics    Temp: 98.4 °F (36.9 °C)  Height Method: Estimated  Height: 5' 1" (154.9 cm)  Height (inches): 61 in  Weight Method: Bed Scale  Weight: 39 kg (85 lb 15.7 oz)  Weight (lb): 85.98 lb  Ideal Body Weight (IBW), Male: 112 lb  % Ideal Body Weight, Male (lb): 76.77 lb  BMI (Calculated): 16.3  BMI Grade: 16 - 16.9 protein-energy malnutrition grade II       Lab/Procedures/Meds    Pertinent Labs Reviewed: reviewed  Pertinent Labs Comments: Na 148, K 3.4, Cl 111, Ca 8.6  Pertinent Medications Reviewed: reviewed  Pertinent Medications Comments: famotidine, enoxaparin, scopolamine    Estimated/Assessed Needs    Weight Used For Calorie Calculations: 39 kg (85 lb 15.7 oz)  Energy Calorie Requirements (kcal): 1548 kcal/day  Energy Need Method: Graves-St Jeor(PAL 1.25)  Protein Requirements: 47-59 g/day(1.2-1.5 g/kg)  Weight Used For Protein Calculations: 39 kg (85 lb 15.7 oz)     Estimated Fluid Requirement Method: RDA Method  RDA Method (mL): 1548     Nutrition Prescription Ordered    Current Diet Order: NPO(for PEG placement that did not occur)    Evaluation of Received Nutrient/Fluid Intake    Enteral Calories (kcal): 0  Enteral Protein (gm): 0  Enteral (Free Water) Fluid (mL): 0  Free Water Flush Fluid (mL): 0  Other Calories (kcal): 0  Total Calories (kcal): 0  IV Fluid (mL): (IV fluids discountinued)  Total Fluid Intake (mL): 0  Energy Calories Required: not meeting needs  Protein Required: not meeting needs  Fluid Required: meeting needs(36 mL/kg)  Comments: LBM: 10/16  Tolerance: tolerating  % Intake of Estimated Energy Needs: 0 - 25 %   % Meal Intake: NPO    Nutrition Risk    Level of Risk/Frequency of Follow-up: (F/U 2 x weekly)     Assessment and Plan    Assessment and Plan     Nutrition Problem  Inadequate energy intake     Related to (etiology): "   Possible SBO     Signs and Symptoms (as evidenced by):   TF held      Interventions/Recommendations (treatment strategy):  Collaboration with other providers     Nutrition Diagnosis Status:   New    Monitor and Evaluation    Food and Nutrient Intake: enteral nutrition intake, parenteral nutrition intake  Food and Nutrient Adminstration: enteral and parenteral nutrition administration  Anthropometric Measurements: weight change, weight, body mass index  Biochemical Data, Medical Tests and Procedures: glucose/endocrine profile, electrolyte and renal panel, lipid profile, inflammatory profile, gastrointestinal profile  Nutrition-Focused Physical Findings: overall appearance, extremities, muscles and bones, head and eyes      Nutrition Follow-Up    RD Follow-up?: Yes

## 2019-10-18 NOTE — NURSING TRANSFER
Nursing Transfer Note      10/18/2019     Transfer To: ICU    Transfer via bed    Transfer with to O2, cardiac monitoring    Transported by transporter    Medicines sent: No    Chart send with patient: Yes    Notified: Aunt    Patient reassessed at: 10/18/19, 1007

## 2019-10-18 NOTE — PROGRESS NOTES
[facility]  Pulmonology  Progress Note    Patient Name: Karri Galeano  MRN: [id number]  Admission Date: 10/3/2019  Hospital Length of Stay: 13 days  Code Status: Full Code  Attending Provider: Carole Hernandez MD  Primary Care Provider: Echo Reeves MD   Principal Problem: Acute respiratory failure with hypoxia    Subjective:     Interval History: Pulmonary asked to re evaluate due to worsening hypoxia in the setting of likely recurrent aspiration, body contracture, poor airway clearance and bronchomalacia.    Objective:     Vital Signs (Most Recent):  Temp: 98 °F (36.7 °C) (10/17/19 0839)  Pulse: 100 (10/17/19 0839)  Resp: 18 (10/17/19 0839)  BP: 134/78 (10/17/19 0839)  SpO2: 96 % (10/17/19 0839) Vital Signs (24h Range):  Temp:  [97.8 °F (36.6 °C)-100.6 °F (38.1 °C)] 98 °F (36.7 °C)  Pulse:  [] 100  Resp:  [16-20] 18  SpO2:  [86 %-98 %] 96 %  BP: (134-143)/(72-91) 134/78     Weight: 40.6 kg (89 lb 8.1 oz)  Body mass index is 16.91 kg/m².      Intake/Output Summary (Last 24 hours) at 10/17/2019 1042  Last data filed at 10/17/2019 0000  Gross per 24 hour   Intake 2050 ml   Output 1 ml   Net 2049 ml       Physical Exam   Constitutional: He appears cachectic. He is active. Sickly appearance: chronic.   HENT:   Head: Normocephalic and atraumatic.   Eyes: Pupils are equal, round, and reactive to light. No scleral icterus. Right eye exhibits no nystagmus. Left eye exhibits no nystagmus.   Neck: Trachea normal. No JVD present.   Cardiovascular: Normal rate and regular rhythm. Exam reveals no gallop and no friction rub.   No murmur heard.  Pulses:       Radial pulses are 2+ on the right side, and 2+ on the left side.        Dorsalis pedis pulses are 2+ on the right side, and 2+ on the left side.   No edema to note   Pulmonary/Chest: Effort normal. No respiratory distress. He has no wheezes. He has no rhonchi. He has no rales.   Abdominal: Soft. Bowel sounds are normal. He exhibits no  distension. There is no tenderness. There is no guarding.   Musculoskeletal: He exhibits deformity.   Contracted right wrist   Neurological: He is alert. He displays atrophy. No cranial nerve deficit.       Vents:  Vent Mode: CPAP (10/09/19 0953)  Ventilator Initiated: Yes (10/06/19 0006)  Set Rate: 15 bmp (10/09/19 0804)  Vt Set: 350 mL (10/09/19 0804)  Pressure Support: 10 cmH20 (10/09/19 0953)  PEEP/CPAP: 5 cmH20 (10/09/19 0953)  Oxygen Concentration (%): 60 (10/11/19 0715)  Peak Airway Pressure: 15.4 cmH2O (10/09/19 0953)  Total Ve: 6.9 mL (10/09/19 0953)  F/VT Ratio<105 (RSBI): (!) 94.7 (10/09/19 0953)    Lines/Drains/Airways     Drain                 NG/OG Tube 10/15/19 1400 [place] sump Right nostril 1 day                Significant Labs:    CBC/Anemia Profile:  Recent Labs   Lab 10/16/19  0514 10/17/19  0542   WBC 24.39* 19.22*   HGB 10.0* 9.7*   HCT 31.8* 30.9*   * 407*   MCV 89 88   RDW 14.6* 14.6*        Chemistries:  Recent Labs   Lab 10/16/19  0514 10/17/19  0542    135*   K 4.1 3.6    100   CO2 26 28   BUN 13 11   CREATININE 0.8 0.7   CALCIUM 8.6* 8.2*   MG 1.6 1.7   PHOS 3.7 2.7       All pertinent labs within the past 24 hours have been reviewed.    Significant Imaging:  I have reviewed and interpreted all pertinent imaging results/findings within the past 24 hours.    Assessment/Plan:     * Acute respiratory failure with hypoxia  Based on CT A/P done on 10/15, I suspect there is at least LLL collapse. Presumably 2/2 mucus plugging/aspiration which has been a recurrent problem.     - CXR ordered  - Increase airway clearance with duonebs q4, 3% hypertonic saline nebs BID, CPT especially of the left  - Sit up in bed as much as possible  - Unable to perform cough assist or IS  - Restarted on abx (levaquin) on 10/16  - Sputum culture  - Low threshold to broaden given risk factors for resistant organisms  - Discussed with his aunt the possibility of requiring a tracheostomy if he is  unable to manage his secretions  - BiPAP at night and with naps to act a pneumatic stent for his bronchomalacia  - Aggressive bowel regimen to avoid splinting 2/2 abdominal distention  - Plan for PEG tube placement    Epilepsy  Divalproex at home.  Continue. No reported seizure >15 years.     I will continue to follow with you       London Hernadez MD  Pulmonology  [facility]  cell - [phone]

## 2019-10-18 NOTE — NURSING
Received report from KAYDEN Kessler. Patient lying in bed resting. NAD noted, safety precautions maintained, sitter at bedside, will monitor.

## 2019-10-18 NOTE — CARE UPDATE
Patient with soft restraints due to confusion, restlessness, and pulling at lines - intermittently combative. For safety will renew his soft restraints at this time. Due to have G-tube placed for comfort. Monitor closely.

## 2019-10-18 NOTE — PLAN OF CARE
Recommendations     Recommendation/Intervention:   1. Initiate previous TF when medically able of  Isosource 1.5 initiate @10 mL/hr and advanced 10 mL q 4 hours to goal rate of 45 mL/hr. TF to provide: 1620 kcal (1847 with propofol at 8.6 mL/hr), 73 g pro, 825 ml fluid. Recommend fluid flushes 300 mL tid or per MD.   2. If no BM today, initiate TPN tomorrow of Clinimix 4.25/5 @ 50mL/hr to provide 490 kcal and 61g protein + 20% Lipids @ 20.8 mL/hr to provide 500 kcal.      Goals: Initate nutrition support within 48 hrs  Nutrition Goal Status: new  Communication of PÉREZ Recs: (POC)

## 2019-10-18 NOTE — PLAN OF CARE
Per MDTs plan to transfer to ICU . (Aunt Chloe Galeano [phone], lives in [place], [place] is here today.  She is now in agreement with PEG placement.  She says she found a place in [place], [place] with [facility]. Rola Rey, Director of [facility] says the facility does take pts that have peg tubes      10/18/19 0941   Discharge Reassessment   Assessment Type Discharge Planning Reassessment   Provided patient/caregiver education on the expected discharge date and the discharge plan Yes   Do you have any problems affording any of your prescribed medications? No   Discharge Plan A Group Home   Discharge Plan B Other  (TBD)   DME Needed Upon Discharge  other (see comments)  (TBD by faility he is discharged to.)   Anticipated Discharge Disposition   (Possibly group home)   Can the patient answer the patient profile reliably? No, cognitively impaired   How does the patient rate their overall health at the present time? Fair   Describe the patient's ability to walk at the present time. Does not walk or unable to take any steps at all   How often would a person be available to care for the patient? Often   Number of comorbid conditions (as recorded on the chart) Five or more   Post-Acute Status   Post-Acute Authorization Placement   Post-Acute Placement Status Awaiting Internal Medical Clearance   Discharge Delays (!) Change in Medical Condition   .Cris Monahan, RN, BSN, STN [facility]  10/18/2019

## 2019-10-18 NOTE — ASSESSMENT & PLAN NOTE
Secondary to aspiration pneumonia and rhino virus  Completed antibiotic treatment prior to stepping down to floor however patient became septic again after eating pureed diet. MBBS did show evidence of aspiration therefore placed NPO.  Abx restarted 10/16. NGT placed to bypass all oral intake pending PEG tube placement which will be done once respiratory status improves.   Although more interactive, is on more O2 requirements , more tachycardic and with complete collapse of left lung. Will continue with highflow supplemental O2, nebulized treatments, nebulized hypertonic saline every 12 hours, bowel regimen, BiPAP QHS, chest physiotherapy and keep HOB elevated  Famotidine IV. Scopolamine patch in place.   Discussed with pulmonology who agrees/recommends transferring to ICU for closer monitoring  If patient's left lung does not re-expand, might need ETT placement for bronchoscopy  Hopefully we can bypass a tracheostomy

## 2019-10-18 NOTE — ASSESSMENT & PLAN NOTE
Family would like group home in [place] when ready for discharge  Might not be possible if he gets a tracheostomy

## 2019-10-18 NOTE — PLAN OF CARE
Pt received from 3rd marilyn on nrb mask. Dr. Hernadez @ bedside. Hypertonic saline treatment along with x1 albuterol given. Cpt done with G5 vibes to left side for mucous plug. Was able to wean from nrb to high flow nc. Pt stable on 13L weaned from 15. Will continue scheduled respiratory treatments as ordered with cpt. Pt has bipap @ bedside on stby for nightly use.

## 2019-10-18 NOTE — PROGRESS NOTES
[facility] Hospital Medicine  Progress Note    Patient Name: Karri Galeano  MRN: [id number]  Patient Class: IP- Inpatient   Admission Date: 10/3/2019  Length of Stay: 14 days  Attending Physician: Carole Hernandez MD  Primary Care Provider: Echo Reeves MD        Subjective:     Principal Problem:Acute respiratory failure with hypoxia        HPI:  Mr. Karri Galeano is a 24 y.o. male with cerebral palsy and epilepsy who presents to [facility] ED from an urgent care facility with complaints of fevers today.  He lives in a group home and started to have a nonproductive cough today.  The caregiver did not observe any vomiting.  He was in his usual state of health yesterday but today has become more lethargic and somnolent.  He was taken to an urgent care facility where he was then transferred to this facility for further care.  He was noted there to have a fever of 102.2° F as well as an oxygen saturation of 95% on nasal cannula at 3 liters/minute.  Further history is otherwise limited at this time.    Overview/Hospital Course:  Mr. Galeano presented with fever, cough and lethargy.  Respiratory status decompensated and he required intubation in the ER.  Workup with imaging consistent with possible pneumonia.  Empiric Zosyn and vancomycin initiated.  Pulmonary consulted for vent management.  Extubated on 10/5 in the morning.  Initially did well, but had worsening respiratory failure during the day.  Started on Bipap with no improvement and reintubated on 10/5 in the afternoon.  Blood cultures no growth to date.  Respiratory viral panel came back positive for rhino virus.  Antibiotics stopped.  Extubated on 10/09 to BiPAP.  Respiratory status stable.  Increased cough with feeding patient and he remained in the ICU.  Speech therapy recommended pureed diet.  He was stepped down to the floor on 10/11.  Patient with poor intake along with increased risk for aspiration. Per aunt, patient was fed  full meals when pureed diet was recommended by Speech. A MBBS was done. Showed evidence of aspiration therefore NPO status and PEG tube placement recommended. Aunt (next of kin) agreed with PEG placement once aspiration confirmed. Patient had unfortunately aspirated during PO intake and clinically worsened. Progressively requiring more O2 via high flow NC. Developed leukocytosis, became less interactive and spiked fever. NGT placed to avoid all oral intake and initiated on IV abx. Tube feeds started via NGT. Pulmonology re-consulted for co-management. Recommended BiPAP (or CPAP) QHS for positive pressure (as he can't participate with IS), nebulized treatments every 4 hours, nebulized hypertonic saline every 12 hours, chest physiotherapy, bowel regimen for fecal impaction/constipation and keep HOB elevated. Patient has scopolamine patch for oral secretions and IV famotidine for stress ulcer prophylaxis. Repeat CXR on 10/17 showed complete collapse of left lung likely due to atelectasis vs mucous plug. Patient remained stable on high flow but given high risk for further decompensation +/- bronchoscopy with intubation, he was upgraded to ICU for closer monitoring. Discussed with aunt at bedside. Patient is full code.         Interval History: patient more interactive and more alert today but with more O2 requirements. More tachycardic. Complete collapse of left lung on CXR yesterday.     Review of Systems   Unable to perform ROS: Patient nonverbal     Objective:     Vital Signs (Most Recent):  Temp: 98.9 °F (37.2 °C) (10/18/19 0727)  Pulse: (!) 111 (10/18/19 0808)  Resp: (!) 22 (10/18/19 0808)  BP: 125/77 (10/18/19 0727)  SpO2: (!) 91 % (10/18/19 0808) Vital Signs (24h Range):  Temp:  [98 °F (36.7 °C)-98.9 °F (37.2 °C)] 98.9 °F (37.2 °C)  Pulse:  [] 111  Resp:  [18-22] 22  SpO2:  [91 %-99 %] 91 %  BP: (125-136)/(75-88) 125/77     Weight: 39 kg (85 lb 15.7 oz)  Body mass index is 16.25 kg/m².    Intake/Output  Summary (Last 24 hours) at 10/18/2019 1003  Last data filed at 10/18/2019 0200  Gross per 24 hour   Intake 600 ml   Output --   Net 600 ml      Physical Exam   Constitutional: He appears well-developed. No distress.   Contracted    HENT:   NGT in place   Cardiovascular: Normal rate and regular rhythm.   Pulmonary/Chest: No stridor. No respiratory distress. He has no wheezes. He has no rales.   Breathing comfortably on comfort flow  Drooling.   Clear breath sounds right lung. Absent breath sounds left lung   Abdominal: Soft. Bowel sounds are normal. He exhibits no distension. There is no tenderness.   Neurological: He is alert.   Making eye contact  Not following commands   Skin: Skin is warm and dry. Capillary refill takes less than 2 seconds. He is not diaphoretic.   Nursing note and vitals reviewed.      Significant Labs: All pertinent labs within the past 24 hours have been reviewed.    Significant Imaging: I have reviewed all pertinent imaging results/findings within the past 24 hours.  I have reviewed and interpreted all pertinent imaging results/findings within the past 24 hours.      Assessment/Plan:      * Acute respiratory failure with hypoxia  Secondary to aspiration pneumonia and rhino virus  Completed antibiotic treatment prior to stepping down to floor however patient became septic again after eating pureed diet. MBBS did show evidence of aspiration therefore placed NPO.  Abx restarted 10/16. NGT placed to bypass all oral intake pending PEG tube placement which will be done once respiratory status improves.   Although more interactive, is on more O2 requirements , more tachycardic and with complete collapse of left lung. Will continue with highflow supplemental O2, nebulized treatments, nebulized hypertonic saline every 12 hours, bowel regimen, BiPAP QHS, chest physiotherapy and keep HOB elevated  Famotidine IV. Scopolamine patch in place.   Discussed with pulmonology who agrees/recommends transferring  to ICU for closer monitoring  If patient's left lung does not re-expand, might need ETT placement for bronchoscopy  Hopefully we can bypass a tracheostomy       Other dysphagia  As above      Pneumonia of left lung due to infectious organism  Management as above    Severe malnutrition  NGT for now. PEG once respiratory status improves  Tolerating tube feeds    Hypokalemia  Replace as needed.  Will continue to monitor    Epilepsy  Continue his home regimen of divalproex.  Also on clonazepam    Spastic quadriplegic cerebral palsy  Family would like group home in [place] when ready for discharge  Might not be possible if he gets a tracheostomy      Hypernatremia  Secondary to inadequate free water intake.  Resolved with isotonic fluids. Unable to properly keep oral hydration due to dysphagia  Free water with tube feeds  BMP daily      VTE Risk Mitigation (From admission, onward)         Ordered     enoxaparin injection 40 mg  Daily      10/04/19 0152     IP VTE HIGH RISK PATIENT  Once      10/04/19 0152                Assessment and plan discussed with patient's aunt at bedside. We also went over recent images and concerns. All questions answered to satisfaction. Patient is to remain full code. Transfer to ICU for closer monitoring.   Spent > 35 minutes in patient's care.      Carole Lomeli MD  Department of Hospital Medicine   [facility]

## 2019-10-18 NOTE — PLAN OF CARE
Patient currently on 14L high flow 02 NC, C-Xray Improved aeration of the left upper lung zone when compared to the previous day's study.

## 2019-10-18 NOTE — PLAN OF CARE
Problem: Infection  Goal: Infection Symptom Resolution  Outcome: Ongoing, Progressing  Intervention: Prevent or Manage Infection  Flowsheets (Taken 10/18/2019 0442)  Fever Reduction/Comfort Measures: lightweight clothing;lightweight bedding;fluid intake increased  Infection Management: aseptic technique maintained  Isolation Precautions: protective environment maintained     Problem: Restraint, Nonbehavioral (Nonviolent)  Goal: Discontinuation Criteria Achieved  Outcome: Ongoing, Progressing  Intervention: Implement Least-restrictive Safety Strategies  Flowsheets (Taken 10/18/2019 0442)  Medical Device Protection: IV pole/bag removed from visual field  Less Restrictive Alternatives: 1:1 observation maintained;bed alarm in use;calming techniques promoted  Diversional Activities: television  Goal: Personal Dignity and Safety Maintained  Outcome: Ongoing, Progressing  Intervention: Protect Dignity, Rights, and Personal Wellbeing  Flowsheets (Taken 10/18/2019 0442)  Trust Relationship/Rapport: care explained;emotional support provided  Intervention: Protect Skin and Joint Integrity  Flowsheets (Taken 10/18/2019 0442)  Body Position: turned;semi-prone, left;semi-prone, right;supine;weight shift assistance provided  Range of Motion: ROM (range of motion) performed     Problem: Coping Ineffective  Goal: Effective Coping  Outcome: Ongoing, Progressing  Intervention: Support and Enhance Coping Strategies  Flowsheets (Taken 10/18/2019 0442)  Family/Support System Care: support provided  Environmental Support: calm environment promoted;caregiver consistency promoted;personal routine supported     Problem: Oral Intake Inadequate  Goal: Improved Oral Intake  Outcome: Ongoing, Progressing  Intervention: Promote and Optimize Oral Intake  Flowsheets (Taken 10/18/2019 0442)  Oral Nutrition Promotion: calorie dense liquids provided

## 2019-10-18 NOTE — NURSING
Patient arrived on unit via stretcher accompanied by Aunt at bedside, patient awake, orient to self, vital signs stable,  Sats 96% on NRB. Pulmonologist current at bedside. Continue monitoring for safety.

## 2019-10-18 NOTE — NURSING
Patient aphasic and with minimal movement of lower extremities. Patient attempts to pull NGT out when soft wrist restraints removed. Sitter at bedside and SR elevated for additional safety measures. Discussed CXR results with Dr Marocs- states patient has had Left upper and lower lobe collapse with essentially no change from previous CXR according to Dr Marcos. No respiratory difficulty. O2 sats 94 -96%  On RA- Breath sounds diminished on Lt side but not significantly. Assessed by RT also. Soft wrist restraints remain in place due to patient continuing to attempt to remove medical devices

## 2019-10-18 NOTE — SUBJECTIVE & OBJECTIVE
Interval History: patient more interactive and more alert today but with more O2 requirements. More tachycardic. Complete collapse of left lung on CXR yesterday.     Review of Systems   Unable to perform ROS: Patient nonverbal     Objective:     Vital Signs (Most Recent):  Temp: 98.9 °F (37.2 °C) (10/18/19 0727)  Pulse: (!) 111 (10/18/19 0808)  Resp: (!) 22 (10/18/19 0808)  BP: 125/77 (10/18/19 0727)  SpO2: (!) 91 % (10/18/19 0808) Vital Signs (24h Range):  Temp:  [98 °F (36.7 °C)-98.9 °F (37.2 °C)] 98.9 °F (37.2 °C)  Pulse:  [] 111  Resp:  [18-22] 22  SpO2:  [91 %-99 %] 91 %  BP: (125-136)/(75-88) 125/77     Weight: 39 kg (85 lb 15.7 oz)  Body mass index is 16.25 kg/m².    Intake/Output Summary (Last 24 hours) at 10/18/2019 1003  Last data filed at 10/18/2019 0200  Gross per 24 hour   Intake 600 ml   Output --   Net 600 ml      Physical Exam   Constitutional: He appears well-developed. No distress.   Contracted    HENT:   NGT in place   Cardiovascular: Normal rate and regular rhythm.   Pulmonary/Chest: No stridor. No respiratory distress. He has no wheezes. He has no rales.   Breathing comfortably on comfort flow  Drooling.   Clear breath sounds right lung. Absent breath sounds left lung   Abdominal: Soft. Bowel sounds are normal. He exhibits no distension. There is no tenderness.   Neurological: He is alert.   Making eye contact  Not following commands   Skin: Skin is warm and dry. Capillary refill takes less than 2 seconds. He is not diaphoretic.   Nursing note and vitals reviewed.      Significant Labs: All pertinent labs within the past 24 hours have been reviewed.    Significant Imaging: I have reviewed all pertinent imaging results/findings within the past 24 hours.  I have reviewed and interpreted all pertinent imaging results/findings within the past 24 hours.

## 2019-10-18 NOTE — ASSESSMENT & PLAN NOTE
Based on CT A/P done on 10/15, I suspect there is at least LLL collapse. Presumably 2/2 mucus plugging/aspiration which has been a recurrent problem.     - CXR ordered  - Increase airway clearance with duonebs q4, 3% hypertonic saline nebs BID, CPT especially of the left  - Sit up in bed as much as possible  - Unable to perform cough assist or IS  - Restarted on abx (levaquin) on 10/16  - Sputum culture  - Low threshold to broaden given risk factors for resistant organisms  - Discussed with his aunt the possibility of requiring a tracheostomy if he is unable to manage his secretions  - BiPAP at night and with naps to act a pneumatic stent for his bronchomalacia  - Aggressive bowel regimen to avoid splinting 2/2 abdominal distention

## 2019-10-19 LAB
ANION GAP SERPL CALC-SCNC: 10 MMOL/L (ref 8–16)
BACTERIA BLD CULT: NORMAL
BACTERIA BLD CULT: NORMAL
BASOPHILS # BLD AUTO: 0.04 K/UL (ref 0–0.2)
BASOPHILS NFR BLD: 0.4 % (ref 0–1.9)
BUN SERPL-MCNC: 11 MG/DL (ref 6–20)
CALCIUM SERPL-MCNC: 8.8 MG/DL (ref 8.7–10.5)
CHLORIDE SERPL-SCNC: 103 MMOL/L (ref 95–110)
CO2 SERPL-SCNC: 30 MMOL/L (ref 23–29)
CREAT SERPL-MCNC: 0.7 MG/DL (ref 0.5–1.4)
DIFFERENTIAL METHOD: ABNORMAL
EOSINOPHIL # BLD AUTO: 0.4 K/UL (ref 0–0.5)
EOSINOPHIL NFR BLD: 3.5 % (ref 0–8)
ERYTHROCYTE [DISTWIDTH] IN BLOOD BY AUTOMATED COUNT: 14.8 % (ref 11.5–14.5)
EST. GFR  (AFRICAN AMERICAN): >60 ML/MIN/1.73 M^2
EST. GFR  (NON AFRICAN AMERICAN): >60 ML/MIN/1.73 M^2
GLUCOSE SERPL-MCNC: 80 MG/DL (ref 70–110)
HCT VFR BLD AUTO: 27.3 % (ref 40–54)
HGB BLD-MCNC: 8.3 G/DL (ref 14–18)
IMM GRANULOCYTES # BLD AUTO: 0.09 K/UL (ref 0–0.04)
IMM GRANULOCYTES NFR BLD AUTO: 0.8 % (ref 0–0.5)
LYMPHOCYTES # BLD AUTO: 1.8 K/UL (ref 1–4.8)
LYMPHOCYTES NFR BLD: 16.4 % (ref 18–48)
MAGNESIUM SERPL-MCNC: 1.8 MG/DL (ref 1.6–2.6)
MCH RBC QN AUTO: 27.7 PG (ref 27–31)
MCHC RBC AUTO-ENTMCNC: 30.4 G/DL (ref 32–36)
MCV RBC AUTO: 91 FL (ref 82–98)
MONOCYTES # BLD AUTO: 1.2 K/UL (ref 0.3–1)
MONOCYTES NFR BLD: 10.6 % (ref 4–15)
NEUTROPHILS # BLD AUTO: 7.6 K/UL (ref 1.8–7.7)
NEUTROPHILS NFR BLD: 68.3 % (ref 38–73)
NRBC BLD-RTO: 0 /100 WBC
PHOSPHATE SERPL-MCNC: 3.4 MG/DL (ref 2.7–4.5)
PLATELET # BLD AUTO: 392 K/UL (ref 150–350)
PMV BLD AUTO: 10.7 FL (ref 9.2–12.9)
POTASSIUM SERPL-SCNC: 3.9 MMOL/L (ref 3.5–5.1)
RBC # BLD AUTO: 3 M/UL (ref 4.6–6.2)
SODIUM SERPL-SCNC: 143 MMOL/L (ref 136–145)
WBC # BLD AUTO: 11.13 K/UL (ref 3.9–12.7)

## 2019-10-19 PROCEDURE — 80048 BASIC METABOLIC PNL TOTAL CA: CPT

## 2019-10-19 PROCEDURE — 99233 PR SUBSEQUENT HOSPITAL CARE,LEVL III: ICD-10-PCS | Mod: ,,, | Performed by: INTERNAL MEDICINE

## 2019-10-19 PROCEDURE — 63600175 PHARM REV CODE 636 W HCPCS: Performed by: INTERNAL MEDICINE

## 2019-10-19 PROCEDURE — 97161 PT EVAL LOW COMPLEX 20 MIN: CPT

## 2019-10-19 PROCEDURE — 83735 ASSAY OF MAGNESIUM: CPT

## 2019-10-19 PROCEDURE — 20000000 HC ICU ROOM

## 2019-10-19 PROCEDURE — 85025 COMPLETE CBC W/AUTO DIFF WBC: CPT

## 2019-10-19 PROCEDURE — 36415 COLL VENOUS BLD VENIPUNCTURE: CPT

## 2019-10-19 PROCEDURE — 99233 SBSQ HOSP IP/OBS HIGH 50: CPT | Mod: ,,, | Performed by: INTERNAL MEDICINE

## 2019-10-19 PROCEDURE — 25000242 PHARM REV CODE 250 ALT 637 W/ HCPCS: Performed by: INTERNAL MEDICINE

## 2019-10-19 PROCEDURE — 94640 AIRWAY INHALATION TREATMENT: CPT

## 2019-10-19 PROCEDURE — S0028 INJECTION, FAMOTIDINE, 20 MG: HCPCS | Performed by: INTERNAL MEDICINE

## 2019-10-19 PROCEDURE — 25000003 PHARM REV CODE 250: Performed by: INTERNAL MEDICINE

## 2019-10-19 PROCEDURE — 94761 N-INVAS EAR/PLS OXIMETRY MLT: CPT

## 2019-10-19 PROCEDURE — 94668 MNPJ CHEST WALL SBSQ: CPT

## 2019-10-19 PROCEDURE — 99900035 HC TECH TIME PER 15 MIN (STAT)

## 2019-10-19 PROCEDURE — 27100171 HC OXYGEN HIGH FLOW UP TO 24 HOURS

## 2019-10-19 PROCEDURE — 84100 ASSAY OF PHOSPHORUS: CPT

## 2019-10-19 PROCEDURE — 94660 CPAP INITIATION&MGMT: CPT

## 2019-10-19 RX ADMIN — SODIUM CHLORIDE 30 MG/ML INHALATION SOLUTION 4 ML: 30 SOLUTION INHALANT at 08:10

## 2019-10-19 RX ADMIN — TAMSULOSIN HYDROCHLORIDE 0.4 MG: 0.4 CAPSULE ORAL at 09:10

## 2019-10-19 RX ADMIN — IPRATROPIUM BROMIDE AND ALBUTEROL SULFATE 3 ML: .5; 3 SOLUTION RESPIRATORY (INHALATION) at 12:10

## 2019-10-19 RX ADMIN — LEVOFLOXACIN 750 MG: 750 INJECTION, SOLUTION INTRAVENOUS at 12:10

## 2019-10-19 RX ADMIN — VALPROIC ACID 500 MG: 250 SOLUTION ORAL at 09:10

## 2019-10-19 RX ADMIN — FLUOXETINE HYDROCHLORIDE 60 MG: 20 SOLUTION ORAL at 09:10

## 2019-10-19 RX ADMIN — MICONAZOLE NITRATE: 20 POWDER TOPICAL at 08:10

## 2019-10-19 RX ADMIN — POLYETHYLENE GLYCOL 3350 17 G: 17 POWDER, FOR SOLUTION ORAL at 09:10

## 2019-10-19 RX ADMIN — FAMOTIDINE 20 MG: 10 INJECTION INTRAVENOUS at 09:10

## 2019-10-19 RX ADMIN — IPRATROPIUM BROMIDE AND ALBUTEROL SULFATE 3 ML: .5; 3 SOLUTION RESPIRATORY (INHALATION) at 08:10

## 2019-10-19 RX ADMIN — CLONAZEPAM 1 MG: 0.5 TABLET ORAL at 09:10

## 2019-10-19 RX ADMIN — IPRATROPIUM BROMIDE AND ALBUTEROL SULFATE 3 ML: .5; 3 SOLUTION RESPIRATORY (INHALATION) at 11:10

## 2019-10-19 RX ADMIN — SCOPALAMINE 1 PATCH: 1 PATCH, EXTENDED RELEASE TRANSDERMAL at 12:10

## 2019-10-19 RX ADMIN — VALPROIC ACID 500 MG: 250 SOLUTION ORAL at 08:10

## 2019-10-19 RX ADMIN — ENOXAPARIN SODIUM 40 MG: 100 INJECTION SUBCUTANEOUS at 05:10

## 2019-10-19 RX ADMIN — IPRATROPIUM BROMIDE AND ALBUTEROL SULFATE 3 ML: .5; 3 SOLUTION RESPIRATORY (INHALATION) at 04:10

## 2019-10-19 RX ADMIN — FAMOTIDINE 20 MG: 10 INJECTION INTRAVENOUS at 08:10

## 2019-10-19 RX ADMIN — CLONAZEPAM 1 MG: 0.5 TABLET ORAL at 08:10

## 2019-10-19 RX ADMIN — SENNOSIDES, DOCUSATE SODIUM 1 TABLET: 50; 8.6 TABLET, FILM COATED ORAL at 09:10

## 2019-10-19 RX ADMIN — MICONAZOLE NITRATE: 20 POWDER TOPICAL at 09:10

## 2019-10-19 NOTE — PLAN OF CARE
Patient received on high flow nasal cannula 10 lpm. Aerosol treatments given at this time. BBS equal coarse. sp02 99%. BIPAP on standby.  Decreased high flow rate 8 lpm. sp02 98%.

## 2019-10-19 NOTE — PT/OT/SLP EVAL
"Physical Therapy Evaluation    Patient Name:  Karri Galeano   MRN:  [id number]    Recommendations:     Discharge Recommendations:  (24hr care)   Discharge Equipment Recommendations: hospital bed, lift device   Barriers to discharge: Trying to find a new facility that will take patient's with PEG    Assessment:     Karri Galeano is a 24 y.o. male admitted with a medical diagnosis of Acute respiratory failure with hypoxia.  He presents with the following impairments/functional limitations:  impaired functional mobilty, weakness, impaired cognition, decreased safety awareness, impaired coordination, impaired cardiopulmonary response to activity, impaired endurance, decreased coordination, impaired balance, decreased upper extremity function, impaired self care skills, decreased lower extremity function .    Rehab Prognosis: Poor; patient would benefit from acute skilled PT services to address these deficits and reach maximum level of function.    Recent Surgery: Procedure(s) (LRB):  Bronchoscopy (N/A) 12 Days Post-Op    Plan:     During this hospitalization, patient to be seen 3 x/week to address the identified rehab impairments via therapeutic activities, therapeutic exercises, wheelchair management/training and progress toward the following goals:    · Plan of Care Expires:  11/02/19    Subjective     Chief Complaint: no c/o, pt non-verbal   Patient/Family Comments/goals: Pt's aunt wants to place him in a different facility.  Dr wants OOB to chair  Pain/Comfort:  · Pain Rating 1: 0/10    Patients cultural, spiritual, Sikhism conflicts given the current situation: no    Living Environment:  Pt came from a group home  Prior to admission, patients level of function was per aide at group home, pt was a dependent t/f bed to W/C as well as dependent for all ADL's.  Aide states that patient could "scoot".in and out of out of his bed and along the floor.  Aide states that he could propel his own W/C Equipment used at " home: wheelchair.  DME owned (not currently used): none.  Upon discharge, patient will have assistance from [facility] [place].    Objective:     Communicated with nsg prior to session.  Patient found supine with NG tube, peripheral IV(ICU monitoring )  upon PT entry to room.    General Precautions: Standard, aspiration   Orthopedic Precautions:N/A   Braces: N/A     Exams:  · Cognitive Exam:  Lethargic, difficult to arouse, not oriented, unable to follow commands  · Skin Integrity/Edema:      · -       Skin integrity: Visible skin intact  · RUE ROM: WFL  · RUE Strength: unable to MMT, volitional movement noted  · LUE ROM: WFL  · LUE Strength: unable to MMT, volitional movement noted  · RLE ROM: WFL  · RLE Strength: Unable to MMT, volitional movement noted  · LLE ROM: WFL  · LLE Strength: Unable to MMT, volitional movement noted    Functional Mobility:  · Bed Mobility:     · Rolling Left:  dependence  · Rolling Right: dependence  · Scooting: dependence      Therapeutic Activities and Exercises:   Limited PT evaluation.  Once restraints removed, pt pulling at lines.  Unsafe for EOB sitting or t/f to chair at this time     AM-PAC 6 CLICK MOBILITY  Total Score:9     Patient left with bed in chair position with all lines intact, restraints reapplied at end of session, nsg notified and CG present.    GOALS:   Multidisciplinary Problems     Physical Therapy Goals        Problem: Physical Therapy Goal    Goal Priority Disciplines Outcome Goal Variances Interventions   Physical Therapy Goal     PT, PT/OT Ongoing, Progressing     Description:  Goals to be met by: 2019     Patient will increase functional independence with mobility by performin. Supine to sit to be assessed  2.  Scooting with SBA  3   Rolling with CGA   4. Bed to chair transfer to be assessed  5. Wheelchair propulsion to be assessed  6. B LE ther ex x 10 reps with assist as needed                      History:     Past Medical History:   Diagnosis Date     Anxiety     Congenital cerebral palsy     Hypotonia     Leukodystrophy     Mental retardation     Retention of urine     Seizures     Stereotyped movement disorder     Urinary tract infection        Past Surgical History:   Procedure Laterality Date    BRONCHOSCOPY N/A 10/7/2019    Procedure: Bronchoscopy;  Surgeon: David Vela MD;  [facility];  Service: Pulmonary;  Laterality: N/A;       Time Tracking:     PT Received On: 10/19/19  PT Start Time: 1006     PT Stop Time: 1025  PT Total Time (min): 19 min     Billable Minutes: Evaluation 19      Ya Vila, PT  10/19/2019

## 2019-10-19 NOTE — ASSESSMENT & PLAN NOTE
2/2 mucus plugging causing left lobe collapse. Recurrent problem in setting of rhinovirus PNA. Improved w/ more aggressive airway clearance. Discussed tracheostomy with aunt who understands this is a possibility.    - Oxygen requirements improving  - Increase airway clearance with duonebs q4, 3% hypertonic saline nebs BID, CPT especially of the left  - PT with OOB as much as tolerated  - Unable to perform cough assist or IS  - Restarted on abx (levaquin) on 10/16, leukocytosis improving  - Low threshold to broaden given risk factors for resistant organisms  - BiPAP at night and with naps to act a pneumatic stent for his bronchomalacia  - Aggressive bowel regimen to avoid splinting 2/2 abdominal distention  - Restart TFs when having BMs

## 2019-10-19 NOTE — PLAN OF CARE
Patient resting in bed in 2 pt soft restraints, no constriction noted. Sitter @ bedside. 7L NC of  high flow o2.; sats 98-99%. No resp distress noted. He remains incontinent of stool and urine. NO stool today. + bowel sounds noted. Feeding on hold. [place] sump to rt nare clamped. PICC to upper rt forearm w/ NS @ kvo. Comfort measure provided.

## 2019-10-19 NOTE — PLAN OF CARE
Pt is on high flow nasal cannula 11 LPM and will continue to give aerosol tx as scheduled. Will continue to monitor.

## 2019-10-19 NOTE — SUBJECTIVE & OBJECTIVE
Interval History: Brought to the ICU yesterday for increasing oxygen requirements in the setting of left lobe collapse from mucus plugging. Underwent aggressive airway clearance with improvement in left sided aeration.     Objective:     Vital Signs (Most Recent):  Temp: 98 °F (36.7 °C) (10/19/19 1120)  Pulse: 80 (10/19/19 1200)  Resp: (!) 24 (10/19/19 1200)  BP: 128/69 (10/19/19 1200)  SpO2: 99 % (10/19/19 1200) Vital Signs (24h Range):  Temp:  [98 °F (36.7 °C)-99 °F (37.2 °C)] 98 °F (36.7 °C)  Pulse:  [] 80  Resp:  [19-37] 24  SpO2:  [95 %-100 %] 99 %  BP: (128-154)/() 128/69     Weight: 39 kg (85 lb 15.7 oz)  Body mass index is 16.25 kg/m².      Intake/Output Summary (Last 24 hours) at 10/19/2019 1330  Last data filed at 10/19/2019 0900  Gross per 24 hour   Intake 120 ml   Output --   Net 120 ml       Physical Exam   Constitutional: He appears cachectic. Sickly appearance: chronic.   HENT:   Head: Normocephalic and atraumatic.   Eyes: Pupils are equal, round, and reactive to light. No scleral icterus. Right eye exhibits no nystagmus. Left eye exhibits no nystagmus.   Neck: Trachea normal. No JVD present.   Cardiovascular: Normal rate and regular rhythm. Exam reveals no gallop and no friction rub.   No murmur heard.  No edema to note   Pulmonary/Chest: Effort normal. No respiratory distress. He has no wheezes. He has no rhonchi. He has no rales.   Abdominal: Soft. Bowel sounds are normal. He exhibits no distension. There is no tenderness. There is no guarding.   Musculoskeletal: He exhibits deformity.   Contracted right wrist   Neurological: He is alert. He displays atrophy. No cranial nerve deficit.       Vents:  Vent Mode: CPAP (10/09/19 0953)  Ventilator Initiated: Yes (10/06/19 0006)  Set Rate: 15 bmp (10/09/19 0804)  Vt Set: 350 mL (10/09/19 0804)  Pressure Support: 10 cmH20 (10/09/19 0953)  PEEP/CPAP: 5 cmH20 (10/09/19 0953)  Oxygen Concentration (%): 100 (10/18/19 1042)  Peak Airway Pressure:  15.4 cmH2O (10/09/19 0953)  Total Ve: 6.9 mL (10/09/19 0953)  F/VT Ratio<105 (RSBI): (!) 94.7 (10/09/19 0953)    Lines/Drains/Airways     Drain                 NG/OG Tube 10/15/19 1400 [place] sump Right nostril 3 days          Peripheral Intravenous Line                 Midline Catheter Insertion/Assessment  - Single Lumen 10/17/19 1415 Right basilic vein (medial side of arm)  1 day                Significant Labs:    CBC/Anemia Profile:  Recent Labs   Lab 10/18/19  0211 10/19/19  0145   WBC 13.21* 11.13   HGB 9.2* 8.3*   HCT 29.0* 27.3*   * 392*   MCV 88 91   RDW 14.6* 14.8*        Chemistries:  Recent Labs   Lab 10/18/19  0211 10/19/19  0145    143   K 3.9 3.9    103   CO2 29 30*   BUN 11 11   CREATININE 0.7 0.7   CALCIUM 8.5* 8.8   MG 1.8 1.8   PHOS 3.1 3.4       All pertinent labs within the past 24 hours have been reviewed.    Significant Imaging:  I have reviewed and interpreted all pertinent imaging results/findings within the past 24 hours.   CXR 10/18/19- Left upper lobe aerated

## 2019-10-19 NOTE — PROGRESS NOTES
[facility]  Pulmonology  Progress Note    Patient Name: Karri Galeano  MRN: [id number]  Admission Date: 10/3/2019  Hospital Length of Stay: 15 days  Code Status: Full Code  Attending Provider: Vanna Louis MD  Primary Care Provider: Echo Reeves MD   Principal Problem: Acute respiratory failure with hypoxia    Subjective:     Interval History: Brought to the ICU yesterday for increasing oxygen requirements in the setting of left lobe collapse from mucus plugging. Underwent aggressive airway clearance with improvement in left sided aeration.     Objective:     Vital Signs (Most Recent):  Temp: 98 °F (36.7 °C) (10/19/19 1120)  Pulse: 80 (10/19/19 1200)  Resp: (!) 24 (10/19/19 1200)  BP: 128/69 (10/19/19 1200)  SpO2: 99 % (10/19/19 1200) Vital Signs (24h Range):  Temp:  [98 °F (36.7 °C)-99 °F (37.2 °C)] 98 °F (36.7 °C)  Pulse:  [] 80  Resp:  [19-37] 24  SpO2:  [95 %-100 %] 99 %  BP: (128-154)/() 128/69     Weight: 39 kg (85 lb 15.7 oz)  Body mass index is 16.25 kg/m².      Intake/Output Summary (Last 24 hours) at 10/19/2019 1330  Last data filed at 10/19/2019 0900  Gross per 24 hour   Intake 120 ml   Output --   Net 120 ml       Physical Exam   Constitutional: He appears cachectic. Sickly appearance: chronic.   HENT:   Head: Normocephalic and atraumatic.   Eyes: Pupils are equal, round, and reactive to light. No scleral icterus. Right eye exhibits no nystagmus. Left eye exhibits no nystagmus.   Neck: Trachea normal. No JVD present.   Cardiovascular: Normal rate and regular rhythm. Exam reveals no gallop and no friction rub.   No murmur heard.  No edema to note   Pulmonary/Chest: Effort normal. No respiratory distress. He has no wheezes. He has no rhonchi. He has no rales.   Abdominal: Soft. Bowel sounds are normal. He exhibits no distension. There is no tenderness. There is no guarding.   Musculoskeletal: He exhibits deformity.   Contracted right wrist   Neurological: He is alert.  He displays atrophy. No cranial nerve deficit.       Vents:  Vent Mode: CPAP (10/09/19 0953)  Ventilator Initiated: Yes (10/06/19 0006)  Set Rate: 15 bmp (10/09/19 0804)  Vt Set: 350 mL (10/09/19 0804)  Pressure Support: 10 cmH20 (10/09/19 0953)  PEEP/CPAP: 5 cmH20 (10/09/19 0953)  Oxygen Concentration (%): 100 (10/18/19 1042)  Peak Airway Pressure: 15.4 cmH2O (10/09/19 0953)  Total Ve: 6.9 mL (10/09/19 0953)  F/VT Ratio<105 (RSBI): (!) 94.7 (10/09/19 0953)    Lines/Drains/Airways     Drain                 NG/OG Tube 10/15/19 1400 [place] sump Right nostril 3 days          Peripheral Intravenous Line                 Midline Catheter Insertion/Assessment  - Single Lumen 10/17/19 1415 Right basilic vein (medial side of arm)  1 day                Significant Labs:    CBC/Anemia Profile:  Recent Labs   Lab 10/18/19  0211 10/19/19  0145   WBC 13.21* 11.13   HGB 9.2* 8.3*   HCT 29.0* 27.3*   * 392*   MCV 88 91   RDW 14.6* 14.8*        Chemistries:  Recent Labs   Lab 10/18/19  0211 10/19/19  0145    143   K 3.9 3.9    103   CO2 29 30*   BUN 11 11   CREATININE 0.7 0.7   CALCIUM 8.5* 8.8   MG 1.8 1.8   PHOS 3.1 3.4       All pertinent labs within the past 24 hours have been reviewed.    Significant Imaging:  I have reviewed and interpreted all pertinent imaging results/findings within the past 24 hours.   CXR 10/18/19- Left upper lobe aerated    Assessment/Plan:     * Acute respiratory failure with hypoxia  2/2 mucus plugging causing left lobe collapse. Recurrent problem in setting of rhinovirus PNA. Improved w/ more aggressive airway clearance. Discussed tracheostomy with aunt who understands this is a possibility.    - Oxygen requirements improving  - Increase airway clearance with duonebs q4, 3% hypertonic saline nebs BID, CPT especially of the left  - PT with OOB as much as tolerated  - Unable to perform cough assist or IS  - Restarted on abx (levaquin) on 10/16, leukocytosis improving  - Low threshold  to broaden given risk factors for resistant organisms  - BiPAP at night and with naps to act a pneumatic stent for his bronchomalacia  - Aggressive bowel regimen to avoid splinting 2/2 abdominal distention  - Restart TFs when having BMs           London Hernadez MD  Pulmonology  [facility] [place]

## 2019-10-19 NOTE — PLAN OF CARE
Rescue from floor on Friday, currently w/ tachypnea but unlabored respirations, saturating well and weaned O2 10 Liters HI FLOW NC. Incontinent of urine, no BM as of yet. NPO status maintained. NGT clamped. Remains free of falls and injuries.

## 2019-10-19 NOTE — CARE UPDATE
[facility]  ICU Multidisciplinary Bedside Rounds   SUMMARY     Date: 10/19/2019    Prehospitalization: Res Care   Admit Date / LOS : 10/3/2019/ 15 days    Diagnosis: Acute respiratory failure with hypoxia    Consults:        Active: GI, Gen Surg, Pulm CC and SW       Needed: N/A     Code Status: Full Code   Advanced Directive: <no information>    LDA: PICC       Central Lines/Site/Justification:Unable to Obtain/Maintain PIV       Urinary Cath/Order/Justification:Patient Does Not Have Urinary Catheter    Vasopressors/Infusions:        GOALS: Volume/ Hemodynamic: N/A                     RASS: N/A    CAM ICU: N/A  Pain Management: none       Pain Controlled: not applicable     Rhythm: NSR    Respiratory Device: HFNC    Oxygen Concentration (%):  [100] 100             Most Recent SBT/ SAT: N/A        VTE Prophylaxis: Pharm  Mobility: Bedrest  Stress Ulcer Prophylaxis: Yes    Dietary: NPO  Tolerance: not applicable  /  Advancement: @ goal    Isolation: No active isolations    Restraints: Yes    Significant Dates:  Post Op Date: N/A  Rescue Date: 10/18/19  Imaging/ Diagnostics: N/A    Noteworthy Labs:  WBC 11.13    CBC/Anemia Labs: Coags:    Recent Labs   Lab 10/17/19  0542 10/18/19  0211 10/19/19  0145   WBC 19.22* 13.21* 11.13   HGB 9.7* 9.2* 8.3*   HCT 30.9* 29.0* 27.3*   * 377* 392*   MCV 88 88 91   RDW 14.6* 14.6* 14.8*    No results for input(s): PT, INR, APTT in the last 168 hours.     Chemistries:   Recent Labs   Lab 10/17/19  0542 10/18/19  0211 10/19/19  0145   * 141 143   K 3.6 3.9 3.9    102 103   CO2 28 29 30*   BUN 11 11 11   CREATININE 0.7 0.7 0.7   CALCIUM 8.2* 8.5* 8.8   MG 1.7 1.8 1.8   PHOS 2.7 3.1 3.4        Cardiac Enzymes: Ejection Fractions:    No results for input(s): CPK, CPKMB, MB, TROPONINI in the last 72 hours. No results found for: EF     POCT Glucose: HbA1c:    No results for input(s): POCTGLUCOSE in the last 168 hours. No results found for: HGBA1C      Needs from Care Team: none     ICU LOS 19h  Level of Care: Critical Care

## 2019-10-19 NOTE — PLAN OF CARE
Problem: Physical Therapy Goal  Goal: Physical Therapy Goal  Description  Goals to be met by: 2019     Patient will increase functional independence with mobility by performin. Supine to sit to be assessed  2.  Scooting with SBA  3   Rolling with CGA   4. Bed to chair transfer to be assessed  5. Wheelchair propulsion to be assessed  6. B LE ther ex x 10 reps with assist as needed     Outcome: Ongoing, Progressing   Initial PT evaluation performed.  Pt could benefit from continued PT services 3x/wk in order to maximize function prior to D/C.  Hospital bed, sidney lift, and 24hr care recommended upon D/C

## 2019-10-20 PROBLEM — K59.00 CONSTIPATION: Status: ACTIVE | Noted: 2019-10-20

## 2019-10-20 LAB
ANION GAP SERPL CALC-SCNC: 10 MMOL/L (ref 8–16)
BASOPHILS # BLD AUTO: 0.05 K/UL (ref 0–0.2)
BASOPHILS NFR BLD: 0.5 % (ref 0–1.9)
BUN SERPL-MCNC: 13 MG/DL (ref 6–20)
CALCIUM SERPL-MCNC: 9.1 MG/DL (ref 8.7–10.5)
CHLORIDE SERPL-SCNC: 103 MMOL/L (ref 95–110)
CO2 SERPL-SCNC: 30 MMOL/L (ref 23–29)
CREAT SERPL-MCNC: 0.8 MG/DL (ref 0.5–1.4)
DIFFERENTIAL METHOD: ABNORMAL
EOSINOPHIL # BLD AUTO: 0.3 K/UL (ref 0–0.5)
EOSINOPHIL NFR BLD: 2.8 % (ref 0–8)
ERYTHROCYTE [DISTWIDTH] IN BLOOD BY AUTOMATED COUNT: 14.8 % (ref 11.5–14.5)
EST. GFR  (AFRICAN AMERICAN): >60 ML/MIN/1.73 M^2
EST. GFR  (NON AFRICAN AMERICAN): >60 ML/MIN/1.73 M^2
GLUCOSE SERPL-MCNC: 81 MG/DL (ref 70–110)
HCT VFR BLD AUTO: 27.8 % (ref 40–54)
HGB BLD-MCNC: 8.6 G/DL (ref 14–18)
IMM GRANULOCYTES # BLD AUTO: 0.05 K/UL (ref 0–0.04)
IMM GRANULOCYTES NFR BLD AUTO: 0.5 % (ref 0–0.5)
INR PPP: 1.1 (ref 0.8–1.2)
LYMPHOCYTES # BLD AUTO: 1.7 K/UL (ref 1–4.8)
LYMPHOCYTES NFR BLD: 17.8 % (ref 18–48)
MAGNESIUM SERPL-MCNC: 1.9 MG/DL (ref 1.6–2.6)
MCH RBC QN AUTO: 27.6 PG (ref 27–31)
MCHC RBC AUTO-ENTMCNC: 30.9 G/DL (ref 32–36)
MCV RBC AUTO: 89 FL (ref 82–98)
MONOCYTES # BLD AUTO: 0.9 K/UL (ref 0.3–1)
MONOCYTES NFR BLD: 9.1 % (ref 4–15)
NEUTROPHILS # BLD AUTO: 6.7 K/UL (ref 1.8–7.7)
NEUTROPHILS NFR BLD: 69.3 % (ref 38–73)
NRBC BLD-RTO: 0 /100 WBC
PHOSPHATE SERPL-MCNC: 3.7 MG/DL (ref 2.7–4.5)
PLATELET # BLD AUTO: 393 K/UL (ref 150–350)
PMV BLD AUTO: 10.8 FL (ref 9.2–12.9)
POTASSIUM SERPL-SCNC: 3.9 MMOL/L (ref 3.5–5.1)
PROTHROMBIN TIME: 12.1 SEC (ref 9–12.5)
RBC # BLD AUTO: 3.12 M/UL (ref 4.6–6.2)
SODIUM SERPL-SCNC: 143 MMOL/L (ref 136–145)
WBC # BLD AUTO: 9.65 K/UL (ref 3.9–12.7)

## 2019-10-20 PROCEDURE — 99233 SBSQ HOSP IP/OBS HIGH 50: CPT | Mod: ,,, | Performed by: INTERNAL MEDICINE

## 2019-10-20 PROCEDURE — 63600175 PHARM REV CODE 636 W HCPCS: Performed by: INTERNAL MEDICINE

## 2019-10-20 PROCEDURE — 25000003 PHARM REV CODE 250: Performed by: INTERNAL MEDICINE

## 2019-10-20 PROCEDURE — 83735 ASSAY OF MAGNESIUM: CPT

## 2019-10-20 PROCEDURE — 20000000 HC ICU ROOM

## 2019-10-20 PROCEDURE — 99233 PR SUBSEQUENT HOSPITAL CARE,LEVL III: ICD-10-PCS | Mod: ,,, | Performed by: INTERNAL MEDICINE

## 2019-10-20 PROCEDURE — 27000221 HC OXYGEN, UP TO 24 HOURS

## 2019-10-20 PROCEDURE — 94640 AIRWAY INHALATION TREATMENT: CPT

## 2019-10-20 PROCEDURE — 94761 N-INVAS EAR/PLS OXIMETRY MLT: CPT

## 2019-10-20 PROCEDURE — 36415 COLL VENOUS BLD VENIPUNCTURE: CPT

## 2019-10-20 PROCEDURE — 84100 ASSAY OF PHOSPHORUS: CPT

## 2019-10-20 PROCEDURE — 94660 CPAP INITIATION&MGMT: CPT

## 2019-10-20 PROCEDURE — 85025 COMPLETE CBC W/AUTO DIFF WBC: CPT

## 2019-10-20 PROCEDURE — 99900035 HC TECH TIME PER 15 MIN (STAT)

## 2019-10-20 PROCEDURE — 25000242 PHARM REV CODE 250 ALT 637 W/ HCPCS: Performed by: INTERNAL MEDICINE

## 2019-10-20 PROCEDURE — 80048 BASIC METABOLIC PNL TOTAL CA: CPT

## 2019-10-20 PROCEDURE — 85610 PROTHROMBIN TIME: CPT

## 2019-10-20 PROCEDURE — S0028 INJECTION, FAMOTIDINE, 20 MG: HCPCS | Performed by: INTERNAL MEDICINE

## 2019-10-20 PROCEDURE — 94668 MNPJ CHEST WALL SBSQ: CPT

## 2019-10-20 PROCEDURE — 25000003 PHARM REV CODE 250: Performed by: HOSPITALIST

## 2019-10-20 RX ORDER — PSEUDOEPHEDRINE/ACETAMINOPHEN 30MG-500MG
100 TABLET ORAL
Status: COMPLETED | OUTPATIENT
Start: 2019-10-20 | End: 2019-10-20

## 2019-10-20 RX ORDER — LUBIPROSTONE 24 UG/1
24 CAPSULE ORAL 2 TIMES DAILY WITH MEALS
Status: DISCONTINUED | OUTPATIENT
Start: 2019-10-20 | End: 2019-10-26

## 2019-10-20 RX ORDER — SYRING-NEEDL,DISP,INSUL,0.3 ML 29 G X1/2"
296 SYRINGE, EMPTY DISPOSABLE MISCELLANEOUS
Status: COMPLETED | OUTPATIENT
Start: 2019-10-20 | End: 2019-10-20

## 2019-10-20 RX ADMIN — LUBIPROSTONE 24 MCG: 24 CAPSULE, GELATIN COATED ORAL at 06:10

## 2019-10-20 RX ADMIN — FAMOTIDINE 20 MG: 10 INJECTION INTRAVENOUS at 09:10

## 2019-10-20 RX ADMIN — VALPROIC ACID 500 MG: 250 SOLUTION ORAL at 09:10

## 2019-10-20 RX ADMIN — MAGNESIUM CITRATE 296 ML: 1.75 LIQUID ORAL at 01:10

## 2019-10-20 RX ADMIN — TAMSULOSIN HYDROCHLORIDE 0.4 MG: 0.4 CAPSULE ORAL at 09:10

## 2019-10-20 RX ADMIN — CLONAZEPAM 1 MG: 0.5 TABLET ORAL at 09:10

## 2019-10-20 RX ADMIN — LEVOFLOXACIN 750 MG: 750 INJECTION, SOLUTION INTRAVENOUS at 12:10

## 2019-10-20 RX ADMIN — IPRATROPIUM BROMIDE AND ALBUTEROL SULFATE 3 ML: .5; 3 SOLUTION RESPIRATORY (INHALATION) at 11:10

## 2019-10-20 RX ADMIN — SODIUM CHLORIDE 30 MG/ML INHALATION SOLUTION 4 ML: 30 SOLUTION INHALANT at 08:10

## 2019-10-20 RX ADMIN — LUBIPROSTONE 24 MCG: 24 CAPSULE, GELATIN COATED ORAL at 09:10

## 2019-10-20 RX ADMIN — MICONAZOLE NITRATE: 20 POWDER TOPICAL at 09:10

## 2019-10-20 RX ADMIN — POLYETHYLENE GLYCOL 3350 17 G: 17 POWDER, FOR SOLUTION ORAL at 09:10

## 2019-10-20 RX ADMIN — IPRATROPIUM BROMIDE AND ALBUTEROL SULFATE 3 ML: .5; 3 SOLUTION RESPIRATORY (INHALATION) at 08:10

## 2019-10-20 RX ADMIN — ENOXAPARIN SODIUM 40 MG: 100 INJECTION SUBCUTANEOUS at 05:10

## 2019-10-20 RX ADMIN — SODIUM CHLORIDE 30 MG/ML INHALATION SOLUTION 4 ML: 30 SOLUTION INHALANT at 07:10

## 2019-10-20 RX ADMIN — Medication 100 ML: at 01:10

## 2019-10-20 RX ADMIN — SODIUM CHLORIDE 500 ML: 9 INJECTION, SOLUTION INTRAVENOUS at 11:10

## 2019-10-20 RX ADMIN — IPRATROPIUM BROMIDE AND ALBUTEROL SULFATE 3 ML: .5; 3 SOLUTION RESPIRATORY (INHALATION) at 07:10

## 2019-10-20 RX ADMIN — IPRATROPIUM BROMIDE AND ALBUTEROL SULFATE 3 ML: .5; 3 SOLUTION RESPIRATORY (INHALATION) at 03:10

## 2019-10-20 RX ADMIN — FLUOXETINE HYDROCHLORIDE 60 MG: 20 SOLUTION ORAL at 09:10

## 2019-10-20 NOTE — ASSESSMENT & PLAN NOTE
Secondary to inadequate free water intake.  Resolved with isotonic fluids. Unable to properly keep oral hydration due to dysphagia  Free water with tube feeds to restart  BMP daily

## 2019-10-20 NOTE — ASSESSMENT & PLAN NOTE
2/2 mucus plugging causing left lobe collapse. Recurrent problem in setting of rhinovirus PNA. Improved w/ more aggressive airway clearance. Discussed tracheostomy with aunt who understands this is a possibility.    - Oxygen requirements improving  - Cont aggressive airway clearance with duonebs q4, 3% hypertonic saline nebs BID, CPT especially of the left  - PT with OOB as much as tolerated  - Unable to perform cough assist or IS  - Restarted on abx (levaquin) on 10/16, leukocytosis improving  - Low threshold to broaden given risk factors for resistant organisms  - BiPAP at night and with naps to act a pneumatic stent for his bronchomalacia  - Aggressive bowel regimen to avoid splinting 2/2 abdominal distention  - TFs restarted

## 2019-10-20 NOTE — SUBJECTIVE & OBJECTIVE
Interval History: Oxygen requirements continue to improve. BRICE overnight. Tentative plan for EGD/PEG tomorrow.     Objective:     Vital Signs (Most Recent):  Temp: 97.5 °F (36.4 °C) (10/20/19 1115)  Pulse: 80 (10/20/19 1144)  Resp: (!) 24 (10/20/19 1144)  BP: (!) 147/84 (10/20/19 0900)  SpO2: 100 % (10/20/19 1144) Vital Signs (24h Range):  Temp:  [97.5 °F (36.4 °C)-98.9 °F (37.2 °C)] 97.5 °F (36.4 °C)  Pulse:  [] 80  Resp:  [18-41] 24  SpO2:  [96 %-100 %] 100 %  BP: (122-155)/(75-96) 147/84     Weight: 39 kg (85 lb 15.7 oz)  Body mass index is 16.25 kg/m².      Intake/Output Summary (Last 24 hours) at 10/20/2019 1428  Last data filed at 10/20/2019 1000  Gross per 24 hour   Intake 70 ml   Output --   Net 70 ml       Physical Exam   Constitutional: He appears cachectic. Sickly appearance: chronic.   HENT:   Head: Normocephalic and atraumatic.   Eyes: Pupils are equal, round, and reactive to light. No scleral icterus. Right eye exhibits no nystagmus. Left eye exhibits no nystagmus.   Neck: Trachea normal. No JVD present.   Cardiovascular: Normal rate and regular rhythm. Exam reveals no gallop and no friction rub.   No murmur heard.  No edema to note   Pulmonary/Chest: Effort normal. No respiratory distress. He has no wheezes. He has no rhonchi. He has no rales.   Abdominal: Soft. Bowel sounds are normal. He exhibits no distension. There is no tenderness. There is no guarding.   Musculoskeletal: He exhibits deformity.   Contracted right wrist   Neurological: He is alert. He displays atrophy. No cranial nerve deficit.       Vents:  Vent Mode: CPAP (10/09/19 0953)  Ventilator Initiated: Yes (10/06/19 0006)  Set Rate: 15 bmp (10/09/19 0804)  Vt Set: 350 mL (10/09/19 0804)  Pressure Support: 10 cmH20 (10/09/19 0953)  PEEP/CPAP: 5 cmH20 (10/09/19 0953)  Oxygen Concentration (%): 100 (10/18/19 1042)  Peak Airway Pressure: 15.4 cmH2O (10/09/19 0953)  Total Ve: 6.9 mL (10/09/19 0953)  F/VT Ratio<105 (RSBI): (!) 94.7  (10/09/19 0953)    Lines/Drains/Airways     Drain                 NG/OG Tube 10/15/19 1400 [place] sump Right nostril 5 days          Peripheral Intravenous Line                 Midline Catheter Insertion/Assessment  - Single Lumen 10/17/19 1415 Right basilic vein (medial side of arm)  3 days                Significant Labs:    CBC/Anemia Profile:  Recent Labs   Lab 10/19/19  0145 10/20/19  0240   WBC 11.13 9.65   HGB 8.3* 8.6*   HCT 27.3* 27.8*   * 393*   MCV 91 89   RDW 14.8* 14.8*        Chemistries:  Recent Labs   Lab 10/19/19  0145 10/20/19  0240    143   K 3.9 3.9    103   CO2 30* 30*   BUN 11 13   CREATININE 0.7 0.8   CALCIUM 8.8 9.1   MG 1.8 1.9   PHOS 3.4 3.7       All pertinent labs within the past 24 hours have been reviewed.    Significant Imaging:  I have reviewed and interpreted all pertinent imaging results/findings within the past 24 hours.   CXR 10/18/19- Left upper lobe aerated

## 2019-10-20 NOTE — PLAN OF CARE
Patient is currently on a 7 lpm High Flow Nasal Cannula with oxygen saturation of 100%.  No apparent distress noted at present time.  Will administer Respiratory treatments as ordered.  Will continue to monitor.

## 2019-10-20 NOTE — PROGRESS NOTES
Chief Complaint / Reason for Consult:  Dysphagia, aspiration pneumonia    Patient now in ICU, appears to be improving from a pulmonary standpoint, currently on nasal cannula    ROS:  Unable to obtain, secondary to current clinical condition    Patient Vitals for the past 24 hrs:   BP Temp Temp src Pulse Resp SpO2   10/20/19 0800 (!) 146/83 -- -- 83 (!) 23 100 %   10/20/19 0744 -- -- -- 100 (!) 41 100 %   10/20/19 0741 -- -- -- 96 (!) 28 --   10/20/19 0727 -- -- -- 82 (!) 24 --   10/20/19 0725 -- 97.7 °F (36.5 °C) -- 82 (!) 28 99 %   10/20/19 0645 -- -- -- 92 (!) 28 99 %   10/20/19 0630 -- -- -- 87 (!) 29 100 %   10/20/19 0615 -- -- -- 89 (!) 26 100 %   10/20/19 0600 138/82 -- -- 73 (!) 32 100 %   10/20/19 0545 -- -- -- 75 (!) 31 99 %   10/20/19 0530 -- -- -- 82 (!) 30 98 %   10/20/19 0515 -- -- -- 92 (!) 34 100 %   10/20/19 0500 (!) 144/87 -- -- 90 (!) 26 99 %   10/20/19 0445 -- -- -- 82 (!) 26 99 %   10/20/19 0430 -- -- -- 88 (!) 25 99 %   10/20/19 0415 -- -- -- 89 (!) 25 99 %   10/20/19 0400 (!) 145/92 -- -- 89 (!) 25 100 %   10/20/19 0345 -- -- -- 89 (!) 31 100 %   10/20/19 0337 -- -- -- 83 (!) 26 100 %   10/20/19 0330 -- -- -- 80 (!) 29 100 %   10/20/19 0315 -- 98 °F (36.7 °C) Axillary 75 (!) 28 100 %   10/20/19 0300 134/83 -- -- 80 (!) 37 99 %   10/20/19 0245 -- -- -- 84 (!) 26 99 %   10/20/19 0230 -- -- -- 95 (!) 26 99 %   10/20/19 0215 -- -- -- 91 (!) 28 100 %   10/20/19 0200 138/75 -- -- 96 (!) 27 99 %   10/20/19 0145 -- -- -- 96 (!) 24 99 %   10/20/19 0130 -- -- -- 99 18 99 %   10/20/19 0115 -- -- -- 95 (!) 21 100 %   10/20/19 0100 (!) 143/83 -- -- 91 (!) 24 100 %   10/20/19 0045 -- -- -- 96 19 100 %   10/20/19 0030 -- -- -- 96 19 98 %   10/20/19 0015 -- -- -- 101 (!) 21 98 %   10/20/19 0000 134/76 -- -- 100 (!) 22 98 %   10/19/19 2348 -- -- -- 95 (!) 25 99 %   10/19/19 2345 -- -- -- 90 (!) 22 100 %   10/19/19 2330 -- -- -- 89 (!) 25 100 %   10/19/19 2315 -- 98.9 °F (37.2 °C) Axillary 90 (!) 31 100 %    10/19/19 2300 (!) 141/86 -- -- 88 19 100 %   10/19/19 2245 -- -- -- 99 (!) 23 99 %   10/19/19 2230 -- -- -- 99 20 99 %   10/19/19 2215 -- -- -- 92 (!) 34 100 %   10/19/19 2200 (!) 147/89 -- -- 88 (!) 22 99 %   10/19/19 2145 -- -- -- 88 (!) 24 100 %   10/19/19 2130 -- -- -- 94 (!) 23 98 %   10/19/19 2125 -- -- -- 96 (!) 22 96 %   10/19/19 2115 -- -- -- 97 (!) 28 100 %   10/19/19 2100 138/83 -- -- 98 (!) 26 100 %   10/19/19 2045 -- -- -- 95 (!) 28 100 %   10/19/19 2030 -- -- -- 89 (!) 35 100 %   10/19/19 2019 -- -- -- 88 (!) 29 100 %   10/19/19 2015 -- -- -- 86 (!) 26 100 %   10/19/19 2000 (!) 155/96 -- -- 84 (!) 24 100 %   10/19/19 1945 -- -- -- 86 (!) 28 100 %   10/19/19 1930 -- -- -- 88 (!) 27 100 %   10/19/19 1915 -- 98 °F (36.7 °C) Axillary 89 (!) 25 100 %   10/19/19 1900 122/88 -- -- 93 -- 100 %   10/19/19 1845 -- -- -- 88 -- 99 %   10/19/19 1615 -- -- -- 86 18 96 %   10/19/19 1515 -- 98.9 °F (37.2 °C) -- -- -- --   10/19/19 1200 128/69 -- -- 80 (!) 24 99 %   10/19/19 1143 -- -- -- 88 (!) 21 100 %   10/19/19 1120 -- 98 °F (36.7 °C) -- -- -- --       Physical Exam:  Gen - thin, no apparent distress  HEENT - Anicteric  CV - S1, S2, no murmurs/rubs  Lungs - CTA-B, normal excursion  Abd - Soft, NT, ND, normal BS's, no HSM.  Ext - No c/c/e  Neuro - No asterixis, nonverbal    Labs:  Recent Labs   Lab 10/20/19  0240   WBC 9.65   RBC 3.12*   HGB 8.6*   HCT 27.8*   *   MCV 89   MCH 27.6   MCHC 30.9*     Recent Labs   Lab 10/20/19  0240   CALCIUM 9.1      K 3.9   CO2 30*      BUN 13   CREATININE 0.8       Imaging:  Reviewed CT from 10/16/2019, suggestive of distended loops of small bowel    Assessment:  This patient is a 24 y.o. male with:   1.  Dysphagia, chronic-secondary to cerebral palsy  2.  Aspiration pneumonia-recently requiring high-flow oxygen and BiPAP, now clinically improved.  3.  History of cerebral palsy and epilepsy.....    Recommendations:  1.  Continue with supportive care.  2.   Continue with pulmonary therapy.  3.  We will plan for tentative EGD/PEG tomorrow, will need anesthesia evaluation to ensure pulmonary status is appropriate and as optimized as possible.  4.  Procedure has already been discussed with patient's family member, who is in agreement.  5.  Will check coags.

## 2019-10-20 NOTE — PLAN OF CARE
Remains in ICU, weaned to 7 L HI FLOW NC on yesterday, wore BIPAP overnight. Patient still without BM despite giving a PRN Ducosate on last night. Remains NPO and tube feeding on hold. Afebrile. Remains free of falls and injuries.

## 2019-10-20 NOTE — CARE UPDATE
[facility]  ICU Multidisciplinary Bedside Rounds   SUMMARY     Date: 10/20/2019    Prehospitalization:   Admit Date / LOS : 10/3/2019/ 16 days    Diagnosis: Acute respiratory failure with hypoxia    Consults:        Active: GI and RD, PULM       Needed: N/A     Code Status: Full Code   Advanced Directive: <no information>    LDA: Midline       Central Lines/Site/Justification:Unable to Obtain/Maintain PIV       Urinary Cath/Order/Justification:Patient Does Not Have Urinary Catheter    Vasopressors/Infusions:        GOALS: Volume/ Hemodynamic: N/A                     RASS: N/A    CAM ICU: N/A  Pain Management: none       Pain Controlled: not applicable     Rhythm: NSR    Respiratory Device: HFNC and Bipap                  Most Recent SBT/ SAT: N/A        VTE Prophylaxis: Pharm  Mobility: Bedrest  Stress Ulcer Prophylaxis: Yes    Dietary: NPO  Tolerance: not applicable    Isolation: No active isolations    Restraints: Yes    Significant Dates:  Post Op Date: N/A  Rescue Date: 10/18/19  Imaging/ Diagnostics: N/A    Noteworthy Labs:  WBC 9.65, HGB 8.6, HCT 27.8, CO2 30    CBC/Anemia Labs: Coags:    Recent Labs   Lab 10/18/19  0211 10/19/19  0145 10/20/19  0240   WBC 13.21* 11.13 9.65   HGB 9.2* 8.3* 8.6*   HCT 29.0* 27.3* 27.8*   * 392* 393*   MCV 88 91 89   RDW 14.6* 14.8* 14.8*    No results for input(s): PT, INR, APTT in the last 168 hours.     Chemistries:   Recent Labs   Lab 10/18/19  0211 10/19/19  0145 10/20/19  0240    143 143   K 3.9 3.9 3.9    103 103   CO2 29 30* 30*   BUN 11 11 13   CREATININE 0.7 0.7 0.8   CALCIUM 8.5* 8.8 9.1   MG 1.8 1.8 1.9   PHOS 3.1 3.4 3.7        Cardiac Enzymes: Ejection Fractions:    No results for input(s): CPK, CPKMB, MB, TROPONINI in the last 72 hours. No results found for: EF     POCT Glucose: HbA1c:    No results for input(s): POCTGLUCOSE in the last 168 hours. No results found for: HGBA1C     Needs from Care Team: none     ICU LOS 1d  18h  Level of Care: Critical Care

## 2019-10-20 NOTE — ASSESSMENT & PLAN NOTE
Malnutrition in the context of Acute on Chronic Issue    Related to (etiology):  Dysphagia    Signs and Symptoms (as evidenced by):  Energy Intake: <50% of estimated energy requirement for >10 days    Muscle Mass Depletion: moderate and severe depletion of temples, clavicle region, scapular region, interosseous muscle and lower extremities ( r/t CP)   Weight Loss: 10% x 1 month      Interventions  Collaboration with providers    Nutrition Diagnosis Status:  New

## 2019-10-20 NOTE — ASSESSMENT & PLAN NOTE
Secondary to aspiration pneumonia and rhino virus  Completed antibiotic treatment prior to stepping down to floor however patient became septic again after eating pureed diet. MBBS did show evidence of aspiration therefore placed NPO.  Abx restarted 10/16. NGT placed to bypass all oral intake pending PEG tube placement which will be done once respiratory status improves.   Although more interactive, is on more O2 requirements , more tachycardic and with complete collapse of left lung. Will continue with highflow supplemental O2, nebulized treatments, nebulized hypertonic saline every 12 hours, bowel regimen, BiPAP QHS, chest physiotherapy and keep HOB elevated  Famotidine IV. Scopolamine patch in place.   Transferred to ICU for closer monitoring on 10/18  Respiratory status improved with aggressive pulmonary toilet  As per Pulmonary

## 2019-10-20 NOTE — SUBJECTIVE & OBJECTIVE
Interval History: No acute events overnight. No bowel movements, maintaining on NC.    Review of Systems   Unable to perform ROS: Patient nonverbal     Objective:     Vital Signs (Most Recent):  Temp: 98 °F (36.7 °C) (10/20/19 0315)  Pulse: 100 (10/20/19 0744)  Resp: (!) 41 (10/20/19 0744)  BP: 138/82 (10/20/19 0600)  SpO2: 100 % (10/20/19 0744) Vital Signs (24h Range):  Temp:  [98 °F (36.7 °C)-98.9 °F (37.2 °C)] 98 °F (36.7 °C)  Pulse:  [] 100  Resp:  [18-41] 41  SpO2:  [96 %-100 %] 100 %  BP: (122-155)/(69-96) 138/82     Weight: 39 kg (85 lb 15.7 oz)  Body mass index is 16.25 kg/m².    Intake/Output Summary (Last 24 hours) at 10/20/2019 0834  Last data filed at 10/19/2019 1229  Gross per 24 hour   Intake 270 ml   Output --   Net 270 ml      Physical Exam   Constitutional: He appears well-developed. No distress.   Contracted    HENT:   Head: Atraumatic.   NGT in place   Eyes: Conjunctivae are normal.   Neck: Neck supple.   Cardiovascular: Normal rate and regular rhythm.   Pulmonary/Chest: No stridor. No respiratory distress. He has no wheezes. He has no rales.   Breathing comfortably on NC, air movement on both side of lungs, no increase in effort   Abdominal: Soft. Bowel sounds are normal. He exhibits no distension. There is no tenderness.   Musculoskeletal:   Wasted extremities   Neurological: He is alert.   Making eye contact, does not follow commands   Skin: Skin is warm and dry. Capillary refill takes less than 2 seconds. He is not diaphoretic.   Nursing note and vitals reviewed.      Significant Labs: All pertinent labs within the past 24 hours have been reviewed.    Significant Imaging: I have reviewed all pertinent imaging results/findings within the past 24 hours.  I have reviewed and interpreted all pertinent imaging results/findings within the past 24 hours.

## 2019-10-20 NOTE — PROGRESS NOTES
[facility]  Pulmonology  Progress Note    Patient Name: Karri Galeano  MRN: [id number]  Admission Date: 10/3/2019  Hospital Length of Stay: 16 days  Code Status: Full Code  Attending Provider: Vanna Louis MD  Primary Care Provider: Echo Reeves MD   Principal Problem: Acute respiratory failure with hypoxia    Subjective:     Interval History: Oxygen requirements continue to improve. BRICE overnight. Tentative plan for EGD/PEG tomorrow.     Objective:     Vital Signs (Most Recent):  Temp: 97.5 °F (36.4 °C) (10/20/19 1115)  Pulse: 80 (10/20/19 1144)  Resp: (!) 24 (10/20/19 1144)  BP: (!) 147/84 (10/20/19 0900)  SpO2: 100 % (10/20/19 1144) Vital Signs (24h Range):  Temp:  [97.5 °F (36.4 °C)-98.9 °F (37.2 °C)] 97.5 °F (36.4 °C)  Pulse:  [] 80  Resp:  [18-41] 24  SpO2:  [96 %-100 %] 100 %  BP: (122-155)/(75-96) 147/84     Weight: 39 kg (85 lb 15.7 oz)  Body mass index is 16.25 kg/m².      Intake/Output Summary (Last 24 hours) at 10/20/2019 1428  Last data filed at 10/20/2019 1000  Gross per 24 hour   Intake 70 ml   Output --   Net 70 ml       Physical Exam   Constitutional: He appears cachectic. Sickly appearance: chronic.   HENT:   Head: Normocephalic and atraumatic.   Eyes: Pupils are equal, round, and reactive to light. No scleral icterus. Right eye exhibits no nystagmus. Left eye exhibits no nystagmus.   Neck: Trachea normal. No JVD present.   Cardiovascular: Normal rate and regular rhythm. Exam reveals no gallop and no friction rub.   No murmur heard.  No edema to note   Pulmonary/Chest: Effort normal. No respiratory distress. He has no wheezes. He has no rhonchi. He has no rales.   Abdominal: Soft. Bowel sounds are normal. He exhibits no distension. There is no tenderness. There is no guarding.   Musculoskeletal: He exhibits deformity.   Contracted right wrist   Neurological: He is alert. He displays atrophy. No cranial nerve deficit.       Vents:  Vent Mode: CPAP (10/09/19  0953)  Ventilator Initiated: Yes (10/06/19 0006)  Set Rate: 15 bmp (10/09/19 0804)  Vt Set: 350 mL (10/09/19 0804)  Pressure Support: 10 cmH20 (10/09/19 0953)  PEEP/CPAP: 5 cmH20 (10/09/19 0953)  Oxygen Concentration (%): 100 (10/18/19 1042)  Peak Airway Pressure: 15.4 cmH2O (10/09/19 0953)  Total Ve: 6.9 mL (10/09/19 0953)  F/VT Ratio<105 (RSBI): (!) 94.7 (10/09/19 0953)    Lines/Drains/Airways     Drain                 NG/OG Tube 10/15/19 1400 [place] sump Right nostril 5 days          Peripheral Intravenous Line                 Midline Catheter Insertion/Assessment  - Single Lumen 10/17/19 1415 Right basilic vein (medial side of arm)  3 days                Significant Labs:    CBC/Anemia Profile:  Recent Labs   Lab 10/19/19  0145 10/20/19  0240   WBC 11.13 9.65   HGB 8.3* 8.6*   HCT 27.3* 27.8*   * 393*   MCV 91 89   RDW 14.8* 14.8*        Chemistries:  Recent Labs   Lab 10/19/19  0145 10/20/19  0240    143   K 3.9 3.9    103   CO2 30* 30*   BUN 11 13   CREATININE 0.7 0.8   CALCIUM 8.8 9.1   MG 1.8 1.9   PHOS 3.4 3.7       All pertinent labs within the past 24 hours have been reviewed.    Significant Imaging:  I have reviewed and interpreted all pertinent imaging results/findings within the past 24 hours.   CXR 10/18/19- Left upper lobe aerated    Assessment/Plan:     * Acute respiratory failure with hypoxia  2/2 mucus plugging causing left lobe collapse. Recurrent problem in setting of rhinovirus PNA. Improved w/ more aggressive airway clearance. Discussed tracheostomy with aunt who understands this is a possibility.    - Oxygen requirements improving  - Cont aggressive airway clearance with duonebs q4, 3% hypertonic saline nebs BID, CPT especially of the left  - PT with OOB as much as tolerated  - Unable to perform cough assist or IS  - Restarted on abx (levaquin) on 10/16, leukocytosis improving  - Low threshold to broaden given risk factors for resistant organisms  - BiPAP at night and with  naps to act a pneumatic stent for his bronchomalacia  - Aggressive bowel regimen to avoid splinting 2/2 abdominal distention  - TFs restarted    Epilepsy  Divalproex at home.  Continue. No reported seizure >15 years.         Signing off at this time. Please call with any questions.     London Hernadez MD  Pulmonology  [facility] [place]

## 2019-10-20 NOTE — ASSESSMENT & PLAN NOTE
Family would like group home in [place] when ready for discharge  Hopefully there will not be need for trach

## 2019-10-20 NOTE — PLAN OF CARE
Patient resting in bed in 2 pt soft in restraints, no constrictions noted. Sitter @ bedside. O2 @ 2L NC; sat 100 %. No resp distress. Enema given, patient had sm and then very large loose and creamy light brown colored stool. No complication, he tolerated enema w/o diff. VSS. Tubefeedings started @ 10cc/hr and tolerating well w/o residuals to rt yvette beverly. PICC to upper rt forearm w/ NS @ kvo. Comfort measure provided. Patient for possible egd/peg tomorrow.

## 2019-10-20 NOTE — PROGRESS NOTES
"[facility] [place]  Adult Nutrition  Progress Note    SUMMARY       Recommendations    1. TF goal rate of 45 ml/hr adequate to meet estimated needs  - Fluid flushes of 200 ml QID or per MD.    -Monitor Mg, Phos, K with TF advancement due to prolonged NPO status   2. Rec PPN if pt unable to tolerate TF to goal rate: Clinimex 4.25/10 @ 75 ml/hr + IVFE daily.   -PN to provide: 1418 kcal, 77g pro, 1800 ml fluid; GIR: 1 mg/kg/min.     Goals: Initate nutrition support within 48 hrs  Nutrition Goal Status: new  Communication of RD Recs: reviewed with RN(secure chat with MD)    Reason for Assessment    Reason For Assessment: RD follow-up  Diagnosis: pulmonary disease, infection/sepsis  Relevant Medical History: CP, mental retardation  Interdisciplinary Rounds: did not attend    General Information Comments: TF on hold, team concerned about lack of BM. Nsg states + BS, no distension and plan to initiate TF @ 10 ml/hr. Noted in chart review pt has only had ~4 days of nutrition during current hospitalization with last date of TF on record as 10/9. Prev NFPE on 10/18: disease appropriate losses of LBM (moderate and severe), adequate fat mass. Wt down since adm. Pt qualifies for dx of acute malnutrition. Plan for PEG on 10/21. Per SLP: NPO due to silent aspiration.     Nutrition Discharge Planning: too soon to determine    Nutrition Risk Screen    Nutrition Risk Screen: tube feeding or parenteral nutrition    Nutrition/Diet History    Patient Reported Diet/Restrictions/Preferences: pureed(reported by )  Spiritual, Cultural Beliefs, Roman [employer] Practices, Values that Affect Care: no  Supplemental Drinks or Food Habits: Ensure Plus(Benecalorie)  Factors Affecting Nutritional Intake: impaired cognitive status/motor control, chewing difficulties/inability to chew food, NPO    Anthropometrics    Temp: 97.7 °F (36.5 °C)  Height Method: Estimated  Height: 5' 1" (154.9 cm)  Height (inches): 61 in  Weight " Method: Bed Scale  Weight: 39 kg (85 lb 15.7 oz)  Weight (lb): 85.98 lb  Ideal Body Weight (IBW), Male: 112 lb  % Ideal Body Weight, Male (lb): 76.77 lb  BMI (Calculated): 16.3  BMI Grade: 16 - 16.9 protein-energy malnutrition grade II  Wt loss: unintentional  Wt loss% 10% since adm       Lab/Procedures/Meds    Pertinent Labs Reviewed: reviewed  Pertinent Labs Comments: Na 148, K 3.4, Cl 111, Ca 8.6  Pertinent Medications Reviewed: reviewed  Pertinent Medications Comments: levo, valproic acid    Estimated/Assessed Needs    Weight Used For Calorie Calculations: 39 kg (85 lb 15.7 oz)  Energy Calorie Requirements (kcal): 1548 kcal/day  Energy Need Method: [place]-St Jeor(PAL 1.25)  Protein Requirements: 47-59 g/day(1.2-1.5 g/kg)  Weight Used For Protein Calculations: 39 kg (85 lb 15.7 oz)     Estimated Fluid Requirement Method: RDA Method  RDA Method (mL): 1548     Nutrition Prescription Ordered    Current Diet Order: NPO(for PEG placement that did not occur)  Current Nutrition Support Formula Ordered: Isosource 1.5  Current Nutrition Support Rate Ordered: (goal rate of 45 ml/hr)  Current Nutrition Support Frequency Ordered: (TF on hold)  Oral Nutrition Supplement: -    Evaluation of Received Nutrient/Fluid Intake    Enteral Calories (kcal): 0  Enteral Protein (gm): 0  Enteral (Free Water) Fluid (mL): 0  Free Water Flush Fluid (mL): 0  Other Calories (kcal): 0  Total Calories (kcal): 0  IV Fluid (mL): (IV fluids discountinued)  Total Fluid Intake (mL): 0  I/O: reviewed  Energy Calories Required: not meeting needs  Protein Required: not meeting needs  Fluid Required: (per MD)  Comments: NGT in place; LBM: 10/16  Tolerance: tolerating    % Meal Intake: NPO    Nutrition Risk    Level of Risk/Frequency of Follow-up: (F/U 2 x weekly)     Assessment and Plan    Severe Malnutrition in the context of Acute on Chronic Issue    Related to (etiology):  Dysphagia    Signs and Symptoms (as evidenced by):  Energy Intake: <50% of  estimated energy requirement for >10 days    Muscle Mass Depletion: moderate and severe depletion of temples, clavicle region, scapular region, interosseous muscle and lower extremities ( r/t CP)   Weight Loss: 10% x 1 month      Interventions  Collaboration with providers    Nutrition Diagnosis Status:  New         Monitor and Evaluation    Food and Nutrient Intake: enteral nutrition intake, parenteral nutrition intake  Food and Nutrient Adminstration: enteral and parenteral nutrition administration  Anthropometric Measurements: weight change, weight, body mass index  Biochemical Data, Medical Tests and Procedures: glucose/endocrine profile, electrolyte and renal panel, lipid profile, inflammatory profile, gastrointestinal profile  Nutrition-Focused Physical Findings: overall appearance, extremities, muscles and bones, head and eyes     Malnutrition Assessment      Energy intake: <50% > 10 days    Lean Body mass: Moderate and Severe losses r/t CP    Wt loss: Severe wt loss (10% x 1 month)          Nutrition Follow-Up    RD Follow-up?: Yes

## 2019-10-20 NOTE — PROGRESS NOTES
[facility] Hospital Medicine  Progress Note    Patient Name: Karri Galeano  MRN: [id number]  Patient Class: IP- Inpatient   Admission Date: 10/3/2019  Length of Stay: 16 days  Attending Physician: Vanna Louis MD  Primary Care Provider: Echo Reeves MD        Subjective:     Principal Problem:Acute respiratory failure with hypoxia        HPI:  Mr. Karri Galeano is a 24 y.o. male with cerebral palsy and epilepsy who presents to [facility] ED from an urgent care facility with complaints of fevers today.  He lives in a group home and started to have a nonproductive cough today.  The caregiver did not observe any vomiting.  He was in his usual state of health yesterday but today has become more lethargic and somnolent.  He was taken to an urgent care facility where he was then transferred to this facility for further care.  He was noted there to have a fever of 102.2° F as well as an oxygen saturation of 95% on nasal cannula at 3 liters/minute.  Further history is otherwise limited at this time.    Overview/Hospital Course:  Mr. Galeano presented with fever, cough and lethargy.  Respiratory status decompensated and he required intubation in the ER.  Workup with imaging consistent with possible pneumonia.  Empiric Zosyn and vancomycin initiated.  Pulmonary consulted for vent management.  Extubated on 10/5 in the morning.  Initially did well, but had worsening respiratory failure during the day.  Started on Bipap with no improvement and reintubated on 10/5 in the afternoon.  Blood cultures no growth to date.  Respiratory viral panel came back positive for rhino virus.  Antibiotics stopped.  Extubated on 10/09 to BiPAP.  Respiratory status stable.  Increased cough with feeding patient and he remained in the ICU.  Speech therapy recommended pureed diet.  He was stepped down to the floor on 10/11.  Patient with poor intake along with increased risk for aspiration. Per aunt, patient was fed full  meals when pureed diet was recommended by Speech. A MBBS was done. Showed evidence of aspiration therefore NPO status and PEG tube placement recommended. Aunt (next of kin) agreed with PEG placement once aspiration confirmed. Patient had unfortunately aspirated during PO intake and clinically worsened. Progressively requiring more O2 via high flow NC. Developed leukocytosis, became less interactive and spiked fever. NGT placed to avoid all oral intake and initiated on IV abx. Tube feeds started via NGT. Pulmonology re-consulted for co-management. Recommended BiPAP (or CPAP) QHS for positive pressure (as he can't participate with IS), nebulized treatments every 4 hours, nebulized hypertonic saline every 12 hours, chest physiotherapy, bowel regimen for fecal impaction/constipation and keep HOB elevated. Patient has scopolamine patch for oral secretions and IV famotidine for stress ulcer prophylaxis. Repeat CXR on 10/17 showed complete collapse of left lung likely due to atelectasis vs mucous plug. Patient remained stable on high flow but given high risk for further decompensation +/- bronchoscopy with intubation, he was upgraded to ICU for closer monitoring. Discussed with aunt at bedside. Patient is full code.         Interval History: No acute events overnight. No bowel movements, maintaining on NC.    Review of Systems   Unable to perform ROS: Patient nonverbal     Objective:     Vital Signs (Most Recent):  Temp: 98 °F (36.7 °C) (10/20/19 0315)  Pulse: 100 (10/20/19 0744)  Resp: (!) 41 (10/20/19 0744)  BP: 138/82 (10/20/19 0600)  SpO2: 100 % (10/20/19 0744) Vital Signs (24h Range):  Temp:  [98 °F (36.7 °C)-98.9 °F (37.2 °C)] 98 °F (36.7 °C)  Pulse:  [] 100  Resp:  [18-41] 41  SpO2:  [96 %-100 %] 100 %  BP: (122-155)/(69-96) 138/82     Weight: 39 kg (85 lb 15.7 oz)  Body mass index is 16.25 kg/m².    Intake/Output Summary (Last 24 hours) at 10/20/2019 0834  Last data filed at 10/19/2019 1229  Gross per 24 hour    Intake 270 ml   Output --   Net 270 ml      Physical Exam   Constitutional: He appears well-developed. No distress.   Contracted    HENT:   Head: Atraumatic.   NGT in place   Eyes: Conjunctivae are normal.   Neck: Neck supple.   Cardiovascular: Normal rate and regular rhythm.   Pulmonary/Chest: No stridor. No respiratory distress. He has no wheezes. He has no rales.   Breathing comfortably on NC, air movement on both side of lungs, no increase in effort   Abdominal: Soft. Bowel sounds are normal. He exhibits no distension. There is no tenderness.   Musculoskeletal:   Wasted extremities   Neurological: He is alert.   Making eye contact, does not follow commands   Skin: Skin is warm and dry. Capillary refill takes less than 2 seconds. He is not diaphoretic.   Nursing note and vitals reviewed.      Significant Labs: All pertinent labs within the past 24 hours have been reviewed.    Significant Imaging: I have reviewed all pertinent imaging results/findings within the past 24 hours.  I have reviewed and interpreted all pertinent imaging results/findings within the past 24 hours.      Assessment/Plan:      * Acute respiratory failure with hypoxia  Secondary to aspiration pneumonia and rhino virus  Completed antibiotic treatment prior to stepping down to floor however patient became septic again after eating pureed diet. MBBS did show evidence of aspiration therefore placed NPO.  Abx restarted 10/16. NGT placed to bypass all oral intake pending PEG tube placement which will be done once respiratory status improves.   Although more interactive, is on more O2 requirements , more tachycardic and with complete collapse of left lung. Will continue with highflow supplemental O2, nebulized treatments, nebulized hypertonic saline every 12 hours, bowel regimen, BiPAP QHS, chest physiotherapy and keep HOB elevated  Famotidine IV. Scopolamine patch in place.   Transferred to ICU for closer monitoring on 10/18  Respiratory status  improved with aggressive pulmonary toilet  As per Pulmonary    Pneumonia of left lung due to infectious organism  Management as above    Hypernatremia  Secondary to inadequate free water intake.  Resolved with isotonic fluids. Unable to properly keep oral hydration due to dysphagia  Free water with tube feeds to restart  BMP daily    Constipation  Already on bowel regimen  Will add Amitiza     Other dysphagia  As above    Severe malnutrition  NGT for now with restart of tube feeds today  PEG tentatively planned for Monday    Hypokalemia  Replace as needed.  Will continue to monitor    Epilepsy  Continue his home regimen of divalproex.  Also on clonazepam    Spastic quadriplegic cerebral palsy  Family would like group home in [place] when ready for discharge  Hopefully there will not be need for trach        VTE Risk Mitigation (From admission, onward)         Ordered     enoxaparin injection 40 mg  Daily      10/04/19 0152     IP VTE HIGH RISK PATIENT  Once      10/04/19 0152                Critical care time spent on the evaluation and treatment of severe organ dysfunction, review of pertinent labs and imaging studies, discussions with consulting providers and discussions with patient/family: 45 minutes.      Vanna Louis MD  Department of Hospital Medicine   [facility]

## 2019-10-21 ENCOUNTER — ANESTHESIA (OUTPATIENT)
Dept: ENDOSCOPY | Facility: HOSPITAL | Age: 24
DRG: 871 | End: 2019-10-21
Payer: MEDICARE

## 2019-10-21 ENCOUNTER — ANESTHESIA EVENT (OUTPATIENT)
Dept: ENDOSCOPY | Facility: HOSPITAL | Age: 24
DRG: 871 | End: 2019-10-21
Payer: MEDICARE

## 2019-10-21 LAB
ANION GAP SERPL CALC-SCNC: 9 MMOL/L (ref 8–16)
APTT BLDCRRT: 33.2 SEC (ref 21–32)
BASOPHILS # BLD AUTO: 0.05 K/UL (ref 0–0.2)
BASOPHILS # BLD AUTO: 0.05 K/UL (ref 0–0.2)
BASOPHILS NFR BLD: 0.7 % (ref 0–1.9)
BASOPHILS NFR BLD: 0.7 % (ref 0–1.9)
BUN SERPL-MCNC: 14 MG/DL (ref 6–20)
CALCIUM SERPL-MCNC: 9 MG/DL (ref 8.7–10.5)
CHLORIDE SERPL-SCNC: 106 MMOL/L (ref 95–110)
CO2 SERPL-SCNC: 29 MMOL/L (ref 23–29)
CREAT SERPL-MCNC: 0.8 MG/DL (ref 0.5–1.4)
DIFFERENTIAL METHOD: ABNORMAL
DIFFERENTIAL METHOD: ABNORMAL
EOSINOPHIL # BLD AUTO: 0.3 K/UL (ref 0–0.5)
EOSINOPHIL # BLD AUTO: 0.3 K/UL (ref 0–0.5)
EOSINOPHIL NFR BLD: 3.8 % (ref 0–8)
EOSINOPHIL NFR BLD: 3.8 % (ref 0–8)
ERYTHROCYTE [DISTWIDTH] IN BLOOD BY AUTOMATED COUNT: 14.8 % (ref 11.5–14.5)
ERYTHROCYTE [DISTWIDTH] IN BLOOD BY AUTOMATED COUNT: 14.8 % (ref 11.5–14.5)
EST. GFR  (AFRICAN AMERICAN): >60 ML/MIN/1.73 M^2
EST. GFR  (NON AFRICAN AMERICAN): >60 ML/MIN/1.73 M^2
GLUCOSE SERPL-MCNC: 90 MG/DL (ref 70–110)
HCT VFR BLD AUTO: 28.1 % (ref 40–54)
HCT VFR BLD AUTO: 28.1 % (ref 40–54)
HGB BLD-MCNC: 8.6 G/DL (ref 14–18)
HGB BLD-MCNC: 8.6 G/DL (ref 14–18)
IMM GRANULOCYTES # BLD AUTO: 0.04 K/UL (ref 0–0.04)
IMM GRANULOCYTES # BLD AUTO: 0.04 K/UL (ref 0–0.04)
IMM GRANULOCYTES NFR BLD AUTO: 0.5 % (ref 0–0.5)
IMM GRANULOCYTES NFR BLD AUTO: 0.5 % (ref 0–0.5)
LYMPHOCYTES # BLD AUTO: 1.8 K/UL (ref 1–4.8)
LYMPHOCYTES # BLD AUTO: 1.8 K/UL (ref 1–4.8)
LYMPHOCYTES NFR BLD: 23.7 % (ref 18–48)
LYMPHOCYTES NFR BLD: 23.7 % (ref 18–48)
MAGNESIUM SERPL-MCNC: 2 MG/DL (ref 1.6–2.6)
MCH RBC QN AUTO: 27.6 PG (ref 27–31)
MCH RBC QN AUTO: 27.6 PG (ref 27–31)
MCHC RBC AUTO-ENTMCNC: 30.6 G/DL (ref 32–36)
MCHC RBC AUTO-ENTMCNC: 30.6 G/DL (ref 32–36)
MCV RBC AUTO: 90 FL (ref 82–98)
MCV RBC AUTO: 90 FL (ref 82–98)
MONOCYTES # BLD AUTO: 0.8 K/UL (ref 0.3–1)
MONOCYTES # BLD AUTO: 0.8 K/UL (ref 0.3–1)
MONOCYTES NFR BLD: 10.8 % (ref 4–15)
MONOCYTES NFR BLD: 10.8 % (ref 4–15)
NEUTROPHILS # BLD AUTO: 4.6 K/UL (ref 1.8–7.7)
NEUTROPHILS # BLD AUTO: 4.6 K/UL (ref 1.8–7.7)
NEUTROPHILS NFR BLD: 60.5 % (ref 38–73)
NEUTROPHILS NFR BLD: 60.5 % (ref 38–73)
NRBC BLD-RTO: 0 /100 WBC
NRBC BLD-RTO: 0 /100 WBC
PHOSPHATE SERPL-MCNC: 4 MG/DL (ref 2.7–4.5)
PLATELET # BLD AUTO: 385 K/UL (ref 150–350)
PLATELET # BLD AUTO: 385 K/UL (ref 150–350)
PMV BLD AUTO: 11 FL (ref 9.2–12.9)
PMV BLD AUTO: 11 FL (ref 9.2–12.9)
POTASSIUM SERPL-SCNC: 3.7 MMOL/L (ref 3.5–5.1)
RBC # BLD AUTO: 3.12 M/UL (ref 4.6–6.2)
RBC # BLD AUTO: 3.12 M/UL (ref 4.6–6.2)
SODIUM SERPL-SCNC: 144 MMOL/L (ref 136–145)
WBC # BLD AUTO: 7.65 K/UL (ref 3.9–12.7)
WBC # BLD AUTO: 7.65 K/UL (ref 3.9–12.7)

## 2019-10-21 PROCEDURE — 43200 ESOPHAGOSCOPY FLEXIBLE BRUSH: CPT | Performed by: INTERNAL MEDICINE

## 2019-10-21 PROCEDURE — 63600175 PHARM REV CODE 636 W HCPCS: Performed by: NURSE ANESTHETIST, CERTIFIED REGISTERED

## 2019-10-21 PROCEDURE — 85025 COMPLETE CBC W/AUTO DIFF WBC: CPT

## 2019-10-21 PROCEDURE — 99900035 HC TECH TIME PER 15 MIN (STAT)

## 2019-10-21 PROCEDURE — 37000009 HC ANESTHESIA EA ADD 15 MINS: Performed by: INTERNAL MEDICINE

## 2019-10-21 PROCEDURE — 25000242 PHARM REV CODE 250 ALT 637 W/ HCPCS: Performed by: INTERNAL MEDICINE

## 2019-10-21 PROCEDURE — 36415 COLL VENOUS BLD VENIPUNCTURE: CPT

## 2019-10-21 PROCEDURE — 85730 THROMBOPLASTIN TIME PARTIAL: CPT

## 2019-10-21 PROCEDURE — 37000008 HC ANESTHESIA 1ST 15 MINUTES: Performed by: INTERNAL MEDICINE

## 2019-10-21 PROCEDURE — 94640 AIRWAY INHALATION TREATMENT: CPT

## 2019-10-21 PROCEDURE — D9220A PRA ANESTHESIA: Mod: ANES,,, | Performed by: ANESTHESIOLOGY

## 2019-10-21 PROCEDURE — D9220A PRA ANESTHESIA: ICD-10-PCS | Mod: CRNA,,, | Performed by: NURSE ANESTHETIST, CERTIFIED REGISTERED

## 2019-10-21 PROCEDURE — 84100 ASSAY OF PHOSPHORUS: CPT

## 2019-10-21 PROCEDURE — 25000003 PHARM REV CODE 250: Performed by: INTERNAL MEDICINE

## 2019-10-21 PROCEDURE — D9220A PRA ANESTHESIA: Mod: CRNA,,, | Performed by: NURSE ANESTHETIST, CERTIFIED REGISTERED

## 2019-10-21 PROCEDURE — 25000003 PHARM REV CODE 250: Performed by: HOSPITALIST

## 2019-10-21 PROCEDURE — 94668 MNPJ CHEST WALL SBSQ: CPT

## 2019-10-21 PROCEDURE — 94660 CPAP INITIATION&MGMT: CPT

## 2019-10-21 PROCEDURE — 83735 ASSAY OF MAGNESIUM: CPT

## 2019-10-21 PROCEDURE — 20000000 HC ICU ROOM

## 2019-10-21 PROCEDURE — D9220A PRA ANESTHESIA: ICD-10-PCS | Mod: ANES,,, | Performed by: ANESTHESIOLOGY

## 2019-10-21 PROCEDURE — 27000221 HC OXYGEN, UP TO 24 HOURS

## 2019-10-21 PROCEDURE — S0028 INJECTION, FAMOTIDINE, 20 MG: HCPCS | Performed by: INTERNAL MEDICINE

## 2019-10-21 PROCEDURE — 80048 BASIC METABOLIC PNL TOTAL CA: CPT

## 2019-10-21 PROCEDURE — 94761 N-INVAS EAR/PLS OXIMETRY MLT: CPT

## 2019-10-21 PROCEDURE — 63600175 PHARM REV CODE 636 W HCPCS: Performed by: INTERNAL MEDICINE

## 2019-10-21 RX ORDER — CEFAZOLIN SODIUM 1 G/3ML
INJECTION, POWDER, FOR SOLUTION INTRAMUSCULAR; INTRAVENOUS
Status: DISCONTINUED | OUTPATIENT
Start: 2019-10-21 | End: 2019-10-21

## 2019-10-21 RX ORDER — CEFAZOLIN SODIUM 1 G/50ML
SOLUTION INTRAVENOUS
Status: DISPENSED
Start: 2019-10-21 | End: 2019-10-22

## 2019-10-21 RX ORDER — PROPOFOL 10 MG/ML
VIAL (ML) INTRAVENOUS
Status: COMPLETED
Start: 2019-10-21 | End: 2019-10-21

## 2019-10-21 RX ORDER — LIDOCAINE HYDROCHLORIDE 20 MG/ML
INJECTION, SOLUTION EPIDURAL; INFILTRATION; INTRACAUDAL; PERINEURAL
Status: DISPENSED
Start: 2019-10-21 | End: 2019-10-22

## 2019-10-21 RX ORDER — PROPOFOL 10 MG/ML
VIAL (ML) INTRAVENOUS
Status: DISCONTINUED | OUTPATIENT
Start: 2019-10-21 | End: 2019-10-21

## 2019-10-21 RX ORDER — LIDOCAINE HCL/PF 100 MG/5ML
SYRINGE (ML) INTRAVENOUS
Status: DISCONTINUED | OUTPATIENT
Start: 2019-10-21 | End: 2019-10-21

## 2019-10-21 RX ORDER — SODIUM CHLORIDE 9 MG/ML
INJECTION, SOLUTION INTRAVENOUS CONTINUOUS PRN
Status: DISCONTINUED | OUTPATIENT
Start: 2019-10-21 | End: 2019-10-21

## 2019-10-21 RX ADMIN — PROPOFOL 40 MG: 10 INJECTION, EMULSION INTRAVENOUS at 02:10

## 2019-10-21 RX ADMIN — IPRATROPIUM BROMIDE AND ALBUTEROL SULFATE 3 ML: .5; 3 SOLUTION RESPIRATORY (INHALATION) at 04:10

## 2019-10-21 RX ADMIN — FAMOTIDINE 20 MG: 10 INJECTION INTRAVENOUS at 08:10

## 2019-10-21 RX ADMIN — IPRATROPIUM BROMIDE AND ALBUTEROL SULFATE 3 ML: .5; 3 SOLUTION RESPIRATORY (INHALATION) at 08:10

## 2019-10-21 RX ADMIN — IPRATROPIUM BROMIDE AND ALBUTEROL SULFATE 3 ML: .5; 3 SOLUTION RESPIRATORY (INHALATION) at 12:10

## 2019-10-21 RX ADMIN — MICONAZOLE NITRATE: 20 POWDER TOPICAL at 08:10

## 2019-10-21 RX ADMIN — VALPROIC ACID 500 MG: 250 SOLUTION ORAL at 08:10

## 2019-10-21 RX ADMIN — CLONAZEPAM 1 MG: 0.5 TABLET ORAL at 08:10

## 2019-10-21 RX ADMIN — ENOXAPARIN SODIUM 40 MG: 100 INJECTION SUBCUTANEOUS at 05:10

## 2019-10-21 RX ADMIN — LEVOFLOXACIN 750 MG: 750 INJECTION, SOLUTION INTRAVENOUS at 12:10

## 2019-10-21 RX ADMIN — PROPOFOL 20 MG: 10 INJECTION, EMULSION INTRAVENOUS at 02:10

## 2019-10-21 RX ADMIN — CEFAZOLIN SODIUM 1 G: 1 POWDER, FOR SOLUTION INTRAMUSCULAR; INTRAVENOUS at 02:10

## 2019-10-21 RX ADMIN — LIDOCAINE HYDROCHLORIDE 100 MG: 20 INJECTION, SOLUTION INTRAVENOUS at 02:10

## 2019-10-21 RX ADMIN — IPRATROPIUM BROMIDE AND ALBUTEROL SULFATE 3 ML: .5; 3 SOLUTION RESPIRATORY (INHALATION) at 11:10

## 2019-10-21 RX ADMIN — SODIUM CHLORIDE: 0.9 INJECTION, SOLUTION INTRAVENOUS at 02:10

## 2019-10-21 RX ADMIN — SODIUM CHLORIDE 30 MG/ML INHALATION SOLUTION 4 ML: 30 SOLUTION INHALANT at 08:10

## 2019-10-21 RX ADMIN — LUBIPROSTONE 24 MCG: 24 CAPSULE, GELATIN COATED ORAL at 07:10

## 2019-10-21 RX ADMIN — POLYETHYLENE GLYCOL 3350 17 G: 17 POWDER, FOR SOLUTION ORAL at 08:10

## 2019-10-21 RX ADMIN — TAMSULOSIN HYDROCHLORIDE 0.4 MG: 0.4 CAPSULE ORAL at 08:10

## 2019-10-21 RX ADMIN — LUBIPROSTONE 24 MCG: 24 CAPSULE, GELATIN COATED ORAL at 05:10

## 2019-10-21 RX ADMIN — FLUOXETINE HYDROCHLORIDE 60 MG: 20 SOLUTION ORAL at 08:10

## 2019-10-21 NOTE — ASSESSMENT & PLAN NOTE
NGT for now with restart of tube feeds   PEG attempted by GI on 10/21 (today) without success  General surgery consulted for J-tube placement

## 2019-10-21 NOTE — ASSESSMENT & PLAN NOTE
Family would like group home in [place] when ready for discharge  Hopefully there will not be need for trach

## 2019-10-21 NOTE — PLAN OF CARE
Remains in ICU, weaned to O2 2 L NC, BIPAP worn overnight. Several BMs over the past 24 hours, trickle tube feeds initiated via R nare NGT, tolerating well but stopped at midnight due to possible PEG placement on today. Right upper arm mid-line catheter intact. Remains free of falls and injuries.

## 2019-10-21 NOTE — PROGRESS NOTES
[facility] Hospital Medicine  Progress Note    Patient Name: Karri Galeano  MRN: [id number]  Patient Class: IP- Inpatient   Admission Date: 10/3/2019  Length of Stay: 17 days  Attending Physician: Vanna Louis MD  Primary Care Provider: Echo Reeves MD        Subjective:     Principal Problem:Acute respiratory failure with hypoxia        HPI:  Mr. Karri Galeano is a 24 y.o. male with cerebral palsy and epilepsy who presents to [facility] ED from an urgent care facility with complaints of fevers today.  He lives in a group home and started to have a nonproductive cough today.  The caregiver did not observe any vomiting.  He was in his usual state of health yesterday but today has become more lethargic and somnolent.  He was taken to an urgent care facility where he was then transferred to this facility for further care.  He was noted there to have a fever of 102.2° F as well as an oxygen saturation of 95% on nasal cannula at 3 liters/minute.  Further history is otherwise limited at this time.    Overview/Hospital Course:  Mr. Galeano presented with fever, cough and lethargy.  Respiratory status decompensated and he required intubation in the ER.  Workup with imaging consistent with possible pneumonia.  Empiric Zosyn and vancomycin initiated.  Pulmonary consulted for vent management.  Extubated on 10/5 in the morning.  Initially did well, but had worsening respiratory failure during the day.  Started on Bipap with no improvement and reintubated on 10/5 in the afternoon.  Blood cultures no growth to date.  Respiratory viral panel came back positive for rhino virus.  Antibiotics stopped.  Extubated on 10/9 to BiPAP.  Respiratory status stable.  Increased cough with feeding patient and he remained in the ICU.  Speech therapy recommended pureed diet.  He was stepped down to the floor on 10/11.  Patient with poor intake along with increased risk for aspiration. Per aunt, patient was fed full  meals when pureed diet was recommended by Speech. A MBBS was done. Showed evidence of aspiration therefore NPO status and PEG tube placement recommended. Aunt (next of kin) agreed with PEG placement once aspiration confirmed. Patient had unfortunately aspirated during PO intake and clinically worsened. Progressively requiring more O2 via high flow NC. Developed leukocytosis, became less interactive and spiked fever. NGT placed to avoid all oral intake and initiated on IV abx. Tube feeds started via NGT. Pulmonology re-consulted for co-management. Recommended BiPAP (or CPAP) QHS for positive pressure (as he can't participate with IS), nebulized treatments every 4 hours, nebulized hypertonic saline every 12 hours, chest physiotherapy, bowel regimen for fecal impaction/constipation and keep HOB elevated. Patient has scopolamine patch for oral secretions and IV famotidine for stress ulcer prophylaxis. Repeat CXR on 10/17 showed complete collapse of left lung likely due to atelectasis vs mucous plug. Patient remained stable on high flow but given high risk for further decompensation +/- bronchoscopy with intubation, he was upgraded to ICU for closer monitoring. Discussed with aunt at bedside. Patient had aggressive pulmonary toilet with improvement in aeration seen on chest x-ray and respiratory status improved.  Trickle feeds resumed and GI planned for PEG on 10/21.        Interval History: No acute events overnight.  Patient had multiple large bowel movements yesterday (4 total).  Attempt at PEG placement was unsuccessful today by GI.    Review of Systems   Unable to perform ROS: Patient nonverbal     Objective:     Vital Signs (Most Recent):  Temp: 96.4 °F (35.8 °C) (10/21/19 1500)  Pulse: 87 (10/21/19 1515)  Resp: (!) 25 (10/21/19 1515)  BP: (!) 158/96 (10/21/19 1515)  SpO2: (!) 88 % (10/21/19 1515) Vital Signs (24h Range):  Temp:  [96.4 °F (35.8 °C)-98.4 °F (36.9 °C)] 96.4 °F (35.8 °C)  Pulse:  [] 87  Resp:   [14-44] 25  SpO2:  [88 %-100 %] 88 %  BP: (124-158)/() 158/96     Weight: 39 kg (85 lb 15.7 oz)  Body mass index is 16.25 kg/m².    Intake/Output Summary (Last 24 hours) at 10/21/2019 1604  Last data filed at 10/21/2019 1230  Gross per 24 hour   Intake 520 ml   Output --   Net 520 ml      Physical Exam   Constitutional: He appears well-developed. No distress.   Contracted    HENT:   Head: Atraumatic.   NGT in place   Eyes: Conjunctivae are normal.   Neck: Neck supple.   Cardiovascular: Normal rate and regular rhythm.   Pulmonary/Chest: No stridor. No respiratory distress. He has no wheezes. He has no rales.   Breathing comfortably on NC, air movement on both side of lungs, no increase in effort   Abdominal: Soft. Bowel sounds are normal. He exhibits no distension. There is no tenderness.   Musculoskeletal:   Wasted extremities   Neurological: He is alert.   Making eye contact, does not follow commands   Skin: Skin is warm and dry. Capillary refill takes less than 2 seconds. He is not diaphoretic.   Nursing note and vitals reviewed.      Significant Labs: All pertinent labs within the past 24 hours have been reviewed.    Significant Imaging: I have reviewed all pertinent imaging results/findings within the past 24 hours.  I have reviewed and interpreted all pertinent imaging results/findings within the past 24 hours.      Assessment/Plan:      * Acute respiratory failure with hypoxia  Secondary to aspiration pneumonia and rhino virus  Completed antibiotic treatment prior to stepping down to floor however patient became septic again after eating pureed diet.   MBBS did show evidence of aspiration therefore placed NPO.  Abx restarted 10/16. NGT placed to bypass all oral intake pending PEG tube placement which will be done once respiratory status improves.   Although more interactive, he was on more O2 requirements , more tachycardic and with complete collapse of left lung.  Treated with highflow supplemental O2,  nebulized treatments, nebulized hypertonic saline every 12 hours, bowel regimen, BiPAP QHS, chest physiotherapy and keep HOB elevated  Famotidine IV. Scopolamine patch in place.   Continue treatment with cefazolin and levofloxacin, last dose on 10/22 and 10/23 respectively  Transferred to ICU for closer monitoring on 10/18  Respiratory status improved with aggressive pulmonary toilet and chest x-ray with improved  As per Pulmonary    Severe malnutrition  NGT for now with restart of tube feeds   PEG attempted by GI on 10/21 (today) without success  General surgery consulted for J-tube placement    Pneumonia of left lung due to infectious organism  Management as above    Hypernatremia  Secondary to inadequate free water intake.  Resolved with isotonic fluids. Unable to properly keep oral hydration due to dysphagia  Free water with tube feeds to restart  BMP daily    Constipation  On bowel regimen  Multiple bowel movements overnight  Continue current regimen    Other dysphagia  As above    Hypokalemia  Replace as needed.  Will continue to monitor    Epilepsy  Continue his home regimen of divalproex.  Also on clonazepam    Spastic quadriplegic cerebral palsy  Family would like group home in [place] when ready for discharge  Hopefully there will not be need for trach      VTE Risk Mitigation (From admission, onward)         Ordered     enoxaparin injection 40 mg  Daily      10/04/19 0152     IP VTE HIGH RISK PATIENT  Once      10/04/19 0152                Critical care time spent on the evaluation and treatment of severe organ dysfunction, review of pertinent labs and imaging studies, discussions with consulting providers and discussions with patient/family: 35 minutes.      Vanna Louis MD  Department of Hospital Medicine   [facility]

## 2019-10-21 NOTE — [employer] PATIENT INSTRUCTIONS
Discharge Summary/Instructions after an Endoscopic Procedure  Patient Name: Karri Galeano  Patient MRN: [id number]  Patient YOB: 1995 Monday, October 21, 2019  Melina Garcia MD  RESTRICTIONS:  During your procedure today, you received medications for sedation.  These   medications may affect your judgment, balance and coordination.  Therefore,   for 24 hours, you have the following restrictions:   - DO NOT drive a car, operate machinery, make legal/financial decisions,   sign important papers or drink alcohol.    ACTIVITY:  Today: no heavy lifting, straining or running due to procedural   sedation/anesthesia.  The following day: return to full activity including work.  DIET:  Eat and drink normally unless instructed otherwise.     TREATMENT FOR COMMON SIDE EFFECTS:  - Mild abdominal pain, nausea, belching, bloating or excessive gas:  rest,   eat lightly and use a heating pad.  - Sore Throat: treat with throat lozenges and/or gargle with warm salt   water.  - Because air was used during the procedure, expelling large amounts of air   from your rectum or belching is normal.  - If a bowel prep was taken, you may not have a bowel movement for 1-3 days.    This is normal.  SYMPTOMS TO WATCH FOR AND REPORT TO YOUR PHYSICIAN:  1. Abdominal pain or bloating, other than gas cramps.  2. Chest pain.  3. Back pain.  4. Signs of infection such as: chills or fever occurring within 24 hours   after the procedure.  5. Rectal bleeding, which would show as bright red, maroon, or black stools.   (A tablespoon of blood from the rectum is not serious, especially if   hemorrhoids are present.)  6. Vomiting.  7. Weakness or dizziness.  GO DIRECTLY TO THE NEAREST EMERGENCY ROOM IF YOU HAVE ANY OF THE FOLLOWING:      Difficulty breathing              Chills and/or fever over 101 F   Persistent vomiting and/or vomiting blood   Severe abdominal pain   Severe chest pain   Black, tarry stools   Bleeding- more than one  tablespoon   Any other symptom or condition that you feel may need urgent attention  Your doctor recommends these additional instructions:  If any biopsies were taken, your doctors clinic will contact you in 1 to 2   weeks with any results.  - Return patient to hospital arenas for ongoing care.   - Resume previous diet today.   - Okay to resume tube feeds through NG tube.  - Continue present medications.   - Consult surgery for surgical J tube placement.  For questions, problems or results please call your physician - Melina Garcia MD at Work:  [phone].  [facility] can be reached at [phone]     IF A COMPLICATION OR EMERGENCY SITUATION ARISES AND YOU ARE UNABLE TO REACH   YOUR PHYSICIAN - GO DIRECTLY TO THE EMERGENCY ROOM.  Melina Garcia MD  10/21/2019 2:52:44 PM  This report has been verified and signed electronically.  [employer]

## 2019-10-21 NOTE — SUBJECTIVE & OBJECTIVE
Interval History: No acute events overnight.  Patient had multiple large bowel movements yesterday (4 total).    Review of Systems   Unable to perform ROS: Patient nonverbal     Objective:     Vital Signs (Most Recent):  Temp: 96.4 °F (35.8 °C) (10/21/19 1500)  Pulse: 87 (10/21/19 1515)  Resp: (!) 25 (10/21/19 1515)  BP: (!) 158/96 (10/21/19 1515)  SpO2: (!) 88 % (10/21/19 1515) Vital Signs (24h Range):  Temp:  [96.4 °F (35.8 °C)-98.4 °F (36.9 °C)] 96.4 °F (35.8 °C)  Pulse:  [] 87  Resp:  [14-44] 25  SpO2:  [88 %-100 %] 88 %  BP: (124-158)/() 158/96     Weight: 39 kg (85 lb 15.7 oz)  Body mass index is 16.25 kg/m².    Intake/Output Summary (Last 24 hours) at 10/21/2019 1604  Last data filed at 10/21/2019 1230  Gross per 24 hour   Intake 520 ml   Output --   Net 520 ml      Physical Exam   Constitutional: He appears well-developed. No distress.   Contracted    HENT:   Head: Atraumatic.   NGT in place   Eyes: Conjunctivae are normal.   Neck: Neck supple.   Cardiovascular: Normal rate and regular rhythm.   Pulmonary/Chest: No stridor. No respiratory distress. He has no wheezes. He has no rales.   Breathing comfortably on NC, air movement on both side of lungs, no increase in effort   Abdominal: Soft. Bowel sounds are normal. He exhibits no distension. There is no tenderness.   Musculoskeletal:   Wasted extremities   Neurological: He is alert.   Making eye contact, does not follow commands   Skin: Skin is warm and dry. Capillary refill takes less than 2 seconds. He is not diaphoretic.   Nursing note and vitals reviewed.      Significant Labs: All pertinent labs within the past 24 hours have been reviewed.    Significant Imaging: I have reviewed all pertinent imaging results/findings within the past 24 hours.  I have reviewed and interpreted all pertinent imaging results/findings within the past 24 hours.

## 2019-10-21 NOTE — NURSING
Patient return to unit via bed accompanied by RN, vital signs stable, Sats 99% on 2L02 PNC,  patient reposition and turn to left side, continue monitor, reconcile orders.

## 2019-10-21 NOTE — TRANSFER OF CARE
"Anesthesia Transfer of Care Note    Patient: Karri Galeano    Procedure(s) Performed: Procedure(s) (LRB):  INSERTION, PEG TUBE (N/A)    Patient location: ICU    Anesthesia Type: general    Transport from OR: Transported from OR on 2-3 L/min O2 by NC with adequate spontaneous ventilation. Continuous ECG monitoring in transport. Continuous SpO2 monitoring in transport    Post pain: adequate analgesia    Post assessment: no apparent anesthetic complications and tolerated procedure well    Post vital signs: stable    Level of consciousness: sedated and responds to stimulation    Nausea/Vomiting: no nausea/vomiting    Complications: none    Transfer of care protocol was followed      Last vitals:   Visit Vitals  BP (!) 124/58 (BP Location: Left arm, Patient Position: Lying)   Pulse 81   Temp 36.9 °C (98.4 °F) (Oral)   Resp 20   Ht 5' 1" (1.549 m)   Wt 39 kg (85 lb 15.7 oz)   SpO2 98%   BMI 16.25 kg/m²     "

## 2019-10-21 NOTE — PLAN OF CARE
PEG attempted by GI on 10/21 (today) without success  General surgery consulted for J-tube placement. Restarted tube feeding at 10ml/hr via NG tube.

## 2019-10-21 NOTE — ANESTHESIA PREPROCEDURE EVALUATION
10/21/2019  Modesto Galeano is a 24 y.o., male. ICU patient with cognative defacits consented via family over the phone. He was set to ICU approx 5 days ago secondary to respiratory distress with  opacification of left lung on CT. CXR from 10/16 and 10/17/2019 demonstrate improvement of left lung and patient has been satting well on nasal canulae. Spoke with Pt aunt (Asha Galeano) at length, explained anesthetic plan and risk, expressing concern for respiratory support during and after procedure. She verbalized understanding and felt that her questions were answered to her satisfaction.     Flow Chart documents Sats % on nasal canula    Anesthesia Evaluation         Review of Systems  Anesthesia Hx:  No previous Anesthesia    Social:  Non-Smoker    Hematology/Oncology:  Hematology Normal   Oncology Normal     EENT/Dental:EENT/Dental Normal   Cardiovascular:  Cardiovascular Normal     Pulmonary:   Pneumonia    Renal/:  Renal/ Normal     Hepatic/GI:  Hepatic/GI Normal    Musculoskeletal:  Musculoskeletal Normal    Neurological:   Seizures Cerebral palsy aand mental retardation   Endocrine:  Endocrine Normal    Dermatological:  Skin Normal    Psych:   Psychiatric History                  Cognative impairment makes pt unable to fully cooperate with exam    Physical Exam  General:  Well nourished    Airway/Jaw/Neck:  Airway Findings:      Chest/Lungs:  Chest/Lungs Findings: Decreased Breath Sounds Bilateral, Expiratory Wheezes, Severe     Heart/Vascular:  Heart Findings: Rate: Normal  Rhythm: Regular Rhythm         Results for MODESTO GALEANO (MRN [id number]) as of 10/21/2019 13:34   Ref. Range 10/21/2019 02:55   WBC Latest Ref Range: 3.90 - 12.70 K/uL 7.65   RBC Latest Ref Range: 4.60 - 6.20 M/uL 3.12 (L)   Hemoglobin Latest Ref Range: 14.0 - 18.0 g/dL 8.6 (L)   Hematocrit Latest Ref Range: 40.0 -  54.0 % 28.1 (L)   MCV Latest Ref Range: 82 - 98 fL 90   MCH Latest Ref Range: 27.0 - 31.0 pg 27.6   MCHC Latest Ref Range: 32.0 - 36.0 g/dL 30.6 (L)   RDW Latest Ref Range: 11.5 - 14.5 % 14.8 (H)   Platelets Latest Ref Range: 150 - 350 K/uL 385 (H)   Results for MODESTO CANTU (MRN [id number]) as of 10/21/2019 13:34   Ref. Range 10/21/2019 02:55   Sodium Latest Ref Range: 136 - 145 mmol/L 144   Potassium Latest Ref Range: 3.5 - 5.1 mmol/L 3.7   Chloride Latest Ref Range: 95 - 110 mmol/L 106   CO2 Latest Ref Range: 23 - 29 mmol/L 29   Anion Gap Latest Ref Range: 8 - 16 mmol/L 9   BUN, Bld Latest Ref Range: 6 - 20 mg/dL 14   Creatinine Latest Ref Range: 0.5 - 1.4 mg/dL 0.8   Test Reason : R50.9,    Vent. Rate : 099 BPM     Atrial Rate : 099 BPM     P-R Int : 130 ms          QRS Dur : 070 ms      QT Int : 328 ms       P-R-T Axes : 045 054 093 degrees     QTc Int : 420 ms    Normal sinus rhythm  Nonspecific ST and T wave abnormality  Abnormal ECG  When compared with ECG of 03-JUN-2017 16:33,  No significant change was found  Confirmed by Reid Wilde MD ([id number]) on 10/5/2019 9:47:51 AM    Referred By: [email or web address]   SELF           Confirmed By:Reid Wilde MD  X-Ray Chest AP Portable   Order: [id number]   Status:  Final result   Visible to patient:  No (Not Released) Next appt:  None   Details     Reading Physician Reading Date Result Priority   Efrain Villaseñor MD 10/18/2019 Routine      Narrative     EXAMINATION:  XR CHEST AP PORTABLE    CLINICAL HISTORY:  follow up left lobe collapse;    TECHNIQUE:  Single frontal view of the chest was performed.    COMPARISON:  Chest 10/17/2019    FINDINGS:  Since the previous examination there is significant improved aeration of the left lung.  Continued left pleural effusion.  There is also obscuration of the left hemidiaphragm, likely from the left pleural effusion or superimposed left basilar atelectasis.    Mild pulmonary vascular congestion in the left upper lung zone.  Right lung is  clear.  Heart size at the upper limits of normal.    The position of the nasogastric tube remains without significant change from previous study.      Impression       1. Improved aeration of the left upper lung zone when compared to the previous day's study.  2. Residual left pleural effusion and left basilar atelectasis.  Follow-up radiographs are suggested.      Electronically signed by: Efrain Villaseñor MD  Date: 10/18/2019  Time: 12:52                 Anesthesia Plan  Type of Anesthesia, risks & benefits discussed:  Anesthesia Type:  general  Patient's Preference:   Intra-op Monitoring Plan: standard ASA monitors  Intra-op Monitoring Plan Comments:   Post Op Pain Control Plan:   Post Op Pain Control Plan Comments:   Induction:   IV  Beta Blocker:         Informed Consent: Patient understands risks and agrees with Anesthesia plan.  Questions answered. Anesthesia consent signed with patient.  ASA Score: 3     Day of Surgery Review of History & Physical:            Ready For Surgery From Anesthesia Perspective.

## 2019-10-21 NOTE — CARE UPDATE
[facility]  ICU Multidisciplinary Bedside Rounds   SUMMARY     Date: 10/21/2019    [facility]  Admit Date / LOS : 10/3/2019/ 17 days    Diagnosis: Acute respiratory failure with hypoxia    Consults:        Active: GI, Pulmonary, dietary, PT       Needed: N/A     Code Status: Full Code   Advanced Directive: <no information>    LDA: Midline and NG       Central Lines/Site/Justification:Unable to Obtain/Maintain PIV       Urinary Cath/Order/Justification:Patient Does Not Have Urinary Catheter    Vasopressors/Infusions:        GOALS: Volume/ Hemodynamic: N/A                     RASS: N/A    CAM ICU: N/A  Pain Management: none       Pain Controlled: not applicable     Rhythm: NSR    Respiratory Device: Nasal Cannula                  Most Recent SBT/ SAT: N/A       [id number]}    VTE Prophylaxis: Pharm  Mobility: Bedrest  Stress Ulcer Prophylaxis: Yes    Dietary: NPO  Tolerance: not applicable    Isolation: No active isolations    Restraints: Yes    Significant Dates:  Post Op Date: N/A  Rescue Date: 10/18/19  Imaging/ Diagnostics: N/A    Noteworthy Labs:  HGB 8.6, HCT 28.1    CBC/Anemia Labs: Coags:    Recent Labs   Lab 10/19/19  0145 10/20/19  0240 10/21/19  0255   WBC 11.13 9.65 7.65  7.65   HGB 8.3* 8.6* 8.6*  8.6*   HCT 27.3* 27.8* 28.1*  28.1*   * 393* 385*  385*   MCV 91 89 90  90   RDW 14.8* 14.8* 14.8*  14.8*    Recent Labs   Lab 10/20/19  0956 10/21/19  0255   INR 1.1  --    APTT  --  33.2*        Chemistries:   Recent Labs   Lab 10/19/19  0145 10/20/19  0240 10/21/19  0255    143 144   K 3.9 3.9 3.7    103 106   CO2 30* 30* 29   BUN 11 13 14   CREATININE 0.7 0.8 0.8   CALCIUM 8.8 9.1 9.0   MG 1.8 1.9 2.0   PHOS 3.4 3.7 4.0        Cardiac Enzymes: Ejection Fractions:    No results for input(s): CPK, CPKMB, MB, TROPONINI in the last 72 hours. No results found for: EF     POCT Glucose: HbA1c:    No results for input(s): POCTGLUCOSE in the last  168 hours. No results found for: HGBA1C     Needs from Care Team: none     ICU LOS 2d 18h  Level of Care: OK to Transfer

## 2019-10-21 NOTE — ASSESSMENT & PLAN NOTE
Secondary to aspiration pneumonia and rhino virus  Completed antibiotic treatment prior to stepping down to floor however patient became septic again after eating pureed diet.   MBBS did show evidence of aspiration therefore placed NPO.  Abx restarted 10/16. NGT placed to bypass all oral intake pending PEG tube placement which will be done once respiratory status improves.   Although more interactive, he was on more O2 requirements , more tachycardic and with complete collapse of left lung.  Treated with highflow supplemental O2, nebulized treatments, nebulized hypertonic saline every 12 hours, bowel regimen, BiPAP QHS, chest physiotherapy and keep HOB elevated  Famotidine IV. Scopolamine patch in place.   Continue treatment with cefazolin and levofloxacin, last dose on 10/22 and 10/23 respectively  Transferred to ICU for closer monitoring on 10/18  Respiratory status improved with aggressive pulmonary toilet and chest x-ray with improved  As per Pulmonary

## 2019-10-22 LAB
ANION GAP SERPL CALC-SCNC: 7 MMOL/L (ref 8–16)
BASOPHILS # BLD AUTO: 0.03 K/UL (ref 0–0.2)
BASOPHILS NFR BLD: 0.4 % (ref 0–1.9)
BUN SERPL-MCNC: 11 MG/DL (ref 6–20)
CALCIUM SERPL-MCNC: 8.7 MG/DL (ref 8.7–10.5)
CHLORIDE SERPL-SCNC: 105 MMOL/L (ref 95–110)
CO2 SERPL-SCNC: 31 MMOL/L (ref 23–29)
CREAT SERPL-MCNC: 0.7 MG/DL (ref 0.5–1.4)
DIFFERENTIAL METHOD: ABNORMAL
EOSINOPHIL # BLD AUTO: 0.2 K/UL (ref 0–0.5)
EOSINOPHIL NFR BLD: 2.7 % (ref 0–8)
ERYTHROCYTE [DISTWIDTH] IN BLOOD BY AUTOMATED COUNT: 14.8 % (ref 11.5–14.5)
EST. GFR  (AFRICAN AMERICAN): >60 ML/MIN/1.73 M^2
EST. GFR  (NON AFRICAN AMERICAN): >60 ML/MIN/1.73 M^2
GLUCOSE SERPL-MCNC: 93 MG/DL (ref 70–110)
HCT VFR BLD AUTO: 27.2 % (ref 40–54)
HGB BLD-MCNC: 8.5 G/DL (ref 14–18)
IMM GRANULOCYTES # BLD AUTO: 0.04 K/UL (ref 0–0.04)
IMM GRANULOCYTES NFR BLD AUTO: 0.5 % (ref 0–0.5)
LYMPHOCYTES # BLD AUTO: 1.8 K/UL (ref 1–4.8)
LYMPHOCYTES NFR BLD: 21.6 % (ref 18–48)
MAGNESIUM SERPL-MCNC: 1.9 MG/DL (ref 1.6–2.6)
MCH RBC QN AUTO: 27.9 PG (ref 27–31)
MCHC RBC AUTO-ENTMCNC: 31.3 G/DL (ref 32–36)
MCV RBC AUTO: 89 FL (ref 82–98)
MONOCYTES # BLD AUTO: 0.8 K/UL (ref 0.3–1)
MONOCYTES NFR BLD: 10.2 % (ref 4–15)
NEUTROPHILS # BLD AUTO: 5.2 K/UL (ref 1.8–7.7)
NEUTROPHILS NFR BLD: 64.6 % (ref 38–73)
NRBC BLD-RTO: 0 /100 WBC
PHOSPHATE SERPL-MCNC: 3.5 MG/DL (ref 2.7–4.5)
PLATELET # BLD AUTO: 322 K/UL (ref 150–350)
PMV BLD AUTO: 10.1 FL (ref 9.2–12.9)
POCT GLUCOSE: 93 MG/DL (ref 70–110)
POTASSIUM SERPL-SCNC: 3.6 MMOL/L (ref 3.5–5.1)
RBC # BLD AUTO: 3.05 M/UL (ref 4.6–6.2)
SODIUM SERPL-SCNC: 143 MMOL/L (ref 136–145)
WBC # BLD AUTO: 8.1 K/UL (ref 3.9–12.7)

## 2019-10-22 PROCEDURE — 25000003 PHARM REV CODE 250: Performed by: INTERNAL MEDICINE

## 2019-10-22 PROCEDURE — 27000221 HC OXYGEN, UP TO 24 HOURS

## 2019-10-22 PROCEDURE — 80048 BASIC METABOLIC PNL TOTAL CA: CPT

## 2019-10-22 PROCEDURE — 11000001 HC ACUTE MED/SURG PRIVATE ROOM

## 2019-10-22 PROCEDURE — 36415 COLL VENOUS BLD VENIPUNCTURE: CPT

## 2019-10-22 PROCEDURE — 63600175 PHARM REV CODE 636 W HCPCS: Performed by: INTERNAL MEDICINE

## 2019-10-22 PROCEDURE — 85025 COMPLETE CBC W/AUTO DIFF WBC: CPT

## 2019-10-22 PROCEDURE — 25000242 PHARM REV CODE 250 ALT 637 W/ HCPCS: Performed by: INTERNAL MEDICINE

## 2019-10-22 PROCEDURE — 94640 AIRWAY INHALATION TREATMENT: CPT

## 2019-10-22 PROCEDURE — 94660 CPAP INITIATION&MGMT: CPT

## 2019-10-22 PROCEDURE — 83735 ASSAY OF MAGNESIUM: CPT

## 2019-10-22 PROCEDURE — 97803 MED NUTRITION INDIV SUBSEQ: CPT

## 2019-10-22 PROCEDURE — S0028 INJECTION, FAMOTIDINE, 20 MG: HCPCS | Performed by: INTERNAL MEDICINE

## 2019-10-22 PROCEDURE — 99900035 HC TECH TIME PER 15 MIN (STAT)

## 2019-10-22 PROCEDURE — 94761 N-INVAS EAR/PLS OXIMETRY MLT: CPT

## 2019-10-22 PROCEDURE — 94668 MNPJ CHEST WALL SBSQ: CPT

## 2019-10-22 PROCEDURE — 84100 ASSAY OF PHOSPHORUS: CPT

## 2019-10-22 RX ADMIN — VALPROIC ACID 500 MG: 250 SOLUTION ORAL at 09:10

## 2019-10-22 RX ADMIN — SODIUM CHLORIDE 30 MG/ML INHALATION SOLUTION 4 ML: 30 SOLUTION INHALANT at 11:10

## 2019-10-22 RX ADMIN — IPRATROPIUM BROMIDE AND ALBUTEROL SULFATE 3 ML: .5; 3 SOLUTION RESPIRATORY (INHALATION) at 07:10

## 2019-10-22 RX ADMIN — LEVOFLOXACIN 750 MG: 750 INJECTION, SOLUTION INTRAVENOUS at 11:10

## 2019-10-22 RX ADMIN — IPRATROPIUM BROMIDE AND ALBUTEROL SULFATE 3 ML: .5; 3 SOLUTION RESPIRATORY (INHALATION) at 08:10

## 2019-10-22 RX ADMIN — CLONAZEPAM 1 MG: 0.5 TABLET ORAL at 09:10

## 2019-10-22 RX ADMIN — SODIUM CHLORIDE 30 MG/ML INHALATION SOLUTION 4 ML: 30 SOLUTION INHALANT at 08:10

## 2019-10-22 RX ADMIN — POLYETHYLENE GLYCOL 3350 17 G: 17 POWDER, FOR SOLUTION ORAL at 09:10

## 2019-10-22 RX ADMIN — IPRATROPIUM BROMIDE AND ALBUTEROL SULFATE 3 ML: .5; 3 SOLUTION RESPIRATORY (INHALATION) at 03:10

## 2019-10-22 RX ADMIN — IPRATROPIUM BROMIDE AND ALBUTEROL SULFATE 3 ML: .5; 3 SOLUTION RESPIRATORY (INHALATION) at 05:10

## 2019-10-22 RX ADMIN — SCOPALAMINE 1 PATCH: 1 PATCH, EXTENDED RELEASE TRANSDERMAL at 02:10

## 2019-10-22 RX ADMIN — FAMOTIDINE 20 MG: 10 INJECTION INTRAVENOUS at 09:10

## 2019-10-22 RX ADMIN — MICONAZOLE NITRATE: 20 POWDER TOPICAL at 09:10

## 2019-10-22 RX ADMIN — FLUOXETINE HYDROCHLORIDE 60 MG: 20 SOLUTION ORAL at 09:10

## 2019-10-22 RX ADMIN — IPRATROPIUM BROMIDE AND ALBUTEROL SULFATE 3 ML: .5; 3 SOLUTION RESPIRATORY (INHALATION) at 11:10

## 2019-10-22 RX ADMIN — TAMSULOSIN HYDROCHLORIDE 0.4 MG: 0.4 CAPSULE ORAL at 09:10

## 2019-10-22 RX ADMIN — LUBIPROSTONE 24 MCG: 24 CAPSULE, GELATIN COATED ORAL at 08:10

## 2019-10-22 RX ADMIN — ENOXAPARIN SODIUM 40 MG: 100 INJECTION SUBCUTANEOUS at 05:10

## 2019-10-22 NOTE — PROGRESS NOTES
gen surg  Consult dictated [id number]  Cerebral palsy,aspiration- failed PEG yest due to hiatal hernia  Poss open j tube later this week,unsuccessful reaching family to discuss this am- will attempt to discuss w family during day today- currently restrained due to pulling at lines-if j tube placed this could be issue keeping in place  Thank you for consult-will follow

## 2019-10-22 NOTE — PLAN OF CARE
"Remains in ICU.  No acute events over night.  Incont of B&B; care provided as needed.  VSS; afebrile, NSR on tele.  Turned and repositioned q2h. POC reviewed with sitter, expressed understanding.    Problem: Fall Injury Risk  Goal: Absence of Fall and Fall-Related Injury  Outcome: Ongoing, Progressing     Problem: Skin Injury Risk Increased  Goal: Skin Health and Integrity  Outcome: Ongoing, Progressing     Problem: Adult Inpatient Plan of Care  Goal: Plan of Care Review  Description  Patient remains intubated today and to ventilator, Propofol drip continues for sedation,  Tube feeds started per OGT, tolerating without difficulty.  Saldivar cath remains patent.  Aunt updated on patient condition, she states "I'm on the way there"   Outcome: Ongoing, Progressing  Goal: Patient-Specific Goal (Individualization)  Outcome: Ongoing, Progressing  Goal: Absence of Hospital-Acquired Illness or Injury  Outcome: Ongoing, Progressing  Goal: Optimal Comfort and Wellbeing  Outcome: Ongoing, Progressing  Goal: Readiness for Transition of Care  Outcome: Ongoing, Progressing  Goal: Rounds/Family Conference  Outcome: Ongoing, Progressing     Problem: Noninvasive Ventilation Acute  Goal: Effective Unassisted Ventilation and Oxygenation  Outcome: Ongoing, Progressing     Problem: Infection  Goal: Infection Symptom Resolution  Outcome: Ongoing, Progressing     Problem: Restraint, Nonbehavioral (Nonviolent)  Goal: Discontinuation Criteria Achieved  Outcome: Ongoing, Progressing  Goal: Personal Dignity and Safety Maintained  Outcome: Ongoing, Progressing     Problem: Coping Ineffective  Goal: Effective Coping  Outcome: Ongoing, Progressing     Problem: Oral Intake Inadequate  Goal: Improved Oral Intake  Outcome: Ongoing, Progressing     "

## 2019-10-22 NOTE — PROGRESS NOTES
[facility] Hospital Medicine  Progress Note    Patient Name: Karri Galeano  MRN: [id number]  Patient Class: IP- Inpatient   Admission Date: 10/3/2019  Length of Stay: 18 days  Attending Physician: Kiko Villasenor MD  Primary Care Provider: Echo Reeves MD        Subjective:     Principal Problem:Acute respiratory failure with hypoxia        HPI:  Mr. Karri Galeano is a 24 y.o. male with cerebral palsy and epilepsy who presents to [facility] ED from an urgent care facility with complaints of fevers today.  He lives in a group home and started to have a nonproductive cough today.  The caregiver did not observe any vomiting.  He was in his usual state of health yesterday but today has become more lethargic and somnolent.  He was taken to an urgent care facility where he was then transferred to this facility for further care.  He was noted there to have a fever of 102.2° F as well as an oxygen saturation of 95% on nasal cannula at 3 liters/minute.  Further history is otherwise limited at this time.    Overview/Hospital Course:  Mr. Galeano presented with fever, cough and lethargy.  Respiratory status decompensated and he required intubation in the ER.  Workup with imaging consistent with possible pneumonia.  Empiric Zosyn and vancomycin initiated.  Pulmonary consulted for vent management.  Extubated on 10/5 in the morning.  Initially did well, but had worsening respiratory failure during the day.  Started on Bipap with no improvement and reintubated on 10/5 in the afternoon.  Blood cultures no growth to date.  Respiratory viral panel came back positive for rhino virus.  Antibiotics stopped.  Extubated on 10/9 to BiPAP.  Respiratory status stable.  Increased cough with feeding patient and he remained in the ICU.  Speech therapy recommended pureed diet.  He was stepped down to the floor on 10/11.  Patient with poor intake along with increased risk for aspiration. Per aunt, patient was fed  full meals when pureed diet was recommended by Speech. A MBBS was done. Showed evidence of aspiration therefore NPO status and PEG tube placement recommended. Aunt (next of kin) agreed with PEG placement once aspiration confirmed. Patient had unfortunately aspirated during PO intake and clinically worsened. Progressively requiring more O2 via high flow NC. Developed leukocytosis, became less interactive and spiked fever. NGT placed to avoid all oral intake and initiated on IV abx. Tube feeds started via NGT. Pulmonology re-consulted for co-management. Recommended BiPAP (or CPAP) QHS for positive pressure (as he can't participate with IS), nebulized treatments every 4 hours, nebulized hypertonic saline every 12 hours, chest physiotherapy, bowel regimen for fecal impaction/constipation and keep HOB elevated. Patient has scopolamine patch for oral secretions and IV famotidine for stress ulcer prophylaxis. Repeat CXR on 10/17 showed complete collapse of left lung likely due to atelectasis vs mucous plug. Patient remained stable on high flow but given high risk for further decompensation +/- bronchoscopy with intubation, he was upgraded to ICU for closer monitoring. Discussed with aunt at bedside. Patient had aggressive pulmonary toilet with improvement in aeration seen on chest x-ray and respiratory status improved.  Trickle feeds resumed and GI planned for PEG on 10/21- not successful and surgery consulted for J tube placement.  The patient was transferred out of the ICU on 10/22.        Interval History: No new issues     Review of Systems   Unable to perform ROS: Patient nonverbal     Objective:     Vital Signs (Most Recent):  Temp: 97.8 °F (36.6 °C) (10/22/19 0301)  Pulse: 64 (10/22/19 0819)  Resp: (!) 24 (10/22/19 0819)  BP: (!) 145/93 (10/22/19 0600)  SpO2: 99 % (10/22/19 0819) Vital Signs (24h Range):  Temp:  [96.4 °F (35.8 °C)-98.4 °F (36.9 °C)] 97.8 °F (36.6 °C)  Pulse:  [64-94] 64  Resp:  [12-44] 24  SpO2:   [88 %-100 %] 99 %  BP: (124-165)/() 145/93     Weight: 39 kg (85 lb 15.7 oz)  Body mass index is 16.25 kg/m².    Intake/Output Summary (Last 24 hours) at 10/22/2019 1006  Last data filed at 10/22/2019 0600  Gross per 24 hour   Intake 750 ml   Output --   Net 750 ml      Physical Exam   Constitutional: He appears well-developed and well-nourished.   HENT:   Head: Normocephalic and atraumatic.   Cardiovascular: Normal rate and regular rhythm.   Pulmonary/Chest: Effort normal. No stridor. No respiratory distress.   Abdominal: Soft.   Neurological: He is alert.   Nursing note and vitals reviewed.      Significant Labs:   BMP:   Recent Labs   Lab 10/22/19  0219   GLU 93      K 3.6      CO2 31*   BUN 11   CREATININE 0.7   CALCIUM 8.7   MG 1.9     CBC:   Recent Labs   Lab 10/21/19  0255 10/22/19  0219   WBC 7.65  7.65 8.10   HGB 8.6*  8.6* 8.5*   HCT 28.1*  28.1* 27.2*   *  385* 322       Significant Imaging      Assessment/Plan:      * Acute respiratory failure with hypoxia  Secondary to aspiration pneumonia and rhino virus  Completed antibiotic treatment prior to stepping down to floor however patient became septic again after eating pureed diet.   MBBS did show evidence of aspiration therefore placed NPO.  Abx restarted 10/16. NGT placed to bypass all oral intake pending PEG tube placement which will be done once respiratory status improves.   Although more interactive, he was on more O2 requirements , more tachycardic and with complete collapse of left lung.  Treated with highflow supplemental O2, nebulized treatments, nebulized hypertonic saline every 12 hours, bowel regimen, BiPAP QHS, chest physiotherapy and keep HOB elevated  Famotidine IV. Scopolamine patch in place.   Continue treatment with cefazolin and levofloxacin, last dose on 10/22 and 10/23 respectively  Transferred to ICU for closer monitoring on 10/18  Respiratory status improved with aggressive pulmonary toilet and chest  x-ray with improved  As per Pulmonary    Constipation  On bowel regimen  Multiple bowel movements overnight  Continue current regimen    Other dysphagia  As above      Severe malnutrition  NGT for now with restart of tube feeds   PEG attempted by GI on 10/21 (today) without success  General surgery consulted for J-tube placement    Hypokalemia  Replace as needed.  Will continue to monitor    Epilepsy  Continue his home regimen of divalproex.  Also on clonazepam    Pneumonia of left lung due to infectious organism  Management as above    Spastic quadriplegic cerebral palsy  Family would like group home in [place] when ready for discharge  Hopefully there will not be need for trach      Hypernatremia  Secondary to inadequate free water intake.  Resolved with isotonic fluids. Unable to properly keep oral hydration due to dysphagia  Free water with tube feeds to restart  BMP daily        VTE Risk Mitigation (From admission, onward)         Ordered     enoxaparin injection 40 mg  Daily      10/04/19 0152     IP VTE HIGH RISK PATIENT  Once      10/04/19 0152                Critical care time spent on the evaluation and treatment of severe organ dysfunction, review of pertinent labs and imaging studies, discussions with consulting providers and discussions with patient/family: 35 minutes.      Kiko Malcolm MD  Department of Hospital Medicine   [facility]

## 2019-10-22 NOTE — PLAN OF CARE
Problem: Malnutrition  Goal: Improved Nutritional Intake  Outcome: Ongoing, Not Progressing  Intervention: Promote and Optimize Nutrition Support  Flowsheets (Taken 10/22/2019 1403)  Nutrition Support Management: tube feeding rate increased

## 2019-10-22 NOTE — PLAN OF CARE
1500 VSS, afebrile, breath sounds diminished, 96% SPO2 on RA, tolerating tube feeds at 10mL/hr, repeat speech study ordered, draining clear yellow urine to diaper, BM 1x this shift, Miralax administered, turned Q2, bed in low locked position, in view of nurses station, no needs at this time.

## 2019-10-22 NOTE — CARE UPDATE
Transfer Summary:    Mr. Galeano presented with fever, cough and lethargy.  Respiratory status decompensated and he required intubation in the ER.  Workup with imaging consistent with possible pneumonia.  Empiric Zosyn and vancomycin initiated.  Pulmonary consulted for vent management.  Extubated on 10/5 in the morning.  Initially did well, but had worsening respiratory failure during the day.  Started on Bipap with no improvement and reintubated on 10/5 in the afternoon.  Blood cultures no growth to date.  Respiratory viral panel came back positive for rhino virus.  Antibiotics stopped.  Extubated on 10/9 to BiPAP.  Respiratory status stable.  Increased cough with feeding patient and he remained in the ICU.  Speech therapy recommended pureed diet.  He was stepped down to the floor on 10/11.  Patient with poor intake along with increased risk for aspiration. Per aunt, patient was fed full meals when pureed diet was recommended by Speech. A MBBS was done. Showed evidence of aspiration therefore NPO status and PEG tube placement recommended. Aunt (next of kin) agreed with PEG placement once aspiration confirmed. Patient had unfortunately aspirated during PO intake and clinically worsened. Progressively requiring more O2 via high flow NC. Developed leukocytosis, became less interactive and spiked fever. NGT placed to avoid all oral intake and initiated on IV abx. Tube feeds started via NGT. Pulmonology re-consulted for co-management. Recommended BiPAP (or CPAP) QHS for positive pressure (as he can't participate with IS), nebulized treatments every 4 hours, nebulized hypertonic saline every 12 hours, chest physiotherapy, bowel regimen for fecal impaction/constipation and keep HOB elevated. Patient has scopolamine patch for oral secretions and IV famotidine for stress ulcer prophylaxis. Repeat CXR on 10/17 showed complete collapse of left lung likely due to atelectasis vs mucous plug. Patient remained stable on high  flow but given high risk for further decompensation +/- bronchoscopy with intubation, he was upgraded to ICU for closer monitoring. Discussed with aunt at bedside. Patient had aggressive pulmonary toilet with improvement in aeration seen on chest x-ray and respiratory status improved.  Trickle feeds resumed and GI planned for PEG on 10/21- not successful and surgery consulted for J tube placement.  The patient was transferred out of the ICU on 10/22.

## 2019-10-22 NOTE — SUBJECTIVE & OBJECTIVE
Interval History: No new issues     Review of Systems   Unable to perform ROS: Patient nonverbal     Objective:     Vital Signs (Most Recent):  Temp: 97.8 °F (36.6 °C) (10/22/19 0301)  Pulse: 64 (10/22/19 0819)  Resp: (!) 24 (10/22/19 0819)  BP: (!) 145/93 (10/22/19 0600)  SpO2: 99 % (10/22/19 0819) Vital Signs (24h Range):  Temp:  [96.4 °F (35.8 °C)-98.4 °F (36.9 °C)] 97.8 °F (36.6 °C)  Pulse:  [64-94] 64  Resp:  [12-44] 24  SpO2:  [88 %-100 %] 99 %  BP: (124-165)/() 145/93     Weight: 39 kg (85 lb 15.7 oz)  Body mass index is 16.25 kg/m².    Intake/Output Summary (Last 24 hours) at 10/22/2019 1006  Last data filed at 10/22/2019 0600  Gross per 24 hour   Intake 750 ml   Output --   Net 750 ml      Physical Exam   Constitutional: He appears well-developed and well-nourished.   HENT:   Head: Normocephalic and atraumatic.   Cardiovascular: Normal rate and regular rhythm.   Pulmonary/Chest: Effort normal. No stridor. No respiratory distress.   Abdominal: Soft.   Neurological: He is alert.   Nursing note and vitals reviewed.      Significant Labs:   BMP:   Recent Labs   Lab 10/22/19  0219   GLU 93      K 3.6      CO2 31*   BUN 11   CREATININE 0.7   CALCIUM 8.7   MG 1.9     CBC:   Recent Labs   Lab 10/21/19  0255 10/22/19  0219   WBC 7.65  7.65 8.10   HGB 8.6*  8.6* 8.5*   HCT 28.1*  28.1* 27.2*   *  385* 322       Significant Imaging

## 2019-10-22 NOTE — CONSULTS
Consult is from Dr. Kiko Villasenor.  Consult directed to Dr. Tony Espinoza   regarding possible open jejunostomy tube placement.    HISTORY OF PRESENT ILLNESS:  This is a 24-year-old male with cerebral palsy,   unable to give any current history originally admitted on 10/04/2019 with   aspiration pneumonia, ultimately requiring intubation, he is apparently   aspirating.  An attempt was made to place a PEG tube yesterday, but this was   unsuccessful due to the presence of a hiatal hernia.  The patient is currently   restrained due to pulling at lines and tubes.    PAST MEDICAL HISTORY:  Cerebral palsy, epilepsy, leukodystrophy, urinary   retention.    PAST SURGICAL HISTORY:  None.    ALLERGIES:  CLINDAMYCIN AND ERYTHROMYCIN.    HOME MEDICATIONS:  Klonopin, Depakote, Prozac, Chronulac, Benecalorie, Flomax,   Desyrel, Xanax, Norco.    PHYSICAL EXAMINATION:  VITAL SIGNS:  Blood pressure 145/93, respiratory rate 20, pulse 76, temperature   97.8.  GENERAL:  Arousable, does not follow commands, on BiPAP.  LUNGS:  Clear to auscultation bilaterally.  CARDIOVASCULAR:  Regular rate and rhythm.  AXILLA:  No masses bilaterally.  EXTREMITIES:  With contractures.  ABDOMEN:  Soft, positive bowel sounds, nontender, nondistended.  No masses.  GENITOURINARY:  No inguinal hernia bilaterally.    LABORATORY DATA:  White blood cell count 8, hematocrit 27 and platelet count   322, BUN 11, creatinine 0.7.  INR is 1.1.  Chest x-ray from 10/18/2019 revealed   improved aeration of the left upper lung zone, residual left pleural effusion   and atelectasis.    ASSESSMENT AND PLAN:  Cerebral palsy with aspiration - failed PEG tube placement   - open jejunostomy would be the next step to obtain access for enteral   feedings, the patient is currently restrained due to pulling at lines and this   would likely be an issue postoperatively, attempted to call family, but not   currently available, we will attempt to speak with him during the day  regarding   the situation to see if they are amenable to open jejunostomy tube placement,   which if so, would likely be performed later this week, thank you for consult,   we will follow.        ALAINA/MARYAN  dd: 10/22/2019 06:30:48 (CDT)  td: 10/22/2019 06:53:09 (CDT)  Doc ID   #[id number]  Job ID #[id number]    CC:

## 2019-10-22 NOTE — NURSING TRANSFER
Nursing Transfer Note      10/22/2019     Transfer To: 406    Transfer via bed    Transfer with belongings    Transported by RN & transport    Medicines sent: antifungal powder, lubiprostone    Chart send with patient: Yes    Notified: aunt, [facility]    Patient reassessed at: 10/22 7754    Upon arrival to floor: patient oriented to room and bed in lowest position

## 2019-10-22 NOTE — PLAN OF CARE
10/22/19 1644   Discharge Reassessment   Assessment Type Discharge Planning Reassessment   Provided patient/caregiver education on the expected discharge date and the discharge plan Yes   Do you have any problems affording any of your prescribed medications? No   Discharge Plan A Group Home   Discharge Plan B Other  (Patient's Aunt Franck communicating with [facility] regarding placement.)   DME Needed Upon Discharge  other (see comments)  (Facility will determine)   Anticipated Discharge Disposition   (Group home)   Can the patient answer the patient profile reliably? No, cognitively impaired   How does the patient rate their overall health at the present time? Fair   Describe the patient's ability to walk at the present time. Does not walk or unable to take any steps at all   How often would a person be available to care for the patient? Often   Number of comorbid conditions (as recorded on the chart) Five or more   During the past month, has the patient often been bothered by feeling down, depressed or hopeless?   (TIFF)   During the past month, has the patient often been bothered by little interest or pleasure in doing things?   (TIFF)   Post-Acute Status   Post-Acute Authorization Placement   Post-Acute Placement Status Awaiting Internal Medical Clearance  (PEG elen not placed.)   Discharge Delays (!) Other  (Awaiting GI recommendations)   Cris Monahan, RN, BSN, STN CCM  10/22/2019

## 2019-10-22 NOTE — ASSESSMENT & PLAN NOTE
Family would like group home in [place] when ready for discharge  Hopefully there will not be need for trach

## 2019-10-22 NOTE — PROGRESS NOTES
"[facility]  Adult Nutrition  Progress Note    SUMMARY       Recommendations    Recommendation/Intervention:   1. TF goal rate of 45 ml/hr adequate to meet estimated needs; Fluid flushes of 200 ml QID or per MD.  Monitor Mg, Phos, K with TF advancement due to prolonged NPO status   2. Rec PPN if pt unable to tolerate TF to goal rate: Clinimex 4.25/10 @ 75 ml/hr + IVFE daily. PN to provide: 1418 kcal, 77g pro, 1800 ml fluid; GIR: 1 mg/kg/min.     Goals: Increase TF to goal by next RD visit  Nutrition Goal Status: new  Communication of RD Recs: reviewed with RN(secure chat with MD)    Reason for Assessment    Reason For Assessment: RD follow-up  Diagnosis: pulmonary disease, infection/sepsis  Relevant Medical History: CP, mental retardation  Interdisciplinary Rounds: did not attend  General Information Comments: Pt tolerating TF @ 10mL/hr; in restraints because he keeps pulling at his tubes. PEG-J endoscopic procedure failed due to hiatal hernia. Pt scheduled to get a J-tube later this week but there are concerns for keeping in the tube since he has been pulling them out. Constipation resolved. NFPE completed 10/18, acute malnutrition continues.  Nutrition Discharge Planning: too soon to determine    Nutrition Risk Screen    Nutrition Risk Screen: tube feeding or parenteral nutrition    Nutrition/Diet History    Patient Reported Diet/Restrictions/Preferences: pureed(reported by )  Spiritual, Cultural Beliefs, Hinduism Practices, Values that Affect Care: no  Supplemental Drinks or Food Habits: Ensure Plus(Benecalorie)  Factors Affecting Nutritional Intake: impaired cognitive status/motor control, chewing difficulties/inability to chew food, NPO    Anthropometrics    Temp: 97.8 °F (36.6 °C)  Height Method: Estimated  Height: 5' 1" (154.9 cm)  Height (inches): 61 in  Weight Method: Bed Scale  Weight: 39 kg (85 lb 15.7 oz)  Weight (lb): 85.98 lb  Ideal Body Weight (IBW), Male: 112 " lb  % Ideal Body Weight, Male (lb): 76.77 lb  BMI (Calculated): 16.3  BMI Grade: 16 - 16.9 protein-energy malnutrition grade II     Lab/Procedures/Meds    Pertinent Labs Reviewed: reviewed  Pertinent Labs Comments: -  Pertinent Medications Reviewed: reviewed  Pertinent Medications Comments: levo, valproic acid    Estimated/Assessed Needs    Weight Used For Calorie Calculations: 39 kg (85 lb 15.7 oz)  Energy Calorie Requirements (kcal): 1548 kcal/day  Energy Need Method: Lingle-St Jeor(PAL 1.25)  Protein Requirements: 47-59 g/day(1.2-1.5 g/kg)  Weight Used For Protein Calculations: 39 kg (85 lb 15.7 oz)  Estimated Fluid Requirement Method: RDA Method  RDA Method (mL): 1548     Nutrition Prescription Ordered    Current Diet Order: NPO(for PEG placement that did not occur)  Current Nutrition Support Formula Ordered: Isosource 1.5  Current Nutrition Support Rate Ordered: (goal rate of 45 ml/hr)  Current Nutrition Support Frequency Ordered: (TF on hold)    Evaluation of Received Nutrient/Fluid Intake    Enteral Calories (kcal): 360  Enteral Protein (gm): 16.32  Enteral (Free Water) Fluid (mL): 184  Free Water Flush Fluid (mL): 0  Other Calories (kcal): 0  Total Calories (kcal): 0  % Kcal Needs: 23  % Protein Needs: 35  IV Fluid (mL): (IV fluids discountinued)  Total Fluid Intake (mL): 0  I/O: 750/750  Energy Calories Required: not meeting needs  Protein Required: not meeting needs  Fluid Required: (per MD)  Comments: LBM: 10/21  Tolerance: tolerating  % Intake of Estimated Energy Needs: 25- 50 %   % Meal Intake: NPO    Nutrition Risk    Level of Risk/Frequency of Follow-up: (F/U 2 x weekly)     Assessment and Plan    Severe malnutrition  Malnutrition in the context of Acute on Chronic Issue    Related to (etiology):  Dysphagia    Signs and Symptoms (as evidenced by):  Energy Intake: <50% of estimated energy requirement for >10 days    Muscle Mass Depletion: moderate and severe depletion of temples, clavicle region,  scapular region, interosseous muscle and lower extremities ( r/t CP)   Weight Loss: 10% x 1 month      Interventions  Collaboration with providers    Nutrition Diagnosis Status:  Continues    Monitor and Evaluation    Food and Nutrient Intake: enteral nutrition intake, parenteral nutrition intake  Food and Nutrient Adminstration: enteral and parenteral nutrition administration  Anthropometric Measurements: weight change, weight, body mass index  Biochemical Data, Medical Tests and Procedures: glucose/endocrine profile, electrolyte and renal panel, lipid profile, inflammatory profile, gastrointestinal profile  Nutrition-Focused Physical Findings: overall appearance, extremities, muscles and bones, head and eyes     Malnutrition Assessment    Energy Intake (Malnutrition): less than or equal to 50% for greater than or equal to 5 days   Faith Region (Muscle Loss): mild depletion  Clavicle Bone Region (Muscle Loss): moderate depletion  Patellar Region (Muscle Loss): severe depletion  Anterior Thigh Region (Muscle Loss): severe depletion  Posterior Calf Region (Muscle Loss): severe depletion   Severe Weight Loss (Malnutrition): (10% x 1 month)    Nutrition Follow-Up    RD Follow-up?: Yes

## 2019-10-22 NOTE — PROGRESS NOTES
Pt set up on [employer]biss BIPAP for the night. 12/5, 2L bled in. Medium full face mask. Tolerating well.

## 2019-10-22 NOTE — CARE UPDATE
[facility]  ICU Multidisciplinary Bedside Rounds   SUMMARY     Date: 10/22/2019    Prehospitalization: othergroup home  Admit Date / LOS : 10/3/2019/ 18 days    Diagnosis: Acute respiratory failure with hypoxia    Consults:        Active: GI, Gen Surg and Pulm CC       Needed: N/A     Code Status: Full Code   Advanced Directive: <no information>    LDA: BIPAP, Midline and NG       Central Lines/Site/Justification:Patient Does Not Have Central Line       Urinary Cath/Order/Justification:Patient Does Not Have Urinary Catheter    Vasopressors/Infusions:        GOALS: Volume/ Hemodynamic: N/A                     RASS: 0  alert and calm    CAM ICU: N/A  Pain Management: none       Pain Controlled: yes     Rhythm: NSR    Respiratory Device: Nasal Cannula and Bipap    Oxygen Concentration (%):  [100] 100             Most Recent SBT/ SAT: N/A       MOVE Screen: PASS    VTE Prophylaxis: Pharm  Mobility: Bedrest  Stress Ulcer Prophylaxis: Yes    Dietary: TF  Tolerance: yes  /  Advancement: @ goal    Isolation: No active isolations    Restraints: Yes    Significant Dates:  Post Op Date: 10/21/19  Rescue Date: 10/18/19  Imaging/ Diagnostics: See multiple x-rays     Noteworthy Labs:  none    CBC/Anemia Labs: Coags:    Recent Labs   Lab 10/20/19  0240 10/21/19  0255 10/22/19  0219   WBC 9.65 7.65  7.65 8.10   HGB 8.6* 8.6*  8.6* 8.5*   HCT 27.8* 28.1*  28.1* 27.2*   * 385*  385* 322   MCV 89 90  90 89   RDW 14.8* 14.8*  14.8* 14.8*    Recent Labs   Lab 10/20/19  0956 10/21/19  0255   INR 1.1  --    APTT  --  33.2*        Chemistries:   Recent Labs   Lab 10/20/19  0240 10/21/19  0255 10/22/19  0219    144 143   K 3.9 3.7 3.6    106 105   CO2 30* 29 31*   BUN 13 14 11   CREATININE 0.8 0.8 0.7   CALCIUM 9.1 9.0 8.7   MG 1.9 2.0 1.9   PHOS 3.7 4.0 3.5        Cardiac Enzymes: Ejection Fractions:    No results for input(s): CPK, CPKMB, MB, TROPONINI in the last 72 hours. No results found for:  EF     POCT Glucose: HbA1c:    No results for input(s): POCTGLUCOSE in the last 168 hours. No results found for: HGBA1C     Needs from Care Team: none     ICU LOS 3d 20h  Level of Care: Critical Care

## 2019-10-23 PROCEDURE — S0028 INJECTION, FAMOTIDINE, 20 MG: HCPCS | Performed by: INTERNAL MEDICINE

## 2019-10-23 PROCEDURE — 94668 MNPJ CHEST WALL SBSQ: CPT

## 2019-10-23 PROCEDURE — 94640 AIRWAY INHALATION TREATMENT: CPT

## 2019-10-23 PROCEDURE — 25000003 PHARM REV CODE 250: Performed by: INTERNAL MEDICINE

## 2019-10-23 PROCEDURE — 11000001 HC ACUTE MED/SURG PRIVATE ROOM

## 2019-10-23 PROCEDURE — G8998 SWALLOW D/C STATUS: HCPCS | Mod: CN

## 2019-10-23 PROCEDURE — 99900035 HC TECH TIME PER 15 MIN (STAT)

## 2019-10-23 PROCEDURE — G8996 SWALLOW CURRENT STATUS: HCPCS | Mod: CN

## 2019-10-23 PROCEDURE — 63600175 PHARM REV CODE 636 W HCPCS: Performed by: INTERNAL MEDICINE

## 2019-10-23 PROCEDURE — 25000242 PHARM REV CODE 250 ALT 637 W/ HCPCS: Performed by: INTERNAL MEDICINE

## 2019-10-23 PROCEDURE — 94761 N-INVAS EAR/PLS OXIMETRY MLT: CPT

## 2019-10-23 PROCEDURE — 92610 EVALUATE SWALLOWING FUNCTION: CPT

## 2019-10-23 RX ADMIN — ENOXAPARIN SODIUM 40 MG: 100 INJECTION SUBCUTANEOUS at 04:10

## 2019-10-23 RX ADMIN — SCOPALAMINE 1 PATCH: 1 PATCH, EXTENDED RELEASE TRANSDERMAL at 04:10

## 2019-10-23 RX ADMIN — FLUOXETINE HYDROCHLORIDE 60 MG: 20 SOLUTION ORAL at 10:10

## 2019-10-23 RX ADMIN — VALPROIC ACID 500 MG: 250 SOLUTION ORAL at 09:10

## 2019-10-23 RX ADMIN — LUBIPROSTONE 24 MCG: 24 CAPSULE, GELATIN COATED ORAL at 10:10

## 2019-10-23 RX ADMIN — SODIUM CHLORIDE 30 MG/ML INHALATION SOLUTION 4 ML: 30 SOLUTION INHALANT at 08:10

## 2019-10-23 RX ADMIN — IPRATROPIUM BROMIDE AND ALBUTEROL SULFATE 3 ML: .5; 3 SOLUTION RESPIRATORY (INHALATION) at 03:10

## 2019-10-23 RX ADMIN — POLYETHYLENE GLYCOL 3350 17 G: 17 POWDER, FOR SOLUTION ORAL at 10:10

## 2019-10-23 RX ADMIN — IPRATROPIUM BROMIDE AND ALBUTEROL SULFATE 3 ML: .5; 3 SOLUTION RESPIRATORY (INHALATION) at 07:10

## 2019-10-23 RX ADMIN — CLONAZEPAM 1 MG: 0.5 TABLET ORAL at 09:10

## 2019-10-23 RX ADMIN — IPRATROPIUM BROMIDE AND ALBUTEROL SULFATE 3 ML: .5; 3 SOLUTION RESPIRATORY (INHALATION) at 11:10

## 2019-10-23 RX ADMIN — FAMOTIDINE 20 MG: 10 INJECTION INTRAVENOUS at 10:10

## 2019-10-23 RX ADMIN — CLONAZEPAM 1 MG: 0.5 TABLET ORAL at 10:10

## 2019-10-23 RX ADMIN — MICONAZOLE NITRATE: 20 POWDER TOPICAL at 10:10

## 2019-10-23 RX ADMIN — SODIUM CHLORIDE 30 MG/ML INHALATION SOLUTION 4 ML: 30 SOLUTION INHALANT at 07:10

## 2019-10-23 RX ADMIN — VALPROIC ACID 500 MG: 250 SOLUTION ORAL at 10:10

## 2019-10-23 RX ADMIN — MICONAZOLE NITRATE: 20 POWDER TOPICAL at 09:10

## 2019-10-23 RX ADMIN — FAMOTIDINE 20 MG: 10 INJECTION INTRAVENOUS at 09:10

## 2019-10-23 RX ADMIN — TAMSULOSIN HYDROCHLORIDE 0.4 MG: 0.4 CAPSULE ORAL at 10:10

## 2019-10-23 NOTE — PLAN OF CARE
DL contacted Rola Rey at [facility] ([phone]) to inquire if they take patient's with a J - Tube. Rola informed DL that they take patients with J and G tubes and have 24 hour care. Rola stated that she hasn't spoken to aunt in a while and hopes that she is working on the application. Rola stated that aunt needs to turn in completed application so that she can start processing it. Rola suggested that after the application is turned in, aunt should start working on obtaining supporting documents. Rola recommended aunt reach out to [facility] to get assistance in obtaining supporting documents. Rola informed that LENNY Lynch sent her clinicals from the hospital. Rola inquired if the hospital will send medication with patient. DL informed Rola that prescriptions for medications will be sent to pharmacy of their preference upon discharge. Rola informed DL that they are holding a bed for patient.      10/23/19 1217   Post-Acute Status   Post-Acute Authorization Placement  ([facility] )   Post-Acute Placement Status Pending Bed Availability

## 2019-10-23 NOTE — PROGRESS NOTES
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]  Yes     Chronic    Hypernatremia [E87.0]  No      Resolved Hospital Problems    Diagnosis Date Resolved POA    Severe sepsis [A41.9, R65.20] 10/11/2019 Yes    On mechanically assisted ventilation [Z99.11] 10/15/2019 Not Applicable       HD #  LOS: 19 days   POD #2 Days Post-Op    Overnight events:  No acute events overnight     Diet - No diet orders on file     I/O last 3 completed shifts:  In: 1290 [NG/GT:990; IV Piggyback:300]  Out: -     Intake/Output Summary (Last 24 hours) at 10/23/2019 0622  Last data filed at 10/22/2019 2153  Gross per 24 hour   Intake 840 ml   Output --   Net 840 ml       Temp:  [97.1 °F (36.2 °C)-98.7 °F (37.1 °C)] 97.1 °F (36.2 °C)  Pulse:  [64-96] 89  Resp:  [12-29] 18  SpO2:  [93 %-100 %] 96 %  BP: (126-151)/(74-96) 128/84    Gen:  Arousable not following commands  Abdomen soft nontender nondistended  Recent Labs   Lab 10/18/19  0211 10/19/19  0145 10/20/19  0240 10/21/19  0255 10/22/19  0219   WBC 13.21* 11.13 9.65 7.65  7.65 8.10   HGB 9.2* 8.3* 8.6* 8.6*  8.6* 8.5*   HCT 29.0* 27.3* 27.8* 28.1*  28.1* 27.2*   * 392* 393* 385*  385* 322    143 143 144 143   K 3.9 3.9 3.9 3.7 3.6    103 103 106 105   BUN 11 11 13 14 11   GLU 97 80 81 90 93        Assessment:  Cerebral palsy with failed PEG attempt    Plan:  Likely feeding jejunostomy Friday    Zoltan Chris MD

## 2019-10-23 NOTE — PHYSICIAN QUERY
PT Name: Karri Galeano  MR #: [id number]    Physician Query Form - Cause and Effect Relationship Clarification      CDS/: Afua Sims               Contact information:[email or web address]    This form is a permanent document in the medical record.     Query Date: October 23, 2019    By submitting this query, we are merely seeking further clarification of documentation. Please utilize your independent clinical judgment when addressing the question(s) below.    The Medical record contains the following:  Supporting Clinical Findings   Location in record     Pneumonia and rhinovirus are due to or associated with each other                                                                                                          Query response 10/11   Dc vanc and zosyn 10/10- completed 7 days                                        + rhinovirus on sputum cx               Severe sepsis                       This patient meets criteria for severe sepsis given fevers, tachycardia, tachypnea, pneumonia, acute respiratory failure.  Blood cultures are negative will start IV fluid hydration and broad spectrum antibiotics                                                                                                                                                              HM PN 10/10         Provider, please clarify if there is any correlation between __Viral PNA____ and ___Sepsis____.           Are the conditions:      [ x ] Due to or associated with each other   [  ] Unrelated to each other   [  ] Other (Please Specify): _________________________   [  ] Clinically Undetermined

## 2019-10-23 NOTE — PLAN OF CARE
DL informed Chloe that [facility] accepts patient's with G and J Tubes. DL informed that Rola at [facility] stated that she is holding a bed for patient and suggested that she turn application in so they can began processing it. DL also informed Chloe that Rola suggested she reach out to [facility] to get documentation since he lives in that [place]. DL informed Chloe that she would send an email to Ashley Pizarro,  at [facility] to see if she had any information on patient that would assist in completing application. Chloe informed DL that she has completed the application but left it at the hotel. Chloe inquired if DL could fax it for her if she brings it tomorrow. DL agreed to fax it. Chloe stated that she has been talking to nurse Lazcano at [facility] to get assistance in obtaining documents. Chloe also stated that she knows the Director of Nursing for [facility] and was told that she could contact her if she needs anything. Chloe stated that she has added her to all emails in regards to obtaining documentation.     10/23/19 1236   Post-Acute Status   Post-Acute Authorization Placement  ([facility] Home - [facility])   Post-Acute Placement Status Pending Bed Availability

## 2019-10-23 NOTE — PLAN OF CARE
Problem: SLP Goal  Goal: SLP Goal  Description    Problem: SLP Goal  Goal: SLP Goal  Description  ST. Pt will participate in swallow eval to determine least restrictive diet without overt s/s of aspiration.-GOAL discontinued 10/23/19    10/23/2019 1416 by Jayde Goodman CCC-SLP  Reactivated

## 2019-10-23 NOTE — UM SECONDARY REVIEW
Physician Advisor Internal    IP Extended Stay > 10     Verbally discussed at [facility] meeting    LOS: approved w/o recommendations due to severity of clinical picture     Noted aspiration, unable to place PEG. Plans for General Surgery to place J Tube Friday. Plans to change group homes at discharge. Accepted to [facility].

## 2019-10-23 NOTE — PLAN OF CARE
Problem: Fall Injury Risk  Goal: Absence of Fall and Fall-Related Injury  10/23/2019 0242 by Flavia Estrada RN  Outcome: Ongoing, Progressing  10/23/2019 0241 by Flavia Estrada RN  Outcome: Ongoing, Progressing     Problem: Skin Injury Risk Increased  Goal: Skin Health and Integrity  10/23/2019 0242 by Flavia Estrada RN  Outcome: Ongoing, Progressing  10/23/2019 0241 by Flavia Estrada RN  Outcome: Ongoing, Progressing     Problem: Malnutrition  Goal: Improved Nutritional Intake  10/23/2019 0242 by Flavia Estrada RN  Outcome: Ongoing, Progressing  10/23/2019 0241 by Flavia Estrada RN  Outcome: Ongoing, Progressing

## 2019-10-23 NOTE — PLAN OF CARE
DL contacted [facility] to inquire about medical conditions accepted for placement. , Lilibeth Hull informed DL that , uJstino Rebolledo was on a call but will ask him to return the call.      10/23/19 1146   Post-Acute Status   Post-Acute Authorization Placement  (longterm)   Post-Acute Placement Status Pending Bed Availability

## 2019-10-23 NOTE — PT/OT/SLP EVAL
Speech Language Pathology Evaluation  Bedside Swallow/Discharge    Patient Name:  Karri Galeano   MRN:  [id number]  Admitting Diagnosis: Acute respiratory failure with hypoxia    Recommendations:                 General Recommendations:  Follow-up not indicated  Diet recommendations:  NPO, NPO   Aspiration Precautions: Alternate means of nutrition/hydration and Frequent oral care   General Precautions: Standard, aspiration  Communication strategies:  gestures    History:     Past Medical History:   Diagnosis Date    Anxiety     Congenital cerebral palsy     Hypotonia     Leukodystrophy     Mental retardation     Retention of urine     Seizures     Stereotyped movement disorder     Urinary tract infection        Past Surgical History:   Procedure Laterality Date    BRONCHOSCOPY N/A 10/7/2019    Procedure: Bronchoscopy;  Surgeon: David Vela MD;  Location: [facility];  Service: Pulmonary;  Laterality: N/A;       Social History: Patient lives in group home    Prior Intubation HX:  Intubated 10/5/19, extubated 10/9/19    Modified Barium Swallow: 10/14/19 revealed:  ·  Pt presents with moderate to severe oropharyngeal dysphagia c/b silent aspiration of pharyngeal stasis across consistencies after initial swallow and leah silent aspiration during the swallow of thin and nectar thick consistencies.   It should be noted SAFEST form of po baseline based on today's study is honey thick liquids and puree however anticipate some aspiration of pharyngeal stasis of these consistencies. If pleasure feeds are initiated after Pt is no longer acutely ill, puree and honey thick liquids  recommended if high aspiration risk is accepted by medical decision maker    Chest X-Rays: *10/18/19 1. Improved aeration of the left upper lung zone when compared to the previous day's study.  2. Residual left pleural effusion and left basilar atelectasis.  Follow-up radiographs are suggested.    Prior diet: unsure at group  home    Subjective     Pt's aunt present for swallow re-eval. Pt still receiving nutrition via NG tube.  Pt not a candidate for PEG tube 2/2 hiatal hernia. GI considering J-tube placement.      Pain/Comfort:  · Pain Rating 1: 0/10    Objective:     Oral Musculature Evaluation  · Oral Musculature: unable to assess due to poor participation/comprehension  · Dentition: present and adequate  · Secretion Management: left corner drooling  · Mucosal Quality: good  · Voice Prior to PO Intake: non-verbal    Bedside Swallow Eval:   Consistencies Assessed:  · Honey thick liquids via spoon x 3  · Puree x2 trials     Oral Phase:   · tongue thrust  · Decreased closure around utensil  · Excess chewing  · Oral residue  · Lingual residue  · Poor oral acceptance  · Slow oral transit time    Pharyngeal Phase:   · Pt known silent aspirator per MBSS completedon 10/14/19  · delayed swallow initation  · multiple spontaneous swallows    Compensatory Strategies  · None    Treatment: Rec: NPO & alternate long term non-orl nutrition    Assessment:     Karri Galeano is a 24 y.o. male with dx oAcute respiratory failure with hypoxia.   ·  Pt presents with moderate to severe oropharyngeal dysphagia c/b silent aspiration of pharyngeal stasis across consistencies after initial swallow and leah silent aspiration during the swallow of thin and nectar thick consistencies per MBSS on 10/14/19. No further ST is warranted at this time.  Goals:   Multidisciplinary Problems     SLP Goals     Not on file          Multidisciplinary Problems (Resolved)        Problem: SLP Goal    Goal Priority Disciplines Outcome   SLP Goal   (Resolved)    Medium SLP Met   Description:  ST. Pt will tolerate PO diet of pureed with Nectar thickened liquids with no overt s/s of aspiration. (goal downgraded 10/11/19)    2. Pt will participate in MBSS- GOAL MET 10/14/19  3. Family will demonstrate knowledge of MBSS results.  -GOAL MET 10/16/19            Problem: SLP Goal     Goal Priority Disciplines Outcome   SLP Goal   (Resolved)    Low SLP Met   Description:  ST. Pt will participate in swallow eval to determine least restrictive diet without overt s/s of aspiration.-GOAL discontinued 10/23/19                     Plan:     · Patient to be seen:  3 x/week, 4 x/week   · Plan of Care expires:  10/23/19  · Plan of Care reviewed with:  patient, other (see comments)(aunt)   · SLP Follow-Up:  No       Discharge recommendations:  other (see comments)(group home)   Barriers to Discharge:  None    Time Tracking:     SLP Treatment Date:   10/23/19  Speech Start Time:  1401  Speech Stop Time:  1414     Speech Total Time (min):  13 min    Billable Minutes: Eval Swallow and Oral Function 13 min    Jayde Goodman CCC-SLP  10/23/2019

## 2019-10-23 NOTE — PLAN OF CARE
SW met with patient's Aunt Chloe at bedside to discuss discharge planning. Chloe informed SW that she does not want patient to return to [facility]. Chloe stated that patient was not receiving proper care. Chloe also stated that someone from [facility] is supposed to be sitting with patient at night but have been told they have been inconsistent. Chloe informed SW that she has been in contact with Rola Rey at [facility] in [place] in regards to placement. Chloe stated that [facility] had available beds at the time but is unsure now. Chloe stated that she was told that they could not hold beds. SW informed Chloe that she will call [facility] to check bed availability. SW inquired about Plan B if [facility] has no bed availability and he is ready for discharge. Chloe stated that Patient can return to [facility] and that a bed is being held for him at [facility] in [facility] as well. Chloe emailed SW list of documents needed for group home application.      10/23/19 1057   Post-Acute Status   Post-Acute Authorization Placement  (senior care)   Post-Acute Placement Status Family Barriers

## 2019-10-23 NOTE — PROGRESS NOTES
PALLIATIVE CARE PROGRESS NOTE:    Reviewed notes.  ST again recommending NPO.   Discussed with Dr. Stokes.      Chanelle Burnette, BSN, RN, CCRN, CHPN   Palliative Care Nurse Coordinator   [facility]  [phone]

## 2019-10-23 NOTE — PLAN OF CARE
DL contacted Rola Rey at [facility] to inquire about bed availability and if they accept patients with PEG Tubes. There was no answer. Voicemail was full, could not leave a message.      10/23/19 1116   Post-Acute Status   Post-Acute Authorization Placement   Post-Acute Placement Status   (Pending bed availability)

## 2019-10-24 ENCOUNTER — ANESTHESIA EVENT (OUTPATIENT)
Dept: SURGERY | Facility: HOSPITAL | Age: 24
DRG: 871 | End: 2019-10-24
Payer: MEDICARE

## 2019-10-24 PROCEDURE — 63600175 PHARM REV CODE 636 W HCPCS: Performed by: INTERNAL MEDICINE

## 2019-10-24 PROCEDURE — 25000242 PHARM REV CODE 250 ALT 637 W/ HCPCS: Performed by: INTERNAL MEDICINE

## 2019-10-24 PROCEDURE — 94640 AIRWAY INHALATION TREATMENT: CPT

## 2019-10-24 PROCEDURE — 97530 THERAPEUTIC ACTIVITIES: CPT

## 2019-10-24 PROCEDURE — 25000003 PHARM REV CODE 250: Performed by: INTERNAL MEDICINE

## 2019-10-24 PROCEDURE — S0028 INJECTION, FAMOTIDINE, 20 MG: HCPCS | Performed by: INTERNAL MEDICINE

## 2019-10-24 PROCEDURE — 94761 N-INVAS EAR/PLS OXIMETRY MLT: CPT

## 2019-10-24 PROCEDURE — 11000001 HC ACUTE MED/SURG PRIVATE ROOM

## 2019-10-24 PROCEDURE — 94668 MNPJ CHEST WALL SBSQ: CPT

## 2019-10-24 RX ORDER — SODIUM CHLORIDE 0.9 % (FLUSH) 0.9 %
10 SYRINGE (ML) INJECTION
Status: DISCONTINUED | OUTPATIENT
Start: 2019-10-24 | End: 2019-10-25 | Stop reason: HOSPADM

## 2019-10-24 RX ADMIN — SODIUM CHLORIDE 30 MG/ML INHALATION SOLUTION 4 ML: 30 SOLUTION INHALANT at 08:10

## 2019-10-24 RX ADMIN — IPRATROPIUM BROMIDE AND ALBUTEROL SULFATE 3 ML: .5; 3 SOLUTION RESPIRATORY (INHALATION) at 03:10

## 2019-10-24 RX ADMIN — FAMOTIDINE 20 MG: 10 INJECTION INTRAVENOUS at 08:10

## 2019-10-24 RX ADMIN — IPRATROPIUM BROMIDE AND ALBUTEROL SULFATE 3 ML: .5; 3 SOLUTION RESPIRATORY (INHALATION) at 11:10

## 2019-10-24 RX ADMIN — CLONAZEPAM 1 MG: 0.5 TABLET ORAL at 08:10

## 2019-10-24 RX ADMIN — MICONAZOLE NITRATE: 20 POWDER TOPICAL at 09:10

## 2019-10-24 RX ADMIN — CLONAZEPAM 1 MG: 0.5 TABLET ORAL at 09:10

## 2019-10-24 RX ADMIN — ENOXAPARIN SODIUM 40 MG: 100 INJECTION SUBCUTANEOUS at 05:10

## 2019-10-24 RX ADMIN — IPRATROPIUM BROMIDE AND ALBUTEROL SULFATE 3 ML: .5; 3 SOLUTION RESPIRATORY (INHALATION) at 04:10

## 2019-10-24 RX ADMIN — FAMOTIDINE 20 MG: 10 INJECTION INTRAVENOUS at 09:10

## 2019-10-24 RX ADMIN — IPRATROPIUM BROMIDE AND ALBUTEROL SULFATE 3 ML: .5; 3 SOLUTION RESPIRATORY (INHALATION) at 08:10

## 2019-10-24 RX ADMIN — FLUOXETINE HYDROCHLORIDE 60 MG: 20 SOLUTION ORAL at 09:10

## 2019-10-24 RX ADMIN — VALPROIC ACID 500 MG: 250 SOLUTION ORAL at 08:10

## 2019-10-24 RX ADMIN — IPRATROPIUM BROMIDE AND ALBUTEROL SULFATE 3 ML: .5; 3 SOLUTION RESPIRATORY (INHALATION) at 09:10

## 2019-10-24 RX ADMIN — VALPROIC ACID 500 MG: 250 SOLUTION ORAL at 09:10

## 2019-10-24 RX ADMIN — TAMSULOSIN HYDROCHLORIDE 0.4 MG: 0.4 CAPSULE ORAL at 09:10

## 2019-10-24 NOTE — PT/OT/SLP PROGRESS
Physical Therapy Treatment    Patient Name:  Karri Galeano   MRN:  [id number]    Recommendations:     Discharge Recommendations:  (24hr care at new skilled nursing)   Discharge Equipment Recommendations: hospital bed, lift device   Barriers to discharge: Awaiting placement in new group [place]    Assessment:     Karri Galeano is a 24 y.o. male admitted with a medical diagnosis of Acute respiratory failure with hypoxia.  He presents with the following impairments/functional limitations:  impaired functional mobilty, impaired balance, impaired cognition, decreased upper extremity function, decreased safety awareness, impaired coordination, impaired endurance, impaired self care skills, decreased coordination Pt able to sit at EOB and stand without pulling lines, but with Total A    Rehab Prognosis: Poor; patient would benefit from acute skilled PT services to address these deficits and reach maximum level of function.    Recent Surgery: Procedure(s) (LRB):  INSERTION, PEG TUBE (N/A) 3 Days Post-Op    Plan:     During this hospitalization, patient to be seen (2-3x/wk) to address the identified rehab impairments via therapeutic activities, neuromuscular re-education and progress toward the following goals:    · Plan of Care Expires:  11/02/19    Subjective     Chief Complaint: no c/o, pt nonverbal, no outward signs of pain.  Pt scratching at arms and head  Patient/Family Comments/goals: Aunt, who is POA, present and states that she is going today to check out new group home  Pain/Comfort:  · Pain Rating 1: 0/10      Objective:     Communicated with nsg prior to session.  Patient found HOB elevated with bed alarm, NG tube upon PT entry to room.     General Precautions: Standard, aspiration, fall   Orthopedic Precautions:N/A   Braces: N/A     Functional Mobility:  · Bed Mobility:     · Rolling Right: dependence  · Scooting: dependence  · Supine to Sit: dependence  · Sit to Supine: dependence  · Transfers:     · Sit to Stand:   total assistance with no AD  · Balance: Poor, Requires Total A to sit and stand      AM-PAC 6 CLICK MOBILITY  Turning over in bed (including adjusting bedclothes, sheets and blankets)?: 2  Sitting down on and standing up from a chair with arms (e.g., wheelchair, bedside commode, etc.): 2  Moving from lying on back to sitting on the side of the bed?: 2  Moving to and from a bed to a chair (including a wheelchair)?: 2  Need to walk in hospital room?: 1  Climbing 3-5 steps with a railing?: 1  Basic Mobility Total Score: 10       Therapeutic Activities and Exercises:   mobility as above, pt had a bowel accident and had to be return to bed, Nsg notified    Patient left HOB elevated with all lines intact, bed alarm on, PCT notified and aunt present..    GOALS:   Multidisciplinary Problems     Physical Therapy Goals        Problem: Physical Therapy Goal    Goal Priority Disciplines Outcome Goal Variances Interventions   Physical Therapy Goal     PT, PT/OT Ongoing, Progressing     Description:  Goals to be met by: 2019     Patient will increase functional independence with mobility by performin. Supine to sit with Mod A  2.  Scooting in bed with Min A   3   Rolling with Min A   4. Bed to chair transfer  With Mod A  5. Tolerate sitting up in chair x 30m                     Time Tracking:     PT Received On: 10/24/19  PT Start Time: 1111     PT Stop Time: 1125  PT Total Time (min): 14 min     Billable Minutes: Therapeutic Activity 14    Treatment Type: Treatment  PT/PTA: PT     PTA Visit Number: 0     Ya Vila, PT  10/24/2019

## 2019-10-24 NOTE — PLAN OF CARE
"  Problem: Fall Injury Risk  Goal: Absence of Fall and Fall-Related Injury  Outcome: Ongoing, Progressing  Intervention: Identify and Manage Contributors to Fall Injury Risk  Flowsheets (Taken 10/24/2019 1831)  Self-Care Promotion: BADL personal routines maintained  Medication Review/Management: medications reviewed  Intervention: Promote Injury-Free Environment  Flowsheets (Taken 10/24/2019 1831)  Safety Promotion/Fall Prevention: assistive device/personal item within reach; side rails raised x 2; room near unit station; /camera at bedside     Problem: Skin Injury Risk Increased  Goal: Skin Health and Integrity  Outcome: Ongoing, Progressing     Problem: Adult Inpatient Plan of Care  Goal: Plan of Care Review  Description  Patient remains intubated today and to ventilator, Propofol drip continues for sedation,  Tube feeds started per OGT, tolerating without difficulty.  Saldivar cath remains patent.  Aunt updated on patient condition, she states "I'm on the way there"   Outcome: Ongoing, Progressing  Goal: Patient-Specific Goal (Individualization)  Outcome: Ongoing, Progressing  Goal: Absence of Hospital-Acquired Illness or Injury  Outcome: Ongoing, Progressing  Intervention: Identify and Manage Fall Risk  Flowsheets (Taken 10/24/2019 1831)  Safety Promotion/Fall Prevention: assistive device/personal item within reach; side rails raised x 2; room near unit station; /camera at bedside  Intervention: Prevent VTE (venous thromboembolism)  Flowsheets (Taken 10/24/2019 1831)  VTE Prevention/Management: ROM (passive) performed  Goal: Optimal Comfort and Wellbeing  Outcome: Ongoing, Progressing  Goal: Readiness for Transition of Care  Outcome: Ongoing, Progressing  Goal: Rounds/Family Conference  Outcome: Ongoing, Progressing     Problem: Noninvasive Ventilation Acute  Goal: Effective Unassisted Ventilation and Oxygenation  Outcome: Ongoing, Progressing     Problem: Infection  Goal: Infection " Symptom Resolution  Outcome: Ongoing, Progressing     Problem: Restraint, Nonbehavioral (Nonviolent)  Goal: Discontinuation Criteria Achieved  Outcome: Ongoing, Progressing  Goal: Personal Dignity and Safety Maintained  Outcome: Ongoing, Progressing     Problem: Coping Ineffective  Goal: Effective Coping  Outcome: Ongoing, Progressing     Problem: Oral Intake Inadequate  Goal: Improved Oral Intake  Outcome: Ongoing, Progressing     Problem: Malnutrition  Goal: Improved Nutritional Intake  Outcome: Ongoing, Progressing

## 2019-10-24 NOTE — ANESTHESIA PREPROCEDURE EVALUATION
10/24/2019  Karri Galeano is a 24 y.o., male.    Pre-operative evaluation for Procedure(s) (LRB):  INSERTION, JEJUNOSTOMY TUBE ( OPEN) (N/A)    Karri Galeano is a 24 y.o. male     Patient Active Problem List   Diagnosis    Severe mental retardation    History of complicated labor and delivery    Microcephaly    Requires assistance with all daily activities    Right lower lobe pneumonia    Atypical stereotyped movement disorder    Oropharyngeal dysphagia    Hypernatremia    Spastic quadriplegic cerebral palsy    Abdominal pain    Normocytic anemia    Hematuria    Dehydration    Urethral stricture    Pneumonia of left lung due to infectious organism    Epilepsy    Hypokalemia    Acute respiratory failure with hypoxia    Severe malnutrition    Other dysphagia    Constipation       Review of patient's allergies indicates:   Allergen Reactions    Clindamycin Hives    Erythromycin Rash       No current facility-administered medications on file prior to encounter.      Current Outpatient Medications on File Prior to Encounter   Medication Sig Dispense Refill    clonazePAM (KLONOPIN) 1 MG tablet Take 1 mg by mouth 2 (two) times daily.      divalproex (DEPAKOTE) 500 MG TbEC Take 1 tablet (500 mg total) by mouth every 12 (twelve) hours. 60 tablet 11    fluoxetine (PROZAC) 20 MG capsule Take 60 mg by mouth once daily.      lactulose (CHRONULAC) 10 gram/15 mL solution Take 10 g by mouth once daily.      nutritional supplement-caloric (BENECALORIE) 7.5 kcal/mL Liqd Take by mouth. Add to food or drink 3 times daily for calorie supplement at 7am and 1600 and 1900      tamsulosin (FLOMAX) 0.4 mg Cap TAKE 1 CAPSULE BY MOUTH ONCE DAILY 30 capsule 11    trazodone (DESYREL) 100 MG tablet Take 100 mg by mouth every evening.      ALPRAZolam (XANAX) 1 MG tablet Take 1 mg by mouth 2 (two) times  daily.      hydrocodone-acetaminophen 5-325mg (NORCO) 5-325 mg per tablet Take 1 tablet by mouth every 6 (six) hours as needed for Pain. 12 tablet 0    LACTOSE-REDUCED FOOD (ENSURE ORAL) Take by mouth 4 (four) times daily.      mineral oil-hydrophil petrolat (AQUAPHOR) Oint Apply topically as needed. Apply to dry skin once daily after bath      phenazopyridine (AZO-DINE) 97.5 mg Tab Take by mouth.      salicylic acid 2 % Liqd Apply topically to face every morning for acne         Past Surgical History:   Procedure Laterality Date    BRONCHOSCOPY N/A 10/7/2019    Procedure: Bronchoscopy;  Surgeon: David Vela MD;  Location: [facility];  Service: Pulmonary;  Laterality: N/A;       Social History     Socioeconomic History    Marital status: Single     Spouse name: Not on file    Number of children: Not on file    Years of education: Not on file    Highest education level: Not on file   Occupational History    Not on file   Social Needs    Financial resource strain: Not on file    Food insecurity:     Worry: Not on file     Inability: Not on file    Transportation needs:     Medical: Not on file     Non-medical: Not on file   Tobacco Use    Smoking status: Never Smoker    Smokeless tobacco: Never Used   Substance and Sexual Activity    Alcohol use: No    Drug use: No    Sexual activity: Never     Birth control/protection: Abstinence   Lifestyle    Physical activity:     Days per week: Not on file     Minutes per session: Not on file    Stress: Not on file   Relationships    Social connections:     Talks on phone: Not on file     Gets together: Not on file     Attends [employer] service: Not on file     Active member of club or organization: Not on file     Attends meetings of clubs or organizations: Not on file     Relationship status: Not on file   Other Topics Concern    Not on file   Social History Narrative    Not on file         CBC:   Recent Labs     10/22/19  0219   WBC 8.10   RBC  3.05*   HGB 8.5*   HCT 27.2*      MCV 89   MCH 27.9   MCHC 31.3*       CMP:   Recent Labs     10/22/19  0219      K 3.6      CO2 31*   BUN 11   CREATININE 0.7   GLU 93   MG 1.9   PHOS 3.5   CALCIUM 8.7       INR  No results for input(s): PT, INR, PROTIME, APTT in the last 72 hours.        Diagnostic Studies:  Results for MODESTO CANTU (MRN [id number]) as of 10/25/2019 08:17   Ref. Range 10/22/2019 02:19   WBC Latest Ref Range: 3.90 - 12.70 K/uL 8.10   RBC Latest Ref Range: 4.60 - 6.20 M/uL 3.05 (L)   Hemoglobin Latest Ref Range: 14.0 - 18.0 g/dL 8.5 (L)   Hematocrit Latest Ref Range: 40.0 - 54.0 % 27.2 (L)   MCV Latest Ref Range: 82 - 98 fL 89   MCH Latest Ref Range: 27.0 - 31.0 pg 27.9   MCHC Latest Ref Range: 32.0 - 36.0 g/dL 31.3 (L)   RDW Latest Ref Range: 11.5 - 14.5 % 14.8 (H)   Platelets Latest Ref Range: 150 - 350 K/uL 322   MPV Latest Ref Range: 9.2 - 12.9 fL 10.1   Gran% Latest Ref Range: 38.0 - 73.0 % 64.6   Gran # (ANC) Latest Ref Range: 1.8 - 7.7 K/uL 5.2   Lymph% Latest Ref Range: 18.0 - 48.0 % 21.6   Lymph # Latest Ref Range: 1.0 - 4.8 K/uL 1.8   Mono% Latest Ref Range: 4.0 - 15.0 % 10.2   Mono # Latest Ref Range: 0.3 - 1.0 K/uL 0.8   Eosinophil% Latest Ref Range: 0.0 - 8.0 % 2.7   Eos # Latest Ref Range: 0.0 - 0.5 K/uL 0.2   Basophil% Latest Ref Range: 0.0 - 1.9 % 0.4   Baso # Latest Ref Range: 0.00 - 0.20 K/uL 0.03   nRBC Latest Ref Range: 0 /100 WBC 0   Differential Method Unknown Automated   Immature Grans (Abs) Latest Ref Range: 0.00 - 0.04 K/uL 0.04   Immature Granulocytes Latest Ref Range: 0.0 - 0.5 % 0.5   Sodium Latest Ref Range: 136 - 145 mmol/L 143   Potassium Latest Ref Range: 3.5 - 5.1 mmol/L 3.6   Chloride Latest Ref Range: 95 - 110 mmol/L 105   CO2 Latest Ref Range: 23 - 29 mmol/L 31 (H)   Anion Gap Latest Ref Range: 8 - 16 mmol/L 7 (L)   BUN, Bld Latest Ref Range: 6 - 20 mg/dL 11   Creatinine Latest Ref Range: 0.5 - 1.4 mg/dL 0.7   eGFR if non African American  Latest Ref Range: >60 mL/min/1.73 m^2 >60   eGFR if  Latest Ref Range: >60 mL/min/1.73 m^2 >60   Glucose Latest Ref Range: 70 - 110 mg/dL 93   Calcium Latest Ref Range: 8.7 - 10.5 mg/dL 8.7   Phosphorus Latest Ref Range: 2.7 - 4.5 mg/dL 3.5   Magnesium Latest Ref Range: 1.6 - 2.6 mg/dL 1.9     EKG (10/3/19):  Normal sinus rhythm  Nonspecific ST and T wave abnormality  Abnormal ECG    Anesthesia Evaluation    I have reviewed the Patient Summary Reports.    I have reviewed the Nursing Notes.      Review of Systems  Anesthesia Hx:  No previous Anesthesia   Denies Personal Hx of Anesthesia complications.   Social:  Non-Smoker    Hematology/Oncology:  Hematology Normal   Oncology Normal     EENT/Dental:EENT/Dental Normal   Cardiovascular:  Cardiovascular Normal     Pulmonary:   Pneumonia    Renal/:  Renal/ Normal     Hepatic/GI:   Oropharyngeal dysphagia   Musculoskeletal:  Musculoskeletal Normal    Neurological:   Seizures Cerebral palsy and mental retardation   Endocrine:  Endocrine Normal    Dermatological:  Skin Normal    Psych:   Psychiatric History          Physical Exam  General:  Well nourished    Airway/Jaw/Neck:   Unable to assess as patient doesn't follow instructions     Chest/Lungs:  Chest/Lungs Clear    Heart/Vascular:  Heart Findings: Normal            Anesthesia Plan  Type of Anesthesia, risks & benefits discussed:  Anesthesia Type:  general  Patient's Preference:   Intra-op Monitoring Plan: standard ASA monitors  Intra-op Monitoring Plan Comments:   Post Op Pain Control Plan: multimodal analgesia, IV/PO Opioids PRN and per primary service following discharge from PACU  Post Op Pain Control Plan Comments:   Induction:   IV  Beta Blocker:  Patient is not currently on a Beta-Blocker (No further documentation required).       Informed Consent: Patient understands risks and agrees with Anesthesia plan.  Questions answered. Anesthesia consent signed with patient.  ASA Score: 3     Day of  Surgery Review of History & Physical:  There are no significant changes.          Ready For Surgery From Anesthesia Perspective.

## 2019-10-24 NOTE — PLAN OF CARE
Problem: Skin Injury Risk Increased  Goal: Skin Health and Integrity  Outcome: Ongoing, Progressing     Problem: Fall Injury Risk  Goal: Absence of Fall and Fall-Related Injury  Outcome: Ongoing, Progressing     Problem: Malnutrition  Goal: Improved Nutritional Intake  Outcome: Ongoing, Progressing

## 2019-10-24 NOTE — NURSING
New feeding tube tubing and new bag of Isosource 1.5 started, 20 mL of residual and gastric contents was noted and returned, 300 mL of water flush was given. Feeding rate was changed from 40 cc/hour to 45 cc/hour.  Patient tolerated well.      PICC line dressing changed using sterile technique, blood return was noted, cap was changed and line was flushed.  Line was clamped after care was performed. Patient tolerated well

## 2019-10-24 NOTE — PLAN OF CARE
Problem: Physical Therapy Goal  Goal: Physical Therapy Goal  Description  Goals to be met by: 2019     Patient will increase functional independence with mobility by performin. Supine to sit with Mod A  2.  Scooting in bed with Min A   3   Rolling with Min A   4. Bed to chair transfer  With Mod A  5. Tolerate sitting up in chair x 30m    Outcome: Ongoing, Progressing   Pt able to sit at EOB and stand without pulling at lines, but with Total A.  Continue with POC

## 2019-10-24 NOTE — PLAN OF CARE
DL spoke with patient's aunt, Chloe to follow up on application process for group home placement. Chloe stated that she has completed the application and will give it to DL to fax to [facility]. Chloe also informed DL that she has received the financials from [facility] but is still having problems obtaining birth certificate, social security and medicaid cards.      10/24/19 1202   Post-Acute Status   Post-Acute Authorization Placement  ([facility])   Post-Acute Placement Status Pending Service Contract

## 2019-10-24 NOTE — SUBJECTIVE & OBJECTIVE
Interval History: pt pulling at NGT, no restraints inplace    Review of Systems   Unable to perform ROS: Patient nonverbal     Objective:     Vital Signs (Most Recent):  Temp: 98.2 °F (36.8 °C) (10/24/19 0519)  Pulse: 92 (10/24/19 0519)  Resp: 20 (10/24/19 0519)  BP: 137/87 (10/24/19 0519)  SpO2: (!) 93 % (10/24/19 0519) Vital Signs (24h Range):  Temp:  [97.3 °F (36.3 °C)-99 °F (37.2 °C)] 98.2 °F (36.8 °C)  Pulse:  [] 92  Resp:  [16-20] 20  SpO2:  [93 %-100 %] 93 %  BP: (126-143)/(77-95) 137/87     Weight: 39 kg (85 lb 15.7 oz)  Body mass index is 16.25 kg/m².    Intake/Output Summary (Last 24 hours) at 10/24/2019 0632  Last data filed at 10/23/2019 1937  Gross per 24 hour   Intake 600 ml   Output --   Net 600 ml      Physical Exam   Constitutional: He appears well-developed. No distress.   Contracted    HENT:   Head: Atraumatic.   NGT in place   Eyes: Conjunctivae are normal.   Neck: Neck supple.   Cardiovascular: Normal rate and regular rhythm.   Pulmonary/Chest: No stridor. No respiratory distress. He has no wheezes. He has no rales.   Breathing comfortably on NC, air movement on both side of lungs, no increase in effort   Abdominal: Soft. Bowel sounds are normal. He exhibits no distension. There is no tenderness.   Musculoskeletal:   Wasted extremities   Neurological: He is alert.   Making eye contact, does not follow commands   Skin: Skin is warm and dry. Capillary refill takes less than 2 seconds. He is not diaphoretic.   Nursing note and vitals reviewed.      Significant Labs: All pertinent labs within the past 24 hours have been reviewed.    Significant Imaging: I have reviewed and interpreted all pertinent imaging results/findings within the past 24 hours.

## 2019-10-24 NOTE — ANESTHESIA POSTPROCEDURE EVALUATION
Anesthesia Post Evaluation    Patient: Karri Galeano    Procedure(s) Performed: Procedure(s) (LRB):  INSERTION, PEG TUBE (N/A)    Final Anesthesia Type: general  Patient location during evaluation: PACU  Patient participation: Yes- Able to Participate  Level of consciousness: awake and alert, oriented and awake  Post-procedure vital signs: reviewed and stable  Pain management: adequate  Airway patency: patent  PONV status at discharge: No PONV  Anesthetic complications: no      Cardiovascular status: blood pressure returned to baseline, hemodynamically stable and stable  Respiratory status: unassisted and spontaneous ventilation  Hydration status: euvolemic  Follow-up not needed.          Vitals Value Taken Time   /75 10/24/2019 11:10 AM   Temp 36.9 °C (98.4 °F) 10/24/2019 11:10 AM   Pulse 86 10/24/2019 11:36 AM   Resp 20 10/24/2019 11:36 AM   SpO2 97 % 10/24/2019 11:36 AM         No case tracking events are documented in the log.      Pain/Henry Score: No data recorded

## 2019-10-24 NOTE — PROGRESS NOTES
"gen surg  Pt awake, calm, not restained, not pulling at tubes  Blood pressure 137/87, pulse 92, temperature 98.2 °F (36.8 °C), temperature source Axillary, resp. rate 20, height 5' 1" (1.549 m), weight 39 kg (85 lb 15.7 oz), SpO2 (!) 93 %.  abd soft, ntnd  A/p aspiration/malnutrition-case d/w pts caregiver/aunt this am, situation,procedure and risks discussed, questions answered, she wishes to proceed, j tube placement planned for tomorrow  "

## 2019-10-24 NOTE — PROGRESS NOTES
[facility] Hospital Medicine  Progress Note    Patient Name: Karri Galeano  MRN: [id number]  Patient Class: IP- Inpatient   Admission Date: 10/3/2019  Length of Stay: 20 days  Attending Physician: Echo Stokes MD  Primary Care Provider: Echo Reeves MD        Subjective:     Principal Problem:Acute respiratory failure with hypoxia        HPI:  Mr. Karri Galeano is a 24 y.o. male with cerebral palsy and epilepsy who presents to [facility] ED from an urgent care facility with complaints of fevers today.  He lives in a group home and started to have a nonproductive cough today.  The caregiver did not observe any vomiting.  He was in his usual state of health yesterday but today has become more lethargic and somnolent.  He was taken to an urgent care facility where he was then transferred to this facility for further care.  He was noted there to have a fever of 102.2° F as well as an oxygen saturation of 95% on nasal cannula at 3 liters/minute.  Further history is otherwise limited at this time.    Overview/Hospital Course:  Mr. Galeano presented with fever, cough and lethargy.  Respiratory status decompensated and he required intubation in the ER.  Workup with imaging consistent with possible pneumonia.  Empiric Zosyn and vancomycin initiated.  Pulmonary consulted for vent management.  Extubated on 10/5 in the morning.  Initially did well, but had worsening respiratory failure during the day.  Started on Bipap with no improvement and reintubated on 10/5 in the afternoon.  Blood cultures no growth to date.  Respiratory viral panel came back positive for rhino virus.  Antibiotics stopped.  Extubated on 10/9 to BiPAP.  Respiratory status stable.  Increased cough with feeding patient and he remained in the ICU.  Speech therapy recommended pureed diet.  He was stepped down to the floor on 10/11.  Patient with poor intake along with increased risk for aspiration. Per aunt, patient was fed full  meals when pureed diet was recommended by Speech. A MBBS was done. Showed evidence of aspiration therefore NPO status and PEG tube placement recommended. Aunt (next of kin) agreed with PEG placement once aspiration confirmed. Patient had unfortunately aspirated during PO intake and clinically worsened. Progressively requiring more O2 via high flow NC. Developed leukocytosis, became less interactive and spiked fever. NGT placed to avoid all oral intake and initiated on IV abx. Tube feeds started via NGT. Pulmonology re-consulted for co-management. Recommended BiPAP (or CPAP) QHS for positive pressure (as he can't participate with IS), nebulized treatments every 4 hours, nebulized hypertonic saline every 12 hours, chest physiotherapy, bowel regimen for fecal impaction/constipation and keep HOB elevated. Patient has scopolamine patch for oral secretions and IV famotidine for stress ulcer prophylaxis. Repeat CXR on 10/17 showed complete collapse of left lung likely due to atelectasis vs mucous plug. Patient remained stable on high flow but given high risk for further decompensation +/- bronchoscopy with intubation, he was upgraded to ICU for closer monitoring. Discussed with aunt at bedside. Patient had aggressive pulmonary toilet with improvement in aeration seen on chest x-ray and respiratory status improved.  Trickle feeds resumed and GI planned for PEG on 10/21- not successful and surgery consulted for J tube placement.  The patient was transferred out of the ICU on 10/22.    Met with patient aunt and she request another  ST eval/swallow eval.  PLan for j tube placement. Keep NPO     Interval History: pt pulling at NGT, no restraints inplace    Review of Systems   Unable to perform ROS: Patient nonverbal     Objective:     Vital Signs (Most Recent):  Temp: 98.2 °F (36.8 °C) (10/24/19 0519)  Pulse: 92 (10/24/19 0519)  Resp: 20 (10/24/19 0519)  BP: 137/87 (10/24/19 0519)  SpO2: (!) 93 % (10/24/19 0519) Vital Signs  (24h Range):  Temp:  [97.3 °F (36.3 °C)-99 °F (37.2 °C)] 98.2 °F (36.8 °C)  Pulse:  [] 92  Resp:  [16-20] 20  SpO2:  [93 %-100 %] 93 %  BP: (126-143)/(77-95) 137/87     Weight: 39 kg (85 lb 15.7 oz)  Body mass index is 16.25 kg/m².    Intake/Output Summary (Last 24 hours) at 10/24/2019 0632  Last data filed at 10/23/2019 1937  Gross per 24 hour   Intake 600 ml   Output --   Net 600 ml      Physical Exam   Constitutional: He appears well-developed. No distress.   Contracted    HENT:   Head: Atraumatic.   NGT in place   Eyes: Conjunctivae are normal.   Neck: Neck supple.   Cardiovascular: Normal rate and regular rhythm.   Pulmonary/Chest: No stridor. No respiratory distress. He has no wheezes. He has no rales.   Breathing comfortably on NC, air movement on both side of lungs, no increase in effort   Abdominal: Soft. Bowel sounds are normal. He exhibits no distension. There is no tenderness.   Musculoskeletal:   Wasted extremities   Neurological: He is alert.   Making eye contact, does not follow commands   Skin: Skin is warm and dry. Capillary refill takes less than 2 seconds. He is not diaphoretic.   Nursing note and vitals reviewed.      Significant Labs: All pertinent labs within the past 24 hours have been reviewed.    Significant Imaging: I have reviewed and interpreted all pertinent imaging results/findings within the past 24 hours.      Assessment/Plan:      * Acute respiratory failure with hypoxia  Secondary to aspiration pneumonia and rhino virus  Completed antibiotic treatment prior to stepping down to floor however patient became septic again after eating pureed diet.   MBBS did show evidence of aspiration therefore placed NPO.  Abx restarted 10/16. NGT placed to bypass all oral intake pending PEG tube placement which will be done once respiratory status improves.   Although more interactive, he was on more O2 requirements , more tachycardic and with complete collapse of left lung.  Treated with  highflow supplemental O2, nebulized treatments, nebulized hypertonic saline every 12 hours, bowel regimen, BiPAP QHS, chest physiotherapy and keep HOB elevated  Famotidine IV. Scopolamine patch in place.   Continue treatment with cefazolin and levofloxacin, last dose on 10/22 and 10/23 respectively  Transferred to ICU for closer monitoring on 10/18  Respiratory status improved with aggressive pulmonary toilet and chest x-ray with improved  As per Pulmonary    Constipation  On bowel regimen  Multiple bowel movements overnight  Continue current regimen    Other dysphagia  As above      Severe malnutrition  NGT for now with restart of tube feeds   PEG attempted by GI on 10/21 (today) without success  General surgery consulted for J-tube placement    Hypokalemia  Replace as needed.  Will continue to monitor    Epilepsy  Continue his home regimen of divalproex.  Also on clonazepam    Pneumonia of left lung due to infectious organism  Management as above    Spastic quadriplegic cerebral palsy  Family would like group home in [place] when ready for discharge  Hopefully there will not be need for trach      Hypernatremia  Secondary to inadequate free water intake.  Resolved with isotonic fluids. Unable to properly keep oral hydration due to dysphagia  Free water with tube feeds to restart  BMP daily        VTE Risk Mitigation (From admission, onward)         Ordered     enoxaparin injection 40 mg  Daily      10/04/19 0152     IP VTE HIGH RISK PATIENT  Once      10/04/19 0152                      Echo Stokes MD  Department of Hospital Medicine   [facility]

## 2019-10-24 NOTE — NURSING
New orders received to increase patient's NG Isosource 1.5 feeding  10cc's per hour until goal rate of 45 cc/hour.  Rate increased to 20 cc per hour at 1100

## 2019-10-25 ENCOUNTER — ANESTHESIA (OUTPATIENT)
Dept: SURGERY | Facility: HOSPITAL | Age: 24
DRG: 871 | End: 2019-10-25
Payer: MEDICARE

## 2019-10-25 PROBLEM — H66.001 NON-RECURRENT ACUTE SUPPURATIVE OTITIS MEDIA OF RIGHT EAR WITHOUT SPONTANEOUS RUPTURE OF TYMPANIC MEMBRANE: Status: ACTIVE | Noted: 2019-10-25

## 2019-10-25 PROCEDURE — 25000003 PHARM REV CODE 250: Performed by: SURGERY

## 2019-10-25 PROCEDURE — D9220A PRA ANESTHESIA: ICD-10-PCS | Mod: ANES,,, | Performed by: ANESTHESIOLOGY

## 2019-10-25 PROCEDURE — 94668 MNPJ CHEST WALL SBSQ: CPT

## 2019-10-25 PROCEDURE — 94640 AIRWAY INHALATION TREATMENT: CPT

## 2019-10-25 PROCEDURE — 27201423 OPTIME MED/SURG SUP & DEVICES STERILE SUPPLY: Performed by: SURGERY

## 2019-10-25 PROCEDURE — D9220A PRA ANESTHESIA: Mod: CRNA,,, | Performed by: NURSE ANESTHETIST, CERTIFIED REGISTERED

## 2019-10-25 PROCEDURE — 63600175 PHARM REV CODE 636 W HCPCS: Performed by: SURGERY

## 2019-10-25 PROCEDURE — S0028 INJECTION, FAMOTIDINE, 20 MG: HCPCS | Performed by: INTERNAL MEDICINE

## 2019-10-25 PROCEDURE — 63600175 PHARM REV CODE 636 W HCPCS: Performed by: NURSE ANESTHETIST, CERTIFIED REGISTERED

## 2019-10-25 PROCEDURE — 36000707: Performed by: SURGERY

## 2019-10-25 PROCEDURE — 94761 N-INVAS EAR/PLS OXIMETRY MLT: CPT

## 2019-10-25 PROCEDURE — 25000242 PHARM REV CODE 250 ALT 637 W/ HCPCS: Performed by: INTERNAL MEDICINE

## 2019-10-25 PROCEDURE — 25000003 PHARM REV CODE 250: Performed by: INTERNAL MEDICINE

## 2019-10-25 PROCEDURE — 71000039 HC RECOVERY, EACH ADD'L HOUR: Performed by: SURGERY

## 2019-10-25 PROCEDURE — 37000009 HC ANESTHESIA EA ADD 15 MINS: Performed by: SURGERY

## 2019-10-25 PROCEDURE — 63600175 PHARM REV CODE 636 W HCPCS: Performed by: INTERNAL MEDICINE

## 2019-10-25 PROCEDURE — 63600175 PHARM REV CODE 636 W HCPCS: Performed by: ANESTHESIOLOGY

## 2019-10-25 PROCEDURE — D9220A PRA ANESTHESIA: ICD-10-PCS | Mod: CRNA,,, | Performed by: NURSE ANESTHETIST, CERTIFIED REGISTERED

## 2019-10-25 PROCEDURE — 99900035 HC TECH TIME PER 15 MIN (STAT)

## 2019-10-25 PROCEDURE — 37000008 HC ANESTHESIA 1ST 15 MINUTES: Performed by: SURGERY

## 2019-10-25 PROCEDURE — 97530 THERAPEUTIC ACTIVITIES: CPT

## 2019-10-25 PROCEDURE — 11000001 HC ACUTE MED/SURG PRIVATE ROOM

## 2019-10-25 PROCEDURE — 25000242 PHARM REV CODE 250 ALT 637 W/ HCPCS: Performed by: SURGERY

## 2019-10-25 PROCEDURE — 36000706: Performed by: SURGERY

## 2019-10-25 PROCEDURE — 25000003 PHARM REV CODE 250: Performed by: NURSE ANESTHETIST, CERTIFIED REGISTERED

## 2019-10-25 PROCEDURE — 71000033 HC RECOVERY, INTIAL HOUR: Performed by: SURGERY

## 2019-10-25 PROCEDURE — 94660 CPAP INITIATION&MGMT: CPT

## 2019-10-25 PROCEDURE — S0028 INJECTION, FAMOTIDINE, 20 MG: HCPCS | Performed by: SURGERY

## 2019-10-25 PROCEDURE — D9220A PRA ANESTHESIA: Mod: ANES,,, | Performed by: ANESTHESIOLOGY

## 2019-10-25 RX ORDER — HYDROCODONE BITARTRATE AND ACETAMINOPHEN 7.5; 325 MG/15ML; MG/15ML
15 SOLUTION ORAL EVERY 4 HOURS PRN
Status: DISCONTINUED | OUTPATIENT
Start: 2019-10-25 | End: 2019-10-26

## 2019-10-25 RX ORDER — VALPROIC ACID 250 MG/5ML
500 SOLUTION ORAL EVERY 12 HOURS
Status: DISCONTINUED | OUTPATIENT
Start: 2019-10-25 | End: 2019-10-27

## 2019-10-25 RX ORDER — LIDOCAINE HCL/PF 100 MG/5ML
SYRINGE (ML) INTRAVENOUS
Status: DISCONTINUED | OUTPATIENT
Start: 2019-10-25 | End: 2019-10-25

## 2019-10-25 RX ORDER — EPHEDRINE SULFATE 50 MG/ML
INJECTION, SOLUTION INTRAVENOUS
Status: DISCONTINUED | OUTPATIENT
Start: 2019-10-25 | End: 2019-10-25

## 2019-10-25 RX ORDER — HYDROMORPHONE HYDROCHLORIDE 2 MG/ML
0.2 INJECTION, SOLUTION INTRAMUSCULAR; INTRAVENOUS; SUBCUTANEOUS EVERY 5 MIN PRN
Status: DISCONTINUED | OUTPATIENT
Start: 2019-10-25 | End: 2019-10-25 | Stop reason: HOSPADM

## 2019-10-25 RX ORDER — FLUOXETINE 20 MG/5ML
60 SOLUTION ORAL DAILY
Status: DISCONTINUED | OUTPATIENT
Start: 2019-10-26 | End: 2019-11-02 | Stop reason: HOSPADM

## 2019-10-25 RX ORDER — CEFAZOLIN SODIUM 1 G/50ML
1 SOLUTION INTRAVENOUS
Status: DISCONTINUED | OUTPATIENT
Start: 2019-10-25 | End: 2019-11-02 | Stop reason: HOSPADM

## 2019-10-25 RX ORDER — PROPOFOL 10 MG/ML
VIAL (ML) INTRAVENOUS
Status: DISCONTINUED | OUTPATIENT
Start: 2019-10-25 | End: 2019-10-25

## 2019-10-25 RX ORDER — ROCURONIUM BROMIDE 10 MG/ML
INJECTION, SOLUTION INTRAVENOUS
Status: DISCONTINUED | OUTPATIENT
Start: 2019-10-25 | End: 2019-10-25

## 2019-10-25 RX ORDER — POLYETHYLENE GLYCOL 3350 17 G/17G
17 POWDER, FOR SOLUTION ORAL DAILY
Status: DISCONTINUED | OUTPATIENT
Start: 2019-10-26 | End: 2019-11-02 | Stop reason: HOSPADM

## 2019-10-25 RX ORDER — SODIUM CHLORIDE, SODIUM LACTATE, POTASSIUM CHLORIDE, CALCIUM CHLORIDE 600; 310; 30; 20 MG/100ML; MG/100ML; MG/100ML; MG/100ML
INJECTION, SOLUTION INTRAVENOUS CONTINUOUS
Status: DISCONTINUED | OUTPATIENT
Start: 2019-10-25 | End: 2019-10-26

## 2019-10-25 RX ORDER — FENTANYL CITRATE 50 UG/ML
INJECTION, SOLUTION INTRAMUSCULAR; INTRAVENOUS
Status: DISCONTINUED | OUTPATIENT
Start: 2019-10-25 | End: 2019-10-25

## 2019-10-25 RX ORDER — ONDANSETRON 2 MG/ML
4 INJECTION INTRAMUSCULAR; INTRAVENOUS DAILY PRN
Status: DISCONTINUED | OUTPATIENT
Start: 2019-10-25 | End: 2019-10-25 | Stop reason: HOSPADM

## 2019-10-25 RX ORDER — HYDROMORPHONE HYDROCHLORIDE 2 MG/ML
0.5 INJECTION, SOLUTION INTRAMUSCULAR; INTRAVENOUS; SUBCUTANEOUS
Status: DISCONTINUED | OUTPATIENT
Start: 2019-10-25 | End: 2019-11-02 | Stop reason: HOSPADM

## 2019-10-25 RX ORDER — ACETAMINOPHEN 10 MG/ML
500 INJECTION, SOLUTION INTRAVENOUS ONCE
Status: COMPLETED | OUTPATIENT
Start: 2019-10-25 | End: 2019-10-25

## 2019-10-25 RX ORDER — TAMSULOSIN HYDROCHLORIDE 0.4 MG/1
1 CAPSULE ORAL DAILY
Status: DISCONTINUED | OUTPATIENT
Start: 2019-10-26 | End: 2019-10-27

## 2019-10-25 RX ORDER — SODIUM CHLORIDE, SODIUM LACTATE, POTASSIUM CHLORIDE, CALCIUM CHLORIDE 600; 310; 30; 20 MG/100ML; MG/100ML; MG/100ML; MG/100ML
INJECTION, SOLUTION INTRAVENOUS CONTINUOUS PRN
Status: DISCONTINUED | OUTPATIENT
Start: 2019-10-25 | End: 2019-10-25

## 2019-10-25 RX ORDER — AMOXICILLIN AND CLAVULANATE POTASSIUM 250; 62.5 MG/5ML; MG/5ML
500 POWDER, FOR SUSPENSION ORAL EVERY 12 HOURS
Status: DISCONTINUED | OUTPATIENT
Start: 2019-10-25 | End: 2019-10-27

## 2019-10-25 RX ORDER — MIDAZOLAM HYDROCHLORIDE 1 MG/ML
INJECTION, SOLUTION INTRAMUSCULAR; INTRAVENOUS
Status: DISCONTINUED | OUTPATIENT
Start: 2019-10-25 | End: 2019-10-25

## 2019-10-25 RX ORDER — SODIUM CHLORIDE 0.9 % (FLUSH) 0.9 %
10 SYRINGE (ML) INJECTION
Status: DISCONTINUED | OUTPATIENT
Start: 2019-10-25 | End: 2019-10-25 | Stop reason: HOSPADM

## 2019-10-25 RX ADMIN — PROPOFOL 80 MG: 10 INJECTION, EMULSION INTRAVENOUS at 12:10

## 2019-10-25 RX ADMIN — CLONAZEPAM 1 MG: 0.5 TABLET ORAL at 09:10

## 2019-10-25 RX ADMIN — AMOXICILLIN AND CLAVULANATE POTASSIUM 500 MG: 250; 62.5 POWDER, FOR SUSPENSION ORAL at 09:10

## 2019-10-25 RX ADMIN — IPRATROPIUM BROMIDE AND ALBUTEROL SULFATE 3 ML: .5; 3 SOLUTION RESPIRATORY (INHALATION) at 04:10

## 2019-10-25 RX ADMIN — VALPROIC ACID 500 MG: 250 SOLUTION ORAL at 09:10

## 2019-10-25 RX ADMIN — SUGAMMADEX 200 MG: 100 INJECTION, SOLUTION INTRAVENOUS at 12:10

## 2019-10-25 RX ADMIN — ENOXAPARIN SODIUM 40 MG: 100 INJECTION SUBCUTANEOUS at 04:10

## 2019-10-25 RX ADMIN — EPHEDRINE SULFATE 10 MG: 50 INJECTION, SOLUTION INTRAMUSCULAR; INTRAVENOUS; SUBCUTANEOUS at 12:10

## 2019-10-25 RX ADMIN — ACETAMINOPHEN 500 MG: 10 INJECTION, SOLUTION INTRAVENOUS at 01:10

## 2019-10-25 RX ADMIN — FENTANYL CITRATE 25 MCG: 50 INJECTION INTRAMUSCULAR; INTRAVENOUS at 12:10

## 2019-10-25 RX ADMIN — LIDOCAINE HYDROCHLORIDE 40 MG: 20 INJECTION, SOLUTION INTRAVENOUS at 12:10

## 2019-10-25 RX ADMIN — MICONAZOLE NITRATE: 20 POWDER TOPICAL at 09:10

## 2019-10-25 RX ADMIN — LUBIPROSTONE 24 MCG: 24 CAPSULE, GELATIN COATED ORAL at 04:10

## 2019-10-25 RX ADMIN — MIDAZOLAM HYDROCHLORIDE 0.5 MG: 1 INJECTION, SOLUTION INTRAMUSCULAR; INTRAVENOUS at 12:10

## 2019-10-25 RX ADMIN — ROCURONIUM BROMIDE 50 MG: 10 INJECTION, SOLUTION INTRAVENOUS at 12:10

## 2019-10-25 RX ADMIN — FAMOTIDINE 20 MG: 10 INJECTION INTRAVENOUS at 08:10

## 2019-10-25 RX ADMIN — IPRATROPIUM BROMIDE AND ALBUTEROL SULFATE 3 ML: .5; 3 SOLUTION RESPIRATORY (INHALATION) at 02:10

## 2019-10-25 RX ADMIN — FENTANYL CITRATE 50 MCG: 50 INJECTION INTRAMUSCULAR; INTRAVENOUS at 12:10

## 2019-10-25 RX ADMIN — FAMOTIDINE 20 MG: 10 INJECTION INTRAVENOUS at 09:10

## 2019-10-25 RX ADMIN — IPRATROPIUM BROMIDE AND ALBUTEROL SULFATE 3 ML: .5; 3 SOLUTION RESPIRATORY (INHALATION) at 12:10

## 2019-10-25 RX ADMIN — IPRATROPIUM BROMIDE AND ALBUTEROL SULFATE 3 ML: .5; 3 SOLUTION RESPIRATORY (INHALATION) at 11:10

## 2019-10-25 RX ADMIN — SODIUM CHLORIDE, SODIUM LACTATE, POTASSIUM CHLORIDE, AND CALCIUM CHLORIDE: .6; .31; .03; .02 INJECTION, SOLUTION INTRAVENOUS at 11:10

## 2019-10-25 RX ADMIN — SCOPALAMINE 1 PATCH: 1 PATCH, EXTENDED RELEASE TRANSDERMAL at 04:10

## 2019-10-25 RX ADMIN — SODIUM CHLORIDE, SODIUM LACTATE, POTASSIUM CHLORIDE, AND CALCIUM CHLORIDE: .6; .31; .03; .02 INJECTION, SOLUTION INTRAVENOUS at 04:10

## 2019-10-25 RX ADMIN — CEFAZOLIN SODIUM 1 G: 1 SOLUTION INTRAVENOUS at 12:10

## 2019-10-25 RX ADMIN — IPRATROPIUM BROMIDE AND ALBUTEROL SULFATE 3 ML: .5; 3 SOLUTION RESPIRATORY (INHALATION) at 09:10

## 2019-10-25 RX ADMIN — SODIUM CHLORIDE 30 MG/ML INHALATION SOLUTION 4 ML: 30 SOLUTION INHALANT at 09:10

## 2019-10-25 RX ADMIN — ONDANSETRON 4 MG: 2 INJECTION, SOLUTION INTRAMUSCULAR; INTRAVENOUS at 12:10

## 2019-10-25 NOTE — NURSING
Report received and patient care assumed.Restraints on patient repositioned. NGT intact with feeding infusing

## 2019-10-25 NOTE — NURSING
Restraint stayed off. Sitter at bedside.Aunt and sitter agreed that patient will be monitored closely. So restraints remains off. Repositioned for comfort

## 2019-10-25 NOTE — NURSING
Paged Dr. Merritt to see if ok to give meds via NGT before surgery and whether NGT can be removed per aunt's request.

## 2019-10-25 NOTE — ANESTHESIA POSTPROCEDURE EVALUATION
Anesthesia Post Evaluation    Patient: Karri Galeano    Procedure(s) Performed: Procedure(s) (LRB):  INSERTION, JEJUNOSTOMY TUBE ( OPEN) (N/A)    Final Anesthesia Type: general  Patient location during evaluation: PACU  Patient participation: Yes- Able to Participate  Level of consciousness: awake and alert and oriented  Post-procedure vital signs: reviewed and stable  Pain management: adequate  Airway patency: patent  PONV status at discharge: No PONV  Anesthetic complications: no      Cardiovascular status: hemodynamically stable and blood pressure returned to baseline  Respiratory status: spontaneous ventilation, room air and unassisted  Hydration status: euvolemic  Follow-up not needed.          Vitals Value Taken Time   /94 10/25/2019  2:20 PM   Temp 36.1 °C (97 °F) 10/25/2019  2:20 PM   Pulse 104 10/25/2019  2:20 PM   Resp 17 10/25/2019  2:20 PM   SpO2 96 % 10/25/2019  2:20 PM         No case tracking events are documented in the log.      Pain/Henry Score: Pain Rating Prior to Med Admin: 4 (10/25/2019  1:37 PM)  Henry Score: 10 (10/25/2019  1:37 PM)

## 2019-10-25 NOTE — PLAN OF CARE
Problem: Adult Inpatient Plan of Care  Goal: Plan of Care Review  Description  Outcome: Ongoing, Progressing   Patient is nonverbal.  TF restarted.  IVF continued.  Incontinence care provided. Abdominal dressing dry and intact w/ marked shadowing.  Turned frequently.  Frequent checks made and bed alarm set.  Family at bedside attentive to patient.  Will continue to monitor.

## 2019-10-25 NOTE — PLAN OF CARE
Problem: Physical Therapy Goal  Goal: Physical Therapy Goal  Description  Goals to be met by: 2019     Patient will increase functional independence with mobility by performin. Supine to sit with Mod A  2.  Scooting in bed with Min A   3   Rolling with Min A   4. Bed to chair transfer  With Mod A  5. Tolerate sitting up in chair x 30m    Outcome: Ongoing, Progressing   Pt requires Dep A for all bed mobility.  Multiple attempts sitting on EOB and performing supine<>sit.  Pt is non verbal and very pleasant.

## 2019-10-25 NOTE — NURSING
Restraints removed at this time for release trial. Once and awhile patient reach at NGT. NGT reinforced to nose . Aunt at bedside. States will stay here until sitter arrive

## 2019-10-25 NOTE — PROGRESS NOTES
"[facility]  Adult Nutrition  Progress Note    SUMMARY       Recommendations     1. Goal rate of Isosource 1.5 of 45 ml/hr, advance as tolerated  -Fluid flushes: 130 ml q 4 hrs. for maintenance needs.    -Of note: with tolerance can increase goal rate to 75 ml x 14 hrs and allow pt to be free from feeds for 10 hrs; continue fluid flushes of 130 ml q 4 hrs.   2. Monitor weight weekly to assess adequacy of TF     Goals: Meet > 85% EEN daily  Nutrition Goal Status: new  Communication of RD Recs: reviewed with physician    Reason for Assessment    Reason For Assessment: RD follow-up  Diagnosis: pulmonary disease, infection/sepsis  Relevant Medical History: CP, mental retardation  Interdisciplinary Rounds: did not attend    General Information Comments: Pt s/p J-tube placement. TF ordered for 10 ml/hr at this time with 60 ml fluid flush. NFPE on 10/18; pt dx with acute malnutrition. LBM: 10/24    Nutrition Discharge Planning: TF to meet 100% of needs    Nutrition Risk Screen    Nutrition Risk Screen: tube feeding or parenteral nutrition    Nutrition/Diet History    Patient Reported Diet/Restrictions/Preferences: pureed(reported by )  Spiritual, Cultural Beliefs, Latter-day Practices, Values that Affect Care: no  Supplemental Drinks or Food Habits: Ensure Plus(Benecalorie)  Factors Affecting Nutritional Intake: impaired cognitive status/motor control, chewing difficulties/inability to chew food, NPO    Anthropometrics    Temp: 97 °F (36.1 °C)  Height Method: Estimated  Height: 5' 1" (154.9 cm)  Height (inches): 61 in  Weight Method: Bed Scale  Weight: 39 kg (85 lb 15.7 oz)  Weight (lb): 85.98 lb  Ideal Body Weight (IBW), Male: 112 lb  % Ideal Body Weight, Male (lb): 76.77 lb  BMI (Calculated): 16.3  BMI Grade: 16 - 16.9 protein-energy malnutrition grade II       Lab/Procedures/Meds    Pertinent Labs Reviewed: reviewed  Pertinent Labs Comments: -  Pertinent Medications Reviewed: " reviewed  Pertinent Medications Comments: levo, valproic acid    Estimated/Assessed Needs    Weight Used For Calorie Calculations: 39 kg (85 lb 15.7 oz)  Energy Calorie Requirements (kcal): 1548 kcal/day  Energy Need Method: [place]-St Jeor(PAL 1.25)  Protein Requirements: 47-59 g/day(1.2-1.5 g/kg)  Weight Used For Protein Calculations: 39 kg (85 lb 15.7 oz)     Estimated Fluid Requirement Method: RDA Method  RDA Method (mL): 1548       Nutrition Prescription Ordered    Current Diet Order: NPO(for PEG placement that did not occur)  Current Nutrition Support Formula Ordered: Isosource 1.5  Current Nutrition Support Rate Ordered: 10 (ml)  Current Nutrition Support Frequency Ordered: ml/hr  Oral Nutrition Supplement: -    Evaluation of Received Nutrient/Fluid Intake    Enteral Calories (kcal): 360  Enteral Protein (gm): 16.32  Enteral (Free Water) Fluid (mL): 184  Free Water Flush Fluid (mL): 60    % Kcal Needs: 23  % Protein Needs: 35  I/O: reviewed  Energy Calories Required: not meeting needs  Protein Required: not meeting needs  Fluid Required: (per MD)  Comments: LBM: 10/24  Tolerance: (TF not yet initiated)    % Meal Intake: NPO    Nutrition Risk    Level of Risk/Frequency of Follow-up: (F/U 2 x weekly)     Assessment and Plan    Severe malnutrition  Malnutrition in the context of Acute on Chronic Issue    Related to (etiology):  Dysphagia    Signs and Symptoms (as evidenced by):  Energy Intake: <50% of estimated energy requirement for >10 days    Muscle Mass Depletion: moderate and severe depletion of temples, clavicle region, scapular region, interosseous muscle and lower extremities ( r/t CP)   Weight Loss: 10% x 1 month      Interventions  Collaboration with providers    Nutrition Diagnosis Status:  New             Monitor and Evaluation    Food and Nutrient Intake: enteral nutrition intake, parenteral nutrition intake  Food and Nutrient Adminstration: enteral and parenteral nutrition  administration  Anthropometric Measurements: weight change, weight, body mass index  Biochemical Data, Medical Tests and Procedures: glucose/endocrine profile, electrolyte and renal panel, lipid profile, inflammatory profile, gastrointestinal profile  Nutrition-Focused Physical Findings: overall appearance, extremities, muscles and bones, head and eyes     Malnutrition Assessment             Energy Intake (Malnutrition): less than or equal to 50% for greater than or equal to 5 days       [place] (Muscle Loss): mild depletion  Clavicle Bone Region (Muscle Loss): moderate depletion  Patellar Region (Muscle Loss): severe depletion  Anterior Thigh Region (Muscle Loss): severe depletion  Posterior Calf Region (Muscle Loss): severe depletion            Severe Weight Loss (Malnutrition): (10% x 1 month)    Nutrition Follow-Up    RD Follow-up?: Yes

## 2019-10-25 NOTE — OP NOTE
gen surg op note  Pre op dx aspiration,malnutrition  Post op dx same  Proc open j tube placement  anesth gen  ebl min  Findings dictated  No specimen  [id number]

## 2019-10-25 NOTE — SUBJECTIVE & OBJECTIVE
Interval History: pulling at ear per family    Review of Systems   Unable to perform ROS: Patient nonverbal     Objective:     Vital Signs (Most Recent):  Temp: 97.6 °F (36.4 °C) (10/25/19 0523)  Pulse: 81 (10/25/19 0523)  Resp: 17 (10/25/19 0523)  BP: 135/82 (10/25/19 0523)  SpO2: 95 % (10/25/19 0523) Vital Signs (24h Range):  Temp:  [95.9 °F (35.5 °C)-98.4 °F (36.9 °C)] 97.6 °F (36.4 °C)  Pulse:  [78-94] 81  Resp:  [17-20] 17  SpO2:  [93 %-98 %] 95 %  BP: (125-135)/(60-82) 135/82     Weight: 39 kg (85 lb 15.7 oz)  Body mass index is 16.25 kg/m².    Intake/Output Summary (Last 24 hours) at 10/25/2019 0551  Last data filed at 10/25/2019 0000  Gross per 24 hour   Intake 1963 ml   Output --   Net 1963 ml      Physical Exam   Constitutional: He appears well-developed. No distress.   Contracted    HENT:   Head: Atraumatic.   NGT in place   Eyes: Conjunctivae are normal.   Neck: Neck supple.   Cardiovascular: Normal rate and regular rhythm.   Pulmonary/Chest: No stridor. No respiratory distress. He has no wheezes. He has no rales.   Breathing comfortably on NC, air movement on both side of lungs, no increase in effort   Abdominal: Soft. Bowel sounds are normal. He exhibits no distension. There is no tenderness.   Musculoskeletal:   Wasted extremities   Neurological: He is alert.   Making eye contact, does not follow commands   Skin: Skin is warm and dry. Capillary refill takes less than 2 seconds. He is not diaphoretic.   Nursing note and vitals reviewed.      Significant Labs: All pertinent labs within the past 24 hours have been reviewed.    Significant Imaging: I have reviewed and interpreted all pertinent imaging results/findings within the past 24 hours.

## 2019-10-25 NOTE — PLAN OF CARE
Patient is currently on Room Air with oxygen saturation of 95%.  No apparent distress noted at present time.  Will administer Respiratory treatments as ordered.  Will continue to monitor.

## 2019-10-25 NOTE — PROGRESS NOTES
[facility] Hospital Medicine  Progress Note    Patient Name: Karri Galeano  MRN: [id number]  Patient Class: IP- Inpatient   Admission Date: 10/3/2019  Length of Stay: 21 days  Attending Physician: Echo Stokes MD  Primary Care Provider: Echo Reeves MD        Subjective:     Principal Problem:Acute respiratory failure with hypoxia        HPI:  Mr. Karri Galeano is a 24 y.o. male with cerebral palsy and epilepsy who presents to [facility] ED from an urgent care facility with complaints of fevers today.  He lives in a group home and started to have a nonproductive cough today.  The caregiver did not observe any vomiting.  He was in his usual state of health yesterday but today has become more lethargic and somnolent.  He was taken to an urgent care facility where he was then transferred to this facility for further care.  He was noted there to have a fever of 102.2° F as well as an oxygen saturation of 95% on nasal cannula at 3 liters/minute.  Further history is otherwise limited at this time.    Overview/Hospital Course:  Mr. Galeano presented with fever, cough and lethargy.  Respiratory status decompensated and he required intubation in the ER.  Workup with imaging consistent with possible pneumonia.  Empiric Zosyn and vancomycin initiated.  Pulmonary consulted for vent management.  Extubated on 10/5 in the morning.  Initially did well, but had worsening respiratory failure during the day.  Started on Bipap with no improvement and reintubated on 10/5 in the afternoon.  Blood cultures no growth to date.  Respiratory viral panel came back positive for rhino virus.  Antibiotics stopped.  Extubated on 10/9 to BiPAP.  Respiratory status stable.  Increased cough with feeding patient and he remained in the ICU.  Speech therapy recommended pureed diet.  He was stepped down to the floor on 10/11.  Patient with poor intake along with increased risk for aspiration. Per aunt, patient was fed full  meals when pureed diet was recommended by Speech. A MBBS was done. Showed evidence of aspiration therefore NPO status and PEG tube placement recommended. Aunt (next of kin) agreed with PEG placement once aspiration confirmed. Patient had unfortunately aspirated during PO intake and clinically worsened. Progressively requiring more O2 via high flow NC. Developed leukocytosis, became less interactive and spiked fever. NGT placed to avoid all oral intake and initiated on IV abx. Tube feeds started via NGT. Pulmonology re-consulted for co-management. Recommended BiPAP (or CPAP) QHS for positive pressure (as he can't participate with IS), nebulized treatments every 4 hours, nebulized hypertonic saline every 12 hours, chest physiotherapy, bowel regimen for fecal impaction/constipation and keep HOB elevated. Patient has scopolamine patch for oral secretions and IV famotidine for stress ulcer prophylaxis. Repeat CXR on 10/17 showed complete collapse of left lung likely due to atelectasis vs mucous plug. Patient remained stable on high flow but given high risk for further decompensation +/- bronchoscopy with intubation, he was upgraded to ICU for closer monitoring. Discussed with aunt at bedside. Patient had aggressive pulmonary toilet with improvement in aeration seen on chest x-ray and respiratory status improved.  Trickle feeds resumed and GI planned for PEG on 10/21- not successful and surgery consulted for J tube placement.  The patient was transferred out of the ICU on 10/22.    Met with patient aunt and she request another  ST eval/swallow eval.  PLan for j tube placement. Keep NPO   Aunt concerned about patient pulling at ear and possible infection.     Interval History: pulling at ear per family    Review of Systems   Unable to perform ROS: Patient nonverbal     Objective:     Vital Signs (Most Recent):  Temp: 97.6 °F (36.4 °C) (10/25/19 0523)  Pulse: 81 (10/25/19 0523)  Resp: 17 (10/25/19 0523)  BP: 135/82  (10/25/19 0523)  SpO2: 95 % (10/25/19 0523) Vital Signs (24h Range):  Temp:  [95.9 °F (35.5 °C)-98.4 °F (36.9 °C)] 97.6 °F (36.4 °C)  Pulse:  [78-94] 81  Resp:  [17-20] 17  SpO2:  [93 %-98 %] 95 %  BP: (125-135)/(60-82) 135/82     Weight: 39 kg (85 lb 15.7 oz)  Body mass index is 16.25 kg/m².    Intake/Output Summary (Last 24 hours) at 10/25/2019 0551  Last data filed at 10/25/2019 0000  Gross per 24 hour   Intake 1963 ml   Output --   Net 1963 ml      Physical Exam   Constitutional: He appears well-developed. No distress.   Contracted    HENT:   Head: Atraumatic.   NGT in place   Eyes: Conjunctivae are normal.   Neck: Neck supple.   Cardiovascular: Normal rate and regular rhythm.   Pulmonary/Chest: No stridor. No respiratory distress. He has no wheezes. He has no rales.   Breathing comfortably on NC, air movement on both side of lungs, no increase in effort   Abdominal: Soft. Bowel sounds are normal. He exhibits no distension. There is no tenderness.   Musculoskeletal:   Wasted extremities   Neurological: He is alert.   Making eye contact, does not follow commands   Skin: Skin is warm and dry. Capillary refill takes less than 2 seconds. He is not diaphoretic.   Nursing note and vitals reviewed.      Significant Labs: All pertinent labs within the past 24 hours have been reviewed.    Significant Imaging: I have reviewed and interpreted all pertinent imaging results/findings within the past 24 hours.      Assessment/Plan:      * Acute respiratory failure with hypoxia  Secondary to aspiration pneumonia and rhino virus  Completed antibiotic treatment prior to stepping down to floor however patient became septic again after eating pureed diet.   MBBS did show evidence of aspiration therefore placed NPO.  Abx restarted 10/16. NGT placed to bypass all oral intake pending PEG tube placement which will be done once respiratory status improves.   Although more interactive, he was on more O2 requirements , more tachycardic  and with complete collapse of left lung.  Treated with highflow supplemental O2, nebulized treatments, nebulized hypertonic saline every 12 hours, bowel regimen, BiPAP QHS, chest physiotherapy and keep HOB elevated  Famotidine IV. Scopolamine patch in place.   Continue treatment with cefazolin and levofloxacin, last dose on 10/22 and 10/23 respectively  Transferred to ICU for closer monitoring on 10/18  Respiratory status improved with aggressive pulmonary toilet and chest x-ray with improved  As per Pulmonary    Non-recurrent acute suppurative otitis media of right ear without spontaneous rupture of tympanic membrane  Recent exposure to IV abx, prolong hospital stay and symptomatic   Right TM with erythema and slightly bulge - treat augmentin solution via tube     Constipation  On bowel regimen  Multiple bowel movements overnight  Continue current regimen    Other dysphagia  As above      Severe malnutrition  NGT for now with restart of tube feeds   PEG attempted by GI on 10/21 (today) without success  General surgery consulted for J-tube placement    Hypokalemia  Replace as needed.  Will continue to monitor    Epilepsy  Continue his home regimen of divalproex.  Also on clonazepam    Pneumonia of left lung due to infectious organism  Management as above    Spastic quadriplegic cerebral palsy  Family would like group home in [place] when ready for discharge  Hopefully there will not be need for trach      Hypernatremia  Secondary to inadequate free water intake.  Resolved with isotonic fluids. Unable to properly keep oral hydration due to dysphagia  Free water with tube feeds to restart  BMP daily        VTE Risk Mitigation (From admission, onward)         Ordered     enoxaparin injection 40 mg  Daily      10/04/19 0152     IP VTE HIGH RISK PATIENT  Once      10/04/19 0152                      Echo Stokes MD  Department of Hospital Medicine   [facility]

## 2019-10-25 NOTE — PLAN OF CARE
Mentally delayed patient.Totally dependent on staff to meet ADLs .Bedrest maintained. Turned every 2 hours  Isosouce 1.5 infusing at 45 cc / prior to midnight then NPO status after midnight maintain for placement of J tube  in am. NGT clamped .Private sitter at bedside. Restraints not needed at this time.

## 2019-10-25 NOTE — PT/OT/SLP PROGRESS
Physical Therapy Treatment    Patient Name:  Karri Galeano   MRN:  [id number]    Recommendations:     Discharge Recommendations:  (24hr care at new [facility])   Discharge Equipment Recommendations: bedside commode, bath bench, hospital bed(w/c with elevated leg rest)   Barriers to discharge: awaiting placement in new group [place]    Assessment:     Karri Galeano is a 24 y.o. male admitted with a medical diagnosis of Acute respiratory failure with hypoxia.  He presents with the following impairments/functional limitations:  weakness, impaired balance, decreased coordination, decreased safety awareness, impaired self care skills, impaired functional mobilty, impaired cognition, decreased lower extremity function, decreased upper extremity function, impaired fine motor, impaired coordination, decreased ROM, impaired skin, gait instability, impaired endurance.  Pt requires Dep A for all bed mobility.  Multiple attempts sitting on EOB and performing supine<>sit.  Pt is non verbal and very pleasant.    Rehab Prognosis: Fair; patient would benefit from acute skilled PT services to address these deficits and reach maximum level of function.    Recent Surgery: Procedure(s) (LRB):  INSERTION, JEJUNOSTOMY TUBE ( OPEN) (N/A) Day of Surgery    Plan:     During this hospitalization, patient to be seen (2-3/wk) to address the identified rehab impairments via therapeutic activities, neuromuscular re-education and progress toward the following goals:    · Plan of Care Expires:  11/02/19    Subjective     Chief Complaint: pt is nonverbal  Patient/Family Comments/goals: pt is non verbal, but did not display any facial grimacing.  Pain/Comfort:  · Pain Rating 1: 0/10(pt is non verbal and did not display any facial grimacing)      Objective:     Communicated with pt's nurseKatherin prior to session.  Patient found HOB elevated with bed alarm, NG tube upon PT entry to room.     General Precautions: Standard, aspiration, fall    Orthopedic Precautions:N/A   Braces: N/A     Functional Mobility:  · Bed Mobility:     · Rolling Left:  Dependence x2 trials  · Rolling Right: dependence x2 trials  · Scooting: dependence  · Supine to Sit: dependence x5 attempts  · Sit to Supine: dependence x5 attempts  · Balance: Dep sitting EOB  · Transfers: Unable to attempt due to pt resisting with legs and arms.      AM-PAC 6 CLICK MOBILITY  Turning over in bed (including adjusting bedclothes, sheets and blankets)?: 2  Sitting down on and standing up from a chair with arms (e.g., wheelchair, bedside commode, etc.): 2  Moving from lying on back to sitting on the side of the bed?: 2  Moving to and from a bed to a chair (including a wheelchair)?: 2  Need to walk in hospital room?: 1  Climbing 3-5 steps with a railing?: 1  Basic Mobility Total Score: 10       Therapeutic Activities and Exercises:  Multiple attempts to sit EOB x5 trials.  Pt able to maintain for ~30 seconds each trial.    PROM performed to BLEs with pt in supine position.   Requires extra time to perform all activity due to pt unable to follow simple step commands and resisting therapist throughout treatment session.  Pt with increased itching sitting EOB.    Patient left HOB elevated with all lines intact, call button in reach, bed alarm on, pt's nurse, Katherin notified and Aunt AIRAM present..    GOALS:   Multidisciplinary Problems     Physical Therapy Goals        Problem: Physical Therapy Goal    Goal Priority Disciplines Outcome Goal Variances Interventions   Physical Therapy Goal     PT, PT/OT Ongoing, Progressing     Description:  Goals to be met by: 2019     Patient will increase functional independence with mobility by performin. Supine to sit with Mod A  2.  Scooting in bed with Min A   3   Rolling with Min A   4. Bed to chair transfer  With Mod A  5. Tolerate sitting up in chair x 30m                     Time Tracking:     PT Received On: 10/25/19  PT Start Time: 0950     PT  Stop Time: 1013  PT Total Time (min): 23 min     Billable Minutes: Therapeutic Activity 23    Treatment Type: Treatment  PT/PTA: PTA     PTA Visit Number: 1     Leta Winston PTA  10/25/2019

## 2019-10-25 NOTE — OP NOTE
DATE OF PROCEDURE:  10/25/2019    PREOPERATIVE DIAGNOSIS:  Aspiration/malnutrition.    POSTOPERATIVE DIAGNOSIS:  Aspiration/malnutrition.    PROCEDURE PERFORMED:  Open jejunostomy tube placement, 16-Bruneian.    SURGEON:  Tony Merritt M.D.    ANESTHESIA:  General.    BLOOD LOSS:  Minimal.    CLINICAL HISTORY:  A 24-year-old female with cerebral palsy, recently down with   aspiration pneumonia.  PEG tube was unable to be placed due to a hiatal hernia.    We were consented for open jejunostomy tube placement.  I discussed the   situation with the patient's power of  this morning at length, questions   answered, she wishes to proceed.    PROCEDURE IN DETAIL:  The patient was brought into the Operating Room, placed on   the operating table in supine position.  The abdomen was prepped and draped in   sterile fashion.  About a 3 inch incision was made above the umbilicus in the   midline.  Electrocautery was used to dissect through the subcutaneous tissue   down to the fascia, which was incised for the length of the incision.  The   peritoneal cavity was entered.  There was no free fluid.  The small bowel was   eviscerated.  It was traced to identify the ligament of Treitz.  Approximately   15 cm distal to this was chosen for the placement of the J tube.  It was   grasped.  A #16 jejunostomy tube was placed.  It was placed through an incision   in the left abdomen on the antimesenteric border of the small bowel and the site   previously identified.  A total of two 3-0 silk pursestring sutures were   placed.  A small enterotomy was made in the center of them.  The jejunostomy   tube was placed.  The distal tube was transected 10 cm distal to the balloon.    It was inserted into the lumen of the bowel.  A 3 mL of saline were inflated   into the balloon, which would prevent it from being pulled out, but not occlude   the lumen.  The 2 pursestrings were secured down and then using to these two   sutures, it was  secured to the abdominal wall at the puncture site for the   J-tube.  There was no spillage of enteric contents during the procedure.  The   site was inspected and was found to be hemostatic.  There were no enterotomies.    There was no leakage.  The fascia was closed with a running #2 nylon.  The   wound was irrigated with normal saline.  The skin was closed with staples.    Sterile dressings were applied.  A binder will be placed loosely to his   abdominal wall to hopefully prevent the patient from pulling the tube out.  His   nasogastric tube was removed in the Operating Room.      ALAINA/IN  dd: 10/25/2019 13:04:02 (CDT)  td: 10/25/2019 14:10:35 (CDT)  Doc ID   #[id number]  Job ID #[id number]    CC:

## 2019-10-25 NOTE — TRANSFER OF CARE
"Anesthesia Transfer of Care Note    Patient: Karri Galeano    Procedure(s) Performed: Procedure(s) (LRB):  INSERTION, JEJUNOSTOMY TUBE ( OPEN) (N/A)    Patient location: PACU    Anesthesia Type: general    Transport from OR: Transported from OR on room air with adequate spontaneous ventilation    Post pain: adequate analgesia    Post assessment: no apparent anesthetic complications and tolerated procedure well    Post vital signs: stable    Level of consciousness: awake and alert    Nausea/Vomiting: no nausea/vomiting    Complications: none    Transfer of care protocol was followed      Last vitals:   Visit Vitals  BP (!) 157/88 (BP Location: Left arm, Patient Position: Lying)   Pulse 110   Temp 35.8 °C (96.4 °F) (Tympanic)   Resp (!) 24   Ht 5' 1" (1.549 m)   Wt 39 kg (85 lb 15.7 oz)   SpO2 99%   BMI 16.25 kg/m²     "

## 2019-10-26 PROCEDURE — 25000003 PHARM REV CODE 250: Performed by: SURGERY

## 2019-10-26 PROCEDURE — 94660 CPAP INITIATION&MGMT: CPT

## 2019-10-26 PROCEDURE — 25000242 PHARM REV CODE 250 ALT 637 W/ HCPCS: Performed by: SURGERY

## 2019-10-26 PROCEDURE — S0028 INJECTION, FAMOTIDINE, 20 MG: HCPCS | Performed by: SURGERY

## 2019-10-26 PROCEDURE — 25000003 PHARM REV CODE 250: Performed by: HOSPITALIST

## 2019-10-26 PROCEDURE — 94761 N-INVAS EAR/PLS OXIMETRY MLT: CPT

## 2019-10-26 PROCEDURE — 63600175 PHARM REV CODE 636 W HCPCS: Performed by: SURGERY

## 2019-10-26 PROCEDURE — 94640 AIRWAY INHALATION TREATMENT: CPT

## 2019-10-26 PROCEDURE — 11000001 HC ACUTE MED/SURG PRIVATE ROOM

## 2019-10-26 PROCEDURE — 99900035 HC TECH TIME PER 15 MIN (STAT)

## 2019-10-26 RX ORDER — HYDROCODONE BITARTRATE AND ACETAMINOPHEN 7.5; 325 MG/15ML; MG/15ML
5 SOLUTION ORAL EVERY 4 HOURS PRN
Status: DISCONTINUED | OUTPATIENT
Start: 2019-10-26 | End: 2019-11-02 | Stop reason: HOSPADM

## 2019-10-26 RX ORDER — DOCUSATE SODIUM 50 MG/5ML
100 LIQUID ORAL 2 TIMES DAILY
Status: DISCONTINUED | OUTPATIENT
Start: 2019-10-26 | End: 2019-11-02 | Stop reason: HOSPADM

## 2019-10-26 RX ADMIN — AMOXICILLIN AND CLAVULANATE POTASSIUM 500 MG: 250; 62.5 POWDER, FOR SUSPENSION ORAL at 08:10

## 2019-10-26 RX ADMIN — SODIUM CHLORIDE 30 MG/ML INHALATION SOLUTION 4 ML: 30 SOLUTION INHALANT at 08:10

## 2019-10-26 RX ADMIN — IPRATROPIUM BROMIDE AND ALBUTEROL SULFATE 3 ML: .5; 3 SOLUTION RESPIRATORY (INHALATION) at 12:10

## 2019-10-26 RX ADMIN — POLYETHYLENE GLYCOL 3350 17 G: 17 POWDER, FOR SOLUTION ORAL at 08:10

## 2019-10-26 RX ADMIN — IPRATROPIUM BROMIDE AND ALBUTEROL SULFATE 3 ML: .5; 3 SOLUTION RESPIRATORY (INHALATION) at 03:10

## 2019-10-26 RX ADMIN — HYDROCODONE BITARTRATE AND ACETAMINOPHEN 15 ML: 7.5; 325 SOLUTION ORAL at 10:10

## 2019-10-26 RX ADMIN — FAMOTIDINE 20 MG: 10 INJECTION INTRAVENOUS at 08:10

## 2019-10-26 RX ADMIN — ENOXAPARIN SODIUM 40 MG: 100 INJECTION SUBCUTANEOUS at 06:10

## 2019-10-26 RX ADMIN — SODIUM CHLORIDE 30 MG/ML INHALATION SOLUTION 4 ML: 30 SOLUTION INHALANT at 07:10

## 2019-10-26 RX ADMIN — MICONAZOLE NITRATE: 20 POWDER TOPICAL at 08:10

## 2019-10-26 RX ADMIN — TAMSULOSIN HYDROCHLORIDE 0.4 MG: 0.4 CAPSULE ORAL at 08:10

## 2019-10-26 RX ADMIN — ACETAMINOPHEN 500 MG: 500 TABLET ORAL at 06:10

## 2019-10-26 RX ADMIN — FLUOXETINE HYDROCHLORIDE 60 MG: 20 SOLUTION ORAL at 08:10

## 2019-10-26 RX ADMIN — DOCUSATE SODIUM 100 MG: 50 LIQUID ORAL at 08:10

## 2019-10-26 RX ADMIN — VALPROIC ACID 500 MG: 250 SOLUTION ORAL at 08:10

## 2019-10-26 RX ADMIN — IPRATROPIUM BROMIDE AND ALBUTEROL SULFATE 3 ML: .5; 3 SOLUTION RESPIRATORY (INHALATION) at 07:10

## 2019-10-26 RX ADMIN — CLONAZEPAM 1 MG: 0.5 TABLET ORAL at 08:10

## 2019-10-26 RX ADMIN — IPRATROPIUM BROMIDE AND ALBUTEROL SULFATE 3 ML: .5; 3 SOLUTION RESPIRATORY (INHALATION) at 08:10

## 2019-10-26 NOTE — PLAN OF CARE
Pt free from falls, injury or any further trauma throughout shift. Pt awake and alert. Continued medications as ordered. Nonverbal complaints of pain, controlled with medications per Jtube. Pt turned q2. Abd binder in place to prevent pt from pulling out Jtube. Pt in no distress. Will cont to monitor.   Problem: Adult Inpatient Plan of Care  Goal: Plan of Care Review  Description  Outcome: Ongoing, Progressing  Flowsheets (Taken 10/26/2019 1711)  Plan of Care Reviewed With: patient

## 2019-10-26 NOTE — SUBJECTIVE & OBJECTIVE
Interval History: j tube placed     Review of Systems   Unable to perform ROS: Patient nonverbal     Objective:     Vital Signs (Most Recent):  Temp: 98.8 °F (37.1 °C) (10/26/19 0800)  Pulse: 106 (10/26/19 0800)  Resp: 18 (10/26/19 0800)  BP: (!) 160/95 (10/26/19 0800)  SpO2: (!) 94 % (10/26/19 0800) Vital Signs (24h Range):  Temp:  [96.4 °F (35.8 °C)-98.8 °F (37.1 °C)] 98.8 °F (37.1 °C)  Pulse:  [] 106  Resp:  [14-38] 18  SpO2:  [94 %-99 %] 94 %  BP: (126-160)/() 160/95     Weight: 39 kg (85 lb 15.7 oz)  Body mass index is 16.25 kg/m².    Intake/Output Summary (Last 24 hours) at 10/26/2019 1051  Last data filed at 10/25/2019 1846  Gross per 24 hour   Intake 1063.33 ml   Output --   Net 1063.33 ml      Physical Exam   Constitutional: He appears well-developed. No distress.   Contracted    HENT:   Head: Atraumatic.   Eyes: Conjunctivae are normal.   Neck: Neck supple.   Cardiovascular: Normal rate and regular rhythm.   Pulmonary/Chest: No stridor. No respiratory distress. He has no wheezes. He has no rales.   Breathing comfortably on NC, air movement on both side of lungs, no increase in effort   Abdominal: Soft. Bowel sounds are normal. He exhibits no distension. There is no tenderness.   j tube in place with abdominal binder   Musculoskeletal:   Wasted extremities   Neurological: He is alert.   Making eye contact, does not follow commands   Skin: Skin is warm and dry. Capillary refill takes less than 2 seconds. He is not diaphoretic.   Nursing note and vitals reviewed.      Significant Labs: All pertinent labs within the past 24 hours have been reviewed.    Significant Imaging: I have reviewed and interpreted all pertinent imaging results/findings within the past 24 hours.

## 2019-10-26 NOTE — ASSESSMENT & PLAN NOTE
NGT for now with restart of tube feeds   PEG attempted by GI on 10/21 (today) without success  General surgery consulted for J-tube placement - placed 10/25

## 2019-10-26 NOTE — PLAN OF CARE
"Alert. Dressing to J-tube sanguineous drainage, outlined. J-tube in place. Abdominal binder in place. No s/s of distress. Safety sitter at bedside. BiPAP on at night. LR continued at 50. Tube feeding continued at 10 cc/ hr. No residual noted. Safety maintained.  Problem: Fall Injury Risk  Goal: Absence of Fall and Fall-Related Injury  Outcome: Ongoing, Progressing     Problem: Skin Injury Risk Increased  Goal: Skin Health and Integrity  Outcome: Ongoing, Progressing     Problem: Adult Inpatient Plan of Care  Goal: Plan of Care Review  Description  Patient remains intubated today and to ventilator, Propofol drip continues for sedation,  Tube feeds started per OGT, tolerating without difficulty.  Saldivar cath remains patent.  Aunt updated on patient condition, she states "I'm on the way there"   Outcome: Ongoing, Progressing  Goal: Patient-Specific Goal (Individualization)  Outcome: Ongoing, Progressing  Goal: Absence of Hospital-Acquired Illness or Injury  Outcome: Ongoing, Progressing  Goal: Optimal Comfort and Wellbeing  Outcome: Ongoing, Progressing     Problem: Noninvasive Ventilation Acute  Goal: Effective Unassisted Ventilation and Oxygenation  Outcome: Ongoing, Progressing     Problem: Restraint, Nonbehavioral (Nonviolent)  Goal: Personal Dignity and Safety Maintained  Outcome: Ongoing, Progressing     Problem: Oral Intake Inadequate  Goal: Improved Oral Intake  Outcome: Ongoing, Progressing     Problem: Malnutrition  Goal: Improved Nutritional Intake  Outcome: Ongoing, Progressing     "

## 2019-10-26 NOTE — PROGRESS NOTES
Surgery Progress Note    Primary Problem: Acute respiratory failure with hypoxia    Other problems:   Active Hospital Problems    Diagnosis  POA    *Acute respiratory failure with hypoxia [J96.01]  Yes    Non-recurrent acute suppurative otitis media of right ear without spontaneous rupture of tympanic membrane [H66.001]  Clinically Undetermined    Constipation [K59.00]  Yes    Other dysphagia [R13.19]  Yes    Severe malnutrition [E43]  Yes    Hypokalemia [E87.6]  No    Pneumonia of left lung due to infectious organism [J18.9]  Yes    Epilepsy [G40.909]  Yes     Chronic    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]  Yes     Chronic    Hypernatremia [E87.0]  No      Resolved Hospital Problems    Diagnosis Date Resolved POA    Severe sepsis [A41.9, R65.20] 10/11/2019 Yes    On mechanically assisted ventilation [Z99.11] 10/15/2019 Not Applicable       HD #  LOS: 22 days   POD #1 Day Post-Op status post J-tube insertion    Overnight events:  No acute events overnight.  Tolerating tube feeds at 10 cc an hour.  No issues.        Diet - No diet orders on file     I/O last 3 completed shifts:  In: 1698.3 [I.V.:1013.3; NG/GT:635; IV Piggyback:50]  Out: -     Intake/Output Summary (Last 24 hours) at 10/26/2019 0858  Last data filed at 10/25/2019 1846  Gross per 24 hour   Intake 1063.33 ml   Output --   Net 1063.33 ml       Temp:  [96.4 °F (35.8 °C)-98.8 °F (37.1 °C)] 98.8 °F (37.1 °C)  Pulse:  [] 106  Resp:  [14-38] 18  SpO2:  [94 %-99 %] 94 %  BP: (126-160)/() 160/95    Gen: alert, well appearing, and in no distress,    Chest: Normal respiratory effort    CVS exam: normal rate and regular rhythm.    Abdomen: Soft appropriately tender to palpation, no rebound. Incision clean, dry and intact. No erythema   J-tube in place    Recent Labs   Lab 10/20/19  0240 10/21/19  0255 10/22/19  0219   WBC 9.65 7.65  7.65 8.10   HGB 8.6* 8.6*  8.6* 8.5*   HCT 27.8* 28.1*  28.1* 27.2*   * 385*  385* 322     144 143   K 3.9 3.7 3.6    106 105   BUN 13 14 11   GLU 81 90 93        Assessment:  Status post J-tube insertion    Plan:  Advance diet 10 cc an hour Q 8 hr still goal of 45  Okay for for discharge from my standpoint once tolerating tube feeds at goal    Zoltan Chris MD

## 2019-10-26 NOTE — PROGRESS NOTES
[facility] Hospital Medicine  Progress Note    Patient Name: Karri Galeano  MRN: [id number]  Patient Class: IP- Inpatient   Admission Date: 10/3/2019  Length of Stay: 22 days  Attending Physician: Echo Stokes MD  Primary Care Provider: Echo Reeves MD        Subjective:     Principal Problem:Acute respiratory failure with hypoxia        HPI:  Mr. Karri Galeano is a 24 y.o. male with cerebral palsy and epilepsy who presents to [facility] ED from an urgent care facility with complaints of fevers today.  He lives in a group home and started to have a nonproductive cough today.  The caregiver did not observe any vomiting.  He was in his usual state of health yesterday but today has become more lethargic and somnolent.  He was taken to an urgent care facility where he was then transferred to this facility for further care.  He was noted there to have a fever of 102.2° F as well as an oxygen saturation of 95% on nasal cannula at 3 liters/minute.  Further history is otherwise limited at this time.    Overview/Hospital Course:  Mr. Galeano presented with fever, cough and lethargy.  Respiratory status decompensated and he required intubation in the ER.  Workup with imaging consistent with possible pneumonia.  Empiric Zosyn and vancomycin initiated.  Pulmonary consulted for vent management.  Extubated on 10/5 in the morning.  Initially did well, but had worsening respiratory failure during the day.  Started on Bipap with no improvement and reintubated on 10/5 in the afternoon.  Blood cultures no growth to date.  Respiratory viral panel came back positive for rhino virus.  Antibiotics stopped.  Extubated on 10/9 to BiPAP.  Respiratory status stable.  Increased cough with feeding patient and he remained in the ICU.  Speech therapy recommended pureed diet.  He was stepped down to the floor on 10/11.  Patient with poor intake along with increased risk for aspiration. Per aunt, patient was fed full  meals when pureed diet was recommended by Speech. A MBBS was done. Showed evidence of aspiration therefore NPO status and PEG tube placement recommended. Aunt (next of kin) agreed with PEG placement once aspiration confirmed. Patient had unfortunately aspirated during PO intake and clinically worsened. Progressively requiring more O2 via high flow NC. Developed leukocytosis, became less interactive and spiked fever. NGT placed to avoid all oral intake and initiated on IV abx. Tube feeds started via NGT. Pulmonology re-consulted for co-management. Recommended BiPAP (or CPAP) QHS for positive pressure (as he can't participate with IS), nebulized treatments every 4 hours, nebulized hypertonic saline every 12 hours, chest physiotherapy, bowel regimen for fecal impaction/constipation and keep HOB elevated. Patient has scopolamine patch for oral secretions and IV famotidine for stress ulcer prophylaxis. Repeat CXR on 10/17 showed complete collapse of left lung likely due to atelectasis vs mucous plug. Patient remained stable on high flow but given high risk for further decompensation +/- bronchoscopy with intubation, he was upgraded to ICU for closer monitoring. Discussed with aunt at bedside. Patient had aggressive pulmonary toilet with improvement in aeration seen on chest x-ray and respiratory status improved.  Trickle feeds resumed and GI planned for PEG on 10/21- not successful and surgery consulted for J tube placement.  The patient was transferred out of the ICU on 10/22.    Met with patient aunt and she request another  ST eval/swallow eval.  PLan for j tube placement. Keep NPO   Aunt concerned about patient pulling at ear and possible infection.     Treating with augmentin for otitis media and j tube placed 10/25  Drop in O2 sats, lower pain med dose, CXR - tolerating tube feeds thus far    Interval History: pt resting more today after pain med    Review of Systems   Unable to perform ROS: Patient nonverbal      Objective:     Vital Signs (Most Recent):  Temp: 98.2 °F (36.8 °C) (10/26/19 1124)  Pulse: (!) 113 (10/26/19 1124)  Resp: 18 (10/26/19 1124)  BP: 133/82 (10/26/19 1124)  SpO2: (!) 90 % (10/26/19 1124) Vital Signs (24h Range):  Temp:  [96.4 °F (35.8 °C)-98.8 °F (37.1 °C)] 98.2 °F (36.8 °C)  Pulse:  [] 113  Resp:  [14-38] 18  SpO2:  [90 %-99 %] 90 %  BP: (126-160)/() 133/82     Weight: 39 kg (85 lb 15.7 oz)  Body mass index is 16.25 kg/m².    Intake/Output Summary (Last 24 hours) at 10/26/2019 1300  Last data filed at 10/25/2019 1846  Gross per 24 hour   Intake 1063.33 ml   Output --   Net 1063.33 ml      Physical Exam   Constitutional: He appears well-developed. No distress.   Contracted    HENT:   Head: Atraumatic.   Eyes: Conjunctivae are normal.   Neck: Neck supple.   Cardiovascular: Normal rate and regular rhythm.   Pulmonary/Chest: No stridor. No respiratory distress. He has no wheezes. He has no rales.   Breathing comfortably on NC, air movement on both side of lungs, no increase in effort   Abdominal: Soft. Bowel sounds are normal. He exhibits no distension. There is no tenderness.   j tube in place with abdominal binder   Musculoskeletal:   Wasted extremities   Neurological: He is alert.   Making eye contact, does not follow commands   Skin: Skin is warm and dry. Capillary refill takes less than 2 seconds. He is not diaphoretic.   Nursing note and vitals reviewed.      Significant Labs: All pertinent labs within the past 24 hours have been reviewed.    Significant Imaging: I have reviewed and interpreted all pertinent imaging results/findings within the past 24 hours.      Assessment/Plan:      * Acute respiratory failure with hypoxia  Secondary to aspiration pneumonia and rhino virus  Completed antibiotic treatment prior to stepping down to floor however patient became septic again after eating pureed diet.   MBBS did show evidence of aspiration therefore placed NPO.  Abx restarted 10/16. NGT  placed to bypass all oral intake pending PEG tube placement which will be done once respiratory status improves.   Although more interactive, he was on more O2 requirements , more tachycardic and with complete collapse of left lung.  Treated with highflow supplemental O2, nebulized treatments, nebulized hypertonic saline every 12 hours, bowel regimen, BiPAP QHS, chest physiotherapy and keep HOB elevated  Famotidine IV. Scopolamine patch in place.   Continue treatment with cefazolin and levofloxacin, last dose on 10/22 and 10/23 respectively  Transferred to ICU for closer monitoring on 10/18  Respiratory status improved with aggressive pulmonary toilet and chest x-ray with improved  As per Pulmonary    Non-recurrent acute suppurative otitis media of right ear without spontaneous rupture of tympanic membrane  Recent exposure to IV abx, prolong hospital stay and symptomatic   Right TM with erythema and slightly bulge - treat augmentin solution via tube     Constipation  On bowel regimen        Other dysphagia  As above      Severe malnutrition  NGT for now with restart of tube feeds   PEG attempted by GI on 10/21 (today) without success  General surgery consulted for J-tube placement - placed 10/25    Hypokalemia  Replace as needed.  Will continue to monitor    Epilepsy  Continue his home regimen of divalproex.  Also on clonazepam    Pneumonia of left lung due to infectious organism  Management as above    Spastic quadriplegic cerebral palsy  Family would like group home in [place] when ready for discharge  Hopefully there will not be need for trach      Hypernatremia  Secondary to inadequate free water intake.  Resolved with isotonic fluids. Unable to properly keep oral hydration due to dysphagia  Free water with tube feeds to restart  BMP daily        VTE Risk Mitigation (From admission, onward)         Ordered     enoxaparin injection 40 mg  Daily      10/04/19 0152     IP VTE HIGH RISK PATIENT  Once      10/04/19  0152                      Echo Stokes MD  Department of Hospital Medicine   [facility]

## 2019-10-26 NOTE — PROGRESS NOTES
[facility] Hospital Medicine  Progress Note    Patient Name: Karri Galeano  MRN: [id number]  Patient Class: IP- Inpatient   Admission Date: 10/3/2019  Length of Stay: 22 days  Attending Physician: Echo Stokes MD  Primary Care Provider: Echo Reeves MD        Subjective:     Principal Problem:Acute respiratory failure with hypoxia        HPI:  Mr. Karri Galeano is a 24 y.o. male with cerebral palsy and epilepsy who presents to [facility] ED from an urgent care facility with complaints of fevers today.  He lives in a group home and started to have a nonproductive cough today.  The caregiver did not observe any vomiting.  He was in his usual state of health yesterday but today has become more lethargic and somnolent.  He was taken to an urgent care facility where he was then transferred to this facility for further care.  He was noted there to have a fever of 102.2° F as well as an oxygen saturation of 95% on nasal cannula at 3 liters/minute.  Further history is otherwise limited at this time.    Overview/Hospital Course:  Mr. Galeano presented with fever, cough and lethargy.  Respiratory status decompensated and he required intubation in the ER.  Workup with imaging consistent with possible pneumonia.  Empiric Zosyn and vancomycin initiated.  Pulmonary consulted for vent management.  Extubated on 10/5 in the morning.  Initially did well, but had worsening respiratory failure during the day.  Started on Bipap with no improvement and reintubated on 10/5 in the afternoon.  Blood cultures no growth to date.  Respiratory viral panel came back positive for rhino virus.  Antibiotics stopped.  Extubated on 10/9 to BiPAP.  Respiratory status stable.  Increased cough with feeding patient and he remained in the ICU.  Speech therapy recommended pureed diet.  He was stepped down to the floor on 10/11.  Patient with poor intake along with increased risk for aspiration. Per aunt, patient was fed full  meals when pureed diet was recommended by Speech. A MBBS was done. Showed evidence of aspiration therefore NPO status and PEG tube placement recommended. Aunt (next of kin) agreed with PEG placement once aspiration confirmed. Patient had unfortunately aspirated during PO intake and clinically worsened. Progressively requiring more O2 via high flow NC. Developed leukocytosis, became less interactive and spiked fever. NGT placed to avoid all oral intake and initiated on IV abx. Tube feeds started via NGT. Pulmonology re-consulted for co-management. Recommended BiPAP (or CPAP) QHS for positive pressure (as he can't participate with IS), nebulized treatments every 4 hours, nebulized hypertonic saline every 12 hours, chest physiotherapy, bowel regimen for fecal impaction/constipation and keep HOB elevated. Patient has scopolamine patch for oral secretions and IV famotidine for stress ulcer prophylaxis. Repeat CXR on 10/17 showed complete collapse of left lung likely due to atelectasis vs mucous plug. Patient remained stable on high flow but given high risk for further decompensation +/- bronchoscopy with intubation, he was upgraded to ICU for closer monitoring. Discussed with aunt at bedside. Patient had aggressive pulmonary toilet with improvement in aeration seen on chest x-ray and respiratory status improved.  Trickle feeds resumed and GI planned for PEG on 10/21- not successful and surgery consulted for J tube placement.  The patient was transferred out of the ICU on 10/22.    Met with patient aunt and she request another  ST eval/swallow eval.  PLan for j tube placement. Keep NPO   Aunt concerned about patient pulling at ear and possible infection.     Treating with augmentin for otitis media and j tube placed 10/25    Interval History: j tube placed     Review of Systems   Unable to perform ROS: Patient nonverbal     Objective:     Vital Signs (Most Recent):  Temp: 98.8 °F (37.1 °C) (10/26/19 0800)  Pulse: 106  (10/26/19 0800)  Resp: 18 (10/26/19 0800)  BP: (!) 160/95 (10/26/19 0800)  SpO2: (!) 94 % (10/26/19 0800) Vital Signs (24h Range):  Temp:  [96.4 °F (35.8 °C)-98.8 °F (37.1 °C)] 98.8 °F (37.1 °C)  Pulse:  [] 106  Resp:  [14-38] 18  SpO2:  [94 %-99 %] 94 %  BP: (126-160)/() 160/95     Weight: 39 kg (85 lb 15.7 oz)  Body mass index is 16.25 kg/m².    Intake/Output Summary (Last 24 hours) at 10/26/2019 1051  Last data filed at 10/25/2019 1846  Gross per 24 hour   Intake 1063.33 ml   Output --   Net 1063.33 ml      Physical Exam   Constitutional: He appears well-developed. No distress.   Contracted    HENT:   Head: Atraumatic.   Eyes: Conjunctivae are normal.   Neck: Neck supple.   Cardiovascular: Normal rate and regular rhythm.   Pulmonary/Chest: No stridor. No respiratory distress. He has no wheezes. He has no rales.   Breathing comfortably on NC, air movement on both side of lungs, no increase in effort   Abdominal: Soft. Bowel sounds are normal. He exhibits no distension. There is no tenderness.   j tube in place with abdominal binder   Musculoskeletal:   Wasted extremities   Neurological: He is alert.   Making eye contact, does not follow commands   Skin: Skin is warm and dry. Capillary refill takes less than 2 seconds. He is not diaphoretic.   Nursing note and vitals reviewed.      Significant Labs: All pertinent labs within the past 24 hours have been reviewed.    Significant Imaging: I have reviewed and interpreted all pertinent imaging results/findings within the past 24 hours.      Assessment/Plan:      * Acute respiratory failure with hypoxia  Secondary to aspiration pneumonia and rhino virus  Completed antibiotic treatment prior to stepping down to floor however patient became septic again after eating pureed diet.   MBBS did show evidence of aspiration therefore placed NPO.  Abx restarted 10/16. NGT placed to bypass all oral intake pending PEG tube placement which will be done once respiratory  status improves.   Although more interactive, he was on more O2 requirements , more tachycardic and with complete collapse of left lung.  Treated with highflow supplemental O2, nebulized treatments, nebulized hypertonic saline every 12 hours, bowel regimen, BiPAP QHS, chest physiotherapy and keep HOB elevated  Famotidine IV. Scopolamine patch in place.   Continue treatment with cefazolin and levofloxacin, last dose on 10/22 and 10/23 respectively  Transferred to ICU for closer monitoring on 10/18  Respiratory status improved with aggressive pulmonary toilet and chest x-ray with improved  As per Pulmonary    Non-recurrent acute suppurative otitis media of right ear without spontaneous rupture of tympanic membrane  Recent exposure to IV abx, prolong hospital stay and symptomatic   Right TM with erythema and slightly bulge - treat augmentin solution via tube     Constipation  On bowel regimen  Multiple bowel movements overnight  Continue current regimen    Other dysphagia  As above      Severe malnutrition  NGT for now with restart of tube feeds   PEG attempted by GI on 10/21 (today) without success  General surgery consulted for J-tube placement - placed 10/25    Hypokalemia  Replace as needed.  Will continue to monitor    Epilepsy  Continue his home regimen of divalproex.  Also on clonazepam    Pneumonia of left lung due to infectious organism  Management as above    Spastic quadriplegic cerebral palsy  Family would like group home in [place] when ready for discharge  Hopefully there will not be need for trach      Hypernatremia  Secondary to inadequate free water intake.  Resolved with isotonic fluids. Unable to properly keep oral hydration due to dysphagia  Free water with tube feeds to restart  BMP daily        VTE Risk Mitigation (From admission, onward)         Ordered     enoxaparin injection 40 mg  Daily      10/04/19 0152     IP VTE HIGH RISK PATIENT  Once      10/04/19 0152                      Echo GARCIA  MD Divya  Department of Hospital Medicine   [facility]

## 2019-10-26 NOTE — SUBJECTIVE & OBJECTIVE
Interval History: pt resting more today after pain med    Review of Systems   Unable to perform ROS: Patient nonverbal     Objective:     Vital Signs (Most Recent):  Temp: 98.2 °F (36.8 °C) (10/26/19 1124)  Pulse: (!) 113 (10/26/19 1124)  Resp: 18 (10/26/19 1124)  BP: 133/82 (10/26/19 1124)  SpO2: (!) 90 % (10/26/19 1124) Vital Signs (24h Range):  Temp:  [96.4 °F (35.8 °C)-98.8 °F (37.1 °C)] 98.2 °F (36.8 °C)  Pulse:  [] 113  Resp:  [14-38] 18  SpO2:  [90 %-99 %] 90 %  BP: (126-160)/() 133/82     Weight: 39 kg (85 lb 15.7 oz)  Body mass index is 16.25 kg/m².    Intake/Output Summary (Last 24 hours) at 10/26/2019 1300  Last data filed at 10/25/2019 1846  Gross per 24 hour   Intake 1063.33 ml   Output --   Net 1063.33 ml      Physical Exam   Constitutional: He appears well-developed. No distress.   Contracted    HENT:   Head: Atraumatic.   Eyes: Conjunctivae are normal.   Neck: Neck supple.   Cardiovascular: Normal rate and regular rhythm.   Pulmonary/Chest: No stridor. No respiratory distress. He has no wheezes. He has no rales.   Breathing comfortably on NC, air movement on both side of lungs, no increase in effort   Abdominal: Soft. Bowel sounds are normal. He exhibits no distension. There is no tenderness.   j tube in place with abdominal binder   Musculoskeletal:   Wasted extremities   Neurological: He is alert.   Making eye contact, does not follow commands   Skin: Skin is warm and dry. Capillary refill takes less than 2 seconds. He is not diaphoretic.   Nursing note and vitals reviewed.      Significant Labs: All pertinent labs within the past 24 hours have been reviewed.    Significant Imaging: I have reviewed and interpreted all pertinent imaging results/findings within the past 24 hours.

## 2019-10-27 LAB
ALBUMIN SERPL BCP-MCNC: 3.7 G/DL (ref 3.5–5.2)
ALP SERPL-CCNC: 89 U/L (ref 55–135)
ALT SERPL W/O P-5'-P-CCNC: 14 U/L (ref 10–44)
ANION GAP SERPL CALC-SCNC: 12 MMOL/L (ref 8–16)
AST SERPL-CCNC: 20 U/L (ref 10–40)
BASOPHILS # BLD AUTO: 0.02 K/UL (ref 0–0.2)
BASOPHILS NFR BLD: 0.1 % (ref 0–1.9)
BILIRUB SERPL-MCNC: 0.4 MG/DL (ref 0.1–1)
BUN SERPL-MCNC: 13 MG/DL (ref 6–20)
CALCIUM SERPL-MCNC: 10.1 MG/DL (ref 8.7–10.5)
CHLORIDE SERPL-SCNC: 101 MMOL/L (ref 95–110)
CO2 SERPL-SCNC: 25 MMOL/L (ref 23–29)
CREAT SERPL-MCNC: 0.9 MG/DL (ref 0.5–1.4)
DIFFERENTIAL METHOD: ABNORMAL
EOSINOPHIL # BLD AUTO: 0 K/UL (ref 0–0.5)
EOSINOPHIL NFR BLD: 0 % (ref 0–8)
ERYTHROCYTE [DISTWIDTH] IN BLOOD BY AUTOMATED COUNT: 15 % (ref 11.5–14.5)
EST. GFR  (AFRICAN AMERICAN): >60 ML/MIN/1.73 M^2
EST. GFR  (NON AFRICAN AMERICAN): >60 ML/MIN/1.73 M^2
GLUCOSE SERPL-MCNC: 126 MG/DL (ref 70–110)
HCT VFR BLD AUTO: 37.9 % (ref 40–54)
HGB BLD-MCNC: 11.9 G/DL (ref 14–18)
IMM GRANULOCYTES # BLD AUTO: 0.09 K/UL (ref 0–0.04)
IMM GRANULOCYTES NFR BLD AUTO: 0.6 % (ref 0–0.5)
LYMPHOCYTES # BLD AUTO: 0.7 K/UL (ref 1–4.8)
LYMPHOCYTES NFR BLD: 4.7 % (ref 18–48)
MAGNESIUM SERPL-MCNC: 1.8 MG/DL (ref 1.6–2.6)
MCH RBC QN AUTO: 27.4 PG (ref 27–31)
MCHC RBC AUTO-ENTMCNC: 31.4 G/DL (ref 32–36)
MCV RBC AUTO: 87 FL (ref 82–98)
MONOCYTES # BLD AUTO: 1.2 K/UL (ref 0.3–1)
MONOCYTES NFR BLD: 7.5 % (ref 4–15)
NEUTROPHILS # BLD AUTO: 13.9 K/UL (ref 1.8–7.7)
NEUTROPHILS NFR BLD: 87.1 % (ref 38–73)
NRBC BLD-RTO: 0 /100 WBC
PHOSPHATE SERPL-MCNC: 3.8 MG/DL (ref 2.7–4.5)
PLATELET # BLD AUTO: 250 K/UL (ref 150–350)
PMV BLD AUTO: 11.4 FL (ref 9.2–12.9)
POTASSIUM SERPL-SCNC: 4.6 MMOL/L (ref 3.5–5.1)
PROT SERPL-MCNC: 8.4 G/DL (ref 6–8.4)
RBC # BLD AUTO: 4.34 M/UL (ref 4.6–6.2)
SODIUM SERPL-SCNC: 138 MMOL/L (ref 136–145)
WBC # BLD AUTO: 15.9 K/UL (ref 3.9–12.7)

## 2019-10-27 PROCEDURE — 84100 ASSAY OF PHOSPHORUS: CPT

## 2019-10-27 PROCEDURE — 25000242 PHARM REV CODE 250 ALT 637 W/ HCPCS: Performed by: SURGERY

## 2019-10-27 PROCEDURE — 11000001 HC ACUTE MED/SURG PRIVATE ROOM

## 2019-10-27 PROCEDURE — 99900035 HC TECH TIME PER 15 MIN (STAT)

## 2019-10-27 PROCEDURE — 94640 AIRWAY INHALATION TREATMENT: CPT

## 2019-10-27 PROCEDURE — 25000003 PHARM REV CODE 250: Performed by: SURGERY

## 2019-10-27 PROCEDURE — 94761 N-INVAS EAR/PLS OXIMETRY MLT: CPT

## 2019-10-27 PROCEDURE — 63600175 PHARM REV CODE 636 W HCPCS: Performed by: HOSPITALIST

## 2019-10-27 PROCEDURE — 85025 COMPLETE CBC W/AUTO DIFF WBC: CPT

## 2019-10-27 PROCEDURE — S0028 INJECTION, FAMOTIDINE, 20 MG: HCPCS | Performed by: SURGERY

## 2019-10-27 PROCEDURE — 36415 COLL VENOUS BLD VENIPUNCTURE: CPT

## 2019-10-27 PROCEDURE — 63600175 PHARM REV CODE 636 W HCPCS: Performed by: SURGERY

## 2019-10-27 PROCEDURE — 83735 ASSAY OF MAGNESIUM: CPT

## 2019-10-27 PROCEDURE — 25000003 PHARM REV CODE 250: Performed by: HOSPITALIST

## 2019-10-27 PROCEDURE — 80053 COMPREHEN METABOLIC PANEL: CPT

## 2019-10-27 RX ORDER — LORAZEPAM 2 MG/ML
1 INJECTION INTRAMUSCULAR 2 TIMES DAILY
Status: DISCONTINUED | OUTPATIENT
Start: 2019-10-27 | End: 2019-11-02 | Stop reason: HOSPADM

## 2019-10-27 RX ORDER — DEXTROSE MONOHYDRATE, SODIUM CHLORIDE, AND POTASSIUM CHLORIDE 50; 1.49; 4.5 G/1000ML; G/1000ML; G/1000ML
INJECTION, SOLUTION INTRAVENOUS CONTINUOUS
Status: DISCONTINUED | OUTPATIENT
Start: 2019-10-27 | End: 2019-10-31

## 2019-10-27 RX ORDER — KETOROLAC TROMETHAMINE 30 MG/ML
15 INJECTION, SOLUTION INTRAMUSCULAR; INTRAVENOUS EVERY 6 HOURS PRN
Status: DISPENSED | OUTPATIENT
Start: 2019-10-27 | End: 2019-10-30

## 2019-10-27 RX ADMIN — MICONAZOLE NITRATE: 20 POWDER TOPICAL at 08:10

## 2019-10-27 RX ADMIN — SODIUM CHLORIDE 30 MG/ML INHALATION SOLUTION 4 ML: 30 SOLUTION INHALANT at 07:10

## 2019-10-27 RX ADMIN — DEXTROSE, SODIUM CHLORIDE, AND POTASSIUM CHLORIDE: 5; .45; .15 INJECTION INTRAVENOUS at 11:10

## 2019-10-27 RX ADMIN — DEXTROSE 500 MG: 50 INJECTION, SOLUTION INTRAVENOUS at 11:10

## 2019-10-27 RX ADMIN — ACETAMINOPHEN 500 MG: 500 TABLET ORAL at 02:10

## 2019-10-27 RX ADMIN — ENOXAPARIN SODIUM 40 MG: 100 INJECTION SUBCUTANEOUS at 04:10

## 2019-10-27 RX ADMIN — LORAZEPAM 1 MG: 2 INJECTION INTRAMUSCULAR; INTRAVENOUS at 08:10

## 2019-10-27 RX ADMIN — RAMELTEON 8 MG: 8 TABLET ORAL at 02:10

## 2019-10-27 RX ADMIN — LORAZEPAM 1 MG: 2 INJECTION INTRAMUSCULAR; INTRAVENOUS at 11:10

## 2019-10-27 RX ADMIN — KETOROLAC TROMETHAMINE 15 MG: 30 INJECTION, SOLUTION INTRAMUSCULAR; INTRAVENOUS at 09:10

## 2019-10-27 RX ADMIN — SODIUM CHLORIDE 30 MG/ML INHALATION SOLUTION 4 ML: 30 SOLUTION INHALANT at 08:10

## 2019-10-27 RX ADMIN — FAMOTIDINE 20 MG: 10 INJECTION INTRAVENOUS at 08:10

## 2019-10-27 RX ADMIN — IPRATROPIUM BROMIDE AND ALBUTEROL SULFATE 3 ML: .5; 3 SOLUTION RESPIRATORY (INHALATION) at 12:10

## 2019-10-27 RX ADMIN — IPRATROPIUM BROMIDE AND ALBUTEROL SULFATE 3 ML: .5; 3 SOLUTION RESPIRATORY (INHALATION) at 08:10

## 2019-10-27 RX ADMIN — DEXTROSE, SODIUM CHLORIDE, AND POTASSIUM CHLORIDE: 5; .45; .15 INJECTION INTRAVENOUS at 08:10

## 2019-10-27 RX ADMIN — FAMOTIDINE 20 MG: 10 INJECTION INTRAVENOUS at 09:10

## 2019-10-27 RX ADMIN — MICONAZOLE NITRATE: 20 POWDER TOPICAL at 09:10

## 2019-10-27 RX ADMIN — IPRATROPIUM BROMIDE AND ALBUTEROL SULFATE 3 ML: .5; 3 SOLUTION RESPIRATORY (INHALATION) at 07:10

## 2019-10-27 RX ADMIN — IPRATROPIUM BROMIDE AND ALBUTEROL SULFATE 3 ML: .5; 3 SOLUTION RESPIRATORY (INHALATION) at 03:10

## 2019-10-27 RX ADMIN — KETOROLAC TROMETHAMINE 15 MG: 30 INJECTION, SOLUTION INTRAMUSCULAR; INTRAVENOUS at 04:10

## 2019-10-27 RX ADMIN — IPRATROPIUM BROMIDE AND ALBUTEROL SULFATE 3 ML: .5; 3 SOLUTION RESPIRATORY (INHALATION) at 04:10

## 2019-10-27 NOTE — NURSING
Dr. Stokes notified of drainage from j-tube along with abd distention and pt discomfort. Tube feeds stopped per night shift and morning meds held. New orders given for 15 mg toradol q6 PRN pain and STAT KUB. Dr. Chris also notified, new order given to place J-tube to gravity. Will cont to monitor.

## 2019-10-27 NOTE — PROGRESS NOTES
Surgery Progress Note    Primary Problem: Acute respiratory failure with hypoxia    Other problems:   Active Hospital Problems    Diagnosis  POA    *Acute respiratory failure with hypoxia [J96.01]  Yes    Non-recurrent acute suppurative otitis media of right ear without spontaneous rupture of tympanic membrane [H66.001]  Clinically Undetermined    Constipation [K59.00]  Yes    Other dysphagia [R13.19]  Yes    Severe malnutrition [E43]  Yes    Hypokalemia [E87.6]  No    Pneumonia of left lung due to infectious organism [J18.9]  Yes    Epilepsy [G40.909]  Yes     Chronic    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]  Yes     Chronic    Hypernatremia [E87.0]  No      Resolved Hospital Problems    Diagnosis Date Resolved POA    Severe sepsis [A41.9, R65.20] 10/11/2019 Yes    On mechanically assisted ventilation [Z99.11] 10/15/2019 Not Applicable       HD #  LOS: 23 days   POD #2 Days Post-Op status post J-tube insertion    Overnight events:  Patient with abdominal distention overnight.  J-tube feeds were held.  Abdominal x-ray shows diffuse gaseous distention of his small bowel throughout.      Diet - Diet NPO     No intake/output data recorded.No intake or output data in the 24 hours ending 10/27/19 1026    Temp:  [97.4 °F (36.3 °C)-98.6 °F (37 °C)] 98.3 °F (36.8 °C)  Pulse:  [] 117  Resp:  [15-40] 18  SpO2:  [90 %-97 %] 94 %  BP: (123-166)/() 150/100    Gen:  No apparent distress  Contracted with knees over chest  Abdomen soft, incision clean dry and intact, J-tube site without erythema  Recent Labs   Lab 10/21/19  0255 10/22/19  0219   WBC 7.65  7.65 8.10   HGB 8.6*  8.6* 8.5*   HCT 28.1*  28.1* 27.2*   *  385* 322    143   K 3.7 3.6    105   BUN 14 11   GLU 90 93        Assessment:  Ileus versus underlying dysmotility issue    Plan:  Continue to event J-tube to gravity  NPO  Repeat labs tomorrow      Zoltan Chris MD

## 2019-10-27 NOTE — PLAN OF CARE
"  Problem: Adult Inpatient Plan of Care  Goal: Plan of Care Review  Description  Patient remains intubated today and to ventilator, Propofol drip continues for sedation,  Tube feeds started per OGT, tolerating without difficulty.  Saldivar cath remains patent.  Aunt updated on patient condition, she states "I'm on the way there"   Outcome: Ongoing, Progressing   Pt free from falls, injury or any further trauma throughout shift. Pt awake and alert. Continued medications as ordered. Nonverbal complaints of pain and discomfort. J-tube to gravity with bilious output. Pt turned q2. Abd binder in place. Holding medications per j-tube. Pt in no distress. Will cont to monitor.   "

## 2019-10-27 NOTE — NURSING
Pt was restless. Manual B/P taken 136/70 L Arm, RR 40, T 97.4 Axillary .   0233- J-tube checked.40 mL green thin liquid noted and returned.  Tylenol given for discomfort. Tube feeding stopped. Will recheck RR. Will continue to monitor.     0339- P- 100, RR-26    0513- T-97.7 Axillary, RR-34, P-116  J-tube checked. Green fluid noted in tube. No residual noted when aspirated. Abdomen distended and firm. Tube feeding remains held. Midline staples intact. Charge nurse notified. Will notify MD. Will continue to monitor.

## 2019-10-28 PROBLEM — K56.0 PARALYTIC ILEUS: Status: ACTIVE | Noted: 2019-10-28

## 2019-10-28 LAB
ANION GAP SERPL CALC-SCNC: 6 MMOL/L (ref 8–16)
BUN SERPL-MCNC: 16 MG/DL (ref 6–20)
CALCIUM SERPL-MCNC: 8.3 MG/DL (ref 8.7–10.5)
CHLORIDE SERPL-SCNC: 103 MMOL/L (ref 95–110)
CO2 SERPL-SCNC: 27 MMOL/L (ref 23–29)
CREAT SERPL-MCNC: 0.8 MG/DL (ref 0.5–1.4)
EST. GFR  (AFRICAN AMERICAN): >60 ML/MIN/1.73 M^2
EST. GFR  (NON AFRICAN AMERICAN): >60 ML/MIN/1.73 M^2
GLUCOSE SERPL-MCNC: 97 MG/DL (ref 70–110)
POTASSIUM SERPL-SCNC: 4.4 MMOL/L (ref 3.5–5.1)
SODIUM SERPL-SCNC: 136 MMOL/L (ref 136–145)

## 2019-10-28 PROCEDURE — 25000003 PHARM REV CODE 250: Performed by: HOSPITALIST

## 2019-10-28 PROCEDURE — 94761 N-INVAS EAR/PLS OXIMETRY MLT: CPT

## 2019-10-28 PROCEDURE — S0028 INJECTION, FAMOTIDINE, 20 MG: HCPCS | Performed by: SURGERY

## 2019-10-28 PROCEDURE — 25000242 PHARM REV CODE 250 ALT 637 W/ HCPCS: Performed by: SURGERY

## 2019-10-28 PROCEDURE — 25000003 PHARM REV CODE 250: Performed by: SURGERY

## 2019-10-28 PROCEDURE — 94640 AIRWAY INHALATION TREATMENT: CPT

## 2019-10-28 PROCEDURE — 11000001 HC ACUTE MED/SURG PRIVATE ROOM

## 2019-10-28 PROCEDURE — 80048 BASIC METABOLIC PNL TOTAL CA: CPT

## 2019-10-28 PROCEDURE — 99900035 HC TECH TIME PER 15 MIN (STAT)

## 2019-10-28 PROCEDURE — 63600175 PHARM REV CODE 636 W HCPCS: Performed by: HOSPITALIST

## 2019-10-28 PROCEDURE — 36415 COLL VENOUS BLD VENIPUNCTURE: CPT

## 2019-10-28 PROCEDURE — 63600175 PHARM REV CODE 636 W HCPCS: Performed by: SURGERY

## 2019-10-28 PROCEDURE — 97803 MED NUTRITION INDIV SUBSEQ: CPT

## 2019-10-28 RX ORDER — BISACODYL 10 MG
10 SUPPOSITORY, RECTAL RECTAL DAILY PRN
Status: DISCONTINUED | OUTPATIENT
Start: 2019-10-28 | End: 2019-11-02 | Stop reason: HOSPADM

## 2019-10-28 RX ORDER — BISACODYL 10 MG
10 SUPPOSITORY, RECTAL RECTAL ONCE
Status: COMPLETED | OUTPATIENT
Start: 2019-10-28 | End: 2019-10-28

## 2019-10-28 RX ADMIN — LORAZEPAM 1 MG: 2 INJECTION INTRAMUSCULAR; INTRAVENOUS at 10:10

## 2019-10-28 RX ADMIN — IPRATROPIUM BROMIDE AND ALBUTEROL SULFATE 3 ML: .5; 3 SOLUTION RESPIRATORY (INHALATION) at 08:10

## 2019-10-28 RX ADMIN — IPRATROPIUM BROMIDE AND ALBUTEROL SULFATE 3 ML: .5; 3 SOLUTION RESPIRATORY (INHALATION) at 11:10

## 2019-10-28 RX ADMIN — ENOXAPARIN SODIUM 40 MG: 100 INJECTION SUBCUTANEOUS at 05:10

## 2019-10-28 RX ADMIN — IPRATROPIUM BROMIDE AND ALBUTEROL SULFATE 3 ML: .5; 3 SOLUTION RESPIRATORY (INHALATION) at 12:10

## 2019-10-28 RX ADMIN — BISACODYL 10 MG: 10 SUPPOSITORY RECTAL at 06:10

## 2019-10-28 RX ADMIN — SCOPALAMINE 1 PATCH: 1 PATCH, EXTENDED RELEASE TRANSDERMAL at 12:10

## 2019-10-28 RX ADMIN — IPRATROPIUM BROMIDE AND ALBUTEROL SULFATE 3 ML: .5; 3 SOLUTION RESPIRATORY (INHALATION) at 07:10

## 2019-10-28 RX ADMIN — IPRATROPIUM BROMIDE AND ALBUTEROL SULFATE 3 ML: .5; 3 SOLUTION RESPIRATORY (INHALATION) at 04:10

## 2019-10-28 RX ADMIN — SODIUM CHLORIDE 30 MG/ML INHALATION SOLUTION 4 ML: 30 SOLUTION INHALANT at 07:10

## 2019-10-28 RX ADMIN — FAMOTIDINE 20 MG: 10 INJECTION INTRAVENOUS at 10:10

## 2019-10-28 RX ADMIN — DEXTROSE, SODIUM CHLORIDE, AND POTASSIUM CHLORIDE: 5; .45; .15 INJECTION INTRAVENOUS at 06:10

## 2019-10-28 RX ADMIN — DEXTROSE 500 MG: 50 INJECTION, SOLUTION INTRAVENOUS at 12:10

## 2019-10-28 RX ADMIN — LORAZEPAM 1 MG: 2 INJECTION INTRAMUSCULAR; INTRAVENOUS at 09:10

## 2019-10-28 RX ADMIN — FAMOTIDINE 20 MG: 10 INJECTION INTRAVENOUS at 09:10

## 2019-10-28 RX ADMIN — MICONAZOLE NITRATE: 20 POWDER TOPICAL at 09:10

## 2019-10-28 RX ADMIN — DEXTROSE, SODIUM CHLORIDE, AND POTASSIUM CHLORIDE: 5; .45; .15 INJECTION INTRAVENOUS at 05:10

## 2019-10-28 RX ADMIN — IPRATROPIUM BROMIDE AND ALBUTEROL SULFATE 3 ML: .5; 3 SOLUTION RESPIRATORY (INHALATION) at 03:10

## 2019-10-28 RX ADMIN — KETOROLAC TROMETHAMINE 15 MG: 30 INJECTION, SOLUTION INTRAMUSCULAR; INTRAVENOUS at 05:10

## 2019-10-28 NOTE — PROGRESS NOTES
Bladder scan reveals 275 ml retained. Attempted 16 fr indwelling cath and 14 fr straight cath, both unsuccessful. Dr Stokes paged. No BM noted from suppository given on night shift    ETA: patient had one loose BM, unable to notice urine in diaper but repeat bladder scan reveals 48 ml retained

## 2019-10-28 NOTE — NURSING
Patient has not urinated yet this shift. Bladder scanned patient at this time. 400+cc noted in bladder. Message left for md on call. Will wait on md to return phone call. Will continue to monitor.   room air

## 2019-10-28 NOTE — PROGRESS NOTES
"gen surg pod 3  Awake, no distress, sitter at bedside  Blood pressure (!) 168/91, pulse 97, temperature 97.1 °F (36.2 °C), temperature source Axillary, resp. rate 20, height 5' 1" (1.549 m), weight 39 kg (85 lb 15.7 oz), SpO2 98 %.  abd soft, hypoactive bs, mild dist, appropriate inc tenderness, inc and j tube site clean  A/p s/p open j tube-expected post op adynamic ileus not resolved, cont to hold tube feeds, keep abd binder loosely over abd to hopefully prevent pt from pulling at tube  "

## 2019-10-28 NOTE — NURSING
Spoke with Dr. Stokes at this time in regards to patient's bladder scan results and unsuccessful attempt at in and out cath x2. Md states to place richardson catheter. Md is aware of unsuccessful attempts but wants staff to try again. Will report to day shift nurse.

## 2019-10-28 NOTE — NURSING
Called Dr. hill again at this time in regards to patient's urine output. Orders given to bladder scan patient in 4 hours and perform in and out cath if 500 or more.

## 2019-10-28 NOTE — SUBJECTIVE & OBJECTIVE
Interval History: elevated HR, bowel obstruction  On imaging    Review of Systems   Unable to perform ROS: Patient nonverbal     Objective:     Vital Signs (Most Recent):  Temp: 97.6 °F (36.4 °C) (10/28/19 0006)  Pulse: 96 (10/28/19 0415)  Resp: 18 (10/28/19 0415)  BP: (!) 135/93 (10/28/19 0006)  SpO2: 98 % (10/28/19 0415) Vital Signs (24h Range):  Temp:  [97.6 °F (36.4 °C)-98.3 °F (36.8 °C)] 97.6 °F (36.4 °C)  Pulse:  [] 96  Resp:  [16-22] 18  SpO2:  [93 %-98 %] 98 %  BP: (123-150)/() 135/93     Weight: 39 kg (85 lb 15.7 oz)  Body mass index is 16.25 kg/m².    Intake/Output Summary (Last 24 hours) at 10/28/2019 0643  Last data filed at 10/27/2019 2357  Gross per 24 hour   Intake 196.67 ml   Output --   Net 196.67 ml      Physical Exam   Constitutional: He appears well-developed. No distress.   Contracted    HENT:   Head: Atraumatic.   Eyes: Conjunctivae are normal.   Neck: Neck supple.   Cardiovascular: Normal rate and regular rhythm.   Pulmonary/Chest: No stridor. No respiratory distress. He has no wheezes. He has no rales.   Breathing comfortably on NC, air movement on both side of lungs, no increase in effort   Abdominal: Soft. Bowel sounds are normal. He exhibits no distension. There is no tenderness.   j tube in place with abdominal binder   Musculoskeletal:   Wasted extremities   Neurological: He is alert.   Making eye contact, does not follow commands   Skin: Skin is warm and dry. Capillary refill takes less than 2 seconds. He is not diaphoretic.   Nursing note and vitals reviewed.      Significant Labs: All pertinent labs within the past 24 hours have been reviewed.    Significant Imaging: I have reviewed and interpreted all pertinent imaging results/findings within the past 24 hours.

## 2019-10-28 NOTE — PLAN OF CARE
"  Problem: Fall Injury Risk  Goal: Absence of Fall and Fall-Related Injury  Outcome: Ongoing, Progressing  Intervention: Identify and Manage Contributors to Fall Injury Risk  Flowsheets (Taken 10/28/2019 0741)  Self-Care Promotion: BADL personal routines maintained; BADL personal objects within reach  Medication Review/Management: medications reviewed; high risk medications identified  Intervention: Promote Injury-Free Environment  Flowsheets (Taken 10/28/2019 0741)  Safety Promotion/Fall Prevention: assistive device/personal item within reach; bed alarm set; Fall Risk signage in place; side rails raised x 2  Environmental Safety Modification: assistive device/personal items within reach; clutter free environment maintained     Problem: Adult Inpatient Plan of Care  Goal: Plan of Care Review  Description  Patient remains intubated today and to ventilator, Propofol drip continues for sedation,  Tube feeds started per OGT, tolerating without difficulty.  Richardson cath remains patent.  Aunt updated on patient condition, she states "I'm on the way there"   Outcome: Ongoing, Progressing  Goal: Patient-Specific Goal (Individualization)  Outcome: Ongoing, Progressing  Goal: Rounds/Family Conference  Outcome: Ongoing, Progressing   Patient nonverbal. Respirations even and unlabored. No distress noted. Skin warm and dry. Iv fluids infusing per md orders. Jtube to abdomen with staples noted. Greenish output noted. 100ml documented this shift. Abdominal distention noted throughout shift. Patient unable to void this shift. Unsuccessful in and out cath 2 attempt. Md aware. Day shift nurse to attempt richardson cath. Patient remains free from falls this shift. Sitter at bedside. Bed In lowest position. Call bell within reach. Will continue to monitor.   "

## 2019-10-28 NOTE — PLAN OF CARE
Pt remains on RA with bipap on standby at the [facility]. Continue duoneb svn tx and bipap as ordered. CPT therapy on hold s/p surgery. Will continue to monitor. DERIC Julien, RRT

## 2019-10-28 NOTE — PLAN OF CARE
"Alert and nonverbal. X1 BM after supp given. X2 wet diapers. MD aware, richardson cath not inserted.Became restless in bed once, calm down after toradol given. IV fluid infusing to SCARLET midline. Chloe (aunt) updated on plan of care. Group home sitters at bedside throughout shift. 100ml of dark green drainage emptied from J tube. Assisted with repositioning as needed.    Problem: Fall Injury Risk  Goal: Absence of Fall and Fall-Related Injury  Outcome: Ongoing, Progressing     Problem: Skin Injury Risk Increased  Goal: Skin Health and Integrity  Outcome: Ongoing, Progressing     Problem: Adult Inpatient Plan of Care  Goal: Plan of Care Review  Description  Patient remains intubated today and to ventilator, Propofol drip continues for sedation,  Tube feeds started per OGT, tolerating without difficulty.  Richardson cath remains patent.  Aunt updated on patient condition, she states "I'm on the way there"   Outcome: Ongoing, Progressing  Goal: Patient-Specific Goal (Individualization)  Outcome: Ongoing, Progressing  Goal: Absence of Hospital-Acquired Illness or Injury  Outcome: Ongoing, Progressing  Goal: Optimal Comfort and Wellbeing  Outcome: Ongoing, Progressing  Goal: Readiness for Transition of Care  Outcome: Ongoing, Progressing  Goal: Rounds/Family Conference  Outcome: Ongoing, Progressing     Problem: Noninvasive Ventilation Acute  Goal: Effective Unassisted Ventilation and Oxygenation  Outcome: Ongoing, Progressing     Problem: Restraint, Nonbehavioral (Nonviolent)  Goal: Personal Dignity and Safety Maintained  Outcome: Ongoing, Progressing     Problem: Oral Intake Inadequate  Goal: Improved Oral Intake  Outcome: Ongoing, Progressing     Problem: Malnutrition  Goal: Improved Nutritional Intake  Outcome: Ongoing, Progressing     "

## 2019-10-28 NOTE — PROGRESS NOTES
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  Nurse is unable to perform in-and out catheter  Abdominal distention improved      ROS:  No chest pain  No shortness of breath    Diet - Diet NPO     No intake/output data recorded.    Intake/Output Summary (Last 24 hours) at 10/28/2019 0626  Last data filed at 10/27/2019 2357  Gross per 24 hour   Intake 196.67 ml   Output --   Net 196.67 ml     J-tube output not recorded  Temp:  [97.6 °F (36.4 °C)-98.3 °F (36.8 °C)] 97.6 °F (36.4 °C)  Pulse:  [] 96  Resp:  [16-22] 18  SpO2:  [93 %-98 %] 98 %  BP: (123-150)/() 135/93    Gen:  No apparent distress  Contracted with knees over chest  Abdomen soft, incision clean dry and intact, J-tube site without erythema  Decreased abdominal distention    Recent Labs   Lab 10/22/19  0219 10/27/19  1008   WBC 8.10 15.90*   HGB 8.5* 11.9*   HCT 27.2* 37.9*    250    138   K 3.6 4.6    101   BUN 11 13   GLU 93 126*   PROT  --  8.4   ALBUMIN  --  3.7   BILITOT  --  0.4   AST  --  20   ALKPHOS   --  89   ALT  --  14        Assessment:  Ileus versus motility disorder status post J-tube, constipation    Plan:  Dulcolax suppository  Continue J-tube to gravity    Zoltan Chris MD

## 2019-10-29 PROBLEM — E87.0 HYPERNATREMIA: Status: RESOLVED | Noted: 2017-06-04 | Resolved: 2019-10-29

## 2019-10-29 PROBLEM — J18.9 PNEUMONIA OF LEFT LUNG DUE TO INFECTIOUS ORGANISM: Status: RESOLVED | Noted: 2019-10-04 | Resolved: 2019-10-29

## 2019-10-29 LAB
BASOPHILS # BLD AUTO: 0.03 K/UL (ref 0–0.2)
BASOPHILS NFR BLD: 0.4 % (ref 0–1.9)
DIFFERENTIAL METHOD: ABNORMAL
EOSINOPHIL # BLD AUTO: 0.1 K/UL (ref 0–0.5)
EOSINOPHIL NFR BLD: 1.4 % (ref 0–8)
ERYTHROCYTE [DISTWIDTH] IN BLOOD BY AUTOMATED COUNT: 15.4 % (ref 11.5–14.5)
HCT VFR BLD AUTO: 28.9 % (ref 40–54)
HGB BLD-MCNC: 8.8 G/DL (ref 14–18)
IMM GRANULOCYTES # BLD AUTO: 0.04 K/UL (ref 0–0.04)
IMM GRANULOCYTES NFR BLD AUTO: 0.6 % (ref 0–0.5)
LYMPHOCYTES # BLD AUTO: 1.9 K/UL (ref 1–4.8)
LYMPHOCYTES NFR BLD: 26.9 % (ref 18–48)
MCH RBC QN AUTO: 27.5 PG (ref 27–31)
MCHC RBC AUTO-ENTMCNC: 30.4 G/DL (ref 32–36)
MCV RBC AUTO: 90 FL (ref 82–98)
MONOCYTES # BLD AUTO: 1.1 K/UL (ref 0.3–1)
MONOCYTES NFR BLD: 15.3 % (ref 4–15)
NEUTROPHILS # BLD AUTO: 3.9 K/UL (ref 1.8–7.7)
NEUTROPHILS NFR BLD: 55.4 % (ref 38–73)
NRBC BLD-RTO: 0 /100 WBC
PLATELET # BLD AUTO: 146 K/UL (ref 150–350)
PMV BLD AUTO: 12.1 FL (ref 9.2–12.9)
RBC # BLD AUTO: 3.2 M/UL (ref 4.6–6.2)
WBC # BLD AUTO: 7.07 K/UL (ref 3.9–12.7)

## 2019-10-29 PROCEDURE — 63600175 PHARM REV CODE 636 W HCPCS: Performed by: SURGERY

## 2019-10-29 PROCEDURE — 85025 COMPLETE CBC W/AUTO DIFF WBC: CPT

## 2019-10-29 PROCEDURE — 25000242 PHARM REV CODE 250 ALT 637 W/ HCPCS: Performed by: SURGERY

## 2019-10-29 PROCEDURE — 97110 THERAPEUTIC EXERCISES: CPT

## 2019-10-29 PROCEDURE — 25000003 PHARM REV CODE 250: Performed by: HOSPITALIST

## 2019-10-29 PROCEDURE — 25000003 PHARM REV CODE 250: Performed by: SURGERY

## 2019-10-29 PROCEDURE — 63600175 PHARM REV CODE 636 W HCPCS: Performed by: HOSPITALIST

## 2019-10-29 PROCEDURE — 11000001 HC ACUTE MED/SURG PRIVATE ROOM

## 2019-10-29 PROCEDURE — 94640 AIRWAY INHALATION TREATMENT: CPT

## 2019-10-29 PROCEDURE — S0028 INJECTION, FAMOTIDINE, 20 MG: HCPCS | Performed by: SURGERY

## 2019-10-29 PROCEDURE — 94668 MNPJ CHEST WALL SBSQ: CPT

## 2019-10-29 PROCEDURE — 94761 N-INVAS EAR/PLS OXIMETRY MLT: CPT

## 2019-10-29 PROCEDURE — 36415 COLL VENOUS BLD VENIPUNCTURE: CPT

## 2019-10-29 RX ORDER — AMOXICILLIN AND CLAVULANATE POTASSIUM 400; 57 MG/5ML; MG/5ML
400 POWDER, FOR SUSPENSION ORAL EVERY 12 HOURS
Status: DISCONTINUED | OUTPATIENT
Start: 2019-10-29 | End: 2019-11-02 | Stop reason: HOSPADM

## 2019-10-29 RX ADMIN — MICONAZOLE NITRATE: 20 POWDER TOPICAL at 11:10

## 2019-10-29 RX ADMIN — AMOXICILLIN AND CLAVULANATE POTASSIUM 400 MG: 400; 57 POWDER, FOR SUSPENSION ORAL at 08:10

## 2019-10-29 RX ADMIN — IPRATROPIUM BROMIDE AND ALBUTEROL SULFATE 3 ML: .5; 3 SOLUTION RESPIRATORY (INHALATION) at 12:10

## 2019-10-29 RX ADMIN — IPRATROPIUM BROMIDE AND ALBUTEROL SULFATE 3 ML: .5; 3 SOLUTION RESPIRATORY (INHALATION) at 08:10

## 2019-10-29 RX ADMIN — CLONAZEPAM 1 MG: 0.5 TABLET ORAL at 09:10

## 2019-10-29 RX ADMIN — FAMOTIDINE 20 MG: 10 INJECTION INTRAVENOUS at 09:10

## 2019-10-29 RX ADMIN — FLUOXETINE HYDROCHLORIDE 60 MG: 20 SOLUTION ORAL at 09:10

## 2019-10-29 RX ADMIN — SODIUM CHLORIDE 30 MG/ML INHALATION SOLUTION 4 ML: 30 SOLUTION INHALANT at 08:10

## 2019-10-29 RX ADMIN — LORAZEPAM 1 MG: 2 INJECTION INTRAMUSCULAR; INTRAVENOUS at 09:10

## 2019-10-29 RX ADMIN — POLYETHYLENE GLYCOL 3350 17 G: 17 POWDER, FOR SOLUTION ORAL at 09:10

## 2019-10-29 RX ADMIN — DOCUSATE SODIUM 100 MG: 50 LIQUID ORAL at 08:10

## 2019-10-29 RX ADMIN — DOCUSATE SODIUM 100 MG: 50 LIQUID ORAL at 09:10

## 2019-10-29 RX ADMIN — DEXTROSE, SODIUM CHLORIDE, AND POTASSIUM CHLORIDE: 5; .45; .15 INJECTION INTRAVENOUS at 06:10

## 2019-10-29 RX ADMIN — IPRATROPIUM BROMIDE AND ALBUTEROL SULFATE 3 ML: .5; 3 SOLUTION RESPIRATORY (INHALATION) at 04:10

## 2019-10-29 RX ADMIN — DEXTROSE 500 MG: 50 INJECTION, SOLUTION INTRAVENOUS at 11:10

## 2019-10-29 RX ADMIN — ENOXAPARIN SODIUM 40 MG: 100 INJECTION SUBCUTANEOUS at 05:10

## 2019-10-29 RX ADMIN — IPRATROPIUM BROMIDE AND ALBUTEROL SULFATE 3 ML: .5; 3 SOLUTION RESPIRATORY (INHALATION) at 11:10

## 2019-10-29 RX ADMIN — CLONAZEPAM 1 MG: 0.5 TABLET ORAL at 08:10

## 2019-10-29 RX ADMIN — DEXTROSE 500 MG: 50 INJECTION, SOLUTION INTRAVENOUS at 01:10

## 2019-10-29 RX ADMIN — DEXTROSE 500 MG: 50 INJECTION, SOLUTION INTRAVENOUS at 12:10

## 2019-10-29 NOTE — PROGRESS NOTES
[facility] Hospital Medicine  Progress Note    Patient Name: Karri Galeano  MRN: [id number]  Patient Class: IP- Inpatient   Admission Date: 10/3/2019  Length of Stay: 25 days  Attending Physician: Bryan Hill MD  Primary Care Provider: Echo Reeves MD        Subjective:     Principal Problem:Acute respiratory failure with hypoxia        HPI:  Mr. Karri Galeano is a 24 y.o. male with cerebral palsy and epilepsy who presents to [facility] ED from an urgent care facility with complaints of fevers today.  He lives in a group home and started to have a nonproductive cough today.  The caregiver did not observe any vomiting.  He was in his usual state of health yesterday but today has become more lethargic and somnolent.  He was taken to an urgent care facility where he was then transferred to this facility for further care.  He was noted there to have a fever of 102.2° F as well as an oxygen saturation of 95% on nasal cannula at 3 liters/minute.  Further history is otherwise limited at this time.    Overview/Hospital Course:  Mr. Galeano presented with fever, cough and lethargy.  Respiratory status decompensated and he required intubation in the ER.  Workup with imaging consistent with possible pneumonia.  Empiric Zosyn and vancomycin initiated.  Pulmonary consulted for vent management.  Extubated on 10/5 in the morning.  Initially did well, but had worsening respiratory failure during the day.  Started on Bipap with no improvement and reintubated on 10/5 in the afternoon.  Blood cultures no growth to date.  Respiratory viral panel came back positive for rhino virus.  Antibiotics stopped.  Extubated on 10/9 to BiPAP.  Respiratory status stable.  Increased cough with feeding patient and he remained in the ICU.  Speech therapy recommended pureed diet.  He was stepped down to the floor on 10/11.  Patient with poor intake along with increased risk for aspiration. Per aunt, patient was fed full  meals when pureed diet was recommended by Speech. A MBBS was done. Showed evidence of aspiration therefore NPO status and PEG tube placement recommended. Aunt (next of kin) agreed with PEG placement once aspiration confirmed. Patient had unfortunately aspirated during PO intake and clinically worsened. Progressively requiring more O2 via high flow NC. Developed leukocytosis, became less interactive and spiked fever. NGT placed to avoid all oral intake and initiated on IV abx. Tube feeds started via NGT. Pulmonology re-consulted for co-management. Recommended BiPAP (or CPAP) QHS for positive pressure (as he can't participate with IS), nebulized treatments every 4 hours, nebulized hypertonic saline every 12 hours, chest physiotherapy, bowel regimen for fecal impaction/constipation and keep HOB elevated. Patient has scopolamine patch for oral secretions and IV famotidine for stress ulcer prophylaxis. Repeat CXR on 10/17 showed complete collapse of left lung likely due to atelectasis vs mucous plug. Patient remained stable on high flow but given high risk for further decompensation +/- bronchoscopy with intubation, he was upgraded to ICU for closer monitoring. Discussed with aunt at bedside. Patient had aggressive pulmonary toilet with improvement in aeration seen on chest x-ray and respiratory status improved.  Trickle feeds resumed and GI planned for PEG on 10/21- not successful and surgery consulted for J tube placement.  The patient was transferred out of the ICU on 10/22.    Met with patient aunt and she request another  ST eval/swallow eval.  PLan for j tube placement. Keep NPO   Aunt concerned about patient pulling at ear and possible infection.     Treating with augmentin for otitis media and j tube placed 10/25  Drop in O2 sats, lower pain med dose, CXR - tolerating tube feeds thus far    10//27- SBO on imaging. Tube feeds held.   10/28- UOP picked up after BM - repeat imaging in am and possible trickle feeds  resumed   Started on trickle feeding on 10/29.    Interval History: No acute events overnight.    Review of Systems   Unable to perform ROS: Patient nonverbal     Objective:     Vital Signs (Most Recent):  Temp: 97.6 °F (36.4 °C) (10/29/19 1144)  Pulse: 102 (10/29/19 1155)  Resp: 16 (10/29/19 1155)  BP: (!) 137/92 (10/29/19 1144)  SpO2: 96 % (10/29/19 1155) Vital Signs (24h Range):  Temp:  [96.6 °F (35.9 °C)-98.5 °F (36.9 °C)] 97.6 °F (36.4 °C)  Pulse:  [] 102  Resp:  [16-20] 16  SpO2:  [85 %-100 %] 96 %  BP: (128-153)/() 137/92     Weight: 39 kg (85 lb 15.7 oz)  Body mass index is 16.25 kg/m².    Intake/Output Summary (Last 24 hours) at 10/29/2019 1259  Last data filed at 10/28/2019 1757  Gross per 24 hour   Intake 0 ml   Output 100 ml   Net -100 ml      Physical Exam   Constitutional: He appears well-developed. No distress.   Contracted    HENT:   Head: Atraumatic.   Eyes: Conjunctivae are normal.   Neck: Neck supple.   Cardiovascular: Normal rate and regular rhythm.   Pulmonary/Chest: No stridor. No respiratory distress. He has no wheezes. He has no rales.   Breathing comfortably on NC, air movement on both side of lungs, no increase in effort   Abdominal: Soft. Bowel sounds are normal. He exhibits no distension. There is no tenderness.   j tube in place with abdominal binder   Musculoskeletal:   Wasted extremities   Neurological: He is alert.   Making eye contact, does not follow commands   Skin: Skin is warm and dry. Capillary refill takes less than 2 seconds. He is not diaphoretic.   Nursing note and vitals reviewed.      Significant Labs:   BMP:   Recent Labs   Lab 10/28/19  0523   GLU 97      K 4.4      CO2 27   BUN 16   CREATININE 0.8   CALCIUM 8.3*     CBC:   Recent Labs   Lab 10/29/19  0524   WBC 7.07   HGB 8.8*   HCT 28.9*   *       Significant Imaging: I have reviewed all pertinent imaging results/findings within the past 24 hours.      Assessment/Plan:      * Acute  respiratory failure with hypoxia  Secondary to aspiration pneumonia and rhino virus  Completed antibiotic treatment prior to stepping down to floor however patient became septic again after eating pureed diet.   MBBS did show evidence of aspiration therefore placed NPO.  Abx restarted 10/16. NGT placed to bypass all oral intake pending PEG tube placement which will be done once respiratory status improves.   Although more interactive, he was on more O2 requirements , more tachycardic and with complete collapse of left lung.  Treated with highflow supplemental O2, nebulized treatments, nebulized hypertonic saline every 12 hours, bowel regimen, BiPAP QHS, chest physiotherapy and keep HOB elevated  Famotidine IV. Scopolamine patch in place.   Continue treatment with cefazolin and levofloxacin, last dose on 10/22 and 10/23 respectively  Transferred to ICU for closer monitoring on 10/18  Respiratory status improved with aggressive pulmonary toilet and chest x-ray with improved  Antibiotics dc'd and patient had jtube placed, developed ileus.   Afebrile with normal WBC.  Had BM.  Restarting trickle feedings.    Paralytic ileus  S/P BM  Starting trickle feedings.    Non-recurrent acute suppurative otitis media of right ear without spontaneous rupture of tympanic membrane  Recent exposure to IV abx, prolong hospital stay and symptomatic   Right TM with erythema and slightly bulge - treat augmentin solution via tube     Constipation  On bowel regimen, oral agents held  Suppository given with + BM          Other dysphagia  As above      Severe malnutrition  PEG attempted by GI on 10/21 (today) without success  General surgery consulted for J-tube placement - placed 10/25  Trickle feedings.    Hypokalemia  Replace as needed.  Will continue to monitor    Epilepsy  Continue his home regimen of divalproex.  Also on clonazepam    Spastic quadriplegic cerebral palsy  Family would like group home in [place] when ready for  discharge  j tube placed          VTE Risk Mitigation (From admission, onward)         Ordered     enoxaparin injection 40 mg  Daily      10/04/19 0152     IP VTE HIGH RISK PATIENT  Once      10/04/19 0152                      Bryan Hill MD  Department of Hospital Medicine   [facility]

## 2019-10-29 NOTE — PLAN OF CARE
Pt remains stable on room air. Continue scheduled respiratory treatments with vibratory cpt as ordered

## 2019-10-29 NOTE — PROGRESS NOTES
[facility] Hospital Medicine  Progress Note    Patient Name: Karri Galeano  MRN: [id number]  Patient Class: IP- Inpatient   Admission Date: 10/3/2019  Length of Stay: 24 days  Attending Physician: Echo Stokes MD  Primary Care Provider: Echo Reeves MD        Subjective:     Principal Problem:Acute respiratory failure with hypoxia        HPI:  Mr. Karri Galeano is a 24 y.o. male with cerebral palsy and epilepsy who presents to [facility] ED from an urgent care facility with complaints of fevers today.  He lives in a group home and started to have a nonproductive cough today.  The caregiver did not observe any vomiting.  He was in his usual state of health yesterday but today has become more lethargic and somnolent.  He was taken to an urgent care facility where he was then transferred to this facility for further care.  He was noted there to have a fever of 102.2° F as well as an oxygen saturation of 95% on nasal cannula at 3 liters/minute.  Further history is otherwise limited at this time.    Overview/Hospital Course:  Mr. Galeano presented with fever, cough and lethargy.  Respiratory status decompensated and he required intubation in the ER.  Workup with imaging consistent with possible pneumonia.  Empiric Zosyn and vancomycin initiated.  Pulmonary consulted for vent management.  Extubated on 10/5 in the morning.  Initially did well, but had worsening respiratory failure during the day.  Started on Bipap with no improvement and reintubated on 10/5 in the afternoon.  Blood cultures no growth to date.  Respiratory viral panel came back positive for rhino virus.  Antibiotics stopped.  Extubated on 10/9 to BiPAP.  Respiratory status stable.  Increased cough with feeding patient and he remained in the ICU.  Speech therapy recommended pureed diet.  He was stepped down to the floor on 10/11.  Patient with poor intake along with increased risk for aspiration. Per aunt, patient was fed full  meals when pureed diet was recommended by Speech. A MBBS was done. Showed evidence of aspiration therefore NPO status and PEG tube placement recommended. Aunt (next of kin) agreed with PEG placement once aspiration confirmed. Patient had unfortunately aspirated during PO intake and clinically worsened. Progressively requiring more O2 via high flow NC. Developed leukocytosis, became less interactive and spiked fever. NGT placed to avoid all oral intake and initiated on IV abx. Tube feeds started via NGT. Pulmonology re-consulted for co-management. Recommended BiPAP (or CPAP) QHS for positive pressure (as he can't participate with IS), nebulized treatments every 4 hours, nebulized hypertonic saline every 12 hours, chest physiotherapy, bowel regimen for fecal impaction/constipation and keep HOB elevated. Patient has scopolamine patch for oral secretions and IV famotidine for stress ulcer prophylaxis. Repeat CXR on 10/17 showed complete collapse of left lung likely due to atelectasis vs mucous plug. Patient remained stable on high flow but given high risk for further decompensation +/- bronchoscopy with intubation, he was upgraded to ICU for closer monitoring. Discussed with aunt at bedside. Patient had aggressive pulmonary toilet with improvement in aeration seen on chest x-ray and respiratory status improved.  Trickle feeds resumed and GI planned for PEG on 10/21- not successful and surgery consulted for J tube placement.  The patient was transferred out of the ICU on 10/22.    Met with patient aunt and she request another  ST eval/swallow eval.  PLan for j tube placement. Keep NPO   Aunt concerned about patient pulling at ear and possible infection.     Treating with augmentin for otitis media and j tube placed 10/25  Drop in O2 sats, lower pain med dose, CXR - tolerating tube feeds thus far    10//27- SBO on imaging. Tube feeds held.   10/28- UOP picked up after BM - repeat imaging in am and possible trickle feeds  resumed     Interval History: pt with resolving ileus, holding tube feeds    Review of Systems   Unable to perform ROS: Patient nonverbal     Objective:     Vital Signs (Most Recent):  Temp: 97.3 °F (36.3 °C) (10/28/19 2055)  Pulse: (!) 31 (10/28/19 2055)  Resp: 18 (10/28/19 2055)  BP: (!) 144/103 (10/28/19 2055)  SpO2: (!) 89 % (10/28/19 2055) Vital Signs (24h Range):  Temp:  [97.1 °F (36.2 °C)-98.3 °F (36.8 °C)] 97.3 °F (36.3 °C)  Pulse:  [] 31  Resp:  [16-20] 18  SpO2:  [89 %-100 %] 89 %  BP: (135-168)/() 144/103     Weight: 39 kg (85 lb 15.7 oz)  Body mass index is 16.25 kg/m².    Intake/Output Summary (Last 24 hours) at 10/28/2019 2106  Last data filed at 10/28/2019 1757  Gross per 24 hour   Intake 50 ml   Output 200 ml   Net -150 ml      Physical Exam   Constitutional: He appears well-developed. No distress.   Contracted    HENT:   Head: Atraumatic.   Eyes: Conjunctivae are normal.   Neck: Neck supple.   Cardiovascular: Normal rate and regular rhythm.   Pulmonary/Chest: No stridor. No respiratory distress. He has no wheezes. He has no rales.   Breathing comfortably on NC, air movement on both side of lungs, no increase in effort   Abdominal: Soft. Bowel sounds are normal. He exhibits no distension. There is no tenderness.   j tube in place with abdominal binder   Musculoskeletal:   Wasted extremities   Neurological: He is alert.   Making eye contact, does not follow commands   Skin: Skin is warm and dry. Capillary refill takes less than 2 seconds. He is not diaphoretic.   Nursing note and vitals reviewed.      Significant Labs:   BMP:   Recent Labs   Lab 10/27/19  1008 10/28/19  0523   * 97    136   K 4.6 4.4    103   CO2 25 27   BUN 13 16   CREATININE 0.9 0.8   CALCIUM 10.1 8.3*   MG 1.8  --      CBC:   Recent Labs   Lab 10/27/19  1008   WBC 15.90*   HGB 11.9*   HCT 37.9*          Significant Imaging: I have reviewed and interpreted all pertinent imaging results/findings  within the past 24 hours.      Assessment/Plan:      * Acute respiratory failure with hypoxia  Secondary to aspiration pneumonia and rhino virus  Completed antibiotic treatment prior to stepping down to floor however patient became septic again after eating pureed diet.   MBBS did show evidence of aspiration therefore placed NPO.  Abx restarted 10/16. NGT placed to bypass all oral intake pending PEG tube placement which will be done once respiratory status improves.   Although more interactive, he was on more O2 requirements , more tachycardic and with complete collapse of left lung.  Treated with highflow supplemental O2, nebulized treatments, nebulized hypertonic saline every 12 hours, bowel regimen, BiPAP QHS, chest physiotherapy and keep HOB elevated  Famotidine IV. Scopolamine patch in place.   Continue treatment with cefazolin and levofloxacin, last dose on 10/22 and 10/23 respectively  Transferred to ICU for closer monitoring on 10/18  Respiratory status improved with aggressive pulmonary toilet and chest x-ray with improved    Antibiotics dc'd and patient had jtube placed, developed ileus. Monitoring for fever, drop in O2 sats and re-image if aspiration suspected. Currently on room air.     Paralytic ileus    Hold tube feeds  j tube to gravity   IVF   KUB in am     Non-recurrent acute suppurative otitis media of right ear without spontaneous rupture of tympanic membrane  Recent exposure to IV abx, prolong hospital stay and symptomatic   Right TM with erythema and slightly bulge - treat augmentin solution via tube     Constipation  On bowel regimen, oral agents held  Suppository given with + BM          Other dysphagia  As above      Severe malnutrition  NGT for now with restart of tube feeds   PEG attempted by GI on 10/21 (today) without success  General surgery consulted for J-tube placement - placed 10/25    Hypokalemia  Replace as needed.  Will continue to monitor    Epilepsy  Continue his home regimen of  divalproex.  Also on clonazepam    Pneumonia of left lung due to infectious organism  Management as above    Spastic quadriplegic cerebral palsy  Family would like group home in [place] when ready for discharge  Hopefully there will not be need for trach  j tube placed        Hypernatremia  Secondary to inadequate free water intake.  Resolved with isotonic fluids. Unable to properly keep oral hydration due to dysphagia  Free water with tube feeds to restart  BMP daily        VTE Risk Mitigation (From admission, onward)         Ordered     enoxaparin injection 40 mg  Daily      10/04/19 0152     IP VTE HIGH RISK PATIENT  Once      10/04/19 0152                      Echo Stokes MD  Department of Hospital Medicine   [facility]

## 2019-10-29 NOTE — PLAN OF CARE
Problem: Malnutrition  Goal: Improved Nutritional Intake  Outcome: Ongoing, Not Progressing  Intervention: Promote and Optimize Nutrition Support  Flowsheets (Taken 10/28/2019 1906)  Nutrition Support Management: -- (TF held)

## 2019-10-29 NOTE — PT/OT/SLP PROGRESS
Physical Therapy Treatment    Patient Name:  Karri Galeano   MRN:  [id number]    Recommendations:     Discharge Recommendations:  (24hr care at new [facility])   Discharge Equipment Recommendations: bedside commode, bath bench, hospital bed(w/c with elevated leg rest)   Barriers to discharge: Awaiting placement in new group [facility]    Assessment:     Karri Galeano is a 24 y.o. male admitted with a medical diagnosis of Acute respiratory failure with hypoxia.  He presents with the following impairments/functional limitations:  weakness, impaired endurance, impaired sensation, impaired self care skills, impaired functional mobilty, gait instability, decreased upper extremity function, impaired balance, decreased lower extremity function, decreased ROM, pain, abnormal tone, decreased coordination, decreased safety awareness, impaired cognition, impaired coordination, impaired fine motor, impaired joint extensibility, impaired muscle length .PROM performed to BUE/BLEs with pt in supine position (pt resisted with movements) . Requires extra V/T cues to perform all activity due to pt unable to follow simple step commands and resisting therapist throughout treatment session. Limited with OOB mobility today 2* BM accident (PCT notified).     Rehab Prognosis: Fair-; patient would benefit from acute skilled PT services to address these deficits and reach maximum level of function.    Recent Surgery: Procedure(s) (LRB):  INSERTION, JEJUNOSTOMY TUBE ( OPEN) (N/A) 4 Days Post-Op    Plan:     During this hospitalization, patient to be seen (2-3/wk) to address the identified rehab impairments via therapeutic activities, neuromuscular re-education and progress toward the following goals:    · Plan of Care Expires:  11/02/19    Subjective     Chief Complaint: pt is nonverbal, [facility] staff present at bedside.    Patient/Family Comments/goals: per pt caregiver from the group home, pt has been sleeping for awhile    Pain/Comfort:  · Pain Rating 1: 0/10  · Pain Rating Post-Intervention 1: 0/10      Objective:     Communicated with nurse Lc prior to session.  Patient found supine in fetal position with bed alarm upon PT entry to room.     General Precautions: Standard, fall, aspiration   Orthopedic Precautions:N/A   Braces: N/A       AM-PAC 6 CLICK MOBILITY  Turning over in bed (including adjusting bedclothes, sheets and blankets)?: 2  Sitting down on and standing up from a chair with arms (e.g., wheelchair, bedside commode, etc.): 2  Moving from lying on back to sitting on the side of the bed?: 2  Moving to and from a bed to a chair (including a wheelchair)?: 2  Need to walk in hospital room?: 1  Climbing 3-5 steps with a railing?: 1  Basic Mobility Total Score: 10       Therapeutic Activities and Exercises:   PROM performed to BUE/BLEs with pt in supine position (pt unable to follow simple step commands and resisting therapist throughout treatment session) . Limited with OOB mobility today 2* BM accident (PCT notified).     Patient left supine with all lines intact, call button in reach, bed alarm on, nurse  notified and PCT and caregiver   present..    GOALS:   Multidisciplinary Problems     Physical Therapy Goals        Problem: Physical Therapy Goal    Goal Priority Disciplines Outcome Goal Variances Interventions   Physical Therapy Goal     PT, PT/OT Ongoing, Progressing     Description:  Goals to be met by: 2019     Patient will increase functional independence with mobility by performin. Supine to sit with Mod A  2.  Scooting in bed with Min A   3   Rolling with Min A   4. Bed to chair transfer  With Mod A  5. Tolerate sitting up in chair x 30m                     Time Tracking:     PT Received On: 10/29/19  PT Start Time: 1404     PT Stop Time: 1415  PT Total Time (min): 11 min     Billable Minutes: Therapeutic Exercise 11    Treatment Type: Treatment  PT/PTA: PTA     PTA Visit Number: 2     Tram T  Latanya, PTA  10/29/2019

## 2019-10-29 NOTE — PLAN OF CARE
Problem: Physical Therapy Goal  Goal: Physical Therapy Goal  Description  Goals to be met by: 2019     Patient will increase functional independence with mobility by performin. Supine to sit with Mod A  2.  Scooting in bed with Min A   3   Rolling with Min A   4. Bed to chair transfer  With Mod A  5. Tolerate sitting up in chair x 30m    Outcome: Ongoing, Progressing   PROM performed to BUE/BLEs with pt in supine position.   Requires extra time to perform all activity due to pt unable to follow simple step commands and resisting therapist throughout treatment session. Limited with OOB mobility today 2* BM accident (PCT notified).

## 2019-10-29 NOTE — ASSESSMENT & PLAN NOTE
Family would like group home in [place] when ready for discharge  Hopefully there will not be need for trach  j tube placed

## 2019-10-29 NOTE — PROGRESS NOTES
"[facility]  Adult Nutrition  Progress Note    SUMMARY       Recommendations    Recommendation/Intervention:   1. When medically able, initiate TF @ goal rate of Isosource 1.5 of 45 ml/hr, advance as tolerated.   -Fluid flushes: 130 ml q 4 hrs. for maintenance needs.    -Of note: with tolerance can increase goal rate to 75 ml x 14 hrs and allow pt to be free from feeds for 10 hrs; continue fluid flushes of 130 ml q 4 hrs.   2. Monitor weight weekly to assess adequacy of TF   3. If unable to initiate TF within 24 hrs consider TPN/PPN for nutrition support. If using TPN: Clinimix 4.25/10 @ 80mL/hr to provide 979 kcal and 82 g pro. If using PPN: Clinimix 4.25/10 @ 45 mL/hr should be used to reduce the risk of thrombosis.     Goals: Meet > 85% EEN daily  Nutrition Goal Status: goal not met  Communication of RD Recs: reviewed with physician    Reason for Assessment    Reason For Assessment: RD follow-up  Diagnosis: pulmonary disease, infection/sepsis  Relevant Medical History: CP, mental retardation  Interdisciplinary Rounds: did not attend  General Information Comments: Pt s/p J-tube placement; TF held due to SBO found 10/27, although pt had a BM today. Awaiting MD and surgical plan. Pt receiving IV fluids. NFPE completed on 10/18; pt dx with acute malnutrition.  Nutrition Discharge Planning: TF to meet 100% of needs    Nutrition Risk Screen    Nutrition Risk Screen: tube feeding or parenteral nutrition    Nutrition/Diet History    Patient Reported Diet/Restrictions/Preferences: pureed(reported by )  Spiritual, Cultural Beliefs, Scientologist Practices, Values that Affect Care: no  Supplemental Drinks or Food Habits: Ensure Plus(Benecalorie)  Factors Affecting Nutritional Intake: impaired cognitive status/motor control, chewing difficulties/inability to chew food, NPO    Anthropometrics    Temp: 98.3 °F (36.8 °C)  Height Method: Estimated  Height: 5' 1" (154.9 cm)  Height (inches): 61 " in  Weight Method: Bed Scale  Weight: 39 kg (85 lb 15.7 oz)  Weight (lb): 85.98 lb  Ideal Body Weight (IBW), Male: 112 lb  % Ideal Body Weight, Male (lb): 76.77 lb  BMI (Calculated): 16.3  BMI Grade: 16 - 16.9 protein-energy malnutrition grade II       Lab/Procedures/Meds    Pertinent Labs Reviewed: reviewed  Pertinent Labs Comments: Ca 8.3, Vit B12 1191  Pertinent Medications Reviewed: reviewed  Pertinent Medications Comments: levo, valproic acid    Estimated/Assessed Needs    Weight Used For Calorie Calculations: 39 kg (85 lb 15.7 oz)  Energy Calorie Requirements (kcal): 1548 kcal/day  Energy Need Method: Haywood-St Jeor(PAL 1.25)  Protein Requirements: 47-59 g/day(1.2-1.5 g/kg)  Weight Used For Protein Calculations: 39 kg (85 lb 15.7 oz)     Estimated Fluid Requirement Method: RDA Method  RDA Method (mL): 1548     Nutrition Prescription Ordered    Current Diet Order: NPO(for PEG placement that did not occur)  Current Nutrition Support Formula Ordered: Isosource 1.5  Current Nutrition Support Rate Ordered: 10 (ml)  Current Nutrition Support Frequency Ordered: ml/hr    Evaluation of Received Nutrient/Fluid Intake    Enteral Calories (kcal): 0  Enteral Protein (gm): 0  Enteral (Free Water) Fluid (mL): 0  Free Water Flush Fluid (mL): 0  Other Calories (kcal): 0  Total Calories (kcal): 0  % Kcal Needs: 0  % Protein Needs: 0  IV Fluid (mL): (IV fluids discountinued)  Total Fluid Intake (mL): 0  I/O: reviewed  Energy Calories Required: not meeting needs  Protein Required: not meeting needs  Fluid Required: (per MD)  Comments: LBM: 10/28  Tolerance: (TF not yet initiated)  % Intake of Estimated Energy Needs: 0 - 25 %   % Meal Intake: NPO    Nutrition Risk    Level of Risk/Frequency of Follow-up: (F/U 2 x weekly)     Assessment and Plan    Severe malnutrition  Malnutrition in the context of Acute on Chronic Issue    Related to (etiology):  Dysphagia    Signs and Symptoms (as evidenced by):  Energy Intake: <50% of  estimated energy requirement for >10 days    Muscle Mass Depletion: moderate and severe depletion of temples, clavicle region, scapular region, interosseous muscle and lower extremities ( r/t CP)   Weight Loss: 10% x 1 month      Interventions  Collaboration with providers    Nutrition Diagnosis Status:  Continues     Monitor and Evaluation    Food and Nutrient Intake: enteral nutrition intake, parenteral nutrition intake  Food and Nutrient Adminstration: enteral and parenteral nutrition administration  Anthropometric Measurements: weight change, weight, body mass index  Biochemical Data, Medical Tests and Procedures: glucose/endocrine profile, electrolyte and renal panel, lipid profile, inflammatory profile, gastrointestinal profile  Nutrition-Focused Physical Findings: overall appearance, extremities, muscles and bones, head and eyes     Malnutrition Assessment     Energy Intake (Malnutrition): less than or equal to 50% for greater than or equal to 5 days       [place] Region (Muscle Loss): mild depletion  Clavicle Bone Region (Muscle Loss): moderate depletion  Patellar Region (Muscle Loss): severe depletion  Anterior Thigh Region (Muscle Loss): severe depletion  Posterior Calf Region (Muscle Loss): severe depletion   Severe Weight Loss (Malnutrition): (10% x 1 month)    Nutrition Follow-Up    RD Follow-up?: Yes

## 2019-10-29 NOTE — SUBJECTIVE & OBJECTIVE
Interval History: No acute events overnight.    Review of Systems   Unable to perform ROS: Patient nonverbal     Objective:     Vital Signs (Most Recent):  Temp: 97.6 °F (36.4 °C) (10/29/19 1144)  Pulse: 102 (10/29/19 1155)  Resp: 16 (10/29/19 1155)  BP: (!) 137/92 (10/29/19 1144)  SpO2: 96 % (10/29/19 1155) Vital Signs (24h Range):  Temp:  [96.6 °F (35.9 °C)-98.5 °F (36.9 °C)] 97.6 °F (36.4 °C)  Pulse:  [] 102  Resp:  [16-20] 16  SpO2:  [85 %-100 %] 96 %  BP: (128-153)/() 137/92     Weight: 39 kg (85 lb 15.7 oz)  Body mass index is 16.25 kg/m².    Intake/Output Summary (Last 24 hours) at 10/29/2019 1259  Last data filed at 10/28/2019 1757  Gross per 24 hour   Intake 0 ml   Output 100 ml   Net -100 ml      Physical Exam   Constitutional: He appears well-developed. No distress.   Contracted    HENT:   Head: Atraumatic.   Eyes: Conjunctivae are normal.   Neck: Neck supple.   Cardiovascular: Normal rate and regular rhythm.   Pulmonary/Chest: No stridor. No respiratory distress. He has no wheezes. He has no rales.   Breathing comfortably on NC, air movement on both side of lungs, no increase in effort   Abdominal: Soft. Bowel sounds are normal. He exhibits no distension. There is no tenderness.   j tube in place with abdominal binder   Musculoskeletal:   Wasted extremities   Neurological: He is alert.   Making eye contact, does not follow commands   Skin: Skin is warm and dry. Capillary refill takes less than 2 seconds. He is not diaphoretic.   Nursing note and vitals reviewed.      Significant Labs:   BMP:   Recent Labs   Lab 10/28/19  0523   GLU 97      K 4.4      CO2 27   BUN 16   CREATININE 0.8   CALCIUM 8.3*     CBC:   Recent Labs   Lab 10/29/19  0524   WBC 7.07   HGB 8.8*   HCT 28.9*   *       Significant Imaging: I have reviewed all pertinent imaging results/findings within the past 24 hours.

## 2019-10-29 NOTE — PLAN OF CARE
Pt alert.Sitter at the bedside. IVF infusing per order. Jtube to gravity. Safety maintained. Bed in lowest locked position. No distress noted. Will continue to monitor.

## 2019-10-29 NOTE — ASSESSMENT & PLAN NOTE
Secondary to aspiration pneumonia and rhino virus  Completed antibiotic treatment prior to stepping down to floor however patient became septic again after eating pureed diet.   MBBS did show evidence of aspiration therefore placed NPO.  Abx restarted 10/16. NGT placed to bypass all oral intake pending PEG tube placement which will be done once respiratory status improves.   Although more interactive, he was on more O2 requirements , more tachycardic and with complete collapse of left lung.  Treated with highflow supplemental O2, nebulized treatments, nebulized hypertonic saline every 12 hours, bowel regimen, BiPAP QHS, chest physiotherapy and keep HOB elevated  Famotidine IV. Scopolamine patch in place.   Continue treatment with cefazolin and levofloxacin, last dose on 10/22 and 10/23 respectively  Transferred to ICU for closer monitoring on 10/18  Respiratory status improved with aggressive pulmonary toilet and chest x-ray with improved    Antibiotics dc'd and patient had jtube placed, developed ileus. Monitoring for fever, drop in O2 sats and re-image if aspiration suspected. Currently on room air.

## 2019-10-29 NOTE — ASSESSMENT & PLAN NOTE
Secondary to aspiration pneumonia and rhino virus  Completed antibiotic treatment prior to stepping down to floor however patient became septic again after eating pureed diet.   MBBS did show evidence of aspiration therefore placed NPO.  Abx restarted 10/16. NGT placed to bypass all oral intake pending PEG tube placement which will be done once respiratory status improves.   Although more interactive, he was on more O2 requirements , more tachycardic and with complete collapse of left lung.  Treated with highflow supplemental O2, nebulized treatments, nebulized hypertonic saline every 12 hours, bowel regimen, BiPAP QHS, chest physiotherapy and keep HOB elevated  Famotidine IV. Scopolamine patch in place.   Continue treatment with cefazolin and levofloxacin, last dose on 10/22 and 10/23 respectively  Transferred to ICU for closer monitoring on 10/18  Respiratory status improved with aggressive pulmonary toilet and chest x-ray with improved  Antibiotics dc'd and patient had jtube placed, developed ileus.   Afebrile with normal WBC.  Had BM.  Restarting trickle feedings.

## 2019-10-29 NOTE — ASSESSMENT & PLAN NOTE
PEG attempted by GI on 10/21 (today) without success  General surgery consulted for J-tube placement - placed 10/25  Trickle feedings.

## 2019-10-29 NOTE — ASSESSMENT & PLAN NOTE
Recent exposure to IV abx, prolong hospital stay and symptomatic   Right TM with erythema and slightly bulge - treat augmentin solution via tube

## 2019-10-29 NOTE — PROGRESS NOTES
"gen surg pod 4  Awake, calm, no distress, had bm yest  Blood pressure 128/89, pulse 87, temperature 96.6 °F (35.9 °C), temperature source Axillary, resp. rate 18, height 5' 1" (1.549 m), weight 39 kg (85 lb 15.7 oz), SpO2 100 %.  abd soft, bs present, very slight dist, inc and j tube site clean, appropriate inc tenderness  A/p s/p open j tube-adynamic ileus starting to resolve, start tf at 10cc hr-do not advance  "

## 2019-10-29 NOTE — SUBJECTIVE & OBJECTIVE
Interval History: pt with resolving ileus, holding tube feeds    Review of Systems   Unable to perform ROS: Patient nonverbal     Objective:     Vital Signs (Most Recent):  Temp: 97.3 °F (36.3 °C) (10/28/19 2055)  Pulse: (!) 31 (10/28/19 2055)  Resp: 18 (10/28/19 2055)  BP: (!) 144/103 (10/28/19 2055)  SpO2: (!) 89 % (10/28/19 2055) Vital Signs (24h Range):  Temp:  [97.1 °F (36.2 °C)-98.3 °F (36.8 °C)] 97.3 °F (36.3 °C)  Pulse:  [] 31  Resp:  [16-20] 18  SpO2:  [89 %-100 %] 89 %  BP: (135-168)/() 144/103     Weight: 39 kg (85 lb 15.7 oz)  Body mass index is 16.25 kg/m².    Intake/Output Summary (Last 24 hours) at 10/28/2019 2106  Last data filed at 10/28/2019 1757  Gross per 24 hour   Intake 50 ml   Output 200 ml   Net -150 ml      Physical Exam   Constitutional: He appears well-developed. No distress.   Contracted    HENT:   Head: Atraumatic.   Eyes: Conjunctivae are normal.   Neck: Neck supple.   Cardiovascular: Normal rate and regular rhythm.   Pulmonary/Chest: No stridor. No respiratory distress. He has no wheezes. He has no rales.   Breathing comfortably on NC, air movement on both side of lungs, no increase in effort   Abdominal: Soft. Bowel sounds are normal. He exhibits no distension. There is no tenderness.   j tube in place with abdominal binder   Musculoskeletal:   Wasted extremities   Neurological: He is alert.   Making eye contact, does not follow commands   Skin: Skin is warm and dry. Capillary refill takes less than 2 seconds. He is not diaphoretic.   Nursing note and vitals reviewed.      Significant Labs:   BMP:   Recent Labs   Lab 10/27/19  1008 10/28/19  0523   * 97    136   K 4.6 4.4    103   CO2 25 27   BUN 13 16   CREATININE 0.9 0.8   CALCIUM 10.1 8.3*   MG 1.8  --      CBC:   Recent Labs   Lab 10/27/19  1008   WBC 15.90*   HGB 11.9*   HCT 37.9*          Significant Imaging: I have reviewed and interpreted all pertinent imaging results/findings within the  past 24 hours.

## 2019-10-30 PROCEDURE — 25000003 PHARM REV CODE 250: Performed by: SURGERY

## 2019-10-30 PROCEDURE — 25000003 PHARM REV CODE 250: Performed by: HOSPITALIST

## 2019-10-30 PROCEDURE — 63600175 PHARM REV CODE 636 W HCPCS: Performed by: HOSPITALIST

## 2019-10-30 PROCEDURE — 97803 MED NUTRITION INDIV SUBSEQ: CPT

## 2019-10-30 PROCEDURE — 94640 AIRWAY INHALATION TREATMENT: CPT

## 2019-10-30 PROCEDURE — 94761 N-INVAS EAR/PLS OXIMETRY MLT: CPT

## 2019-10-30 PROCEDURE — 94668 MNPJ CHEST WALL SBSQ: CPT

## 2019-10-30 PROCEDURE — S0028 INJECTION, FAMOTIDINE, 20 MG: HCPCS | Performed by: SURGERY

## 2019-10-30 PROCEDURE — 25000242 PHARM REV CODE 250 ALT 637 W/ HCPCS: Performed by: SURGERY

## 2019-10-30 PROCEDURE — 11000001 HC ACUTE MED/SURG PRIVATE ROOM

## 2019-10-30 RX ORDER — RAMELTEON 8 MG/1
8 TABLET ORAL NIGHTLY PRN
Status: DISCONTINUED | OUTPATIENT
Start: 2019-10-30 | End: 2019-11-02 | Stop reason: HOSPADM

## 2019-10-30 RX ORDER — ONDANSETRON 2 MG/ML
8 INJECTION INTRAMUSCULAR; INTRAVENOUS EVERY 8 HOURS PRN
Status: DISCONTINUED | OUTPATIENT
Start: 2019-10-30 | End: 2019-11-02 | Stop reason: HOSPADM

## 2019-10-30 RX ORDER — ACETAMINOPHEN 500 MG
500 TABLET ORAL EVERY 6 HOURS PRN
Status: DISCONTINUED | OUTPATIENT
Start: 2019-10-30 | End: 2019-11-02 | Stop reason: HOSPADM

## 2019-10-30 RX ORDER — HYDRALAZINE HYDROCHLORIDE 20 MG/ML
10 INJECTION INTRAMUSCULAR; INTRAVENOUS EVERY 6 HOURS PRN
Status: DISCONTINUED | OUTPATIENT
Start: 2019-10-30 | End: 2019-11-02 | Stop reason: HOSPADM

## 2019-10-30 RX ORDER — ENOXAPARIN SODIUM 100 MG/ML
40 INJECTION SUBCUTANEOUS EVERY 24 HOURS
Status: DISCONTINUED | OUTPATIENT
Start: 2019-10-30 | End: 2019-11-02 | Stop reason: HOSPADM

## 2019-10-30 RX ADMIN — DOCUSATE SODIUM 100 MG: 50 LIQUID ORAL at 09:10

## 2019-10-30 RX ADMIN — DEXTROSE, SODIUM CHLORIDE, AND POTASSIUM CHLORIDE: 5; .45; .15 INJECTION INTRAVENOUS at 07:10

## 2019-10-30 RX ADMIN — DEXTROSE, SODIUM CHLORIDE, AND POTASSIUM CHLORIDE 1000 ML: 5; .45; .15 INJECTION INTRAVENOUS at 06:10

## 2019-10-30 RX ADMIN — MICONAZOLE NITRATE: 20 POWDER TOPICAL at 10:10

## 2019-10-30 RX ADMIN — LORAZEPAM 1 MG: 2 INJECTION INTRAMUSCULAR; INTRAVENOUS at 10:10

## 2019-10-30 RX ADMIN — FLUOXETINE HYDROCHLORIDE 60 MG: 20 SOLUTION ORAL at 09:10

## 2019-10-30 RX ADMIN — IPRATROPIUM BROMIDE AND ALBUTEROL SULFATE 3 ML: .5; 3 SOLUTION RESPIRATORY (INHALATION) at 07:10

## 2019-10-30 RX ADMIN — AMOXICILLIN AND CLAVULANATE POTASSIUM 400 MG: 400; 57 POWDER, FOR SUSPENSION ORAL at 10:10

## 2019-10-30 RX ADMIN — IPRATROPIUM BROMIDE AND ALBUTEROL SULFATE 3 ML: .5; 3 SOLUTION RESPIRATORY (INHALATION) at 03:10

## 2019-10-30 RX ADMIN — DEXTROSE 500 MG: 50 INJECTION, SOLUTION INTRAVENOUS at 12:10

## 2019-10-30 RX ADMIN — SODIUM CHLORIDE 30 MG/ML INHALATION SOLUTION 4 ML: 30 SOLUTION INHALANT at 07:10

## 2019-10-30 RX ADMIN — IPRATROPIUM BROMIDE AND ALBUTEROL SULFATE 3 ML: .5; 3 SOLUTION RESPIRATORY (INHALATION) at 12:10

## 2019-10-30 RX ADMIN — FAMOTIDINE 20 MG: 10 INJECTION INTRAVENOUS at 10:10

## 2019-10-30 RX ADMIN — CLONAZEPAM 1 MG: 0.5 TABLET ORAL at 09:10

## 2019-10-30 RX ADMIN — AMOXICILLIN AND CLAVULANATE POTASSIUM 400 MG: 400; 57 POWDER, FOR SUSPENSION ORAL at 11:10

## 2019-10-30 RX ADMIN — LORAZEPAM 1 MG: 2 INJECTION INTRAMUSCULAR; INTRAVENOUS at 09:10

## 2019-10-30 RX ADMIN — FAMOTIDINE 20 MG: 10 INJECTION INTRAVENOUS at 09:10

## 2019-10-30 RX ADMIN — CLONAZEPAM 1 MG: 0.5 TABLET ORAL at 10:10

## 2019-10-30 RX ADMIN — ENOXAPARIN SODIUM 40 MG: 100 INJECTION SUBCUTANEOUS at 05:10

## 2019-10-30 RX ADMIN — POLYETHYLENE GLYCOL 3350 17 G: 17 POWDER, FOR SOLUTION ORAL at 09:10

## 2019-10-30 RX ADMIN — DOCUSATE SODIUM 100 MG: 50 LIQUID ORAL at 10:10

## 2019-10-30 NOTE — PROGRESS NOTES
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  Positive bowel movements overnight.      Diet - Diet NPO     I/O last 3 completed shifts:  In: 0   Out: 200 [Drains:200]    Intake/Output Summary (Last 24 hours) at 10/30/2019 0643  Last data filed at 10/30/2019 0500  Gross per 24 hour   Intake 1370 ml   Output --   Net 1370 ml       Temp:  [97.6 °F (36.4 °C)-98.3 °F (36.8 °C)] 97.9 °F (36.6 °C)  Pulse:  [] 106  Resp:  [16-20] 18  SpO2:  [85 %-100 %] 94 %  BP: (137-156)/() 155/98    Gen: alert, well appearing, and in no distress,  Abdomen mild distention, nontender incision without erythema    Recent Labs   Lab 10/27/19  1008 10/28/19  0523 10/29/19  0524   WBC 15.90*  --  7.07   HGB 11.9*  --  8.8*   HCT 37.9*  --  28.9*     --  146*    136  --    K 4.6 4.4  --     103  --    BUN 13 16  --    * 97  --    PROT 8.4  --   --    ALBUMIN 3.7  --   --    BILITOT 0.4  --   --    AST 20  --   --     ALKPHOS 89  --   --    ALT 14  --   --         Assessment:  Status post J-tube    Plan:  Will advance tube feeds to a 20 cc an hour  Continue care per primary    Zoltan Chris MD

## 2019-10-30 NOTE — PROGRESS NOTES
[facility] Hospital Medicine  Progress Note    Patient Name: Karri Galeano  MRN: [id number]  Patient Class: IP- Inpatient   Admission Date: 10/3/2019  Length of Stay: 26 days  Attending Physician: Bryan Hill MD  Primary Care Provider: Echo Reeves MD        Subjective:     Principal Problem:Acute respiratory failure with hypoxia        HPI:  Mr. Karri Galeano is a 24 y.o. male with cerebral palsy and epilepsy who presents to [facility] ED from an urgent care facility with complaints of fevers today.  He lives in a group home and started to have a nonproductive cough today.  The caregiver did not observe any vomiting.  He was in his usual state of health yesterday but today has become more lethargic and somnolent.  He was taken to an urgent care facility where he was then transferred to this facility for further care.  He was noted there to have a fever of 102.2° F as well as an oxygen saturation of 95% on nasal cannula at 3 liters/minute.  Further history is otherwise limited at this time.    Overview/Hospital Course:  Mr. Galenao presented with fever, cough and lethargy.  Respiratory status decompensated and he required intubation in the ER.  Workup with imaging consistent with possible pneumonia.  Empiric Zosyn and vancomycin initiated.  Pulmonary consulted for vent management.  Extubated on 10/5 in the morning.  Initially did well, but had worsening respiratory failure during the day.  Started on Bipap with no improvement and reintubated on 10/5 in the afternoon.  Blood cultures no growth to date.  Respiratory viral panel came back positive for rhino virus.  Antibiotics stopped.  Extubated on 10/9 to BiPAP.  Respiratory status stable.  Increased cough with feeding patient and he remained in the ICU.  Speech therapy recommended pureed diet.  He was stepped down to the floor on 10/11.  Patient with poor intake along with increased risk for aspiration. Per aunt, patient was fed full  meals when pureed diet was recommended by Speech. A MBBS was done. Showed evidence of aspiration therefore NPO status and PEG tube placement recommended. Aunt (next of kin) agreed with PEG placement once aspiration confirmed. Patient had unfortunately aspirated during PO intake and clinically worsened. Progressively requiring more O2 via high flow NC. Developed leukocytosis, became less interactive and spiked fever. NGT placed to avoid all oral intake and initiated on IV abx. Tube feeds started via NGT. Pulmonology re-consulted for co-management. Recommended BiPAP (or CPAP) QHS for positive pressure (as he can't participate with IS), nebulized treatments every 4 hours, nebulized hypertonic saline every 12 hours, chest physiotherapy, bowel regimen for fecal impaction/constipation and keep HOB elevated. Patient has scopolamine patch for oral secretions and IV famotidine for stress ulcer prophylaxis. Repeat CXR on 10/17 showed complete collapse of left lung likely due to atelectasis vs mucous plug. Patient remained stable on high flow but given high risk for further decompensation +/- bronchoscopy with intubation, he was upgraded to ICU for closer monitoring. Discussed with aunt at bedside. Patient had aggressive pulmonary toilet with improvement in aeration seen on chest x-ray and respiratory status improved.  Trickle feeds resumed and GI planned for PEG on 10/21- not successful and surgery consulted for J tube placement.  The patient was transferred out of the ICU on 10/22.    Met with patient aunt and she request another  ST eval/swallow eval.  PLan for j tube placement. Keep NPO   Aunt concerned about patient pulling at ear and possible infection.     Treating with augmentin for otitis media and j tube placed 10/25  Drop in O2 sats, lower pain med dose, CXR - tolerating tube feeds thus far    10//27- SBO on imaging. Tube feeds held.   10/28- UOP picked up after BM - repeat imaging in am and possible trickle feeds  resumed   Started on trickle feeding on 10/29.    Interval History: No new events overnight.    Review of Systems   Unable to perform ROS: Patient nonverbal     Objective:     Vital Signs (Most Recent):  Temp: 97.7 °F (36.5 °C) (10/30/19 1602)  Pulse: 101 (10/30/19 1602)  Resp: 18 (10/30/19 1602)  BP: (!) 141/96 (10/30/19 1602)  SpO2: 97 % (10/30/19 1211) Vital Signs (24h Range):  Temp:  [97.3 °F (36.3 °C)-98.3 °F (36.8 °C)] 97.7 °F (36.5 °C)  Pulse:  [] 101  Resp:  [16-19] 18  SpO2:  [93 %-100 %] 97 %  BP: (141-156)/() 141/96     Weight: 39 kg (85 lb 15.7 oz)  Body mass index is 16.25 kg/m².    Intake/Output Summary (Last 24 hours) at 10/30/2019 1644  Last data filed at 10/30/2019 0500  Gross per 24 hour   Intake 1370 ml   Output --   Net 1370 ml      Physical Exam   Constitutional: He appears well-developed. No distress.   Contracted    HENT:   Head: Atraumatic.   Eyes: Conjunctivae are normal.   Neck: Neck supple.   Cardiovascular: Normal rate and regular rhythm.   Pulmonary/Chest: No stridor. No respiratory distress. He has no wheezes. He has no rales.   Breathing comfortably on NC, air movement on both side of lungs, no increase in effort   Abdominal: Soft. Bowel sounds are normal. He exhibits no distension. There is no tenderness.   j tube in place with abdominal binder   Musculoskeletal:   Wasted extremities   Neurological: He is alert.   Making eye contact, does not follow commands   Skin: Skin is warm and dry. Capillary refill takes less than 2 seconds. He is not diaphoretic.   Nursing note and vitals reviewed.      Significant Labs:   BMP:   No results for input(s): GLU, NA, K, CL, CO2, BUN, CREATININE, CALCIUM, MG in the last 48 hours.  CBC:   Recent Labs   Lab 10/29/19  0524   WBC 7.07   HGB 8.8*   HCT 28.9*   *       Significant Imaging: I have reviewed all pertinent imaging results/findings within the past 24 hours.      Assessment/Plan:      * Acute respiratory failure with  hypoxia  Secondary to aspiration pneumonia and rhino virus  Completed antibiotic treatment prior to stepping down to floor however patient became septic again after eating pureed diet.   MBBS did show evidence of aspiration therefore placed NPO.  Abx restarted 10/16. NGT placed to bypass all oral intake pending PEG tube placement which will be done once respiratory status improves.   Although more interactive, he was on more O2 requirements , more tachycardic and with complete collapse of left lung.  Treated with highflow supplemental O2, nebulized treatments, nebulized hypertonic saline every 12 hours, bowel regimen, BiPAP QHS, chest physiotherapy and keep HOB elevated  Famotidine IV. Scopolamine patch in place.   Continue treatment with cefazolin and levofloxacin, last dose on 10/22 and 10/23 respectively  Transferred to ICU for closer monitoring on 10/18  Respiratory status improved with aggressive pulmonary toilet and chest x-ray with improved  Antibiotics dc'd and patient had jtube placed, developed ileus.   Afebrile with normal WBC.  Had BM.  Restarting trickle feedings.  Increasing rate.    Paralytic ileus  S/P BM  Starting trickle feedings.    Non-recurrent acute suppurative otitis media of right ear without spontaneous rupture of tympanic membrane  Recent exposure to IV abx, prolong hospital stay and symptomatic   Right TM with erythema and slightly bulge - treat augmentin solution via tube     Constipation  On bowel regimen, oral agents held  Suppository given with + BM          Other dysphagia  As above      Severe malnutrition  PEG attempted by GI on 10/21 (today) without success  General surgery consulted for J-tube placement - placed 10/25  Trickle feedings.    Hypokalemia  Replace as needed.  Will continue to monitor    Epilepsy  Continue his home regimen of divalproex.  Also on clonazepam    Spastic quadriplegic cerebral palsy  Family would like group home in [place] when ready for discharge  j tube  placed          VTE Risk Mitigation (From admission, onward)         Ordered     enoxaparin injection 40 mg  Daily      10/30/19 1108     IP VTE HIGH RISK PATIENT  Once      10/04/19 0152                      Bryan Hill MD  Department of Hospital Medicine   [facility]

## 2019-10-30 NOTE — SUBJECTIVE & OBJECTIVE
Interval History: No new events overnight.    Review of Systems   Unable to perform ROS: Patient nonverbal     Objective:     Vital Signs (Most Recent):  Temp: 97.7 °F (36.5 °C) (10/30/19 1602)  Pulse: 101 (10/30/19 1602)  Resp: 18 (10/30/19 1602)  BP: (!) 141/96 (10/30/19 1602)  SpO2: 97 % (10/30/19 1211) Vital Signs (24h Range):  Temp:  [97.3 °F (36.3 °C)-98.3 °F (36.8 °C)] 97.7 °F (36.5 °C)  Pulse:  [] 101  Resp:  [16-19] 18  SpO2:  [93 %-100 %] 97 %  BP: (141-156)/() 141/96     Weight: 39 kg (85 lb 15.7 oz)  Body mass index is 16.25 kg/m².    Intake/Output Summary (Last 24 hours) at 10/30/2019 1644  Last data filed at 10/30/2019 0500  Gross per 24 hour   Intake 1370 ml   Output --   Net 1370 ml      Physical Exam   Constitutional: He appears well-developed. No distress.   Contracted    HENT:   Head: Atraumatic.   Eyes: Conjunctivae are normal.   Neck: Neck supple.   Cardiovascular: Normal rate and regular rhythm.   Pulmonary/Chest: No stridor. No respiratory distress. He has no wheezes. He has no rales.   Breathing comfortably on NC, air movement on both side of lungs, no increase in effort   Abdominal: Soft. Bowel sounds are normal. He exhibits no distension. There is no tenderness.   j tube in place with abdominal binder   Musculoskeletal:   Wasted extremities   Neurological: He is alert.   Making eye contact, does not follow commands   Skin: Skin is warm and dry. Capillary refill takes less than 2 seconds. He is not diaphoretic.   Nursing note and vitals reviewed.      Significant Labs:   BMP:   No results for input(s): GLU, NA, K, CL, CO2, BUN, CREATININE, CALCIUM, MG in the last 48 hours.  CBC:   Recent Labs   Lab 10/29/19  0524   WBC 7.07   HGB 8.8*   HCT 28.9*   *       Significant Imaging: I have reviewed all pertinent imaging results/findings within the past 24 hours.

## 2019-10-30 NOTE — ASSESSMENT & PLAN NOTE
Secondary to aspiration pneumonia and rhino virus  Completed antibiotic treatment prior to stepping down to floor however patient became septic again after eating pureed diet.   MBBS did show evidence of aspiration therefore placed NPO.  Abx restarted 10/16. NGT placed to bypass all oral intake pending PEG tube placement which will be done once respiratory status improves.   Although more interactive, he was on more O2 requirements , more tachycardic and with complete collapse of left lung.  Treated with highflow supplemental O2, nebulized treatments, nebulized hypertonic saline every 12 hours, bowel regimen, BiPAP QHS, chest physiotherapy and keep HOB elevated  Famotidine IV. Scopolamine patch in place.   Continue treatment with cefazolin and levofloxacin, last dose on 10/22 and 10/23 respectively  Transferred to ICU for closer monitoring on 10/18  Respiratory status improved with aggressive pulmonary toilet and chest x-ray with improved  Antibiotics dc'd and patient had jtube placed, developed ileus.   Afebrile with normal WBC.  Had BM.  Restarting trickle feedings.  Increasing rate.

## 2019-10-30 NOTE — PROGRESS NOTES
"[facility]  Adult Nutrition  Progress Note    SUMMARY       Recommendations    Recommendation/Intervention:   1. Advance TF of Isosource 1.5 as tolerated to a goal rate of 45 mL/hr to provide 1620 kcal, 73 g pro, and 825 mL free fluid. Additional fluish flushes of 200 mL q 6 hrs to meet fluid needs.   2. RD to monitor weekly weights closely.    Goals: Meet > 85% EEN daily  Nutrition Goal Status: goal not met  Communication of RD Recs: reviewed with physician    Reason for Assessment    Reason For Assessment: RD follow-up  Diagnosis: pulmonary disease, infection/sepsis  Relevant Medical History: CP, mental retardation  Interdisciplinary Rounds: did not attend  General Information Comments: Pt SBO resolved; trickle TF began via J-tube on 10/29; tolerating well. TF to be increased per MD note. NFPE completed 10/18; acute malnutrition continues.  Nutrition Discharge Planning: TF to meet 100% of needs    Nutrition Risk Screen    Nutrition Risk Screen: tube feeding or parenteral nutrition    Nutrition/Diet History    Patient Reported Diet/Restrictions/Preferences: pureed(reported by )  Spiritual, Cultural Beliefs, Hindu Practices, Values that Affect Care: no  Supplemental Drinks or Food Habits: Ensure Plus(Benecalorie)  Factors Affecting Nutritional Intake: impaired cognitive status/motor control, chewing difficulties/inability to chew food, NPO    Anthropometrics    Temp: 97.7 °F (36.5 °C)  Height Method: Estimated  Height: 5' 1" (154.9 cm)  Height (inches): 61 in  Weight Method: Bed Scale  Weight: 39 kg (85 lb 15.7 oz)  Weight (lb): 85.98 lb  Ideal Body Weight (IBW), Male: 112 lb  % Ideal Body Weight, Male (lb): 76.77 lb  BMI (Calculated): 16.3  BMI Grade: 16 - 16.9 protein-energy malnutrition grade II       Lab/Procedures/Meds    Pertinent Labs Reviewed: reviewed  Pertinent Medications Reviewed: reviewed  Pertinent Medications Comments: levo, valproic " acid    Estimated/Assessed Needs    Weight Used For Calorie Calculations: 39 kg (85 lb 15.7 oz)  Energy Calorie Requirements (kcal): 1548 kcal/day  Energy Need Method: [place]-St Jeor(PAL 1.25)  Protein Requirements: 47-59 g/day(1.2-1.5 g/kg)  Weight Used For Protein Calculations: 39 kg (85 lb 15.7 oz)     Estimated Fluid Requirement Method: RDA Method  RDA Method (mL): 1548         Nutrition Prescription Ordered    Current Diet Order: NPO(for PEG placement that did not occur)  Current Nutrition Support Formula Ordered: Isosource 1.5  Current Nutrition Support Rate Ordered: 10 (ml)  Current Nutrition Support Frequency Ordered: ml/hr  Oral Nutrition Supplement: -    Evaluation of Received Nutrient/Fluid Intake    Enteral Calories (kcal): 0  Enteral Protein (gm): 0  Enteral (Free Water) Fluid (mL): 0  Free Water Flush Fluid (mL): 0  Other Calories (kcal): 0  Total Calories (kcal): 0  % Kcal Needs: 0  % Protein Needs: 0  IV Fluid (mL): (IV fluids discountinued)  Total Fluid Intake (mL): 0  I/O: reviewed  Energy Calories Required: not meeting needs  Protein Required: not meeting needs  Fluid Required: (per MD)  Comments: LBM: 10/30   Tolerance: (TF not yet initiated)  % Intake of Estimated Energy Needs: 0 - 25 %   % Meal Intake: NPO    Nutrition Risk    Level of Risk/Frequency of Follow-up: (F/U 2 x weekly)     Assessment and Plan    Severe malnutrition  Malnutrition in the context of Acute on Chronic Issue    Related to (etiology):  Dysphagia    Signs and Symptoms (as evidenced by):  Energy Intake: <50% of estimated energy requirement for >10 days    Muscle Mass Depletion: moderate and severe depletion of temples, clavicle region, scapular region, interosseous muscle and lower extremities ( r/t CP)   Weight Loss: 10% x 1 month      Interventions  Collaboration with providers    Nutrition Diagnosis Status:  Continues              Monitor and Evaluation    Food and Nutrient Intake: enteral nutrition intake, parenteral  nutrition intake  Food and Nutrient Adminstration: enteral and parenteral nutrition administration  Anthropometric Measurements: weight change, weight, body mass index  Biochemical Data, Medical Tests and Procedures: glucose/endocrine profile, electrolyte and renal panel, lipid profile, inflammatory profile, gastrointestinal profile  Nutrition-Focused Physical Findings: overall appearance, extremities, muscles and bones, head and eyes     Malnutrition Assessment    Energy Intake (Malnutrition): less than or equal to 50% for greater than or equal to 5 days   [place] Region (Muscle Loss): mild depletion  Clavicle Bone Region (Muscle Loss): moderate depletion  Patellar Region (Muscle Loss): severe depletion  Anterior Thigh Region (Muscle Loss): severe depletion  Posterior Calf Region (Muscle Loss): severe depletion   Severe Weight Loss (Malnutrition): (10% x 1 month)    Nutrition Follow-Up    RD Follow-up?: Yes

## 2019-10-30 NOTE — PLAN OF CARE
Patient is lying comfortably in bed. He is accompanied by the sitter and the camera. No distress noted.

## 2019-10-30 NOTE — PLAN OF CARE
Problem: Malnutrition  Goal: Improved Nutritional Intake  Outcome: Ongoing, Not Progressing  Intervention: Promote and Optimize Nutrition Support  Flowsheets (Taken 10/30/2019 1842)  Nutrition Support Management: tube feeding rate increased

## 2019-10-31 PROCEDURE — 25000242 PHARM REV CODE 250 ALT 637 W/ HCPCS: Performed by: SURGERY

## 2019-10-31 PROCEDURE — 25000003 PHARM REV CODE 250: Performed by: SURGERY

## 2019-10-31 PROCEDURE — 25000003 PHARM REV CODE 250: Performed by: HOSPITALIST

## 2019-10-31 PROCEDURE — S0028 INJECTION, FAMOTIDINE, 20 MG: HCPCS | Performed by: SURGERY

## 2019-10-31 PROCEDURE — 63600175 PHARM REV CODE 636 W HCPCS: Performed by: HOSPITALIST

## 2019-10-31 PROCEDURE — 11000001 HC ACUTE MED/SURG PRIVATE ROOM

## 2019-10-31 PROCEDURE — 94668 MNPJ CHEST WALL SBSQ: CPT

## 2019-10-31 PROCEDURE — 94660 CPAP INITIATION&MGMT: CPT

## 2019-10-31 PROCEDURE — 97530 THERAPEUTIC ACTIVITIES: CPT

## 2019-10-31 PROCEDURE — 94761 N-INVAS EAR/PLS OXIMETRY MLT: CPT

## 2019-10-31 PROCEDURE — 94640 AIRWAY INHALATION TREATMENT: CPT

## 2019-10-31 RX ADMIN — SCOPALAMINE 1 PATCH: 1 PATCH, EXTENDED RELEASE TRANSDERMAL at 12:10

## 2019-10-31 RX ADMIN — DEXTROSE 500 MG: 50 INJECTION, SOLUTION INTRAVENOUS at 02:10

## 2019-10-31 RX ADMIN — DOCUSATE SODIUM 100 MG: 50 LIQUID ORAL at 09:10

## 2019-10-31 RX ADMIN — DEXTROSE 500 MG: 50 INJECTION, SOLUTION INTRAVENOUS at 12:10

## 2019-10-31 RX ADMIN — FLUOXETINE HYDROCHLORIDE 60 MG: 20 SOLUTION ORAL at 10:10

## 2019-10-31 RX ADMIN — IPRATROPIUM BROMIDE AND ALBUTEROL SULFATE 3 ML: .5; 3 SOLUTION RESPIRATORY (INHALATION) at 12:10

## 2019-10-31 RX ADMIN — CLONAZEPAM 1 MG: 0.5 TABLET ORAL at 10:10

## 2019-10-31 RX ADMIN — FAMOTIDINE 20 MG: 10 INJECTION INTRAVENOUS at 08:10

## 2019-10-31 RX ADMIN — DEXTROSE 500 MG: 50 INJECTION, SOLUTION INTRAVENOUS at 11:10

## 2019-10-31 RX ADMIN — ENOXAPARIN SODIUM 40 MG: 100 INJECTION SUBCUTANEOUS at 05:10

## 2019-10-31 RX ADMIN — IPRATROPIUM BROMIDE AND ALBUTEROL SULFATE 3 ML: .5; 3 SOLUTION RESPIRATORY (INHALATION) at 03:10

## 2019-10-31 RX ADMIN — IPRATROPIUM BROMIDE AND ALBUTEROL SULFATE 3 ML: .5; 3 SOLUTION RESPIRATORY (INHALATION) at 04:10

## 2019-10-31 RX ADMIN — AMOXICILLIN AND CLAVULANATE POTASSIUM 400 MG: 400; 57 POWDER, FOR SUSPENSION ORAL at 09:10

## 2019-10-31 RX ADMIN — IPRATROPIUM BROMIDE AND ALBUTEROL SULFATE 3 ML: .5; 3 SOLUTION RESPIRATORY (INHALATION) at 07:10

## 2019-10-31 RX ADMIN — CLONAZEPAM 1 MG: 0.5 TABLET ORAL at 09:10

## 2019-10-31 RX ADMIN — POLYETHYLENE GLYCOL 3350 17 G: 17 POWDER, FOR SOLUTION ORAL at 10:10

## 2019-10-31 RX ADMIN — LORAZEPAM 1 MG: 2 INJECTION INTRAMUSCULAR; INTRAVENOUS at 08:10

## 2019-10-31 RX ADMIN — MICONAZOLE NITRATE: 20 POWDER TOPICAL at 09:10

## 2019-10-31 RX ADMIN — FAMOTIDINE 20 MG: 10 INJECTION INTRAVENOUS at 10:10

## 2019-10-31 RX ADMIN — MICONAZOLE NITRATE: 20 POWDER TOPICAL at 11:10

## 2019-10-31 RX ADMIN — AMOXICILLIN AND CLAVULANATE POTASSIUM 400 MG: 400; 57 POWDER, FOR SUSPENSION ORAL at 01:10

## 2019-10-31 RX ADMIN — DOCUSATE SODIUM 100 MG: 50 LIQUID ORAL at 10:10

## 2019-10-31 RX ADMIN — LORAZEPAM 1 MG: 2 INJECTION INTRAMUSCULAR; INTRAVENOUS at 10:10

## 2019-10-31 NOTE — PROGRESS NOTES
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]  Yes     Chronic      Resolved Hospital Problems    Diagnosis Date Resolved POA    Pneumonia of left lung due to infectious organism [J18.9] 10/29/2019 Yes    Severe sepsis [A41.9, R65.20] 10/11/2019 Yes    On mechanically assisted ventilation [Z99.11] 10/15/2019 Not Applicable    Hypernatremia [E87.0] 10/29/2019 No       HD #  LOS: 27 days   POD #6 Days Post-Op status post J-tube insertion    Overnight events:  Tolerating tube feeds at 20 cc an hour without any issues     Diet - Diet NPO     I/O last 3 completed shifts:  In: 1370 [I.V.:1200; NG/GT:120; IV Piggyback:50]  Out: - No intake or output data in the 24 hours ending 10/31/19 0625    Temp:  [97.3 °F (36.3 °C)-99 °F (37.2 °C)] 97.8 °F (36.6 °C)  Pulse:  [] 84  Resp:  [14-18] 16  SpO2:  [92 %-100 %] 99 %  BP: (134-156)/() 147/92    Gen:  No apparent distress  Abdomen soft, mild distention nontender.    Recent Labs   Lab 10/27/19  1008 10/28/19  0523 10/29/19  0524   WBC 15.90*  --  7.07   HGB 11.9*  --  8.8*   HCT 37.9*  --  28.9*     --  146*    136  --    K 4.6 4.4  --     103  --    BUN 13 16  --    * 97  --    PROT 8.4  --   --    ALBUMIN 3.7  --   --    BILITOT 0.4  --   --    AST 20  --   --    ALKPHOS 89  --   --    ALT 14  --   --         Assessment:  Status post J-tube insertion    Plan:  Advance tube feeds 10 cc an hour  until goal reach    Zoltan Chris MD

## 2019-10-31 NOTE — PLAN OF CARE
DL contacted Rola at [facility] to inform that patient has a potential discharge tomorrow if he tolerates tube feedings. Rola informed  that a 90L is needed to be completed by Dr. Hill. Rola stated that she will have Marcus Hoang, KAYDEN call patient's nurse Ashley to discuss patient care. Rola also stated that Marcus will email 90L to  to give to Dr. Hill. DL sent message to Dr. Hill via secure chat to inform about completion of 90L. DL also contacted patient's aunt Chloe to inform that patient has a potential discharge for tomorrow if tolerating feedings. Chloe stated that she made it in last night and will come to the hospital later. Chloe stated that she will call Rola to follow up on any additional information that's needed. DL notified nurse Ashley that nurse from [place] will be calling her to discuss patient care.        10/31/19 1033   Post-Acute Status   Post[facility]  ([facility])   Post-Acute Placement Status Additional Clinical Requested

## 2019-10-31 NOTE — PT/OT/SLP PROGRESS
Physical Therapy Treatment    Patient Name:  Karri Galeano   MRN:  [id number]    Recommendations:     Discharge Recommendations:  (24hr care at [facility])   Discharge Equipment Recommendations: bedside commode, bath bench, hospital bed(w/c with elevated leg rest)   Barriers to discharge: 24hr care at [facility]    Assessment:     Karri Galeano is a 24 y.o. male admitted with a medical diagnosis of Acute respiratory failure with hypoxia.  He presents with the following impairments/functional limitations:  weakness, impaired endurance, impaired self care skills, gait instability, impaired functional mobilty, impaired cognition, decreased coordination, impaired balance, decreased upper extremity function, decreased lower extremity function, decreased safety awareness, abnormal tone, impaired coordination.    Rehab Prognosis: Poor; patient would benefit from acute skilled PT services to address these deficits and reach maximum level of function.    Recent Surgery: Procedure(s) (LRB):  INSERTION, JEJUNOSTOMY TUBE ( OPEN) (N/A) 6 Days Post-Op    Plan:     During this hospitalization, patient to be seen (2-3/wk) to address the identified rehab impairments via therapeutic activities, neuromuscular re-education and progress toward the following goals:    · Plan of Care Expires:  11/02/19    Subjective     Chief Complaint: none  Patient/Family Comments/goals: to go home  Pain/Comfort:  · Pain Rating 1: 0/10      Objective:     Communicated with nurse prior to session.  Patient found supine with   upon PT entry to room.     General Precautions: Standard, fall, aspiration   Orthopedic Precautions:N/A   Braces: N/A     Functional Mobility:  · Bed Mobility:     · Rolling Right: total assistance  · Scooting: total assistance  · Supine to Sit: total assistance  · Sit to Supine: total assistance      AM-PAC 6 CLICK MOBILITY  Turning over in bed (including adjusting bedclothes, sheets and blankets)?: 2  Sitting down on  and standing up from a chair with arms (e.g., wheelchair, bedside commode, etc.): 2  Moving from lying on back to sitting on the side of the bed?: 2  Moving to and from a bed to a chair (including a wheelchair)?: 2  Need to walk in hospital room?: 1  Climbing 3-5 steps with a railing?: 1  Basic Mobility Total Score: 10       Therapeutic Activities and Exercises:   sat EOB X 15 MIN with Mod A. Pt resisting at times.    Patient left supine with all lines intact, nurse notified and caregiver and aunt present..    GOALS:   Multidisciplinary Problems     Physical Therapy Goals        Problem: Physical Therapy Goal    Goal Priority Disciplines Outcome Goal Variances Interventions   Physical Therapy Goal     PT, PT/OT Ongoing, Progressing     Description:  Goals to be met by: 2019     Patient will increase functional independence with mobility by performin. Supine to sit with Mod A  2.  Scooting in bed with Min A   3   Rolling with Min A   4. Bed to chair transfer  With Mod A  5. Tolerate sitting up in chair x 30m                     Time Tracking:     PT Received On: 10/31/19  PT Start Time: 1500     PT Stop Time: 1515  PT Total Time (min): 15 min     Billable Minutes: Therapeutic Activity 15       PT/PTA: PTA     PTA Visit Number: 1     Chris Romero PTA  10/31/2019

## 2019-10-31 NOTE — PLAN OF CARE
DL contacted patient's aunt, Chloe to discuss discharge planning. Chloe stated that she was concerned that patient may be getting discharged prematurely. Chloe stated that patient's goal for feeding is 45cc per hour but intake is only 30cc per hour. Chloe stated that she does not want patient to get discharged and he is not ready. DL instructed Chloe to discuss issues with Dr. Hill in regards to feeding. Chloe also had some concerns about transportation to the [facility]. DL stated that she will contact [facility] at [place] tomorrow to discuss mode of transportation for their residents. Chloe stated that she will be at the hospital early tomorrow morning.      10/31/19 1612   Post-Acute Status   Post-Acute Authorization Other  (Discharge Planning)   Other Status See Comments

## 2019-10-31 NOTE — PLAN OF CARE
Problem: Physical Therapy Goal  Goal: Physical Therapy Goal  Description  Goals to be met by: 2019     Patient will increase functional independence with mobility by performin. Supine to sit with Mod A  2.  Scooting in bed with Min A   3   Rolling with Min A   4. Bed to chair transfer  With Mod A  5. Tolerate sitting up in chair x 30m    Outcome: Ongoing, Progressing   Pt sat EOB x 15 min with Mod A. tot A with sup to sit, rolling, and scooting

## 2019-10-31 NOTE — NURSING
Pt nonverbal, receiving TF @ 40ml/hr with 0 residuals. Aspiration precautions maintained. Incontinence care provided as needed. abdominal binder in place. No bm during am shift. Hydrocolloid dressing applied to sacrum. Sitter at bedside

## 2019-10-31 NOTE — PLAN OF CARE
"Pt remains free of falls and injuries. Sitter at bedside. Incontinence care provided as needed. Tube feeding cont at 20cc/hr. Mild abd distention noted, but pt tolerating TF. IVFs and abx cont as scheduled. VSSAF. Resp tx cont as scheduled. No nonverbal indicators of pain noted.     Problem: Fall Injury Risk  Goal: Absence of Fall and Fall-Related Injury  Outcome: Ongoing, Progressing  Intervention: Promote Injury-Free Environment  Flowsheets (Taken 10/31/2019 0436)  Safety Promotion/Fall Prevention: bed alarm set; /camera at bedside; supervised activity  Environmental Safety Modification: clutter free environment maintained     Problem: Skin Injury Risk Increased  Goal: Skin Health and Integrity  Outcome: Ongoing, Progressing  Intervention: Optimize Skin Protection  Flowsheets (Taken 10/31/2019 0437)  Pressure Reduction Techniques: frequent weight shift encouraged  Head of Bed (HOB): HOB at 30 degrees     Problem: Adult Inpatient Plan of Care  Goal: Plan of Care Review  Description  Patient remains intubated today and to ventilator, Propofol drip continues for sedation,  Tube feeds started per OGT, tolerating without difficulty.  Saldivar cath remains patent.  Aunt updated on patient condition, she states "I'm on the way there"   Outcome: Ongoing, Progressing     "

## 2019-11-01 VITALS
TEMPERATURE: 99 F | BODY MASS INDEX: 16.24 KG/M2 | HEART RATE: 102 BPM | WEIGHT: 86 LBS | HEIGHT: 61 IN | DIASTOLIC BLOOD PRESSURE: 95 MMHG | RESPIRATION RATE: 18 BRPM | SYSTOLIC BLOOD PRESSURE: 139 MMHG | OXYGEN SATURATION: 98 %

## 2019-11-01 PROBLEM — F39 MOOD DISORDER: Status: ACTIVE | Noted: 2019-11-01

## 2019-11-01 PROCEDURE — 90686 IIV4 VACC NO PRSV 0.5 ML IM: CPT | Performed by: HOSPITALIST

## 2019-11-01 PROCEDURE — 25000003 PHARM REV CODE 250: Performed by: SURGERY

## 2019-11-01 PROCEDURE — 94761 N-INVAS EAR/PLS OXIMETRY MLT: CPT

## 2019-11-01 PROCEDURE — 63600175 PHARM REV CODE 636 W HCPCS: Performed by: HOSPITALIST

## 2019-11-01 PROCEDURE — 94640 AIRWAY INHALATION TREATMENT: CPT

## 2019-11-01 PROCEDURE — 94668 MNPJ CHEST WALL SBSQ: CPT

## 2019-11-01 PROCEDURE — 90471 IMMUNIZATION ADMIN: CPT | Performed by: HOSPITALIST

## 2019-11-01 PROCEDURE — 90792 PR PSYCHIATRIC DIAGNOSTIC EVALUATION W/MEDICAL SERVICES: ICD-10-PCS | Mod: ,,, | Performed by: PSYCHIATRY & NEUROLOGY

## 2019-11-01 PROCEDURE — 25000003 PHARM REV CODE 250: Performed by: HOSPITALIST

## 2019-11-01 PROCEDURE — 25000242 PHARM REV CODE 250 ALT 637 W/ HCPCS: Performed by: SURGERY

## 2019-11-01 PROCEDURE — 90792 PSYCH DIAG EVAL W/MED SRVCS: CPT | Mod: ,,, | Performed by: PSYCHIATRY & NEUROLOGY

## 2019-11-01 PROCEDURE — 99900035 HC TECH TIME PER 15 MIN (STAT)

## 2019-11-01 PROCEDURE — G0008 ADMIN INFLUENZA VIRUS VAC: HCPCS | Performed by: HOSPITALIST

## 2019-11-01 PROCEDURE — S0028 INJECTION, FAMOTIDINE, 20 MG: HCPCS | Performed by: SURGERY

## 2019-11-01 RX ORDER — POLYETHYLENE GLYCOL 3350 17 G/17G
17 POWDER, FOR SOLUTION ORAL DAILY
Qty: 30 EACH | Refills: 3 | Status: SHIPPED | OUTPATIENT
Start: 2019-11-02

## 2019-11-01 RX ORDER — VALPROIC ACID 250 MG/5ML
500 SOLUTION ORAL EVERY 12 HOURS
Qty: 600 ML | Refills: 0 | Status: SHIPPED | OUTPATIENT
Start: 2019-11-01 | End: 2019-12-01

## 2019-11-01 RX ORDER — DOCUSATE SODIUM 50 MG/5ML
100 LIQUID ORAL 2 TIMES DAILY
Qty: 473 ML | Refills: 0 | Status: SHIPPED | OUTPATIENT
Start: 2019-11-01

## 2019-11-01 RX ORDER — FLUOXETINE HYDROCHLORIDE 20 MG/1
60 CAPSULE ORAL DAILY
Qty: 30 CAPSULE | Refills: 3 | Status: SHIPPED | OUTPATIENT
Start: 2019-11-01

## 2019-11-01 RX ORDER — CLONAZEPAM 1 MG/1
1 TABLET ORAL 2 TIMES DAILY
Qty: 30 TABLET | Refills: 3 | Status: SHIPPED | OUTPATIENT
Start: 2019-11-01

## 2019-11-01 RX ORDER — SCOLOPAMINE TRANSDERMAL SYSTEM 1 MG/1
1 PATCH, EXTENDED RELEASE TRANSDERMAL
Qty: 10 PATCH | Refills: 0 | Status: SHIPPED | OUTPATIENT
Start: 2019-11-03

## 2019-11-01 RX ORDER — IPRATROPIUM BROMIDE AND ALBUTEROL SULFATE 2.5; .5 MG/3ML; MG/3ML
3 SOLUTION RESPIRATORY (INHALATION) EVERY 8 HOURS
Status: DISCONTINUED | OUTPATIENT
Start: 2019-11-02 | End: 2019-11-02 | Stop reason: HOSPADM

## 2019-11-01 RX ORDER — ACETAMINOPHEN 500 MG
500 TABLET ORAL EVERY 6 HOURS PRN
Qty: 30 TABLET | Refills: 0 | Status: SHIPPED | OUTPATIENT
Start: 2019-11-01

## 2019-11-01 RX ORDER — ALBUTEROL SULFATE 2.5 MG/.5ML
2.5 SOLUTION RESPIRATORY (INHALATION) EVERY 6 HOURS PRN
Qty: 225 MG | Refills: 0 | Status: SHIPPED | OUTPATIENT
Start: 2019-11-01 | End: 2019-12-01

## 2019-11-01 RX ORDER — MICONAZOLE NITRATE 2 %
POWDER (GRAM) TOPICAL 2 TIMES DAILY
Qty: 43 G | Refills: 0 | Status: SHIPPED | OUTPATIENT
Start: 2019-11-01

## 2019-11-01 RX ORDER — DIVALPROEX SODIUM 125 MG/1
500 CAPSULE, COATED PELLETS ORAL 2 TIMES DAILY
Qty: 240 CAPSULE | Refills: 11 | Status: SHIPPED | OUTPATIENT
Start: 2019-11-01 | End: 2020-10-31

## 2019-11-01 RX ADMIN — IPRATROPIUM BROMIDE AND ALBUTEROL SULFATE 3 ML: .5; 3 SOLUTION RESPIRATORY (INHALATION) at 04:11

## 2019-11-01 RX ADMIN — ACETAMINOPHEN 500 MG: 500 TABLET ORAL at 08:11

## 2019-11-01 RX ADMIN — CLONAZEPAM 1 MG: 0.5 TABLET ORAL at 08:11

## 2019-11-01 RX ADMIN — INFLUENZA VIRUS VACCINE 0.5 ML: 15; 15; 15; 15 SUSPENSION INTRAMUSCULAR at 08:11

## 2019-11-01 RX ADMIN — IPRATROPIUM BROMIDE AND ALBUTEROL SULFATE 3 ML: .5; 3 SOLUTION RESPIRATORY (INHALATION) at 11:11

## 2019-11-01 RX ADMIN — DOCUSATE SODIUM 100 MG: 50 LIQUID ORAL at 08:11

## 2019-11-01 RX ADMIN — DEXTROSE 500 MG: 50 INJECTION, SOLUTION INTRAVENOUS at 11:11

## 2019-11-01 RX ADMIN — AMOXICILLIN AND CLAVULANATE POTASSIUM 400 MG: 400; 57 POWDER, FOR SUSPENSION ORAL at 08:11

## 2019-11-01 RX ADMIN — IPRATROPIUM BROMIDE AND ALBUTEROL SULFATE 3 ML: .5; 3 SOLUTION RESPIRATORY (INHALATION) at 08:11

## 2019-11-01 RX ADMIN — LORAZEPAM 1 MG: 2 INJECTION INTRAMUSCULAR; INTRAVENOUS at 08:11

## 2019-11-01 RX ADMIN — FAMOTIDINE 20 MG: 10 INJECTION INTRAVENOUS at 08:11

## 2019-11-01 RX ADMIN — POLYETHYLENE GLYCOL 3350 17 G: 17 POWDER, FOR SOLUTION ORAL at 08:11

## 2019-11-01 RX ADMIN — CLONAZEPAM 1 MG: 0.5 TABLET ORAL at 07:11

## 2019-11-01 RX ADMIN — MICONAZOLE NITRATE: 20 POWDER TOPICAL at 11:11

## 2019-11-01 RX ADMIN — FLUOXETINE HYDROCHLORIDE 60 MG: 20 SOLUTION ORAL at 08:11

## 2019-11-01 RX ADMIN — ENOXAPARIN SODIUM 40 MG: 100 INJECTION SUBCUTANEOUS at 06:11

## 2019-11-01 RX ADMIN — IPRATROPIUM BROMIDE AND ALBUTEROL SULFATE 3 ML: .5; 3 SOLUTION RESPIRATORY (INHALATION) at 12:11

## 2019-11-01 NOTE — HPI
Patient Karri Galeano has a history of cerebral palsy and seizures.   He presented to the hospital with fever and cough.  Has required a prolonged hospitalization which has included intubation, small bowel obstruction, and feedings.  Patient is non-verbal and minimally interactive physically.  He moves his hands somewhat and looks around aimlessly.  His aunt is at bedside and does all of his talking.  She states that he has been non-functional since birth.  He has been in group homes for years since mother has passed.  She [place] of [place] but comes in frequently to check on him.  She is not aware of any agitation or outbursts.  She states that he can sometimes get a little fussy if left in his chair for too long alone.  Otherwise, he is calm and very simple.  He is not able to express himself much at all.  Staff reports calm cooperative behavior, not requiring PRNs.  He is noted to be prescribed fluoxetine 60mg daily (which is available in liquid form), clonazepam 1mg BID, and lorazepam 1mg BID.  I do not see the lorazepam as a prehospitalization medication.  He is also on valproic acid 500mg (Depacon IV) every 12 hours.  He is likely on the clonazepam and Depacon because of seizure history but aunt says that he hasn't had a seizure in years.  She does not  on and depression or anxiety.

## 2019-11-01 NOTE — PLAN OF CARE
SW emailed Psychiatric Evaluation, Prescriptions, and 90L to nurses Lynne Montana and Marcus Hoang at [facility].      11/01/19 8282   Post-Acute Status   Post-Acute Authorization Placement  ([facility])   Post-Acute Placement Status Patient Evaluation by Facility

## 2019-11-01 NOTE — SUBJECTIVE & OBJECTIVE
Interval History: Tolerating tube feedings.    Review of Systems   Unable to perform ROS: Patient nonverbal     Objective:     Vital Signs (Most Recent):  Temp: 97.8 °F (36.6 °C) (10/31/19 1944)  Pulse: 101 (10/31/19 1951)  Resp: 18 (10/31/19 1951)  BP: 135/88 (10/31/19 1944)  SpO2: 96 % (10/31/19 1951) Vital Signs (24h Range):  Temp:  [96.1 °F (35.6 °C)-97.8 °F (36.6 °C)] 97.8 °F (36.6 °C)  Pulse:  [] 101  Resp:  [14-22] 18  SpO2:  [89 %-100 %] 96 %  BP: (130-150)/() 135/88     Weight: 39 kg (85 lb 15.7 oz)  Body mass index is 16.25 kg/m².    Intake/Output Summary (Last 24 hours) at 10/31/2019 2053  Last data filed at 10/31/2019 1800  Gross per 24 hour   Intake 1638 ml   Output 0 ml   Net 1638 ml      Physical Exam   Constitutional: He appears well-developed. No distress.   Contracted    HENT:   Head: Atraumatic.   Eyes: Conjunctivae are normal.   Neck: Neck supple.   Cardiovascular: Normal rate and regular rhythm.   Pulmonary/Chest: No stridor. No respiratory distress. He has no wheezes. He has no rales.   Breathing comfortably on NC, air movement on both side of lungs, no increase in effort   Abdominal: Soft. Bowel sounds are normal. He exhibits no distension. There is no tenderness.   j tube in place with abdominal binder   Musculoskeletal:   Wasted extremities   Neurological: He is alert.   Making eye contact, does not follow commands   Skin: Skin is warm and dry. Capillary refill takes less than 2 seconds. He is not diaphoretic.   Nursing note and vitals reviewed.      Significant Labs:   BMP:   No results for input(s): GLU, NA, K, CL, CO2, BUN, CREATININE, CALCIUM, MG in the last 48 hours.  CBC:   No results for input(s): WBC, HGB, HCT, PLT in the last 48 hours.    Significant Imaging: I have reviewed all pertinent imaging results/findings within the past 24 hours.

## 2019-11-01 NOTE — SUBJECTIVE & OBJECTIVE
Patient History           Medical as of 11/1/2019     Past Medical History     Diagnosis Date Comments Source    Congenital cerebral palsy -- -- Provider    Hx of psychiatric care -- -- Provider    Hypotonia -- -- Provider    Leukodystrophy -- -- Provider    Mental retardation -- -- Provider    Psychiatric problem -- -- Provider    Retention of urine -- -- Provider    Seizures -- -- Provider    Stereotyped movement disorder -- -- Provider    Therapy -- -- Provider    Urinary tract infection -- -- Provider          Pertinent Negatives     Diagnosis Date Noted Comments Source    Elevated PSA 02/07/2018 -- Provider    History of psychiatric hospitalization 11/01/2019 -- Provider    Kidney stone 02/07/2018 -- Provider    STD (sexually transmitted disease) 02/07/2018 -- Provider    Suicide attempt 11/01/2019 -- Provider                  Surgical as of 11/1/2019     Past Surgical History     Procedure Laterality Date Comments Source    BRONCHOSCOPY N/A 10/7/2019 Procedure: Bronchoscopy;  Surgeon: David Vela MD;  Location: [facility] ENDO;  Service: Pulmonary;  Laterality: N/A; Provider    PLACEMENT OF JEJUNOSTOMY TUBE N/A 10/25/2019 Procedure: INSERTION, JEJUNOSTOMY TUBE ( OPEN);  Surgeon: Tony Merritt III, MD;  Location: [facility] OR;  Service: General;  Laterality: N/A; Provider                  Family as of 11/1/2019     Problem Relation Name Age of Onset Comments Source    No Known Problems Mother -- -- -- Provider    No Known Problems Father -- -- -- Provider    Prostate cancer Neg Hx -- -- -- Provider    Kidney disease Neg Hx -- -- -- Provider            Tobacco Use as of 11/1/2019     Smoking Status Smoking Start Date Smoking Quit Date Packs/Day Years Used    Never Smoker -- -- -- --    Types Comments Smokeless Tobacco Status Smokeless Tobacco Quit Date Source    -- -- Never Used -- Provider            Alcohol Use as of 11/1/2019     Alcohol Use Drinks/Week Alcohol/Week Comments Source    No -- -- --  Provider    Frequency Standard Drinks Binge Drinking        -- -- --              Drug Use as of 11/1/2019     Drug Use Types Frequency Comments Source    No -- -- -- Provider            Sexual Activity as of 11/1/2019     Sexually Active Birth Control Partners Comments Source    Never Abstinence -- -- Provider            Activities of Daily Living as of 11/1/2019     Activities of Daily Living Question Response Comments Source    Patient feels they ought to cut down on drinking/drug use Not Asked -- Provider    Patient annoyed by others criticizing their drinking/drug use Not Asked -- Provider    Patient has felt bad or guilty about drinking/drug use Not Asked -- Provider    Patient has had a drink/used drugs as an eye opener in the AM Not Asked -- Provider            Social Documentation as of 11/1/2019    None           Occupational as of 11/1/2019    None           Socioeconomic as of 11/1/2019     Marital Status Spouse Name Number of Children Years Education Education Level Preferred Language Ethnicity Race Source    Single -- 0 -- -- English /Black Black or  Provider    Financial Resource Strain Food Insecurity: Worry Food Insecurity: Inability Transportation Needs: Medical Transportation Needs: Non-medical    -- -- -- -- --            Pertinent History     Question Response Comments    Lives with -- --    Place in Birth Order -- --    Lives in group home --    Number of Siblings -- --    Raised by -- --    Legal Involvement -- --    Childhood Trauma -- --    Criminal History of -- --    Financial Status -- --    Highest Level of Education -- --    Does patient have access to a firearm? -- --     Service -- --    Primary Leisure Activity -- --    Spirituality -- --        Past Medical History:   Diagnosis Date    Congenital cerebral palsy     Hx of psychiatric care     Hypotonia     Leukodystrophy     Mental retardation     Psychiatric problem     Retention of  urine     Seizures     Stereotyped movement disorder     Therapy     Urinary tract infection      Past Surgical History:   Procedure Laterality Date    BRONCHOSCOPY N/A 10/7/2019    Procedure: Bronchoscopy;  Surgeon: David Vela MD;  Location: [facility] ENDO;  Service: Pulmonary;  Laterality: N/A;    PLACEMENT OF JEJUNOSTOMY TUBE N/A 10/25/2019    Procedure: INSERTION, JEJUNOSTOMY TUBE ( OPEN);  Surgeon: Tony Merritt III, MD;  Location: [facility] OR;  Service: General;  Laterality: N/A;     Family History     Problem Relation (Age of Onset)    No Known Problems Mother, Father        Tobacco Use    Smoking status: Never Smoker    Smokeless tobacco: Never Used   Substance and Sexual Activity    Alcohol use: No    Drug use: No    Sexual activity: Never     Birth control/protection: Abstinence     Review of patient's allergies indicates:   Allergen Reactions    Clindamycin Hives    Erythromycin Rash       No current facility-administered medications on file prior to encounter.      Current Outpatient Medications on File Prior to Encounter   Medication Sig    [DISCONTINUED] clonazePAM (KLONOPIN) 1 MG tablet Take 1 mg by mouth 2 (two) times daily.    [DISCONTINUED] divalproex (DEPAKOTE) 500 MG TbEC Take 1 tablet (500 mg total) by mouth every 12 (twelve) hours.    [DISCONTINUED] fluoxetine (PROZAC) 20 MG capsule Take 60 mg by mouth once daily.    [DISCONTINUED] lactulose (CHRONULAC) 10 gram/15 mL solution Take 10 g by mouth once daily.    [DISCONTINUED] nutritional supplement-caloric (BENECALORIE) 7.5 kcal/mL Liqd Take by mouth. Add to food or drink 3 times daily for calorie supplement at 7am and 1600 and 1900    [DISCONTINUED] tamsulosin (FLOMAX) 0.4 mg Cap TAKE 1 CAPSULE BY MOUTH ONCE DAILY    [DISCONTINUED] trazodone (DESYREL) 100 MG tablet Take 100 mg by mouth every evening.    [DISCONTINUED] ALPRAZolam (XANAX) 1 MG tablet Take 1 mg by mouth 2 (two) times daily.    [DISCONTINUED]  "hydrocodone-acetaminophen 5-325mg (NORCO) 5-325 mg per tablet Take 1 tablet by mouth every 6 (six) hours as needed for Pain.    [DISCONTINUED] LACTOSE-REDUCED FOOD (ENSURE ORAL) Take by mouth 4 (four) times daily.    [DISCONTINUED] mineral oil-hydrophil petrolat (AQUAPHOR) Oint Apply topically as needed. Apply to dry skin once daily after bath    [DISCONTINUED] phenazopyridine (AZO-DINE) 97.5 mg Tab Take by mouth.    [DISCONTINUED] salicylic acid 2 % Liqd Apply topically to face every morning for acne     Psychotherapeutics (From admission, onward)    Start     Stop Route Frequency Ordered    10/30/19 2100  ramelteon tablet 8 mg      -- Oral Nightly PRN 10/30/19 1108    10/27/19 1215  lorazepam injection 1 mg      -- IV 2 times daily 10/27/19 1112    10/26/19 0900  FLUoxetine 20 mg/5 mL (4 mg/mL) solution 60 mg      -- PER J TUBE Daily 10/25/19 1543        Review of Systems   Unable to perform ROS: Patient nonverbal     Strengths and Liabilities: Strength: Patient has positive support network., Liability: Patient is dependent., Liability: Patient has intellectual deficits.    Objective:     Vital Signs (Most Recent):  Temp: 98.2 °F (36.8 °C) (11/01/19 1159)  Pulse: 110 (11/01/19 1159)  Resp: 18 (11/01/19 1159)  BP: 133/84 (11/01/19 1159)  SpO2: 96 % (11/01/19 1159) Vital Signs (24h Range):  Temp:  [96.1 °F (35.6 °C)-98.8 °F (37.1 °C)] 98.2 °F (36.8 °C)  Pulse:  [] 110  Resp:  [14-19] 18  SpO2:  [92 %-100 %] 96 %  BP: (127-137)/(82-94) 133/84     Height: 5' 1" (154.9 cm)  Weight: 39 kg (85 lb 15.7 oz)  Body mass index is 16.25 kg/m².      Intake/Output Summary (Last 24 hours) at 11/1/2019 1507  Last data filed at 11/1/2019 0900  Gross per 24 hour   Intake 540 ml   Output 0 ml   Net 540 ml       Physical Exam   Psychiatric:   EXAMINATION    CONSTITUTIONAL  General Appearance: hospital attire, simple, contactured into a ball    MUSCULOSKELETAL  Muscle Strength and Tone: weak  Abnormal Involuntary Movements: " none noted  Gait and Station: none; wheelchair or bed only    PSYCHIATRIC MENTAL STATUS EXAM   Level of Consciousness: awake, aimlessly looking around  Orientation: none  Grooming: limited  Psychomotor Behavior: slowed yet restless at times  Speech: none  Language: none  Mood: not able to obtain  Affect: odd and simple  Thought Process: none  Associations: none  Thought Content: none  Memory: not able to obtain  Attention: distracted  Fund of Knowledge: none  Insight: poor into current state  Judgment: poor due to mental capacity          Significant Labs:   Last 24 Hours:   Recent Lab Results     None        All pertinent labs within the past 24 hours have been reviewed.    Significant Imaging: I have reviewed all pertinent imaging results/findings within the past 24 hours.

## 2019-11-01 NOTE — PLAN OF CARE
DL contacted nurse Ashley and informed is ready for discharge from case management standpoint. DL also contacted patient's aunt, Chloe and informed that transportation will be arriving at 8:00 pm to pick patient up for transport to [place]. DL also notified Chloe that nurse Lynne suggested she leave before patient to sign consents.      11/01/19 0485   Final Note   Assessment Type Final Discharge Note   Anticipated Discharge Disposition   (Group Home)   Hospital Follow Up  Appt(s) scheduled? No   Discharge plans and expectations educations in teach back method with documentation complete? No     ADT 30 order placed for  Transportation.  Requested  time: 8:00 pm (Wheelchair)  If transportation does not arrive at ETA time nurse will be instructed to follow protocol for transportation below:   How can I get in touch directly with dispatch, if needed?                 Non-emergent dispatch: [phone]                                   Escalation Needs ([facility] Lead): [phone]

## 2019-11-01 NOTE — PLAN OF CARE
DL sent plan of care to Lynne nurse at [facility] who stated that she will send the plan of care to the doctor.      11/01/19 1610   Post-Acute Status   Post-Acute Authorization Placement  (skilled nursing)   Post-Acute Placement Status Patient Evaluation by Facility

## 2019-11-01 NOTE — PLAN OF CARE
DL met with patient's aunt, Chloe to discuss progress in discharge planning. Chloe informed DL that she received a call from Lynne at [facility] stating that patient is still missing documents needed for admission. Chloe called Emy nurse at [facility] on speaker phone to inquire about missing documents. Emy stated that she sent all documents to [facility] and is in the process of looking for patient's chart that was on her desk but is no longer there. DL informed Chloe that prescriptions and psychiatric evaluation are the last documents that are needed from case management.        11/01/19 1506   Post-Acute Status   Post-Acute Authorization Placement  ([facility])   Post-Acute Placement Status Patient Evaluation by Facility

## 2019-11-01 NOTE — PROGRESS NOTES
"[facility]  Adult Nutrition  Progress Note    SUMMARY       Recommendations     1. Advance TF to 75 ml/hr x 14 hrs.    -Continue H20 flushes of 130 ml q 4 hrs.   2. Monitor weight weekly;   -current TF should promote wt gain.   -Facility RD can adjust goal rate if weight gain of 1#/week not present.     Goals: Meet > 85% EEN daily  Nutrition Goal Status: progressing towards goal  Communication of RD Recs: reviewed with physician    Reason for Assessment    Reason For Assessment: RD follow-up  Diagnosis: pulmonary disease, infection/sepsis  Relevant Medical History: CP, mental retardation  Interdisciplinary Rounds: did not attend    General Information Comments: TF advancing to goal rate, discussed with MD to advance goal rate to 75 ml/hr x 14 hrs as current goal rate of 65 ml/hr inadequate to meet estimated needs. Pt tolerating feeds at this time. NFPE 11/1: Pt with fat moderate fat loss, LBM losses consistent with disease state, wt loss, prolonged lack of nutrition during adm; pt qualifies for acute malnutrition    Nutrition Discharge Planning: TF to meet 100% of needs    Nutrition Risk Screen    Nutrition Risk Screen: tube feeding or parenteral nutrition    Nutrition/Diet History    Patient Reported Diet/Restrictions/Preferences: pureed(reported by )  Spiritual, Cultural Beliefs, Congregational Practices, Values that Affect Care: no  Supplemental Drinks or Food Habits: Ensure Plus(Benecalorie)  Factors Affecting Nutritional Intake: impaired cognitive status/motor control, chewing difficulties/inability to chew food, NPO    Anthropometrics    Temp: 98.2 °F (36.8 °C)  Height Method: Estimated  Height: 5' 1" (154.9 cm)  Height (inches): 61 in  Weight Method: Bed Scale  Weight: 39 kg (85 lb 15.7 oz)  Weight (lb): 85.98 lb  Ideal Body Weight (IBW), Male: 112 lb  % Ideal Body Weight, Male (lb): 76.77 lb  BMI (Calculated): 16.3  BMI Grade: 16 - 16.9 protein-energy malnutrition grade " II       Lab/Procedures/Meds    Pertinent Labs Reviewed: reviewed  Pertinent Labs Comments: -  Pertinent Medications Reviewed: reviewed  Pertinent Medications Comments: levo, valproic acid    Estimated/Assessed Needs    Weight Used For Calorie Calculations: 39 kg (85 lb 15.7 oz)  Energy Calorie Requirements (kcal): 1548 kcal/day  Energy Need Method: Cabell-St Jeor(PAL 1.25)  Protein Requirements: 47-59 g/day(1.2-1.5 g/kg)  Weight Used For Protein Calculations: 39 kg (85 lb 15.7 oz)     Estimated Fluid Requirement Method: RDA Method  RDA Method (mL): 1548    Nutrition Prescription Ordered    Current Diet Order: NPO(for PEG placement that did not occur)  Current Nutrition Support Formula Ordered: Isosource 1.5  Current Nutrition Support Rate Ordered: 65 (ml)  Current Nutrition Support Frequency Ordered: ml/hr x 14 hrs  Oral Nutrition Supplement: -    Evaluation of Received Nutrient/Fluid Intake    Enteral Calories (kcal): 1365  Enteral Protein (gm): 62  Enteral (Free Water) Fluid (mL): 691  Free Water Flush Fluid (mL): 780    I/O: reviewed  Energy Calories Required:  meeting needs  Protein Required:  meeting needs  Fluid Required: meeting needs  Comments: LBM: 10/31  Tolerance: tolerating    % Meal Intake: NPO    Nutrition Risk    Level of Risk/Frequency of Follow-up: (1 x week)     Assessment and Plan    Severe malnutrition  Malnutrition in the context of Acute on Chronic Issue    Related to (etiology):  Dysphagia    Signs and Symptoms (as evidenced by):  Energy Intake: <50% of estimated energy requirement for >10 days    Muscle Mass Depletion: moderate and severe depletion of temples, clavicle region, scapular region, interosseous muscle and lower extremities ( r/t CP)   Weight Loss: 10% x 1 month      Interventions  Collaboration with providers    Nutrition Diagnosis Status:  New             Monitor and Evaluation    Food and Nutrient Intake: enteral nutrition intake  Food and Nutrient Adminstration: enteral and  parenteral nutrition administration  Anthropometric Measurements: weight change, weight, body mass index  Biochemical Data, Medical Tests and Procedures: glucose/endocrine profile, electrolyte and renal panel, lipid profile, inflammatory profile, gastrointestinal profile  Nutrition-Focused Physical Findings: overall appearance, extremities, muscles and bones, head and eyes     Malnutrition Assessment             Energy Intake (Malnutrition): less than or equal to 50% for greater than or equal to 5 days       [place] Region (Muscle Loss): mild depletion  Clavicle Bone Region (Muscle Loss): moderate depletion  Patellar Region (Muscle Loss): severe depletion  Anterior Thigh Region (Muscle Loss): severe depletion  Posterior Calf Region (Muscle Loss): severe depletion            Severe Weight Loss (Malnutrition): (10% x 1 month)    Nutrition Follow-Up    RD Follow-up?: Yes

## 2019-11-01 NOTE — NURSING
Scheduled meds given w/out difficulty thru J tube. Tube feedings stopped @ 40ml/hr and water flush 130ml, per order. Safety measures maintained. Bed alarm set. Tele sitter at bedside. Will cont to monitor

## 2019-11-01 NOTE — PLAN OF CARE
DL met with patient's aunt Chloe at bedside. DL informed that 90L has been completed by TN and that aunt has  to complete top portion. DL informed aunt that she will email it to the facility. Chloe stated that she is still concerned about patient's discharge and not being at goal for tube feeding. Chloe stated that patient's goal is 65 cc but he's only at 40 cc. Chloe stated that she is would like to talk to the doctor about keeping patient for one more day to make sure that he can tolerate tube feeding at goal. DL informed that Dr. Hill will be notified that she would like to talk to him.        11/01/19 1021   Post-Acute Status   Post-Acute Authorization Other  (Discharge Planning)   Other Status See Comments   Discharge Delays None known at this time

## 2019-11-01 NOTE — PROGRESS NOTES
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  Patient tolerated tube feeds at 40 cc an hour yesterday.    Diet - Diet NPO     I/O last 3 completed shifts:  In: 1638 [I.V.:786; NG/GT:852]  Out: 0     Intake/Output Summary (Last 24 hours) at 11/1/2019 0651  Last data filed at 10/31/2019 1800  Gross per 24 hour   Intake 640 ml   Output 0 ml   Net 640 ml       Temp:  [96.1 °F (35.6 °C)-98.6 °F (37 °C)] 98.6 °F (37 °C)  Pulse:  [] 101  Resp:  [14-22] 14  SpO2:  [89 %-100 %] 93 %  BP: (130-150)/() 132/85    Gen:  No apparent distress  Abdomen soft, mild distention nontender.    Recent Labs   Lab 10/27/19  1008 10/28/19  0523 10/29/19  0524   WBC 15.90*  --  7.07   HGB 11.9*  --  8.8*   HCT 37.9*  --  28.9*     --  146*    136  --    K 4.6 4.4  --     103  --    BUN 13 16  --    * 97  --    PROT 8.4  --   --    ALBUMIN 3.7  --   --    BILITOT 0.4  --   --    AST 20  --   --    ALKPHOS 89  --   --    ALT 14  --   --          Assessment:  Status post J-tube doing well    Plan:  Continue to advance tube feeds to goal  Okay with discharge from my standpoint once tolerating tube feeds at goal    Zoltan Chris MD

## 2019-11-01 NOTE — ASSESSMENT & PLAN NOTE
Secondary to aspiration pneumonia and rhino virus  Completed antibiotic treatment prior to stepping down to floor however patient became septic again after eating pureed diet.   MBBS did show evidence of aspiration therefore placed NPO.  Abx restarted 10/16. NGT placed to bypass all oral intake pending PEG tube placement which will be done once respiratory status improves.   Although more interactive, he was on more O2 requirements , more tachycardic and with complete collapse of left lung.  Treated with highflow supplemental O2, nebulized treatments, nebulized hypertonic saline every 12 hours, bowel regimen, BiPAP QHS, chest physiotherapy and keep HOB elevated  Famotidine IV. Scopolamine patch in place.   Continue treatment with cefazolin and levofloxacin, last dose on 10/22 and 10/23 respectively  Transferred to ICU for closer monitoring on 10/18  Respiratory status improved with aggressive pulmonary toilet and chest x-ray with improved  Antibiotics dc'd and patient had jtube placed, developed ileus.   Afebrile with normal WBC.  Had BM.  Restarting trickle feedings.  Increasing rate.  Advance feeds to goal.  Back to group home tomorrow if no issues overnight.

## 2019-11-01 NOTE — PLAN OF CARE
[facility] [address] [place] [address]  [phone]      Facility Transfer Orders                        11/01/2019    Admit to: Group Home    Diagnoses:  Active Hospital Problems    Diagnosis  POA    *Acute respiratory failure with hypoxia [J96.01]  Yes     Priority: 1 - High    Paralytic ileus [K56.0]  No    Non-recurrent acute suppurative otitis media of right ear without spontaneous rupture of tympanic membrane [H66.001]  Clinically Undetermined    Constipation [K59.00]  Yes    Other dysphagia [R13.19]  Yes    Severe malnutrition [E43]  Yes    Hypokalemia [E87.6]  No    Epilepsy [G40.909]  Yes     Chronic    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]  Yes     Chronic      Resolved Hospital Problems    Diagnosis Date Resolved POA    Pneumonia of left lung due to infectious organism [J18.9] 10/29/2019 Yes     Priority: 1 - High    Severe sepsis [A41.9, R65.20] 10/11/2019 Yes    On mechanically assisted ventilation [Z99.11] 10/15/2019 Not Applicable    Hypernatremia [E87.0] 10/29/2019 No       Allergies:  Review of patient's allergies indicates:   Allergen Reactions    Clindamycin Hives    Erythromycin Rash         Diet: NPO       Tube Feeding:  Isosource 1.5    - Continuous at 75 mL per hour for 14 hours during the day and hold for 10 hours during night    - Flush tube with 130 mL water every 8 hours    Nursing Precautions:          - Fall precautions   - Seizure precaution   - Decubitus precautions:        -  for positioning   - Pressure reducing foam mattress   - Turn patient every two hours. Use wedge pillows to anchor patient            Medications: Discontinue all previous medication orders, if any. See new list below.      Karri Galeano   Home Medication Instructions RAY:[id number]    Printed on:11/01/19 4162   Medication Information                      acetaminophen (TYLENOL) 500 MG tablet  1 tablet (500 mg total) by Per J Tube route every  6 (six) hours as needed for Pain or Temperature greater than (100).             albuterol sulfate 2.5 mg/0.5 mL Nebu  Take 2.5 mg by nebulization every 6 (six) hours as needed (shortness of breath). Rescue             clonazePAM (KLONOPIN) 1 MG tablet  1 tablet (1 mg total) by Per J Tube route 2 (two) times daily.             divalproex (DEPAKOTE SPRINKLE) 125 mg capsule  4 capsules (500 mg total) by Per J Tube route 2 (two) times daily.             docusate (COLACE) 50 mg/5 mL liquid  10 mLs (100 mg total) by Per J Tube route 2 (two) times daily.             FLUoxetine 20 MG capsule  3 capsules (60 mg total) by Per J Tube route once daily.             miconazole NITRATE 2 % (MICOTIN) 2 % top powder  Apply topically 2 (two) times daily. Apply to perianal area bid             polyethylene glycol (GLYCOLAX) 17 gram PwPk  17 g by Per J Tube route once daily.             scopolamine (TRANSDERM-SCOP) 1.3-1.5 mg (1 mg over 3 days)  Place 1 patch onto the skin Every 3 (three) days.                       _________________________________  Bryan Hill MD  11/01/2019

## 2019-11-01 NOTE — PROGRESS NOTES
TN spoke with Lynne at [facility].  Per Lynne they are unable to get patient's medications this evening.  Meds will be sent over night.  She request that night meds be given prior to leaving the hospital.  TN will discuss with MD. Batista is to call back with time meds are expected.     1807:  TN spoke with Lynne who stated that medication will be at the facility this evening except Klonopin.  Patient will receive his Klonopin prior to transfer.  TN spoke with OCBárbara who will report to on-coming OC to ensure dose given.  TN also spoke with nurseAshley who will report to on-coming nurse and request Klonopin be given prior to discharge.   scheduled transport given time for medication to be administered.

## 2019-11-01 NOTE — ASSESSMENT & PLAN NOTE
Secondary to aspiration pneumonia and rhino virus  Completed antibiotic treatment prior to stepping down to floor however patient became septic again after eating pureed diet.   MBBS did show evidence of aspiration therefore placed NPO.  Abx restarted 10/16. NGT placed to bypass all oral intake pending PEG tube placement which will be done once respiratory status improves.   Although more interactive, he was on more O2 requirements , more tachycardic and with complete collapse of left lung.  Treated with highflow supplemental O2, nebulized treatments, nebulized hypertonic saline every 12 hours, bowel regimen, BiPAP QHS, chest physiotherapy and keep HOB elevated  Famotidine IV. Scopolamine patch in place.   Continue treatment with cefazolin and levofloxacin, last dose on 10/22 and 10/23 respectively  Transferred to ICU for closer monitoring on 10/18  Respiratory status improved with aggressive pulmonary toilet and chest x-ray with improved  Antibiotics dc'd and patient had jtube placed, developed ileus.   Afebrile with normal WBC.  Had BM.  Restarting trickle feedings.  Increasing rate.  Tube feeds at goal and patient tolerating.  Patient ready for discharge to group home.  Now group home stating they need a psych eval prior to admission.

## 2019-11-01 NOTE — PROGRESS NOTES
[facility] Hospital Medicine  Progress Note    Patient Name: Karri Galeano  MRN: [id number]  Patient Class: IP- Inpatient   Admission Date: 10/3/2019  Length of Stay: 28 days  Attending Physician: Bryan Hill MD  Primary Care Provider: Echo Reeves MD        Subjective:     Principal Problem:Acute respiratory failure with hypoxia        HPI:  Mr. Karri Galeano is a 24 y.o. male with cerebral palsy and epilepsy who presents to [facility] ED from an urgent care facility with complaints of fevers today.  He lives in a group home and started to have a nonproductive cough today.  The caregiver did not observe any vomiting.  He was in his usual state of health yesterday but today has become more lethargic and somnolent.  He was taken to an urgent care facility where he was then transferred to this facility for further care.  He was noted there to have a fever of 102.2° F as well as an oxygen saturation of 95% on nasal cannula at 3 liters/minute.  Further history is otherwise limited at this time.    Overview/Hospital Course:  Mr. Galeano presented with fever, cough and lethargy.  Respiratory status decompensated and he required intubation in the ER.  Workup with imaging consistent with possible pneumonia.  Empiric Zosyn and vancomycin initiated.  Pulmonary consulted for vent management.  Extubated on 10/5 in the morning.  Initially did well, but had worsening respiratory failure during the day.  Started on Bipap with no improvement and reintubated on 10/5 in the afternoon.  Blood cultures no growth to date.  Respiratory viral panel came back positive for rhino virus.  Antibiotics stopped.  Extubated on 10/9 to BiPAP.  Respiratory status stable.  Increased cough with feeding patient and he remained in the ICU.  Speech therapy recommended pureed diet.  He was stepped down to the floor on 10/11.  Patient with poor intake along with increased risk for aspiration. Per aunt, patient was fed full  meals when pureed diet was recommended by Speech. A MBBS was done. Showed evidence of aspiration therefore NPO status and PEG tube placement recommended. Aunt (next of kin) agreed with PEG placement once aspiration confirmed. Patient had unfortunately aspirated during PO intake and clinically worsened. Progressively requiring more O2 via high flow NC. Developed leukocytosis, became less interactive and spiked fever. NGT placed to avoid all oral intake and initiated on IV abx. Tube feeds started via NGT. Pulmonology re-consulted for co-management. Recommended BiPAP (or CPAP) QHS for positive pressure (as he can't participate with IS), nebulized treatments every 4 hours, nebulized hypertonic saline every 12 hours, chest physiotherapy, bowel regimen for fecal impaction/constipation and keep HOB elevated. Patient has scopolamine patch for oral secretions and IV famotidine for stress ulcer prophylaxis. Repeat CXR on 10/17 showed complete collapse of left lung likely due to atelectasis vs mucous plug. Patient remained stable on high flow but given high risk for further decompensation +/- bronchoscopy with intubation, he was upgraded to ICU for closer monitoring. Discussed with aunt at bedside. Patient had aggressive pulmonary toilet with improvement in aeration seen on chest x-ray and respiratory status improved.  Trickle feeds resumed and GI planned for PEG on 10/21- not successful and surgery consulted for J tube placement.  The patient was transferred out of the ICU on 10/22.    Met with patient aunt and she request another  ST eval/swallow eval.  PLan for j tube placement. Keep NPO   Aunt concerned about patient pulling at ear and possible infection.     Treating with augmentin for otitis media and j tube placed 10/25  Drop in O2 sats, lower pain med dose, CXR - tolerating tube feeds thus far    10//27- SBO on imaging. Tube feeds held.   10/28- UOP picked up after BM - repeat imaging in am and possible trickle feeds  resumed   Started on trickle feeding on 10/29.  Tube feeds increased and now up to goal.  Patient ready for discharge to group home.    Interval History: No issues.    Review of Systems   Unable to perform ROS: Patient nonverbal     Objective:     Vital Signs (Most Recent):  Temp: 98.2 °F (36.8 °C) (11/01/19 1159)  Pulse: 110 (11/01/19 1159)  Resp: 18 (11/01/19 1159)  BP: 133/84 (11/01/19 1159)  SpO2: 96 % (11/01/19 1159) Vital Signs (24h Range):  Temp:  [96.1 °F (35.6 °C)-98.8 °F (37.1 °C)] 98.2 °F (36.8 °C)  Pulse:  [] 110  Resp:  [14-19] 18  SpO2:  [92 %-100 %] 96 %  BP: (127-137)/(82-94) 133/84     Weight: 39 kg (85 lb 15.7 oz)  Body mass index is 16.25 kg/m².    Intake/Output Summary (Last 24 hours) at 11/1/2019 1441  Last data filed at 11/1/2019 0900  Gross per 24 hour   Intake 540 ml   Output 0 ml   Net 540 ml      Physical Exam   Constitutional: He appears well-developed. No distress.   Contracted    HENT:   Head: Atraumatic.   Eyes: Conjunctivae are normal.   Neck: Neck supple.   Cardiovascular: Normal rate and regular rhythm.   Pulmonary/Chest: No stridor. No respiratory distress. He has no wheezes. He has no rales.   Breathing comfortably on NC, air movement on both side of lungs, no increase in effort   Abdominal: Soft. Bowel sounds are normal. He exhibits no distension. There is no tenderness.   j tube in place with abdominal binder   Musculoskeletal:   Wasted extremities   Neurological: He is alert.   Making eye contact, does not follow commands   Skin: Skin is warm and dry. Capillary refill takes less than 2 seconds. He is not diaphoretic.   Nursing note and vitals reviewed.      Significant Labs:   BMP:   No results for input(s): GLU, NA, K, CL, CO2, BUN, CREATININE, CALCIUM, MG in the last 48 hours.  CBC:   No results for input(s): WBC, HGB, HCT, PLT in the last 48 hours.    Significant Imaging: I have reviewed all pertinent imaging results/findings within the past 24  hours.      Assessment/Plan:      * Acute respiratory failure with hypoxia  Secondary to aspiration pneumonia and rhino virus  Completed antibiotic treatment prior to stepping down to floor however patient became septic again after eating pureed diet.   MBBS did show evidence of aspiration therefore placed NPO.  Abx restarted 10/16. NGT placed to bypass all oral intake pending PEG tube placement which will be done once respiratory status improves.   Although more interactive, he was on more O2 requirements , more tachycardic and with complete collapse of left lung.  Treated with highflow supplemental O2, nebulized treatments, nebulized hypertonic saline every 12 hours, bowel regimen, BiPAP QHS, chest physiotherapy and keep HOB elevated  Famotidine IV. Scopolamine patch in place.   Continue treatment with cefazolin and levofloxacin, last dose on 10/22 and 10/23 respectively  Transferred to ICU for closer monitoring on 10/18  Respiratory status improved with aggressive pulmonary toilet and chest x-ray with improved  Antibiotics dc'd and patient had jtube placed, developed ileus.   Afebrile with normal WBC.  Had BM.  Restarting trickle feedings.  Increasing rate.  Tube feeds at goal and patient tolerating.  Patient ready for discharge to group home.  Now group home stating they need a psych eval prior to admission.     Paralytic ileus  S/P BM  Starting trickle feedings.    Non-recurrent acute suppurative otitis media of right ear without spontaneous rupture of tympanic membrane  Recent exposure to IV abx, prolong hospital stay and symptomatic   Right TM with erythema and slightly bulge - treat augmentin solution via tube     Constipation  On bowel regimen, oral agents held  Suppository given with + BM          Other dysphagia  As above      Severe malnutrition  PEG attempted by GI on 10/21 (today) without success  General surgery consulted for J-tube placement - placed 10/25  Trickle feedings.    Hypokalemia  Replace  as needed.  Will continue to monitor    Epilepsy  Continue his home regimen of divalproex.  Also on clonazepam    Spastic quadriplegic cerebral palsy  Family would like group home in [place] when ready for discharge  j tube placed          VTE Risk Mitigation (From admission, onward)         Ordered     enoxaparin injection 40 mg  Daily      10/30/19 1108     IP VTE HIGH RISK PATIENT  Once      10/04/19 0152                      Bryan Hill MD  Department of Hospital Medicine   [facility]

## 2019-11-01 NOTE — SUBJECTIVE & OBJECTIVE
Interval History: No issues.    Review of Systems   Unable to perform ROS: Patient nonverbal     Objective:     Vital Signs (Most Recent):  Temp: 98.2 °F (36.8 °C) (11/01/19 1159)  Pulse: 110 (11/01/19 1159)  Resp: 18 (11/01/19 1159)  BP: 133/84 (11/01/19 1159)  SpO2: 96 % (11/01/19 1159) Vital Signs (24h Range):  Temp:  [96.1 °F (35.6 °C)-98.8 °F (37.1 °C)] 98.2 °F (36.8 °C)  Pulse:  [] 110  Resp:  [14-19] 18  SpO2:  [92 %-100 %] 96 %  BP: (127-137)/(82-94) 133/84     Weight: 39 kg (85 lb 15.7 oz)  Body mass index is 16.25 kg/m².    Intake/Output Summary (Last 24 hours) at 11/1/2019 1441  Last data filed at 11/1/2019 0900  Gross per 24 hour   Intake 540 ml   Output 0 ml   Net 540 ml      Physical Exam   Constitutional: He appears well-developed. No distress.   Contracted    HENT:   Head: Atraumatic.   Eyes: Conjunctivae are normal.   Neck: Neck supple.   Cardiovascular: Normal rate and regular rhythm.   Pulmonary/Chest: No stridor. No respiratory distress. He has no wheezes. He has no rales.   Breathing comfortably on NC, air movement on both side of lungs, no increase in effort   Abdominal: Soft. Bowel sounds are normal. He exhibits no distension. There is no tenderness.   j tube in place with abdominal binder   Musculoskeletal:   Wasted extremities   Neurological: He is alert.   Making eye contact, does not follow commands   Skin: Skin is warm and dry. Capillary refill takes less than 2 seconds. He is not diaphoretic.   Nursing note and vitals reviewed.      Significant Labs:   BMP:   No results for input(s): GLU, NA, K, CL, CO2, BUN, CREATININE, CALCIUM, MG in the last 48 hours.  CBC:   No results for input(s): WBC, HGB, HCT, PLT in the last 48 hours.    Significant Imaging: I have reviewed all pertinent imaging results/findings within the past 24 hours.

## 2019-11-01 NOTE — PROGRESS NOTES
[facility] Hospital Medicine  Progress Note    Patient Name: Karri Galeano  MRN: [id number]  Patient Class: IP- Inpatient   Admission Date: 10/3/2019  Length of Stay: 27 days  Attending Physician: Bryan Hill MD  Primary Care Provider: Echo Reeves MD        Subjective:     Principal Problem:Acute respiratory failure with hypoxia        HPI:  Mr. Karri Galeano is a 24 y.o. male with cerebral palsy and epilepsy who presents to [facility] ED from an urgent care facility with complaints of fevers today.  He lives in a group home and started to have a nonproductive cough today.  The caregiver did not observe any vomiting.  He was in his usual state of health yesterday but today has become more lethargic and somnolent.  He was taken to an urgent care facility where he was then transferred to this facility for further care.  He was noted there to have a fever of 102.2° F as well as an oxygen saturation of 95% on nasal cannula at 3 liters/minute.  Further history is otherwise limited at this time.    Overview/Hospital Course:  Mr. Galeano presented with fever, cough and lethargy.  Respiratory status decompensated and he required intubation in the ER.  Workup with imaging consistent with possible pneumonia.  Empiric Zosyn and vancomycin initiated.  Pulmonary consulted for vent management.  Extubated on 10/5 in the morning.  Initially did well, but had worsening respiratory failure during the day.  Started on Bipap with no improvement and reintubated on 10/5 in the afternoon.  Blood cultures no growth to date.  Respiratory viral panel came back positive for rhino virus.  Antibiotics stopped.  Extubated on 10/9 to BiPAP.  Respiratory status stable.  Increased cough with feeding patient and he remained in the ICU.  Speech therapy recommended pureed diet.  He was stepped down to the floor on 10/11.  Patient with poor intake along with increased risk for aspiration. Per aunt, patient was fed full  meals when pureed diet was recommended by Speech. A MBBS was done. Showed evidence of aspiration therefore NPO status and PEG tube placement recommended. Aunt (next of kin) agreed with PEG placement once aspiration confirmed. Patient had unfortunately aspirated during PO intake and clinically worsened. Progressively requiring more O2 via high flow NC. Developed leukocytosis, became less interactive and spiked fever. NGT placed to avoid all oral intake and initiated on IV abx. Tube feeds started via NGT. Pulmonology re-consulted for co-management. Recommended BiPAP (or CPAP) QHS for positive pressure (as he can't participate with IS), nebulized treatments every 4 hours, nebulized hypertonic saline every 12 hours, chest physiotherapy, bowel regimen for fecal impaction/constipation and keep HOB elevated. Patient has scopolamine patch for oral secretions and IV famotidine for stress ulcer prophylaxis. Repeat CXR on 10/17 showed complete collapse of left lung likely due to atelectasis vs mucous plug. Patient remained stable on high flow but given high risk for further decompensation +/- bronchoscopy with intubation, he was upgraded to ICU for closer monitoring. Discussed with aunt at bedside. Patient had aggressive pulmonary toilet with improvement in aeration seen on chest x-ray and respiratory status improved.  Trickle feeds resumed and GI planned for PEG on 10/21- not successful and surgery consulted for J tube placement.  The patient was transferred out of the ICU on 10/22.    Met with patient aunt and she request another  ST eval/swallow eval.  PLan for j tube placement. Keep NPO   Aunt concerned about patient pulling at ear and possible infection.     Treating with augmentin for otitis media and j tube placed 10/25  Drop in O2 sats, lower pain med dose, CXR - tolerating tube feeds thus far    10//27- SBO on imaging. Tube feeds held.   10/28- UOP picked up after BM - repeat imaging in am and possible trickle feeds  resumed   Started on trickle feeding on 10/29.    Interval History: Tolerating tube feedings.    Review of Systems   Unable to perform ROS: Patient nonverbal     Objective:     Vital Signs (Most Recent):  Temp: 97.8 °F (36.6 °C) (10/31/19 1944)  Pulse: 101 (10/31/19 1951)  Resp: 18 (10/31/19 1951)  BP: 135/88 (10/31/19 1944)  SpO2: 96 % (10/31/19 1951) Vital Signs (24h Range):  Temp:  [96.1 °F (35.6 °C)-97.8 °F (36.6 °C)] 97.8 °F (36.6 °C)  Pulse:  [] 101  Resp:  [14-22] 18  SpO2:  [89 %-100 %] 96 %  BP: (130-150)/() 135/88     Weight: 39 kg (85 lb 15.7 oz)  Body mass index is 16.25 kg/m².    Intake/Output Summary (Last 24 hours) at 10/31/2019 2053  Last data filed at 10/31/2019 1800  Gross per 24 hour   Intake 1638 ml   Output 0 ml   Net 1638 ml      Physical Exam   Constitutional: He appears well-developed. No distress.   Contracted    HENT:   Head: Atraumatic.   Eyes: Conjunctivae are normal.   Neck: Neck supple.   Cardiovascular: Normal rate and regular rhythm.   Pulmonary/Chest: No stridor. No respiratory distress. He has no wheezes. He has no rales.   Breathing comfortably on NC, air movement on both side of lungs, no increase in effort   Abdominal: Soft. Bowel sounds are normal. He exhibits no distension. There is no tenderness.   j tube in place with abdominal binder   Musculoskeletal:   Wasted extremities   Neurological: He is alert.   Making eye contact, does not follow commands   Skin: Skin is warm and dry. Capillary refill takes less than 2 seconds. He is not diaphoretic.   Nursing note and vitals reviewed.      Significant Labs:   BMP:   No results for input(s): GLU, NA, K, CL, CO2, BUN, CREATININE, CALCIUM, MG in the last 48 hours.  CBC:   No results for input(s): WBC, HGB, HCT, PLT in the last 48 hours.    Significant Imaging: I have reviewed all pertinent imaging results/findings within the past 24 hours.      Assessment/Plan:      * Acute respiratory failure with hypoxia  Secondary to  aspiration pneumonia and rhino virus  Completed antibiotic treatment prior to stepping down to floor however patient became septic again after eating pureed diet.   MBBS did show evidence of aspiration therefore placed NPO.  Abx restarted 10/16. NGT placed to bypass all oral intake pending PEG tube placement which will be done once respiratory status improves.   Although more interactive, he was on more O2 requirements , more tachycardic and with complete collapse of left lung.  Treated with highflow supplemental O2, nebulized treatments, nebulized hypertonic saline every 12 hours, bowel regimen, BiPAP QHS, chest physiotherapy and keep HOB elevated  Famotidine IV. Scopolamine patch in place.   Continue treatment with cefazolin and levofloxacin, last dose on 10/22 and 10/23 respectively  Transferred to ICU for closer monitoring on 10/18  Respiratory status improved with aggressive pulmonary toilet and chest x-ray with improved  Antibiotics dc'd and patient had jtube placed, developed ileus.   Afebrile with normal WBC.  Had BM.  Restarting trickle feedings.  Increasing rate.  Advance feeds to goal.  Back to group home tomorrow if no issues overnight.    Paralytic ileus  S/P BM  Starting trickle feedings.    Non-recurrent acute suppurative otitis media of right ear without spontaneous rupture of tympanic membrane  Recent exposure to IV abx, prolong hospital stay and symptomatic   Right TM with erythema and slightly bulge - treat augmentin solution via tube     Constipation  On bowel regimen, oral agents held  Suppository given with + BM          Other dysphagia  As above      Severe malnutrition  PEG attempted by GI on 10/21 (today) without success  General surgery consulted for J-tube placement - placed 10/25  Trickle feedings.    Hypokalemia  Replace as needed.  Will continue to monitor    Epilepsy  Continue his home regimen of divalproex.  Also on clonazepam    Spastic quadriplegic cerebral palsy  Family would like  group home in [place] when ready for discharge  j tube placed          VTE Risk Mitigation (From admission, onward)         Ordered     enoxaparin injection 40 mg  Daily      10/30/19 1108     IP VTE HIGH RISK PATIENT  Once      10/04/19 0152                      Bryan Hill MD  Department of Hospital Medicine   [facility]

## 2019-11-01 NOTE — NURSING
Pt more alert than yesterday, TF @ desired goal of 65ml/hr, incision to abdomen c/d/i. TF flushed three twice. Report given to [place] at [place].

## 2019-11-01 NOTE — PLAN OF CARE
Recommendations      1. Advance TF to 75 ml/hr x 14 hrs.    -Continue H20 flushes of 130 ml q 4 hrs.   2. Monitor weight weekly;   -current TF should promote wt gain.   -Facility RD can adjust goal rate if weight gain of 1#/week not present.      Goals: Meet > 85% EEN daily  Nutrition Goal Status: progressing towards goal  Communication of RD Recs: reviewed with physician

## 2019-11-01 NOTE — CONSULTS
[facility]  Psychiatry  Consult Note    Patient Name: Karri Galeano  MRN: [id number]   Code Status: Full Code  Admission Date: 10/3/2019  Hospital Length of Stay: 28 days  Attending Physician: Bryan Hill MD  Primary Care Provider: Echo Reeves MD    Current Legal Status: N/A    Patient information was obtained from patient, chart review, nursing,  and ER records.   Inpatient consult to Psychiatry  Consult performed by: Jose Acosta MD  Consult ordered by: Bryan Hill MD        Subjective:     Principal Problem:Acute respiratory failure with hypoxia    Chief Complaint:  Altered mental status     HPI: Patient Karri Galeano has a history of cerebral palsy and seizures.   He presented to the hospital with fever and cough.  Has required a prolonged hospitalization which has included intubation, small bowel obstruction, and feedings.  Patient is non-verbal and minimally interactive physically.  He moves his hands somewhat and looks around aimlessly.  His aunt is at bedside and does all of his talking.  She states that he has been non-functional since birth.  He has been in group homes for years since mother has passed.  [place] of [place] but comes in frequently to check on him.  She is not aware of any agitation or outbursts.  She states that he can sometimes get a little fussy if left in his chair for too long alone.  Otherwise, he is calm and very simple.  He is not able to express himself much at all.  Staff reports calm cooperative behavior, not requiring PRNs.  He is noted to be prescribed fluoxetine 60mg daily (which is available in liquid form), clonazepam 1mg BID, and lorazepam 1mg BID.  I do not see the lorazepam as a prehospitalization medication.  He is also on valproic acid 500mg (Depacon IV) every 12 hours.  He is likely on the clonazepam and Depacon because of seizure history but aunt says that he hasn't had a seizure in years.  She does not   on and depression or anxiety.    Hospital Course: No notes on file         Patient History           Medical as of 11/1/2019     Past Medical History     Diagnosis Date Comments Source    Congenital cerebral palsy -- -- Provider    Hx of psychiatric care -- -- Provider    Hypotonia -- -- Provider    Leukodystrophy -- -- Provider    Mental retardation -- -- Provider    Psychiatric problem -- -- Provider    Retention of urine -- -- Provider    Seizures -- -- Provider    Stereotyped movement disorder -- -- Provider    Therapy -- -- Provider    Urinary tract infection -- -- Provider          Pertinent Negatives     Diagnosis Date Noted Comments Source    Elevated PSA 02/07/2018 -- Provider    History of psychiatric hospitalization 11/01/2019 -- Provider    Kidney stone 02/07/2018 -- Provider    STD (sexually transmitted disease) 02/07/2018 -- Provider    Suicide attempt 11/01/2019 -- Provider                  Surgical as of 11/1/2019     Past Surgical History     Procedure Laterality Date Comments Source    BRONCHOSCOPY N/A 10/7/2019 Procedure: Bronchoscopy;  Surgeon: David Vela MD;  Location: [facility] ENDO;  Service: Pulmonary;  Laterality: N/A; Provider    PLACEMENT OF JEJUNOSTOMY TUBE N/A 10/25/2019 Procedure: INSERTION, JEJUNOSTOMY TUBE ( OPEN);  Surgeon: Tony Merritt III, MD;  Location: [facility] OR;  Service: General;  Laterality: N/A; Provider                  Family as of 11/1/2019     Problem Relation Name Age of Onset Comments Source    No Known Problems Mother -- -- -- Provider    No Known Problems Father -- -- -- Provider    Prostate cancer Neg Hx -- -- -- Provider    Kidney disease Neg Hx -- -- -- Provider            Tobacco Use as of 11/1/2019     Smoking Status Smoking Start Date Smoking Quit Date Packs/Day Years Used    Never Smoker -- -- -- --    Types Comments Smokeless Tobacco Status Smokeless Tobacco Quit Date Source    -- -- Never Used -- Provider            Alcohol Use as of 11/1/2019      Alcohol Use Drinks/Week Alcohol/Week Comments Source    No -- -- -- Provider    Frequency Standard Drinks Binge Drinking        -- -- --              Drug Use as of 11/1/2019     Drug Use Types Frequency Comments Source    No -- -- -- Provider            Sexual Activity as of 11/1/2019     Sexually Active Birth Control Partners Comments Source    Never Abstinence -- -- Provider            Activities of Daily Living as of 11/1/2019     Activities of Daily Living Question Response Comments Source    Patient feels they ought to cut down on drinking/drug use Not Asked -- Provider    Patient annoyed by others criticizing their drinking/drug use Not Asked -- Provider    Patient has felt bad or guilty about drinking/drug use Not Asked -- Provider    Patient has had a drink/used drugs as an eye opener in the AM Not Asked -- Provider            Social Documentation as of 11/1/2019    None           Occupational as of 11/1/2019    None           Socioeconomic as of 11/1/2019     Marital Status Spouse Name Number of Children Years Education Education Level Preferred Language Ethnicity Race Source    Single -- 0 -- -- English /Black Black or  Provider    Financial Resource Strain Food Insecurity: Worry Food Insecurity: Inability Transportation Needs: Medical Transportation Needs: Non-medical    -- -- -- -- --            Pertinent History     Question Response Comments    Lives with -- --    Place in Birth Order -- --    Lives in group home --    Number of Siblings -- --    Raised by -- --    Legal Involvement -- --    Childhood Trauma -- --    Criminal History of -- --    Financial Status -- --    Highest Level of Education -- --    Does patient have access to a firearm? -- --     Service -- --    Primary Leisure Activity -- --    Spirituality -- --        Past Medical History:   Diagnosis Date    Congenital cerebral palsy     Hx of psychiatric care     Hypotonia     Leukodystrophy      Mental retardation     Psychiatric problem     Retention of urine     Seizures     Stereotyped movement disorder     Therapy     Urinary tract infection      Past Surgical History:   Procedure Laterality Date    BRONCHOSCOPY N/A 10/7/2019    Procedure: Bronchoscopy;  Surgeon: David Vela MD;  Location: [facility] ENDO;  Service: Pulmonary;  Laterality: N/A;    PLACEMENT OF JEJUNOSTOMY TUBE N/A 10/25/2019    Procedure: INSERTION, JEJUNOSTOMY TUBE ( OPEN);  Surgeon: Tony Merritt III, MD;  Location: [facility] OR;  Service: General;  Laterality: N/A;     Family History     Problem Relation (Age of Onset)    No Known Problems Mother, Father        Tobacco Use    Smoking status: Never Smoker    Smokeless tobacco: Never Used   Substance and Sexual Activity    Alcohol use: No    Drug use: No    Sexual activity: Never     Birth control/protection: Abstinence     Review of patient's allergies indicates:   Allergen Reactions    Clindamycin Hives    Erythromycin Rash       No current facility-administered medications on file prior to encounter.      Current Outpatient Medications on File Prior to Encounter   Medication Sig    [DISCONTINUED] clonazePAM (KLONOPIN) 1 MG tablet Take 1 mg by mouth 2 (two) times daily.    [DISCONTINUED] divalproex (DEPAKOTE) 500 MG TbEC Take 1 tablet (500 mg total) by mouth every 12 (twelve) hours.    [DISCONTINUED] fluoxetine (PROZAC) 20 MG capsule Take 60 mg by mouth once daily.    [DISCONTINUED] lactulose (CHRONULAC) 10 gram/15 mL solution Take 10 g by mouth once daily.    [DISCONTINUED] nutritional supplement-caloric (BENECALORIE) 7.5 kcal/mL Liqd Take by mouth. Add to food or drink 3 times daily for calorie supplement at 7am and 1600 and 1900    [DISCONTINUED] tamsulosin (FLOMAX) 0.4 mg Cap TAKE 1 CAPSULE BY MOUTH ONCE DAILY    [DISCONTINUED] trazodone (DESYREL) 100 MG tablet Take 100 mg by mouth every evening.    [DISCONTINUED] ALPRAZolam (XANAX) 1 MG tablet Take  "1 mg by mouth 2 (two) times daily.    [DISCONTINUED] hydrocodone-acetaminophen 5-325mg (NORCO) 5-325 mg per tablet Take 1 tablet by mouth every 6 (six) hours as needed for Pain.    [DISCONTINUED] LACTOSE-REDUCED FOOD (ENSURE ORAL) Take by mouth 4 (four) times daily.    [DISCONTINUED] mineral oil-hydrophil petrolat (AQUAPHOR) Oint Apply topically as needed. Apply to dry skin once daily after bath    [DISCONTINUED] phenazopyridine (AZO-DINE) 97.5 mg Tab Take by mouth.    [DISCONTINUED] salicylic acid 2 % Liqd Apply topically to face every morning for acne     Psychotherapeutics (From admission, onward)    Start     Stop Route Frequency Ordered    10/30/19 2100  ramelteon tablet 8 mg      -- Oral Nightly PRN 10/30/19 1108    10/27/19 1215  lorazepam injection 1 mg      -- IV 2 times daily 10/27/19 1112    10/26/19 0900  FLUoxetine 20 mg/5 mL (4 mg/mL) solution 60 mg      -- PER J TUBE Daily 10/25/19 1543        Review of Systems   Unable to perform ROS: Patient nonverbal     Strengths and Liabilities: Strength: Patient has positive support network., Liability: Patient is dependent., Liability: Patient has intellectual deficits.    Objective:     Vital Signs (Most Recent):  Temp: 98.2 °F (36.8 °C) (11/01/19 1159)  Pulse: 110 (11/01/19 1159)  Resp: 18 (11/01/19 1159)  BP: 133/84 (11/01/19 1159)  SpO2: 96 % (11/01/19 1159) Vital Signs (24h Range):  Temp:  [96.1 °F (35.6 °C)-98.8 °F (37.1 °C)] 98.2 °F (36.8 °C)  Pulse:  [] 110  Resp:  [14-19] 18  SpO2:  [92 %-100 %] 96 %  BP: (127-137)/(82-94) 133/84     Height: 5' 1" (154.9 cm)  Weight: 39 kg (85 lb 15.7 oz)  Body mass index is 16.25 kg/m².      Intake/Output Summary (Last 24 hours) at 11/1/2019 1507  Last data filed at 11/1/2019 0900  Gross per 24 hour   Intake 540 ml   Output 0 ml   Net 540 ml       Physical Exam   Psychiatric:   EXAMINATION    CONSTITUTIONAL  General Appearance: hospital attire, simple, contactured into a ball    MUSCULOSKELETAL  Muscle " Strength and Tone: weak  Abnormal Involuntary Movements: none noted  Gait and Station: none; wheelchair or bed only    PSYCHIATRIC MENTAL STATUS EXAM   Level of Consciousness: awake, aimlessly looking around  Orientation: none  Grooming: limited  Psychomotor Behavior: slowed yet restless at times  Speech: none  Language: none  Mood: not able to obtain  Affect: odd and simple  Thought Process: none  Associations: none  Thought Content: none  Memory: not able to obtain  Attention: distracted  Fund of Knowledge: none  Insight: poor into current state  Judgment: poor due to mental capacity          Significant Labs:   Last 24 Hours:   Recent Lab Results     None        All pertinent labs within the past 24 hours have been reviewed.    Significant Imaging: I have reviewed all pertinent imaging results/findings within the past 24 hours.    Assessment/Plan:     Mood disorder  Patient on Prozac 60mg daily which likely indicates an underlying mood disorder but there is no evidence at this time.  He is calm and not requiring redirection, at a stable baseline.  There is not psychiatric contraindication to group home placement at this time.  Continue fluoxetine 60mg liquid form at this time.  Would continue clonazepam 1mg BID (likely for seizures).  Change depakote to Depakene liquid 500mg BID to administer via J-tube.  Patient should have continued follow up with his outpatient psychiatrist, Dr. Melina Fernández.    Severe malnutrition  Simple non-functional baseline    Spastic quadriplegic cerebral palsy  As per primary service         Total Time:  45 minutes     Jose Acosta MD   Psychiatry  [facility]

## 2019-11-01 NOTE — PLAN OF CARE
DL met with patient's aunt Chloe at bedside and participated on a phone with nurse Batista at [place]. Lynne instructed DL to email the 90L and prescriptions for patient. Lynne also informed SW that patient will need a Psychiatric Evaluation prior to admit. Lynne stated that patient can travel by ambulance and it didn't matter the company. DL spoke with Dr. Hill and inquired if a psychiatric consult can be placed and explained that patient needs it for admission. DL also informed Dr. Hill that prescriptions are needed.      11/01/19 1101   Post-Acute Status   Post-Acute Authorization Placement  (Group Home)   Other Status See Comments

## 2019-11-01 NOTE — ASSESSMENT & PLAN NOTE
Patient on Prozac 60mg daily which likely indicates an underlying mood disorder but there is no evidence at this time.  He is calm and not requiring redirection, at a stable baseline.  There is not psychiatric contraindication to group home placement at this time.  Continue fluoxetine 60mg liquid form at this time.  Would continue clonazepam 1mg BID (likely for seizures).  Change depakote to Depakene liquid 500mg BID to administer via J-tube.  Patient should have continued follow up with his outpatient psychiatrist, Dr. Melina Fernández.

## 2019-11-02 NOTE — NURSING
Patient clonazepam pulled twice due to the fact that I dropped the first dose packaging under the cabinet in the medication.  The first dose was administer when with the Aunt in the room.  Returned the medication to pharmacy.  Patient was being discharged at this time also. Patient was discharged before I could return medication to MAR. Patient IV removed and patient discharged via wheelchair.

## 2019-11-02 NOTE — NURSING
Transport company [employer] said they could not transport the patient via his w/c because he did not have a chest strap and their seat belt would not prevent him from falling forward. Family member present offered to ride with the patient but the company declined. Transfer center notified and [employer] transport cancelled. Family (Chloe Galeano) member stated that the patient had to be at the group home tonight and she would take him and get his chair tomorrow. Patient max assisted x 2 with transfer into family member's car and seat belted in. WC labeled and placed in 4 east nurse's station for the night.

## 2019-11-02 NOTE — PLAN OF CARE
"  Problem: Fall Injury Risk  Goal: Absence of Fall and Fall-Related Injury  Outcome: Met     Problem: Fall Injury Risk  Goal: Absence of Fall and Fall-Related Injury  Outcome: Met     Problem: Fall Injury Risk  Goal: Absence of Fall and Fall-Related Injury  Outcome: Met     Problem: Skin Injury Risk Increased  Goal: Skin Health and Integrity  Outcome: Met     Problem: Adult Inpatient Plan of Care  Goal: Plan of Care Review  Description  Patient remains intubated today and to ventilator, Propofol drip continues for sedation,  Tube feeds started per OGT, tolerating without difficulty.  Saldivar cath remains patent.  Aunt updated on patient condition, she states "I'm on the way there"   Outcome: Adequate for Care Transition  Goal: Patient-Specific Goal (Individualization)  Outcome: Adequate for Care Transition  Goal: Absence of Hospital-Acquired Illness or Injury  Outcome: Adequate for Care Transition  Goal: Optimal Comfort and Wellbeing  Outcome: Adequate for Care Transition  Goal: Readiness for Transition of Care  Outcome: Adequate for Care Transition  Goal: Rounds/Family Conference  Outcome: Adequate for Care Transition     "

## 2019-11-03 NOTE — DISCHARGE SUMMARY
[facility] Hospital Medicine  Discharge Summary      Patient Name: Karri Galeano  MRN: [id number]  Admission Date: 10/3/2019  Hospital Length of Stay: 28 days  Discharge Date and Time: 11/1/2019 10:15 PM  Attending Physician: No att. providers found   Discharging Provider: Bryan Hill MD  Primary Care Provider: Echo Reeves MD      HPI:   Mr. Karri Galeano is a 24 y.o. male with cerebral palsy and epilepsy who presents to [facility] ED from an urgent care facility with complaints of fevers today.  He lives in a group home and started to have a nonproductive cough today.  The caregiver did not observe any vomiting.  He was in his usual state of health yesterday but today has become more lethargic and somnolent.  He was taken to an urgent care facility where he was then transferred to this facility for further care.  He was noted there to have a fever of 102.2° F as well as an oxygen saturation of 95% on nasal cannula at 3 liters/minute.  Further history is otherwise limited at this time.    Procedure(s) (LRB):  INSERTION, JEJUNOSTOMY TUBE ( OPEN) (N/A)      Hospital Course:   Mr. Galeano presented with fever, cough and lethargy.  Respiratory status decompensated and he required intubation in the ER.  Workup with imaging consistent with possible pneumonia.  Empiric Zosyn and vancomycin initiated.  Pulmonary consulted for vent management.  Extubated on 10/5 in the morning.  Initially did well, but had worsening respiratory failure during the day.  Started on Bipap with no improvement and reintubated on 10/5 in the afternoon.  Blood cultures no growth to date.  Respiratory viral panel came back positive for rhino virus.  Antibiotics stopped.  Extubated on 10/9 to BiPAP.  Respiratory status stable.  Increased cough with feeding patient and he remained in the ICU.  Speech therapy recommended pureed diet.  He was stepped down to the floor on 10/11.  Patient with poor intake along with  increased risk for aspiration. Per aunt, patient was fed full meals when pureed diet was recommended by Speech. A MBBS was done. Showed evidence of aspiration therefore NPO status and PEG tube placement recommended. Aunt (next of kin) agreed with PEG placement once aspiration confirmed. Patient had unfortunately aspirated during PO intake and clinically worsened. Progressively requiring more O2 via high flow NC. Developed leukocytosis, became less interactive and spiked fever. NGT placed to avoid all oral intake and initiated on IV abx. Tube feeds started via NGT. Pulmonology re-consulted for co-management. Recommended BiPAP (or CPAP) QHS for positive pressure (as he can't participate with IS), nebulized treatments every 4 hours, nebulized hypertonic saline every 12 hours, chest physiotherapy, bowel regimen for fecal impaction/constipation and keep HOB elevated. Patient has scopolamine patch for oral secretions and IV famotidine for stress ulcer prophylaxis. Repeat CXR on 10/17 showed complete collapse of left lung likely due to atelectasis vs mucous plug. Patient remained stable on high flow but given high risk for further decompensation +/- bronchoscopy with intubation, he was upgraded to ICU for closer monitoring. Discussed with aunt at bedside. Patient had aggressive pulmonary toilet with improvement in aeration seen on chest x-ray and respiratory status improved.  Trickle feeds resumed and GI planned for PEG on 10/21- not successful and surgery consulted for J tube placement.  The patient was transferred out of the ICU on 10/22.  Patient's aunt concerned about patient pulling at his ear.  Some swelling to ear with possible OM.  Started on Augmentin.  J tube placed on 10/25.  Patient was started on trickle feedings.  He was not having bowel movements and abdomen looked slightly distended.  Xray with possible SBO.  Tube feeds held.  Surgery continued to monitor patient.  Patient then had a bowel movement and tube  feeds restarted.  They were slowly increased to goal.  Patient has remained afebrile and hemodynamically stable.  Currently tolerating tube feeds to goal.  Patient is now ready for discharge.  He is going to a different group home.  Everything has been arranged and he will be discharged.     Consults:   Consults (From admission, onward)        Status Ordering Provider     Inpatient consult to Gastroenterology  Once     Provider:  Rafael Harrell MD    Completed VERNON ALBERTO     Inpatient consult to Palliative Care  Once     Provider:  Chanelle Burnette RN    Completed JOHANA GENTILE     Inpatient consult to Psychiatry  Once     Provider:  Jose Acosta MD    Completed EVA VEGAS     Inpatient consult to Pulmonology  Once     Provider:  Wong Duval MD    Completed EVA VEGAS     Inpatient consult to Pulmonology  Once     Provider:  London Hernadez MD    Completed RUCHI AMARAL     Inpatient consult to Registered Dietitian/Nutritionist  Once     Provider:  (Not yet assigned)    Completed YARA TREJO     Inpatient consult to Social Work  Once     Provider:  (Not yet assigned)    Completed ASHWINI KONG     Inpatient consult to Spiritual Care  Once     Provider:  (Not yet assigned)    Completed JOHANA GENTILE     IP consult to case management  Once     Provider:  (Not yet assigned)    Completed EVA VEGAS     IP consult to dietary  Once     Provider:  (Not yet assigned)    Completed YARA TREJO     Pharmacy to dose Vancomycin consult  Once     Provider:  (Not yet assigned)    Completed EVA VEGAS     Pharmacy to dose Vancomycin consult  Once     Provider:  (Not yet assigned)    Completed VERNON ALBERTO          No new Assessment & Plan notes have been filed under this hospital service since the last note was generated.  Service: Hospital Medicine    Final Active Diagnoses:    Diagnosis Date Noted POA    PRINCIPAL PROBLEM:  Acute respiratory  failure with hypoxia [J96.01] 10/11/2019 Yes    Mood disorder [F39] 11/01/2019 Yes    Paralytic ileus [K56.0] 10/28/2019 No    Non-recurrent acute suppurative otitis media of right ear without spontaneous rupture of tympanic membrane [H66.001] 10/25/2019 Clinically Undetermined    Constipation [K59.00] 10/20/2019 Yes    Other dysphagia [R13.19] 10/15/2019 Yes    Severe malnutrition [E43] 10/12/2019 Yes    Hypokalemia [E87.6] 10/05/2019 No    Epilepsy [G40.909] 10/04/2019 Yes     Chronic    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] 06/05/2017 Yes     Chronic    Severe mental retardation [F72] 03/02/2015 Yes     Chronic      Problems Resolved During this Admission:    Diagnosis Date Noted Date Resolved POA    Pneumonia of left lung due to infectious organism [J18.9] 10/04/2019 10/29/2019 Yes    Severe sepsis [A41.9, R65.20] 10/04/2019 10/11/2019 Yes    On mechanically assisted ventilation [Z99.11] 10/04/2019 10/15/2019 Not Applicable    Hypernatremia [E87.0] 06/04/2017 10/29/2019 No       Discharged Condition: stable    Disposition: Group Home    Follow Up:    Patient Instructions:   No discharge procedures on file.      Pending Diagnostic Studies:     None         Medications:  Reconciled Home Medications:      Medication List      START taking these medications    acetaminophen 500 MG tablet  Commonly known as:  TYLENOL  1 tablet (500 mg total) by Per J Tube route every 6 (six) hours as needed for Pain or Temperature greater than (100).     albuterol sulfate 2.5 mg/0.5 mL Nebu  Take 2.5 mg by nebulization every 6 (six) hours as needed (shortness of breath). Rescue     divalproex 125 mg capsule  Commonly known as:  DEPAKOTE SPRINKLE  4 capsules (500 mg total) by Per J Tube route 2 (two) times daily.  Replaces:  divalproex 500 MG Tbec     docusate 50 mg/5 mL liquid  Commonly known as:  COLACE  10 mLs (100 mg total) by Per J Tube route 2 (two) times daily.     miconazole NITRATE 2 % 2 % top  powder  Commonly known as:  MICOTIN  Apply topically 2 (two) times daily. Apply to perianal area bid     polyethylene glycol 17 gram Pwpk  Commonly known as:  GLYCOLAX  17 g by Per J Tube route once daily.     scopolamine 1.3-1.5 mg (1 mg over 3 days)  Commonly known as:  TRANSDERM-SCOP  Place 1 patch onto the skin Every 3 (three) days.     valproic acid (as sodium salt) 250 mg/5 mL (5 mL) Soln  Commonly known as:  DEPAKENE  Take 10 mLs (500 mg total) by mouth every 12 (twelve) hours.        CHANGE how you take these medications    clonazePAM 1 MG tablet  Commonly known as:  KLONOPIN  1 tablet (1 mg total) by Per J Tube route 2 (two) times daily.  What changed:  how to take this     FLUoxetine 20 MG capsule  3 capsules (60 mg total) by Per J Tube route once daily.  What changed:  how to take this        STOP taking these medications    ALPRAZolam 1 MG tablet  Commonly known as:  XANAX     AQUAPHOR Oint  Generic drug:  mineral oil-hydrophil petrolat     AZO-DINE 97.5 mg Tab  Generic drug:  phenazopyridine     BENECALORIE 7.5 kcal/mL Liqd  Generic drug:  nutritional supplement-caloric     divalproex 500 MG Tbec  Commonly known as:  DEPAKOTE  Replaced by:  divalproex 125 mg capsule     ENSURE ORAL     HYDROcodone-acetaminophen 5-325 mg per tablet  Commonly known as:  NORCO     lactulose 10 gram/15 mL solution  Commonly known as:  CHRONULAC     SALICYLIC ACID 2 % Clsr  Generic drug:  salicylic acid     tamsulosin 0.4 mg Cap  Commonly known as:  FLOMAX     traZODone 100 MG tablet  Commonly known as:  DESYREL            Indwelling Lines/Drains at time of discharge:   Lines/Drains/Airways     Drain                 Gastrostomy/Enterostomy 10/25/19 1240 Jejunostomy tube 8 days                Time spent on the discharge of patient: >30 minutes  Patient was seen and examined on the date of discharge and determined to be suitable for discharge.         Bryan Hill MD  Department of Hospital Medicine  [facility]

## 2019-11-05 NOTE — PT/OT/SLP DISCHARGE
Physical Therapy Discharge Summary    Name: Karri Galeano  MRN: [id number]   Principal Problem: Acute respiratory failure with hypoxia     Patient Discharged from acute Physical Therapy on 2019.  Please refer to prior PT noted date on 10/31/2019for functional status.     Assessment:     Patient appropriate for care in another setting.    Objective:     GOALS:   Multidisciplinary Problems     Physical Therapy Goals        Problem: Physical Therapy Goal    Goal Priority Disciplines Outcome Goal Variances Interventions   Physical Therapy Goal     PT, PT/OT Ongoing, Progressing     Description:  Goals to be met by: 2019     Patient will increase functional independence with mobility by performin. Supine to sit with Mod A  2.  Scooting in bed with Min A   3   Rolling with Min A   4. Bed to chair transfer  With Mod A  5. Tolerate sitting up in chair x 30m                     Reasons for Discontinuation of Therapy Services  Transfer to alternate level of care.      Plan:     Patient Discharged to: group home with 24hr care.    Ya Vila, PT  2019

## 2019-11-18 NOTE — PROGRESS NOTES
Patient:   NAMITA MENDOZA            MRN: [id number]            FIN: [id number]              Age:   65 years     Sex:  MALE     :  51   Associated Diagnoses:   None   Author:   BRENDON GOODWIN  Patient seen and examiend at bedside this AM. Pain better controlled this AM, resolving at this time. Continues to report low appetite at this time. Worked with PT this AM.  Medications (27) Active  Scheduled: (18)  Abacavir 300 mg tab  600 mg 2 tab, Oral, Daily  Allopurinol 300 mg tab  300 mg 1 tab, Oral, BID  ApixaBAN 5 mg tab  5 mg 1 tab, Oral, Q12H  Atorvastatin 20 mg tab  20 mg 1 tab, Oral, Daily  CloNIDine 0.2 mg tab  0.2 mg 1 tab, Oral, Q12H  Dolutegravir 50 mg tab  50 mg 1 tab, Oral, Daily  Gabapentin 300 mg cap  300 mg 1 cap, Oral, Q8H  insulin glargine  10 unit 0.1 mL, Subcutaneous, Q Bedtime  Insulin human lispro 1 unit/0.01 mL inj  1-6 units, Subcutaneous, QID [before meals & HS]  LamiVUDine 150 mg tab  150 mg 1 tab, Oral, Q Evening  Lidocaine 4% (40 mg/gm) patch  1 patch, TransDermal, Daily  lidocaine topical patch removal`  Remove Patch, TransDermal, Q Bedtime  Memantine 10 mg tab  20 mg 2 tab, Oral, Q12H  Metoprolol tartrate 50 mg tab  50 mg 1 tab, Oral, Q12H  Mirtazapine 15 mg tab  15 mg 1 tab, Oral, Q Bedtime  NIFEdipine 90 mg XL tab  90 mg 1 tab, Oral, Daily  Pantoprazole 20 mg DR tab  20 mg 1 tab, Oral, Daily  Sucralfate 1,000 mg/10 mL oral susp repack  1,000 mg 10 mL, Oral, QID [before meals & HS]  Continuous: (0)  PRN: (9)  Acetaminophen 500 mg tab  500 mg 1 tab, Oral, Q8H  Dextrose (glucose) 40% 15 gm/37.5 gm oral gel UD  15 gm, Oral, As Directed PRN  Dextrose (glucose) 50% 25 gm/50 mL syringe  12.5 gm 25 mL, IV Push, As Directed PRN  Glucagon 1 mg/1 mL emergency kit SDV  1 mg 1 mL, IM, As Directed PRN  HydrALAZINE 20 mg/1 mL inj SDV  10 mg 0.5 mL, Slow IV Push, Q6H  Hydrocodone-acetaminophen  mg tab  1 tab, Oral, Q6H  Hydrocodone-acetaminophen 5-325 mg tab  1  Pharmacokinetic Initial Assessment: IV Vancomycin    Assessment/Plan:    Initiate intravenous vancomycin with loading dose of 750 mg once followed by a maintenance dose of vancomycin 750 mg IV every 12 hours  Desired empiric serum trough concentration is 10 to 20 mcg/mL  Draw vancomycin trough level 30 min prior to fourth dose on 10/5/19 at approximately 1030   Pharmacy will continue to follow and monitor vancomycin.      Please contact pharmacy at extension [phone] with any questions regarding this assessment.     Thank you for the consult,   Saleem Myers       Patient brief summary:  Karri Galeano is a 24 y.o. male initiated on antimicrobial therapy with IV Vancomycin for treatment of suspected lower respiratory infection    Drug Allergies:   Review of patient's allergies indicates:   Allergen Reactions    Clindamycin Hives    Erythromycin Rash       Actual Body Weight:   47.8 kg    Renal Function:   Estimated Creatinine Clearance: 109.1 mL/min (based on SCr of 0.7 mg/dL).,     Dialysis Method (if applicable):  N/A    CBC (last 72 hours):  Recent Labs   Lab Result Units 10/03/19  2020   WBC K/uL 10.32   Hemoglobin g/dL 10.5*   Hematocrit % 34.5*   Platelets K/uL 137*   Gran% % 73.2*   Lymph% % 11.0*   Mono% % 14.3   Eosinophil% % 1.4   Basophil% % 0.3   Differential Method  Automated       Metabolic Panel (last 72 hours):  Recent Labs   Lab Result Units 10/03/19  2020 10/03/19  2039   Sodium mmol/L 146*  --    Potassium mmol/L 3.7  --    Chloride mmol/L 109  --    CO2 mmol/L 28  --    Glucose mg/dL 116*  --    Glucose, UA   --  Negative   BUN, Bld mg/dL 15  --    Creatinine mg/dL 0.7  --    Albumin g/dL 3.4*  --    Total Bilirubin mg/dL 0.2  --    Alkaline Phosphatase U/L 73  --    AST U/L 19  --    ALT U/L 14  --        Drug levels (last 3 results):  No results for input(s): VANCOMYCINRA, VANCOMYCINPE, VANCOMYCINTR in the last 72 hours.    Microbiologic Results:  Microbiology Results (last 7 days)        Procedure Component Value Units Date/Time    Blood culture x two cultures. Draw prior to antibiotics. [[id number]] Collected:  10/03/19 2054    Order Status:  Sent Specimen:  Blood from Peripheral, Wrist, Left Updated:  10/03/19 2109    Blood culture x two cultures. Draw prior to antibiotics. [[id number]] Collected:  10/03/19 2020    Order Status:  Sent Specimen:  Blood from Peripheral, Forearm, Left Updated:  10/03/19 2037             tab, Oral, Q6H  MetoCLOPramide 10 mg/2 mL inj SDV  5 mg 1 mL, Slow IV Push, Q6H  TraMADol 50 mg tab  50 mg 1 tab, Oral, Q12H    Allergies (2) Active Reaction  Dilaudid HALLUCINATIONS  Bactrim hives    Physical Exam  Vitals between:   17-NOV-2019 12:40:48   TO   18-NOV-2019 12:40:48                   LAST RESULT MINIMUM MAXIMUM  Temperature 36.8 36.8 38.3  Heart Rate 83 80 104  Respiratory Rate 16 16 18  NISBP           126 105 175  NIDBP           76 66 95  NIMBP           93 79 112  SpO2                    97 95 100    General:  NAD  CV: RR on palpation  Pulmonary: Normal WOB, no SOB  Abdominal: Soft, NT, ND  Extremities: Right BKA site inspected. No evidence of hematoma or active bleeding. Skin edges appear viable. .Palpable right femoral pulse  Labs between:  17-NOV-2019 12:40 to 18-NOV-2019 12:40  CBC:                 WBC  HgB  Hct  Plt  MCV  RDW   18-NOV-2019 (H) 21.8  (L) 9.9  (L) 31.0  158  92.5  (H) 15.1   DIFF:                 Seg  Neutroph//ABS  Lymph//ABS  Mono//ABS  EOS/ABS  18-NOV-2019 NOT APPLICABLE  84 // (H) 18.4  6 // 1.2 8 // (H) 1.8  1 // 0.1  BMP:                 Na  Cl  BUN  Glu   18-NOV-2019 137  (H) 109  (H) 39  (H) 230                              K  CO2  Cr  Ca                              4.1  (L) 16  (H) 2.26  (L) 8.0   CMP:                 AST  ALT  AlkPhos  Bili  Albumin   18-NOV-2019 27  14  108  0.4  (L) 1.9   POC GLU:                 Latest Result  Latest Date  Minimum  Min Date  Maximum  Max Date                             (H) 276  18-NOV-2019 (H) 276  18-NOV-2019 (H) 163  17-NOV-2019                   Assessment and Plan  Mr. Serafin Lozano is a 66y/o male with PMHx of  HTN, HLD, DM2, HIV, CKD III, previous foot amputation (DOS = 10/8/2019 for dry gangrene + osteomyelitis), and non-healing wounds, who was seen POD#4 s/p Right BKA per Dr. Carrero (DOS = 11/14/19).  - Site can be left open to air at this time   - Continue elevation of Right leg with flexion/extension exercises  completed hourly to prevent contracture formation while in bed. Ok for upright for meals and OOB as tolerated.  - continue with Norco, Tylenol, Gabapentin for pain control  - Dispo: will seek AR placement at this time.   Pt to be discussed with attending: Dr. Alise Snyder. PGY-1  Vascular Surgery  Patient seen and examined with complete chart review with resident team.   I agree with the above stated plan.  Stable from vascular standpoint.

## 2019-11-22 NOTE — ASSESSMENT & PLAN NOTE
Recent exposure to IV abx, prolong hospital stay and symptomatic   Right TM with erythema and slightly bulge - treat augmentin solution via tube    Spoke with patient and reviewed provider's message with in regarding tapering off Percocet  Patient verbalized understanding and agrees to plan

## 2021-07-22 NOTE — PROGRESS NOTES
Home PALLIATIVE CARE PROGRESS NOTE:    Brief bedside visit.  Patient still intubated as he did not pass the SBT today.  His Aunt Anne is at the bedside and trying to decide if she is going to cancel her flight back to [facility].  Emotional support provided.    Chanelle Burnette, MADAYN, RN, CCRN, CHPN   Palliative Care Nurse Coordinator   [facility]  [phone]

## 2022-05-18 NOTE — ED NOTES
Communicated to NP and MD that caregiver believes pt isn't going to sit still for CT scan.    no neck pain, no back pain, no fever, no cough, no congestion/no loss of consciousness/no vomiting

## 2022-09-19 NOTE — PROGRESS NOTES
Spoke to pt re endometrial biopsy  Wants to watch symptoms  If bleeding or pain, will schedule follow up  I also think its a good idea for her to see urogyn   Subjective:       Patient ID: Karri Galeano is a 22 y.o. male.    Chief Complaint: Urinary Retention (follow up)    21 yo AAM with CP and history of urethral stricture. Here for f/u. No issues with voiding.      Other   This is a chronic (history of urethral strictures) problem. The current episode started more than 1 month ago. The problem occurs rarely. The problem has been resolved. Pertinent negatives include no abdominal pain, anorexia, arthralgias, change in bowel habit, chest pain, chills, congestion, coughing, diaphoresis, fatigue, fever, headaches, joint swelling, myalgias, nausea, neck pain, numbness, rash, sore throat, swollen glands, urinary symptoms, vertigo, visual change, vomiting or weakness.     Review of Systems   Constitutional: Negative for activity change, appetite change, chills, diaphoresis, fatigue, fever and unexpected weight change.   HENT: Negative for congestion, hearing loss, sinus pressure, sore throat and trouble swallowing.    Eyes: Negative for photophobia, pain, discharge and visual disturbance.   Respiratory: Negative for apnea, cough and shortness of breath.    Cardiovascular: Negative for chest pain, palpitations and leg swelling.   Gastrointestinal: Negative for abdominal distention, abdominal pain, anal bleeding, anorexia, blood in stool, change in bowel habit, constipation, diarrhea, nausea, rectal pain and vomiting.   Endocrine: Negative for cold intolerance, heat intolerance, polydipsia, polyphagia and polyuria.   Genitourinary: Negative for decreased urine volume, difficulty urinating, discharge, dysuria, enuresis, flank pain, frequency, genital sores, hematuria, penile pain, penile swelling, scrotal swelling, testicular pain and urgency.   Musculoskeletal: Positive for gait problem. Negative for arthralgias, back pain, joint swelling, myalgias and neck pain.   Skin: Negative for color change, pallor, rash and wound.   Allergic/Immunologic: Negative for environmental  allergies, food allergies and immunocompromised state.   Neurological: Negative for dizziness, vertigo, seizures, weakness, numbness and headaches.   Hematological: Negative for adenopathy. Does not bruise/bleed easily.   Psychiatric/Behavioral: Negative.        Objective:      Physical Exam   Nursing note and vitals reviewed.  Constitutional: He is oriented to person, place, and time. He appears well-developed and well-nourished.   HENT:   Head: Normocephalic.   Nose: Nose normal.   Mouth/Throat: Oropharynx is clear and moist.   Eyes: Conjunctivae and EOM are normal. Pupils are equal, round, and reactive to light.   Neck: Normal range of motion. Neck supple.   Cardiovascular: Normal rate, regular rhythm, normal heart sounds and intact distal pulses.    Pulmonary/Chest: Effort normal and breath sounds normal.   Abdominal: Soft. Bowel sounds are normal.   Genitourinary: Penis normal.   Musculoskeletal: Normal range of motion.   Neurological: He is alert and oriented to person, place, and time. He has normal reflexes.   Skin: Skin is warm and dry.     Psychiatric: He has a normal mood and affect. His behavior is normal. Judgment and thought content normal.       Assessment:       1. Urethral stricture, unspecified stricture type        Plan:       Patient Instructions   Pt may f/u  As needed and may see Urologist closer to home if needed.

## 2023-04-12 NOTE — PROGRESS NOTES
Anticoagulation Clinic Progress Note    Anticoagulation Summary  As of 2023    INR goal:  2.0-3.0   TTR:  62.6 % (4.5 y)   INR used for dosin.6 (2023)   Warfarin maintenance plan:  5 mg every day; Starting 2023   Weekly warfarin total:  35 mg   No change documented:  Checo Brambila, Pharmacy Intern   Plan last modified:  Love Allison RPH (3/27/2023)   Next INR check:  2023   Priority:  Maintenance   Target end date:  Indefinite    Indications    Atrial fibrillation (HCC) [I48.91]             Anticoagulation Episode Summary     INR check location:      Preferred lab:      Send INR reminders to:   [facility]    Comments:        Anticoagulation Care Providers     Provider Role Specialty Phone number    Pia Dueñas MD Referring Cardiology [phone]          Clinic Interview:  Patient Findings     Negatives:  Signs/symptoms of thrombosis, Signs/symptoms of bleeding,   Laboratory test error suspected, Change in health, Change in alcohol use,   Change in activity, Upcoming invasive procedure, Emergency department   visit, Upcoming dental procedure, Missed doses, Extra doses, Change in   medications, Change in diet/appetite, Hospital admission, Bruising, Other   complaints      Clinical Outcomes     Negatives:  Major bleeding event, Thromboembolic event,   Anticoagulation-related hospital admission, Anticoagulation-related ED   visit, Anticoagulation-related fatality        INR History:      Latest Ref Rng & Units 2023     1:30 PM 2023     1:30 PM 3/17/2023     1:00 PM 3/21/2023    10:30 AM 3/27/2023     2:58 PM 3/27/2023     3:36 PM 2023     1:30 PM   Anticoagulation Monitoring   INR  1.8 2.2 1.4 1.6  1.85 2.6   INR Date  2023 2023 3/17/2023 3/21/2023  3/27/2023 2023   INR Goal  2.0-3.0 2.0-3.0 2.0-3.0 2.0-3.0  2.0-3.0 2.0-3.0   Trend  Same Same Same Same  Up Same   Last Week Total  32.5 mg 32.5 mg 32.5 mg 35 mg  35 mg 35 mg   Next Week Total  35  [facility] Hospital Medicine  Progress Note    Patient Name: Karri Galeano  MRN: [id number]  Patient Class: IP- Inpatient   Admission Date: 10/3/2019  Length of Stay: 24 days  Attending Physician: Echo Stokes MD  Primary Care Provider: Echo Reeves MD        Subjective:     Principal Problem:Acute respiratory failure with hypoxia        HPI:  Mr. Karri Galeano is a 24 y.o. male with cerebral palsy and epilepsy who presents to [facility] ED from an urgent care facility with complaints of fevers today.  He lives in a group home and started to have a nonproductive cough today.  The caregiver did not observe any vomiting.  He was in his usual state of health yesterday but today has become more lethargic and somnolent.  He was taken to an urgent care facility where he was then transferred to this facility for further care.  He was noted there to have a fever of 102.2° F as well as an oxygen saturation of 95% on nasal cannula at 3 liters/minute.  Further history is otherwise limited at this time.    Overview/Hospital Course:  Mr. Galeano presented with fever, cough and lethargy.  Respiratory status decompensated and he required intubation in the ER.  Workup with imaging consistent with possible pneumonia.  Empiric Zosyn and vancomycin initiated.  Pulmonary consulted for vent management.  Extubated on 10/5 in the morning.  Initially did well, but had worsening respiratory failure during the day.  Started on Bipap with no improvement and reintubated on 10/5 in the afternoon.  Blood cultures no growth to date.  Respiratory viral panel came back positive for rhino virus.  Antibiotics stopped.  Extubated on 10/9 to BiPAP.  Respiratory status stable.  Increased cough with feeding patient and he remained in the ICU.  Speech therapy recommended pureed diet.  He was stepped down to the floor on 10/11.  Patient with poor intake along with increased risk for aspiration. Per aunt, patient was fed full  meals when pureed diet was recommended by Speech. A MBBS was done. Showed evidence of aspiration therefore NPO status and PEG tube placement recommended. Aunt (next of kin) agreed with PEG placement once aspiration confirmed. Patient had unfortunately aspirated during PO intake and clinically worsened. Progressively requiring more O2 via high flow NC. Developed leukocytosis, became less interactive and spiked fever. NGT placed to avoid all oral intake and initiated on IV abx. Tube feeds started via NGT. Pulmonology re-consulted for co-management. Recommended BiPAP (or CPAP) QHS for positive pressure (as he can't participate with IS), nebulized treatments every 4 hours, nebulized hypertonic saline every 12 hours, chest physiotherapy, bowel regimen for fecal impaction/constipation and keep HOB elevated. Patient has scopolamine patch for oral secretions and IV famotidine for stress ulcer prophylaxis. Repeat CXR on 10/17 showed complete collapse of left lung likely due to atelectasis vs mucous plug. Patient remained stable on high flow but given high risk for further decompensation +/- bronchoscopy with intubation, he was upgraded to ICU for closer monitoring. Discussed with aunt at bedside. Patient had aggressive pulmonary toilet with improvement in aeration seen on chest x-ray and respiratory status improved.  Trickle feeds resumed and GI planned for PEG on 10/21- not successful and surgery consulted for J tube placement.  The patient was transferred out of the ICU on 10/22.    Met with patient aunt and she request another  ST eval/swallow eval.  PLan for j tube placement. Keep NPO   Aunt concerned about patient pulling at ear and possible infection.     Treating with augmentin for otitis media and j tube placed 10/25  Drop in O2 sats, lower pain med dose, CXR - tolerating tube feeds thus far    10//27- SBO on imaging. Tube feeds held.     Interval History: elevated HR, bowel obstruction  On imaging    Review of  mg 32.5 mg 35 mg 35 mg  37.5 mg 35 mg   Sun  5 mg 5 mg 5 mg 5 mg  5 mg 5 mg   Mon  5 mg 5 mg 5 mg -  7.5 mg (3/27); Otherwise 5 mg 5 mg   Tue  5 mg 5 mg 5 mg 7.5 mg (3/21)  5 mg 5 mg   Wed  5 mg 5 mg 5 mg 5 mg  5 mg 5 mg   Thu  5 mg 5 mg 5 mg 5 mg  5 mg 5 mg   Fri  5 mg (1/27); Otherwise 2.5 mg 2.5 mg 5 mg (3/17) 2.5 mg  5 mg 5 mg   Sat  5 mg 5 mg 5 mg 5 mg  5 mg 5 mg   Historical INR 0.90 - 1.10     1.85       Visit Report     Report Report Report        Plan:  1. INR is Therapeutic today- see above in Anticoagulation Summary.  Will instruct Lana Nielsonb to Continue their warfarin regimen- see above in Anticoagulation Summary.  2. Follow up in 2 weeks  3. Patient declines warfarin refills.  4. Verbal and written information provided. Patient expresses understanding and has no further questions at this time.    Checo Brambila, Pharmacy Intern   Systems   Unable to perform ROS: Patient nonverbal     Objective:     Vital Signs (Most Recent):  Temp: 97.6 °F (36.4 °C) (10/28/19 0006)  Pulse: 96 (10/28/19 0415)  Resp: 18 (10/28/19 0415)  BP: (!) 135/93 (10/28/19 0006)  SpO2: 98 % (10/28/19 0415) Vital Signs (24h Range):  Temp:  [97.6 °F (36.4 °C)-98.3 °F (36.8 °C)] 97.6 °F (36.4 °C)  Pulse:  [] 96  Resp:  [16-22] 18  SpO2:  [93 %-98 %] 98 %  BP: (123-150)/() 135/93     Weight: 39 kg (85 lb 15.7 oz)  Body mass index is 16.25 kg/m².    Intake/Output Summary (Last 24 hours) at 10/28/2019 0643  Last data filed at 10/27/2019 2357  Gross per 24 hour   Intake 196.67 ml   Output --   Net 196.67 ml      Physical Exam   Constitutional: He appears well-developed. No distress.   Contracted    HENT:   Head: Atraumatic.   Eyes: Conjunctivae are normal.   Neck: Neck supple.   Cardiovascular: Normal rate and regular rhythm.   Pulmonary/Chest: No stridor. No respiratory distress. He has no wheezes. He has no rales.   Breathing comfortably on NC, air movement on both side of lungs, no increase in effort   Abdominal: Soft. Bowel sounds are normal. He exhibits no distension. There is no tenderness.   j tube in place with abdominal binder   Musculoskeletal:   Wasted extremities   Neurological: He is alert.   Making eye contact, does not follow commands   Skin: Skin is warm and dry. Capillary refill takes less than 2 seconds. He is not diaphoretic.   Nursing note and vitals reviewed.      Significant Labs: All pertinent labs within the past 24 hours have been reviewed.    Significant Imaging: I have reviewed and interpreted all pertinent imaging results/findings within the past 24 hours.      Assessment/Plan:      * Acute respiratory failure with hypoxia  Secondary to aspiration pneumonia and rhino virus  Completed antibiotic treatment prior to stepping down to floor however patient became septic again after eating pureed diet.   MBBS did show evidence of aspiration  therefore placed NPO.  Abx restarted 10/16. NGT placed to bypass all oral intake pending PEG tube placement which will be done once respiratory status improves.   Although more interactive, he was on more O2 requirements , more tachycardic and with complete collapse of left lung.  Treated with highflow supplemental O2, nebulized treatments, nebulized hypertonic saline every 12 hours, bowel regimen, BiPAP QHS, chest physiotherapy and keep HOB elevated  Famotidine IV. Scopolamine patch in place.   Continue treatment with cefazolin and levofloxacin, last dose on 10/22 and 10/23 respectively  Transferred to ICU for closer monitoring on 10/18  Respiratory status improved with aggressive pulmonary toilet and chest x-ray with improved  As per Pulmonary    Paralytic ileus    Hold tube feeds  j tube to gravity     Non-recurrent acute suppurative otitis media of right ear without spontaneous rupture of tympanic membrane  Recent exposure to IV abx, prolong hospital stay and symptomatic   Right TM with erythema and slightly bulge - treat augmentin solution via tube     Constipation  On bowel regimen        Other dysphagia  As above      Severe malnutrition  NGT for now with restart of tube feeds   PEG attempted by GI on 10/21 (today) without success  General surgery consulted for J-tube placement - placed 10/25    Hypokalemia  Replace as needed.  Will continue to monitor    Epilepsy  Continue his home regimen of divalproex.  Also on clonazepam    Pneumonia of left lung due to infectious organism  Management as above    Spastic quadriplegic cerebral palsy  Family would like group home in [place] when ready for discharge  Hopefully there will not be need for trach      Hypernatremia  Secondary to inadequate free water intake.  Resolved with isotonic fluids. Unable to properly keep oral hydration due to dysphagia  Free water with tube feeds to restart  BMP daily        VTE Risk Mitigation (From admission, onward)         Ordered      enoxaparin injection 40 mg  Daily      10/04/19 0152     IP VTE HIGH RISK PATIENT  Once      10/04/19 0152                      Echo Stokes MD  Department of Hospital Medicine   [facility]

## 2024-01-08 NOTE — ED NOTES
Physician at bedside.     Writer spoke with patient about going to the hospital to detox. Patient verbalized understanding. Patient does have a ride to get to the hospital. No other questions or concerns at this time, but patient will call the clinic if anything comes up. Phone number for clinic provided to patient from writer.

## (undated) DEVICE — SOL 9P NACL IRR PIC IL

## (undated) DEVICE — Device

## (undated) DEVICE — SOL NACL 0.9% INJ PF/100 150

## (undated) DEVICE — PACK ENDOSCOPY GENERAL

## (undated) DEVICE — SEE MEDLINE ITEM 152742

## (undated) DEVICE — POSITIONER HEAD DONUT 9IN FOAM

## (undated) DEVICE — DRESSING ADHESIVE ISLAND 3 X 6

## (undated) DEVICE — SYR 10CC LUER LOCK

## (undated) DEVICE — SUT SILK 3-0 SH 18IN BLACK

## (undated) DEVICE — UNDERGLOVE BIOGEL PI SZ 6.5 LF

## (undated) DEVICE — STAPLER SKIN PROXIMATE WIDE

## (undated) DEVICE — SUT SILK 0 BLK BR FSL 18 IN

## (undated) DEVICE — APPLICATOR CHLORAPREP ORN 26ML

## (undated) DEVICE — GLOVE SURGICAL LATEX SZ 7

## (undated) DEVICE — SUT ETHILON 2 BLK MONO TP-1

## (undated) DEVICE — SET DECANTER MEDICHOICE

## (undated) DEVICE — SPONGE LAP 18X18 PREWASHED

## (undated) DEVICE — GLOVE BIOGEL PI MICRO SZ 7

## (undated) DEVICE — UNDERGLOVES BIOGEL PI SZ 7 LF

## (undated) DEVICE — SEE MEDLINE ITEM 152622

## (undated) DEVICE — SEE MEDLINE ITEM 146417

## (undated) DEVICE — SUT VICRYL 3-0 27 SH

## (undated) DEVICE — TRAY FOLEY 16FR INFECTION CONT

## (undated) DEVICE — GLOVE BIOGEL PI MICRO SZ 6.5

## (undated) DEVICE — GAUZE SPONGE 4X4 12PLY

## (undated) DEVICE — SEE MEDLINE ITEM 157117

## (undated) DEVICE — TAPE CLOTH SOFT MEDIPORE 4IN

## (undated) DEVICE — COVER OVERHEAD SURG LT BLUE

## (undated) DEVICE — ELECTRODE REM PLYHSV RETURN 9